# Patient Record
Sex: FEMALE | Race: WHITE | Employment: FULL TIME | URBAN - NONMETROPOLITAN AREA
[De-identification: names, ages, dates, MRNs, and addresses within clinical notes are randomized per-mention and may not be internally consistent; named-entity substitution may affect disease eponyms.]

---

## 2017-10-10 ENCOUNTER — HOSPITAL ENCOUNTER (OUTPATIENT)
Dept: GENERAL RADIOLOGY | Age: 57
Discharge: HOME | End: 2017-10-10
Payer: COMMERCIAL

## 2017-10-10 ENCOUNTER — HOSPITAL ENCOUNTER (OUTPATIENT)
Age: 57
Discharge: HOME | End: 2017-10-10
Payer: COMMERCIAL

## 2017-10-10 DIAGNOSIS — R05.9 COUGH: ICD-10-CM

## 2017-10-10 PROCEDURE — 71020 XR CHEST STANDARD TWO VW: CPT

## 2017-10-11 LAB
ABSOLUTE BASO #: 0.1 K/UL (ref 0–0.1)
ABSOLUTE EOS #: 0.1 K/UL (ref 0.1–0.4)
ABSOLUTE LYMPH #: 1.2 K/UL (ref 0.8–5.2)
ABSOLUTE MONO #: 0.2 K/UL (ref 0.1–0.9)
ABSOLUTE NEUT #: 6 K/UL (ref 1.3–9.1)
ALBUMIN SERPL-MCNC: 4.2 G/DL (ref 3.2–5.3)
ALK PHOSPHATASE: 78 IU/L (ref 35–121)
ALT SERPL-CCNC: 8 IU/L (ref 5–59)
ANION GAP SERPL CALCULATED.3IONS-SCNC: 10 MMOL/L
AST SERPL-CCNC: 12 IU/L (ref 10–42)
BASOPHILS RELATIVE PERCENT: 0.7 %
BILIRUB SERPL-MCNC: 0.6 MG/DL (ref 0.2–1.3)
BUN BLDV-MCNC: 11 MG/DL (ref 10–20)
CALCIUM SERPL-MCNC: 9.7 MG/DL (ref 8.7–10.8)
CHLORIDE BLD-SCNC: 108 MMOL/L (ref 95–111)
CO2: 29 MMOL/L (ref 21–32)
CREAT SERPL-MCNC: 0.8 MG/DL (ref 0.5–1.3)
EGFR AFRICAN AMERICAN: 90
EGFR IF NONAFRICAN AMERICAN: 74
EOSINOPHILS RELATIVE PERCENT: 1.4 %
GLUCOSE: 79 MG/DL (ref 70–100)
HCT VFR BLD CALC: 43.1 % (ref 36–48)
HEMOGLOBIN: 14.8 G/DL (ref 12–16)
LYMPHOCYTE %: 15.8 %
MCH RBC QN AUTO: 32 PG (ref 27–34)
MCHC RBC AUTO-ENTMCNC: 34.3 G/DL (ref 31–36)
MCV RBC AUTO: 93.1 FL (ref 80–100)
MONOCYTES # BLD: 3 %
NEUTROPHILS RELATIVE PERCENT: 78.8 %
PDW BLD-RTO: 12.1 % (ref 10.8–14.8)
PLATELETS: 107 K/UL (ref 150–450)
POTASSIUM SERPL-SCNC: 4.6 MMOL/L (ref 3.5–5.4)
RBC: 4.63 M/UL (ref 4–5.5)
SEDIMENTATION RATE, ERYTHROCYTE: 4 MM/HR (ref 0–30)
SODIUM BLD-SCNC: 142 MMOL/L (ref 134–147)
TOTAL PROTEIN: 6.3 G/DL (ref 5.8–8)
TSH SERPL DL<=0.05 MIU/L-ACNC: 4.75 UIU/ML (ref 0.4–4.4)
VITAMIN B-12: 723 PG/ML (ref 211–911)
WBC: 7.7 K/UL (ref 3.7–10.8)

## 2019-07-24 RX ORDER — BUPIVACAINE HYDROCHLORIDE 2.5 MG/ML
5 INJECTION, SOLUTION EPIDURAL; INFILTRATION; INTRACAUDAL ONCE
Status: CANCELLED | OUTPATIENT
Start: 2019-07-24 | End: 2019-07-24

## 2019-07-24 RX ORDER — METHYLPREDNISOLONE ACETATE 80 MG/ML
80 INJECTION, SUSPENSION INTRA-ARTICULAR; INTRALESIONAL; INTRAMUSCULAR; SOFT TISSUE ONCE
Status: CANCELLED | OUTPATIENT
Start: 2019-07-24 | End: 2019-07-24

## 2019-07-25 ENCOUNTER — HOSPITAL ENCOUNTER (OUTPATIENT)
Dept: INTERVENTIONAL RADIOLOGY/VASCULAR | Age: 59
Discharge: HOME | End: 2019-07-25
Payer: COMMERCIAL

## 2019-07-25 VITALS
SYSTOLIC BLOOD PRESSURE: 133 MMHG | TEMPERATURE: 98 F | OXYGEN SATURATION: 95 % | HEART RATE: 62 BPM | DIASTOLIC BLOOD PRESSURE: 87 MMHG | RESPIRATION RATE: 16 BRPM

## 2019-07-25 PROCEDURE — 6360000002 HC RX W HCPCS

## 2019-07-25 PROCEDURE — 2709999900 HC NON-CHARGEABLE SUPPLY

## 2019-07-25 PROCEDURE — 6360000004 HC RX CONTRAST MEDICATION: Performed by: RADIOLOGY

## 2019-07-25 PROCEDURE — 2500000003 HC RX 250 WO HCPCS

## 2019-07-25 PROCEDURE — 62323 NJX INTERLAMINAR LMBR/SAC: CPT | Performed by: RADIOLOGY

## 2019-07-25 RX ORDER — METHYLPREDNISOLONE ACETATE 80 MG/ML
80 INJECTION, SUSPENSION INTRA-ARTICULAR; INTRALESIONAL; INTRAMUSCULAR; SOFT TISSUE ONCE
Status: COMPLETED | OUTPATIENT
Start: 2019-07-25 | End: 2019-07-25

## 2019-07-25 RX ORDER — BUPIVACAINE HYDROCHLORIDE 2.5 MG/ML
5 INJECTION, SOLUTION EPIDURAL; INFILTRATION; INTRACAUDAL ONCE
Status: COMPLETED | OUTPATIENT
Start: 2019-07-25 | End: 2019-07-25

## 2019-07-25 RX ORDER — CYCLOBENZAPRINE HCL 5 MG
5 TABLET ORAL 2 TIMES DAILY PRN
COMMUNITY
End: 2021-03-26 | Stop reason: ALTCHOICE

## 2019-07-25 RX ADMIN — METHYLPREDNISOLONE ACETATE 80 MG: 80 INJECTION, SUSPENSION INTRA-ARTICULAR; INTRALESIONAL; INTRAMUSCULAR; SOFT TISSUE at 14:28

## 2019-07-25 RX ADMIN — IOHEXOL 1 ML: 180 INJECTION INTRAVENOUS at 14:28

## 2019-07-25 RX ADMIN — BUPIVACAINE HYDROCHLORIDE 2 ML: 2.5 INJECTION, SOLUTION EPIDURAL; INFILTRATION; INTRACAUDAL at 14:28

## 2019-07-25 ASSESSMENT — PAIN SCALES - GENERAL
PAINLEVEL_OUTOF10: 5
PAINLEVEL_OUTOF10: 0

## 2019-07-25 ASSESSMENT — PAIN DESCRIPTION - ORIENTATION: ORIENTATION: MID

## 2019-07-25 ASSESSMENT — PAIN DESCRIPTION - LOCATION: LOCATION: BACK

## 2019-07-25 ASSESSMENT — PAIN DESCRIPTION - PAIN TYPE: TYPE: CHRONIC PAIN

## 2019-07-25 NOTE — PROGRESS NOTES
1414 Patient received in IR for Lumbar Epidural  1416 This procedure has been fully reviewed with the patient and written informed consent has been obtained. Pt states no pain. When moving pain goes down to right leg. 1427 [facility]  [facility]36 Procedure completed; patient tolerated well. Band aid to lower back; no bleeding noted. 1432 Patient on cart; comfort ensured. 1435 Patient taken to OPN via cart.

## 2021-02-26 ENCOUNTER — HOSPITAL ENCOUNTER (OUTPATIENT)
Dept: MAMMOGRAPHY | Age: 61
Discharge: HOME | End: 2021-02-26
Payer: COMMERCIAL

## 2021-02-26 ENCOUNTER — HOSPITAL ENCOUNTER (OUTPATIENT)
Dept: GENERAL RADIOLOGY | Age: 61
Discharge: HOME | End: 2021-02-26
Payer: COMMERCIAL

## 2021-02-26 DIAGNOSIS — R05.9 COUGH: ICD-10-CM

## 2021-02-26 DIAGNOSIS — Z12.39 ENCOUNTER FOR OTHER SCREENING FOR MALIGNANT NEOPLASM OF BREAST: ICD-10-CM

## 2021-02-26 PROCEDURE — 77063 BREAST TOMOSYNTHESIS BI: CPT

## 2021-02-26 PROCEDURE — 71046 X-RAY EXAM CHEST 2 VIEWS: CPT

## 2021-03-11 ENCOUNTER — HOSPITAL ENCOUNTER (OUTPATIENT)
Dept: CT IMAGING | Age: 61
Discharge: HOME | End: 2021-03-11
Payer: COMMERCIAL

## 2021-03-11 DIAGNOSIS — R91.1 PULMONARY NODULE: ICD-10-CM

## 2021-03-11 PROCEDURE — 71250 CT THORAX DX C-: CPT

## 2021-03-16 ENCOUNTER — OFFICE VISIT (OUTPATIENT)
Dept: PULMONOLOGY | Age: 61
End: 2021-03-16
Payer: COMMERCIAL

## 2021-03-16 VITALS
HEIGHT: 66 IN | BODY MASS INDEX: 19.64 KG/M2 | DIASTOLIC BLOOD PRESSURE: 82 MMHG | WEIGHT: 122.2 LBS | SYSTOLIC BLOOD PRESSURE: 132 MMHG | OXYGEN SATURATION: 98 % | TEMPERATURE: 98.2 F | HEART RATE: 65 BPM

## 2021-03-16 DIAGNOSIS — R63.4 WEIGHT LOSS: ICD-10-CM

## 2021-03-16 DIAGNOSIS — J98.4 CAVITATING MASS IN LEFT UPPER LUNG LOBE: ICD-10-CM

## 2021-03-16 DIAGNOSIS — R93.89 ABNORMAL CT OF THE CHEST: ICD-10-CM

## 2021-03-16 DIAGNOSIS — Z72.0 TOBACCO ABUSE: Primary | ICD-10-CM

## 2021-03-16 PROCEDURE — 99204 OFFICE O/P NEW MOD 45 MIN: CPT | Performed by: INTERNAL MEDICINE

## 2021-03-16 SDOH — HEALTH STABILITY: MENTAL HEALTH: HOW MANY STANDARD DRINKS CONTAINING ALCOHOL DO YOU HAVE ON A TYPICAL DAY?: NOT ASKED

## 2021-03-16 NOTE — PROGRESS NOTES
[facility] Pul[facility], Sleep and Critical Care [facility]   Pulmonary medicine clinic initial consult note. Patient: Nidhi Morataya  : 1960      [place]:    Nidhi Morataya is a 62 y.o. old female came for further evaluation regarding her left lung upper lobe lung mass with referral from Dr. Stef Juarez MD    She was initially evaluated by Dr. Anusha Galicia MD due to generalized weakness and significant weight loss despite having a normal appetite. She underwent chest x-ray PA and lateral views on 2021. The chest x-ray was reported as abnormal with a 2 cm nodule in the left perihilar region compared to her old x-ray performed in 2017. Patient subsequently underwent CT scan of chest without contrast as recommended by the radiologist on 2021. She was found to have a left upper lobe spiculated mass on the CT scan. She came for further evaluation      She underwent chest CT scan on: 2021  The above chest  CT was performed at: [facility]    She was ever seen by a pulmonologist in the past:no  She was ever diagnosed with lung nodule or Lung cancer in the past: no  She was ever diagnosed with Mediastinal Lymphadenopathy in the past:no  She ever diagnosed with any extrapulmonary cancers I.e Breast,Colon etc: no    She was ever diagnosed with following pulmonary diseases:  Bronchial asthma: NO  COPD:Yes. She is currently not using any inhalers. She never underwent pulmonary function test in the past  Pulmonary fibrosis:NO  Sarcoidosis:NO  Pulmonary embolism: NO  Pulmonary hypertension:NO  Pleural effusion/s in the past:NO    She is having cough: Yes  Duration of cough: for 1 year.    More than 8weeks: Yes  History of post nasal drip: No  History suggestive of GERD I.e bad tast in the mouth in the morning or heart burn: No  History suggestive of aspiration/silent aspiration: No  Her cough is associated with sputum production: Yes   The sputum color: clear  Hemoptysis:No  Diurnal variation: None. Associated with fever: No  Recent travel history:No.    Rrecent sick contacts: No.   History of Atopy: No.  History of exposure to occupational dust or chemicals:No.     She ever diagnosed with pulmonary tuberculosis in the past:NO  She ever recently exposed to any patients with tuberculosis:NO  She ever recently travelled to endemic places of tuberculosis:NO  Her ever tested for PPD in the past: NO    She ever diagnosed with  connective tissue diseases including Systemic lupus Erythematosus or Rheumatoid arthritis: no    Any of her first-degree family relative ever diagnosed with Lung cancer: no  She ever exposed to asbestos, radon, or uranium in the past: no    She ever had a Colonoscopy performed: yes -she had a last colonoscopy 5 years back. She was found to have a 2 benign polyps. 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 History of tobacco smoking:Yes  Amount of tobacco smokin.0 PPD. Years of tobacco smokin. Quit smoking: No.              Current smoker: Yes. Amount of current tobacco smokin.25 PPD    History of recreational or IV drug use in the past: She smokes marijuana occasionally.   History of alcohol use in the past:NO     History of exposure to coal mines/coal dust: NO  History of exposure to foundry dust/welding: NO  History of exposure to quarry/silica/sandblasting: NO  History of exposure to asbestos/working with breaks/ships: NO  History of exposure to farm dust: NO  History of recent travel to long distances: NO  History of exposure to birds, pigeons, or chickens in the past:NO  Pet animals at home:Yes  Dogs: 2  Cats: 2    History of pulmonary embolism in the past: No            History of DVT in the past:No                       Review of Systems:   General/Constitutional: She lost approximately 25 pounds of weight in the last 1 year with normal appetite No fever or chills. HENT: Negative. Eyes: Negative. Upper respiratory tract: No nasal stuffiness or post nasal drip. She gives a history of rhinorrhea  Lower respiratory tract/ lungs: Occasional cough with occasional sputum production. No hemoptysis. Cardiovascular: No palpitations or chest pain. Gastrointestinal: No nausea or vomiting. Neurological: No focal neurologiacal weakness. Extremities: No edema. Musculoskeletal: No complaints. Genitourinary: No complaints. Hematological: Negative. Psychiatric/Behavioral: Negative. Skin: No itching. Current Medications:          Past Medical History:   Diagnosis Date    Arthritis     COPD (chronic obstructive pulmonary disease) ([facility] [address]        Past Surgical History:   Procedure Laterality Date     SECTION      CHOLECYSTECTOMY      COLONOSCOPY  2016    HYSTERECTOMY      UPPER GASTROINTESTINAL ENDOSCOPY  2016       Allergies   Allergen Reactions    Tylenol With Codeine #3 [Acetaminophen-Codeine]        Current Outpatient Medications   Medication Sig Dispense Refill    VITAMIN D PO Take by mouth      cyclobenzaprine (FLEXERIL) 5 MG tablet Take 5 mg by mouth 2 times daily as needed for Muscle spasms       No current facility-administered medications for this visit. No family history on file. Physical Exam     VITALS:  /82 (Site: Left Upper Arm, Position: Sitting, Cuff Size: Medium Adult)   Pulse 65   Temp 98.2 °F (36.8 °C)   Ht 5' 6\" (1.676 m)   Wt 122 lb 3.2 oz (55.4 kg)   SpO2 98% Comment: on RA  BMI 19.72 kg/m²   Nursing note and vitals reviewed.    Constitutional: Patient appears moderately built and moderately nourished. No distress. Patient is oriented to person, place, and time. HENT:   Head: Normocephalic and atraumatic. Right Ear: External ear normal.   Left Ear: External ear normal.   Mouth/Throat: Oropharynx is clear and moist.  No oral thrush. Eyes: Conjunctivae are normal. Pupils are equal, round, and reactive to light. No scleral icterus. Neck: Neck supple. No JVD present. No tracheal deviation present. Cardiovascular: Normal rate, regular rhythm, normal heart sounds. No murmur heard. Pulmonary/Chest: Effort normal and breath sounds normal. No stridor. No respiratory distress. No wheezes. No rales. Patient exhibits no tenderness. Abdominal: Soft. Patient exhibits no distension. No tenderness. Musculoskeletal: Normal range of motion. Extremities: Patient exhibits no edema and no tenderness. Lymphadenopathy:  No cervical adenopathy. Neurological: Patient is alert and oriented to person, place, and time. Skin: Skin is warm and dry. Patient is not diaphoretic. Psychiatric: Patient  has a normal mood and affect. Patient behavior is normal.       Diagnostic Data:    Radiological Data:  CT scan of chest without contrast.  Fri Mar 12, 2021  8:18 AM   PROCEDURE: CT CHEST WO CONTRAST   CLINICAL INFORMATION: Pulmonary nodule   COMPARISON: Chest x-ray dated 2/26/2021   1. There is a cavitary mass demonstrated within the left upper lobe with peripheral mild spiculation measuring 3.9 x 2.1 cm on axial image 34. This corresponds with previous chest x-ray dated 2/26/2021. This is concerning for malignancy until proven otherwise. Further evaluation could be obtained with PET/CT and/or biopsy. An infectious process is also not excluded. 2. There are additional smaller groundglass nodular densities within the right lung as detailed above. 3. Small pericardial effusion is noted. This is nonspecific.        Pulmonary function tests:  None in epic      Assessment:  - 3.9 x 2.1 cm spiculated cavitary mass present within the left upper lobe-differential include high suspicion for neoplastic process VS infectious etiology.  -Chronic history of tobacco smoking for 45 years with 1 pack of cigarettes per day. She is currently smoking 6 cigarettes/day. To evaluate for COPD. -Significant weight loss- ? etiology      Recommendations/Plan:  -Schedule patient for CT guided biopsy of left upper lobe lung mass with interventional radiology service at [address] [place] [address] as soon as possible. Patient was informed about complications of procedure including but not limited to development of pneumothorax with requirement of chest tube placement. Patient verbalizes understanding.  -Christian Parikh was educated and informed about CT guided biopsy procedure,method, complicantions of procedure including but not limited to development of pneumothorax with requirement of chest tube placement. She agreed for the procedure and verbalizes understanding.  -Will schedule patient for full pulmonary function tests as out patient before clinic visit. - Patient educated to quit smoking as soon as possible. Patient verbalizes understanding of adverse consequences of tobacco smoking.  -Will schedule patient for follow up with my clinic in 1week after biopsy to go over the results and further managment.  -Christian Parikh was advised to make early appointment with my clinic if she develops any constitutional symptoms including loss of weight, poor appetite or hemoptysis. She verbalizes under standing.  - Patient and her  were educated about my impression and plan. They verbalizes understanding.    -I personally reviewed all the above labs, pulmonary, pathological, microbiological and radiological investigations.

## 2021-03-19 NOTE — PROGRESS NOTES
[facility] for Pulmonary, Sleep and Critical Care Medicine   Pulmonary medicine follow up note. Patient: Kay Goddard                 : 1960                           [place]:     Kay Goddard is a 62 y.o. old female came for follow up regarding her left lung upper lobe lung mass. She underwent CT-guided biopsy of the left upper lobe lung mass by interventional radiology service on 2021. She was diagnosed with Adenocarcinoma. She came for follow-up today after having a MRI scan of the brain with and without contrast, full pulmonary function tests and PET scan- requested the last visit    She was referred to Dr. Kurtis Hansen MD from medical oncology service at her last visit. She still waiting to see Dr. Kurtis Hansen MD.  Her appointment was scheduled on 2021 with the Dr. Kurtis Hansen MD.     She was initially referred from Dr. Gloria Buckley.     On today's questioning:  She denies cough or expectoration. She denies hemoptysis. She denies fever or chills. She denies recent hospitalizations or emergency room visits. She is currently not using any inhalers     She denies recent loss of weight or appetite changes. She denies recent decline in functional status. She is currently not using any home oxygen at home. She ever diagnosed with  connective tissue diseases including Systemic lupus Erythematosus or Rheumatoid arthritis: no     Any of her first-degree family relative ever diagnosed with Lung cancer: no     She ever exposed to asbestos, radon, or uranium in the past: no     She ever had a Colonoscopy performed: yes -she had a last colonoscopy 5 years back. She was found to have a 2 benign polyps.   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 History of tobacco smoking:Yes  Amount of tobacco smokin.0 PPD. Years of tobacco smokin. Quit smoking: No.              Current smoker: Yes. Amount of current tobacco smoking: She still smoking 1cigarette/day     History of recreational or IV drug use in the past: She smokes marijuana occasionally. History of alcohol use in the past:NO     History of exposure to coal mines/coal dust: NO  History of exposure to foundry dust/welding: NO  History of exposure to quarry/silica/sandblasting: NO  History of exposure to asbestos/working with breaks/ships: NO  History of exposure to farm dust: NO  History of recent travel to long distances: NO  History of exposure to birds, pigeons, or chickens in the past:NO  Pet animals at home:Yes  Dogs: 2  Cats: 2     History of pulmonary embolism in the past: No            History of DVT in the past:No                         Review of Systems:   General/Constitutional: she gained 4lbs of weight from the last visit. See HPI regarding appetite,fever and chills. HENT: Negative. Eyes: Negative. Upper respiratory tract: No nasal stuffiness or post nasal drip. Lower respiratory tract/ lungs: See HPI. Cardiovascular: No palpitations or chest pain. Gastrointestinal: No nausea or vomiting. Neurological: No focal neurologiacal weakness. Extremities: No edema. Musculoskeletal: No complaints. Genitourinary: No complaints. Hematological: Negative. Psychiatric/Behavioral: Negative. Skin: No itching. Her current functional status is unlimited on level ground. She can climb unlimited flights of stairs.        Past Medical History:   Diagnosis Date    Arthritis     COPD (chronic obstructive pulmonary disease) [facility])        Past Surgical History:   Procedure Laterality Date     SECTION      CHOLECYSTECTOMY      COLONOSCOPY  2016    CT NEEDLE BIOPSY LUNG PERCUTANEOUS  3/26/2021    CT NEEDLE BIOPSY LUNG PERCUTANEOUS 3/26/2021 [facility] CT SCAN    HYSTERECTOMY      UPPER GASTROINTESTINAL ENDOSCOPY  2016       Social History     Tobacco Use    Smoking status: Current Every Day Smoker     Packs/day: 1.00     Years: 45.00     Pack years: 45.00     Types: Cigarettes    Smokeless tobacco: Never Used   Substance Use Topics    Alcohol use: Never     Frequency: Never     Binge frequency: Never    Drug use: Yes     Types: Marijuana       Allergies   Allergen Reactions    Tylenol With Codeine #3 [Acetaminophen-Codeine]        Current Outpatient Medications   Medication Sig Dispense Refill    VITAMIN D PO Take by mouth       No current facility-administered medications for this visit. No family history on file. Vitals: /72 (Site: Left Upper Arm, Position: Sitting, Cuff Size: Medium Adult)   Pulse 66   Temp 97.5 °F (36.4 °C)   Ht 5' 6\" (1.676 m)   Wt 126 lb 12.8 oz (57.5 kg)   SpO2 98% Comment: on ra  BMI 20.47 kg/m²         Nursing note and vitals reviewed. Constitutional: Patient appears moderately built and moderately nourished. No distress. Patient is oriented to person, place, and time. HENT:   Head: Normocephalic and atraumatic. Right Ear: External ear normal.   Left Ear: External ear normal.   Mouth/Throat: Oropharynx is clear and moist.  No oral thrush. Eyes: Conjunctivae are normal. Pupils are equal, round, and reactive to light. No scleral icterus. Neck: Neck supple. No JVD present. No tracheal deviation present. Cardiovascular: Normal rate, regular rhythm, normal heart sounds. No murmur heard. Pulmonary/Chest: Effort normal and breath sounds normal. No stridor. No respiratory distress. No wheezes. No rales. Patient exhibits no tenderness.    Abdominal: Soft. Patient exhibits no distension. No tenderness. Musculoskeletal: Normal range of motion. Extremities: Patient exhibits no edema and no tenderness. Lymphadenopathy:  No cervical adenopathy. Neurological: Patient is alert and oriented to person, place, and time. Skin: Skin is warm and dry. Patient is not diaphoretic. Psychiatric: Patient  has a normal mood and affect. Patient behavior is normal.      Diagnostic Data:     Radiological Data:  CT scan of chest without contrast.  Fri Mar 12, 2021  8:18 AM   PROCEDURE: CT CHEST WO CONTRAST   CLINICAL INFORMATION: Pulmonary nodule   COMPARISON: Chest x-ray dated 2/26/2021   1. There is a cavitary mass demonstrated within the left upper lobe with peripheral mild spiculation measuring 3.9 x 2.1 cm on axial image 34. This corresponds with previous chest x-ray dated 2/26/2021. This is concerning for malignancy until proven otherwise. Further evaluation could be obtained with PET/CT and/or biopsy. An infectious process is also not excluded. 2. There are additional smaller groundglass nodular densities within the right lung as detailed above. 3. Small pericardial effusion is noted. This is nonspecific. CT-guided biopsy by interventional radiology service on 26 March 2021:     Pathology:  Copies To:   Miguel A Gill     Clinical Information: CAVITATING MASS SHANNON, SMOKER     FINAL DIAGNOSIS:   Lung, left, needle core biopsies:       Adenocarcinoma. Chest x-ray PA view:  PROCEDURE: XR CHEST PA INSPIRATION 1 VW   1. No pneumothorax following left lung lesion biopsy. There is redemonstration of a nodular density overlying the left midlung. PET scan:  4/14/2021 10:04 AM   PROCEDURE: PET CT SKULL BASE TO MID THIGH   1. FDG avid left lung mass corresponding to known malignancy. 2. Small, mildly FDG avid right lung nodules, possibly infectious or inflammatory but satellite malignancy cannot be excluded.       MRI scan of the brain with and without contrast:   PROCEDURE: MRI BRAIN W WO CONTRAST   1. No evidence of intracranial metastases or other acute intracranial abnormality. 2. Moderate severity chronic microvascular angiopathy. 3. Mild to moderate mucosal inflammation in the paranasal sinuses with air-fluid levels as evidence for an acute component     Pulmonary function tests: Performed on 6 April 2021            Assessment:  -3.9 x 2.1 cm spiculated cavitary mass present within the left upper lobe S/p CT guided biopsy by IR service on 3/26/2021. The biopsy is consistent with Adenocarcinoma. -Moderately severe COPD-patient currently remain asymptomatic with no limitation in functional status. She did not want to go for any maintenance inhalers.  -Chronic history of tobacco smoking for 45 years with 1 pack of cigarettes per day. She is currently smoking 1 cigarette/day. Recommendations/Plan:  -Patient educated to quit smoking as soon as possible. Patient verbalizes understanding of adverse consequences of tobacco smoking.  -Start patient on Albuterol HFA 90mcg/Spray MDI, 2puffs  Q6Hprn. She  was informed about adverse effects of Albuterol HFA. She verbalizes understanding.  -Patient advised to continue current inhalers and keep good compliance. Patient verbalizes understanding.  -Patient educated to update his pneumococcal vaccine with family physician and take influenza vaccine in coming season with out fail. Patient verbalizes understanding. Patient told me that she do not take flu shots or pneumonia vaccines.  -Schedule general surgery consult [facility] Dr. Silvestre Lees MD at [facility] as soon as possible for further evaluation and management of 3.9 x 2.1 cm spiculated cavitary mass present within the left upper lobe S/p CT guided biopsy by IR service on 3/26/2021.  The biopsy is consistent with Adenocarcinoma.  -She was advised to keep her scheduled appointment for Medical Oncology consultation with Monroe Díaz MD- Medical Oncologist on 4/19/2021 for further evaluation,staging and management of lung adenocarcinoma noted on recent CT-guided biopsy.  -Schedule patient for follow up with my clinic in 1 to 2months for clinical re evaluation. Patient advised to make early appointment if needed. -Virginia Lo was advised to make early appointment with my clinic if she develops any constitutional symptoms including loss of weight, poor appetite or hemoptysis. She verbalizes under standing.  - Patient and her  were educated about my impression and plan. They verbalizes understanding.     -I personally reviewed and updated the Past medical hx, Past surgical hx,Social hx, Family hx, Medications, Allergies in the discrete data section of the patient chart. I also reviewed pertaining labs and Pulmonary medicine,Sleep medicine related, Pathological, Microbiological and Radiological investigations. Addendum done on 4/19/21 at 1:40 PM EDT:  I spoke with Dr. Rula Elizondo MD medical oncologist on the case. Dr. Rula Elizondo MD recommended staging EBUS for further evaluation of patient's adenocarcinoma of lung. I placed an interventional pulmonary consultation with Dr. Gagandeep Stoddard DO for EBUS staging. I personally spoke with him over phone. He is kind enough to do EBUS procedure next 1 to 2 days. I tried to reach patient to discuss regarding my referral to Dr. Gagandeep Stoddard for staging EBUS by calling her on mobile and home phone number. She is not available to speak with me.   I am able to leave a message on her home answering system to call back to discuss about referral.

## 2021-03-23 RX ORDER — MIDAZOLAM HYDROCHLORIDE 2 MG/2ML
1 INJECTION, SOLUTION INTRAMUSCULAR; INTRAVENOUS ONCE
Status: CANCELLED | OUTPATIENT
Start: 2021-03-23 | End: 2021-03-23

## 2021-03-23 RX ORDER — FENTANYL CITRATE 50 UG/ML
50 INJECTION, SOLUTION INTRAMUSCULAR; INTRAVENOUS ONCE
Status: CANCELLED | OUTPATIENT
Start: 2021-03-23 | End: 2021-03-23

## 2021-03-23 RX ORDER — SODIUM CHLORIDE 450 MG/100ML
INJECTION, SOLUTION INTRAVENOUS CONTINUOUS
Status: CANCELLED | OUTPATIENT
Start: 2021-03-23

## 2021-03-25 RX ORDER — SODIUM CHLORIDE 450 MG/100ML
INJECTION, SOLUTION INTRAVENOUS CONTINUOUS
Status: CANCELLED | OUTPATIENT
Start: 2021-03-25

## 2021-03-25 RX ORDER — FENTANYL CITRATE 50 UG/ML
50 INJECTION, SOLUTION INTRAMUSCULAR; INTRAVENOUS ONCE
Status: CANCELLED | OUTPATIENT
Start: 2021-03-25 | End: 2021-03-25

## 2021-03-25 RX ORDER — MIDAZOLAM HYDROCHLORIDE 2 MG/2ML
1 INJECTION, SOLUTION INTRAMUSCULAR; INTRAVENOUS ONCE
Status: CANCELLED | OUTPATIENT
Start: 2021-03-25 | End: 2021-03-25

## 2021-03-26 ENCOUNTER — HOSPITAL ENCOUNTER (OUTPATIENT)
Dept: GENERAL RADIOLOGY | Age: 61
Discharge: HOME | End: 2021-03-26
Payer: COMMERCIAL

## 2021-03-26 ENCOUNTER — HOSPITAL ENCOUNTER (OUTPATIENT)
Dept: CT IMAGING | Age: 61
Discharge: HOME | End: 2021-03-26
Payer: COMMERCIAL

## 2021-03-26 VITALS
RESPIRATION RATE: 18 BRPM | OXYGEN SATURATION: 96 % | DIASTOLIC BLOOD PRESSURE: 72 MMHG | HEART RATE: 62 BPM | SYSTOLIC BLOOD PRESSURE: 112 MMHG | BODY MASS INDEX: 19.29 KG/M2 | WEIGHT: 120 LBS | HEIGHT: 66 IN | TEMPERATURE: 97.5 F

## 2021-03-26 LAB
CREAT SERPL-MCNC: 0.8 MG/DL (ref 0.4–1.2)
ERYTHROCYTE [DISTWIDTH] IN BLOOD BY AUTOMATED COUNT: 13.2 % (ref 11.5–14.5)
ERYTHROCYTE [DISTWIDTH] IN BLOOD BY AUTOMATED COUNT: 47.5 FL (ref 35–45)
GFR SERPL CREATININE-BSD FRML MDRD: 73 ML/MIN/1.73M2
HCT VFR BLD CALC: 41.7 % (ref 37–47)
HEMOGLOBIN: 13.4 GM/DL (ref 12–16)
MCH RBC QN AUTO: 31.8 PG (ref 26–33)
MCHC RBC AUTO-ENTMCNC: 32.1 GM/DL (ref 32.2–35.5)
MCV RBC AUTO: 99 FL (ref 81–99)
PLATELET # BLD: 129 THOU/MM3 (ref 130–400)
PMV BLD AUTO: 13 FL (ref 9.4–12.4)
RBC # BLD: 4.21 MILL/MM3 (ref 4.2–5.4)
WBC # BLD: 6.7 THOU/MM3 (ref 4.8–10.8)

## 2021-03-26 PROCEDURE — 6370000000 HC RX 637 (ALT 250 FOR IP)

## 2021-03-26 PROCEDURE — 87075 CULTR BACTERIA EXCEPT BLOOD: CPT

## 2021-03-26 PROCEDURE — 6360000002 HC RX W HCPCS

## 2021-03-26 PROCEDURE — 88333 PATH CONSLTJ SURG CYTO XM 1: CPT

## 2021-03-26 PROCEDURE — 71045 X-RAY EXAM CHEST 1 VIEW: CPT

## 2021-03-26 PROCEDURE — 32408 CORE NDL BX LNG/MED PERQ: CPT

## 2021-03-26 PROCEDURE — 87070 CULTURE OTHR SPECIMN AEROBIC: CPT

## 2021-03-26 PROCEDURE — 87102 FUNGUS ISOLATION CULTURE: CPT

## 2021-03-26 PROCEDURE — 77012 CT SCAN FOR NEEDLE BIOPSY: CPT

## 2021-03-26 PROCEDURE — 2709999900 HC NON-CHARGEABLE SUPPLY

## 2021-03-26 PROCEDURE — 87205 SMEAR GRAM STAIN: CPT

## 2021-03-26 PROCEDURE — 2580000003 HC RX 258: Performed by: RADIOLOGY

## 2021-03-26 PROCEDURE — 88305 TISSUE EXAM BY PATHOLOGIST: CPT

## 2021-03-26 PROCEDURE — 36415 COLL VENOUS BLD VENIPUNCTURE: CPT

## 2021-03-26 PROCEDURE — 82565 ASSAY OF CREATININE: CPT

## 2021-03-26 PROCEDURE — 85027 COMPLETE CBC AUTOMATED: CPT

## 2021-03-26 PROCEDURE — 87116 MYCOBACTERIA CULTURE: CPT

## 2021-03-26 RX ORDER — SODIUM CHLORIDE 450 MG/100ML
INJECTION, SOLUTION INTRAVENOUS CONTINUOUS
Status: DISCONTINUED | OUTPATIENT
Start: 2021-03-26 | End: 2021-03-27 | Stop reason: HOSPADM

## 2021-03-26 RX ORDER — BACITRACIN, NEOMYCIN, POLYMYXIN B 400; 3.5; 5 [USP'U]/G; MG/G; [USP'U]/G
OINTMENT TOPICAL ONCE
Status: COMPLETED | OUTPATIENT
Start: 2021-03-26 | End: 2021-03-26

## 2021-03-26 RX ORDER — FENTANYL CITRATE 50 UG/ML
50 INJECTION, SOLUTION INTRAMUSCULAR; INTRAVENOUS ONCE
Status: COMPLETED | OUTPATIENT
Start: 2021-03-26 | End: 2021-03-26

## 2021-03-26 RX ORDER — MIDAZOLAM HYDROCHLORIDE 2 MG/2ML
1 INJECTION, SOLUTION INTRAMUSCULAR; INTRAVENOUS ONCE
Status: COMPLETED | OUTPATIENT
Start: 2021-03-26 | End: 2021-03-26

## 2021-03-26 RX ADMIN — FENTANYL CITRATE 25 MCG: 50 INJECTION, SOLUTION INTRAMUSCULAR; INTRAVENOUS at 09:11

## 2021-03-26 RX ADMIN — BACITRACIN, NEOMYCIN, POLYMYXIN B 1 G: 400; 3.5; 5 OINTMENT TOPICAL at 09:33

## 2021-03-26 RX ADMIN — SODIUM CHLORIDE: 4.5 INJECTION, SOLUTION INTRAVENOUS at 07:52

## 2021-03-26 RX ADMIN — MIDAZOLAM HYDROCHLORIDE 0.5 MG: 2 INJECTION, SOLUTION INTRAMUSCULAR; INTRAVENOUS at 09:22

## 2021-03-26 ASSESSMENT — PAIN SCALES - GENERAL: PAINLEVEL_OUTOF10: 0

## 2021-03-26 ASSESSMENT — PAIN - FUNCTIONAL ASSESSMENT: PAIN_FUNCTIONAL_ASSESSMENT: 0-10

## 2021-03-26 NOTE — PROGRESS NOTES
__M__ Safety:       (Environmental)   [place] to environment   Ensure ID band is correct and in place/ allergy band as needed   Assess for fall risk   Initiate fall precautions as applicable (fall band, side rails, etc.)   Call light within reach   Bed in low position/ wheels locked    _M___ Pain:        Assess pain level and characteristics   Administer analgesics as ordered   Assess effectiveness of pain management and report to MD as needed    __M__ Knowledge Deficit:   Assess baseline knowledge   Provide teaching at level of understanding   Provide teaching via preferred learning method   Evaluate teaching effectiveness    ___M_ Hemodynamic/Respiratory Status:       (Pre and Post Procedure Monitoring)   Assess/Monitor vital signs and LOC   Assess Baseline SpO2 prior to any sedation   Obtain weight/height   Assess vital signs/ LOC until patient meets discharge criteria   Monitor procedure site and notify MD of any issues

## 2021-03-26 NOTE — PROGRESS NOTES
[facility]  Sedation/Analgesia History & Physical    Pt Name: Natasha Davis  MRN: [id number]  YOB: 1960  Provider Performing Procedure: Colette Palm MD  Primary Care Physician: Martin Loving MD    PRE-PROCEDURE   DNR-CCA/DNR-CC []Yes [x]No  Brief History/Pre-Procedure Diagnosis: SHANNON lung mass          MEDICAL HISTORY  []CAD/Valve  []Liver Disease  []Lung Disease []Diabetes  []Hypertension []Renal Disease  []Additional information:       has a past medical history of Arthritis and COPD (chronic obstructive pulmonary disease) ([facility] [address] SURGICAL HISTORY   has a past surgical history that includes Hysterectomy; Cholecystectomy;  section; Colonoscopy (2016); and Upper gastrointestinal endoscopy (2016). Additional information:       ALLERGIES   Allergies as of 2021 - Review Complete 2021   Allergen Reaction Noted    Tylenol with codeine #3 [acetaminophen-codeine]  2014     Additional information:       MEDICATIONS   Coumadin Use Last 5 Days [x]No []Yes  Antiplatelet drug therapy use last 5 days  [x]No []Yes  Other anticoagulant use last 5 days  [x]No []Yes    Current Outpatient Medications:     VITAMIN D PO, Take by mouth, Disp: , Rfl:     Current Facility-Administered Medications:     0.45 % sodium chloride infusion, , Intravenous, Continuous, Colette Palm MD, Last Rate: 20 mL/hr at 21 0752, New Bag at 21 0752  Prior to Admission medications    Medication Sig Start Date End Date Taking?  Authorizing Provider   VITAMIN D PO Take by mouth   Yes Historical Provider, MD     Additional information:       VITAL SIGNS   Vitals:    21 0935   BP: 121/74   Pulse: 54   Resp: 12   Temp:    SpO2: 97%       PHYSICAL:   Heart:  [x]Regular rate and rhythm  []Other:    Lungs:  []Clear    []Other:    Abdomen: [x]Soft    []Other:    Mental Status: [x]Alert & Oriented  []Other:      PLANNED PROCEDURE   [x]Biospy []Arteriogram []Drainage   []Mediport Insertion  []Fistulogram []IV access       []Vertebroplasty / Augmentation  []IVC filter []Dialysis catheter []Biliary drainage  []Other: []CAPD Catheter []Nephrostomy Tube / Stent  SEDATION  Planned agent:[x]Midazolam []Meperidine [x]Sublimaze []Dilaudid []Morphine     []Diazepam  []Other:     ASA Classification:  []1 [x]2 []3 []4 []5  Class 1: A normal healthy patient  Class 2: Pt with mild to moderate systemic disease  Class 3: Severe systemic disease or disturbance  Class 4: Severe systemic disorders that are already life threatening. Class 5: Moribund pt with little chances of survival, for more than 24 hours. Mallampati I Airway Classification:   []1 [x]2 []3 []4    [x]Pre-procedure diagnostic studies complete and results available. Comment:    [x]Previous sedation/anesthesia experiences assessed. Comment:    [x]The patient is an appropriate candidate to undergo the planned procedure sedation and anesthesia. (Refer to nursing sedation/analgesia documentation record)  [x]Formulation and discussion of sedation/procedure plan, risks, and expectations with patient and/or responsible adult completed. [x]Patient examined immediately prior to the procedure.  (Refer to nursing sedation/analgesia documentation record)    Dharmesh Amezquita MD  Electronically signed 3/26/2021 at 9:41 AM

## 2021-03-26 NOTE — PROGRESS NOTES
0844 Pt arrived to CT holding. Skin natural for race, warm, dry. Respirations even and unlabored. Denies c/o pain. 1892 Dr. Dorota Burleson in to evaluate pt and explain procedure. Consent obtained. 1798 Pt positioned on CT table. Monitor applied. 9810 Pt prepped for procedure with chloraprep  0913 Procedure started with Dr. Dorota Burleson  9446 Pathology present in lab.   323 Procedure complete. Core samples given to pathologist. bandaid to left chest puncture site. Blood patch to biopsy tract per Dr. Dorota Burleson using pt's own blood. 0963 Pt transported to [facility] via cart. Report called to Deedee Padron RN. Skin natural for race, warm, dry. Respirations even and unlabored. Denies c/o pain at this time.

## 2021-03-26 NOTE — PROGRESS NOTES
Formulation and discussion of sedation / procedure plans, risks, benefits, side effects and alternatives with patient and/or responsible adult completed.     Electronically signed by Nguyen Aguilera MD on 3/26/2021 at 9:41 AM

## 2021-03-31 LAB
AEROBIC CULTURE: NORMAL
ANAEROBIC CULTURE: NORMAL
GRAM STAIN RESULT: NORMAL

## 2021-04-02 ENCOUNTER — OFFICE VISIT (OUTPATIENT)
Dept: PULMONOLOGY | Age: 61
End: 2021-04-02
Payer: COMMERCIAL

## 2021-04-02 VITALS
SYSTOLIC BLOOD PRESSURE: 128 MMHG | TEMPERATURE: 98.4 F | BODY MASS INDEX: 19.67 KG/M2 | OXYGEN SATURATION: 97 % | WEIGHT: 122.4 LBS | HEIGHT: 66 IN | DIASTOLIC BLOOD PRESSURE: 80 MMHG | HEART RATE: 70 BPM

## 2021-04-02 DIAGNOSIS — R63.4 WEIGHT LOSS: ICD-10-CM

## 2021-04-02 DIAGNOSIS — C34.92 ADENOCARCINOMA OF LEFT LUNG (HCC): Primary | ICD-10-CM

## 2021-04-02 DIAGNOSIS — Z72.0 TOBACCO ABUSE: ICD-10-CM

## 2021-04-02 PROCEDURE — 99214 OFFICE O/P EST MOD 30 MIN: CPT | Performed by: INTERNAL MEDICINE

## 2021-04-02 NOTE — PATIENT INSTRUCTIONS
Recommendations/Plan:  -Schedule patient for PET scan of the lungs and whole body with lung nodule protocol before clinic visit.  -Schedule patient for MRI scan of brain with and with out contrast for lung cancer staging.  -She was advised to keep her scheduled appointment on 6 April 2021 for a planned full pulmonary function tests as out patient before clinic visit. - Patient educated to quit smoking as soon as possible. Patient verbalizes understanding of adverse consequences of tobacco smoking.  -Schedule patient for Medical Oncology consult with Apple Christianson MD- Medical Oncologist. as soon as possible for further evaluation,staging and management of lung adenocarcinoma noted on recent CT-guided biopsy.  -Will schedule patient for follow up with my clinic in 1 week to go over the above test results and further managment.  -Jade Bland was advised to make early appointment with my clinic if she develops any constitutional symptoms including loss of weight, poor appetite or hemoptysis. She verbalizes under standing.  - Patient and her  were educated about my impression and plan. They verbalizes understanding.

## 2021-04-02 NOTE — PROGRESS NOTES
Neck Circumference -   13  Mallampati - 1    Lung Nodule Screening     [x] Qualifies    [] Does not qualify   [] Declined    [] Completed

## 2021-04-02 NOTE — PROGRESS NOTES
[facility]   Pulmonary medicine follow up note. Patient: Donna Coley  : 1960      "[employer]:    Donna Coley is a 62 y.o. old female came for follow up regarding her left lung upper lobe lung mass after having CT-guided biopsy by interventional radiology service on 2021. She was initially referred from Dr. Lynette Juarez MD.    On today's questioning:  She denies cough or expectoration. She denies hemoptysis. She denies fever or chills. She denies recent hospitalizations or emergency room visits. She is currently not using any inhalers    She denies any recent loss of weight  She denies recent decline in functional status. She was initially evaluated by Dr. Ezequiel Martinez MD due to generalized weakness and significant weight loss despite having a normal appetite. She underwent chest x-ray PA and lateral views on 2021. The chest x-ray was reported as abnormal with a 2 cm nodule in the left perihilar region compared to her old x-ray performed in 2017. Patient subsequently underwent CT scan of chest without contrast as recommended by the radiologist on 2021. She was found to have a left upper lobe spiculated mass on the CT scan. She ever diagnosed with  connective tissue diseases including Systemic lupus Erythematosus or Rheumatoid arthritis: no    Any of her first-degree family relative ever diagnosed with Lung cancer: no    She ever exposed to asbestos, radon, or uranium in the past: no    She ever had a Colonoscopy performed: yes -she had a last colonoscopy 5 years back. She was found to have a 2 benign polyps.   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 History of tobacco smoking:Yes  Amount of tobacco smokin.0 PPD. Years of tobacco smokin. Quit smoking: No.              Current smoker: Yes. Amount of current tobacco smoking: She still smoking 6 cigarettes/day    History of recreational or IV drug use in the past: She smokes marijuana occasionally. History of alcohol use in the past:NO     History of exposure to coal mines/coal dust: NO  History of exposure to foundry dust/welding: NO  History of exposure to quarry/silica/sandblasting: NO  History of exposure to asbestos/working with breaks/ships: NO  History of exposure to farm dust: NO  History of recent travel to long distances: NO  History of exposure to birds, pigeons, or chickens in the past:NO  Pet animals at home:Yes  Dogs: 2  Cats: 2    History of pulmonary embolism in the past: No            History of DVT in the past:No                       Review of Systems:   General/Constitutional: Please see HPI section regarding weight, appetite, fever and chills. HENT: Negative. Eyes: Negative. Upper respiratory tract: No nasal stuffiness or post nasal drip. Lower respiratory tract/ lungs: Please see HPI section. Cardiovascular: No palpitations or chest pain. Gastrointestinal: No nausea or vomiting. Neurological: No focal neurologiacal weakness. Extremities: No edema. Musculoskeletal: No complaints. Genitourinary: No complaints. Hematological: Negative. Psychiatric/Behavioral: Negative. Skin: No itching.         Current Medications:        Past Medical History:   Diagnosis Date    Arthritis     COPD (chronic obstructive pulmonary disease) ([facility] [address]        Past Surgical History:   Procedure Laterality Date     SECTION      CHOLECYSTECTOMY      COLONOSCOPY  2016    CT NEEDLE BIOPSY LUNG PERCUTANEOUS  3/26/2021    CT NEEDLE BIOPSY LUNG PERCUTANEOUS 3/26/2021 [facility] CT SCAN    HYSTERECTOMY      UPPER GASTROINTESTINAL ENDOSCOPY  07/11/2016       Allergies   Allergen Reactions    Tylenol With Codeine #3 [Acetaminophen-Codeine]        Current Outpatient Medications   Medication Sig Dispense Refill    VITAMIN D PO Take by mouth       No current facility-administered medications for this visit. No family history on file. Physical Exam     VITALS:  /80 (Site: Left Upper Arm, Position: Sitting, Cuff Size: Medium Adult)   Pulse 70   Temp 98.4 °F (36.9 °C)   Ht 5' 6\" (1.676 m)   Wt 122 lb 6.4 oz (55.5 kg)   SpO2 97% Comment: on room air at rest  BMI 19.76 kg/m²   Nursing note and vitals reviewed. Constitutional: Patient appears moderately built and moderately nourished. No distress. Patient is oriented to person, place, and time. HENT:   Head: Normocephalic and atraumatic. Right Ear: External ear normal.   Left Ear: External ear normal.   Mouth/Throat: Oropharynx is clear and moist.  No oral thrush. Eyes: Conjunctivae are normal. Pupils are equal, round, and reactive to light. No scleral icterus. Neck: Neck supple. No JVD present. No tracheal deviation present. Cardiovascular: Normal rate, regular rhythm, normal heart sounds. No murmur heard. Pulmonary/Chest: Effort normal and breath sounds normal. No stridor. No respiratory distress. No wheezes. No rales. Patient exhibits no tenderness. Abdominal: Soft. Patient exhibits no distension. No tenderness. Musculoskeletal: Normal range of motion. Extremities: Patient exhibits no edema and no tenderness. Lymphadenopathy:  No cervical adenopathy. Neurological: Patient is alert and oriented to person, place, and time. Skin: Skin is warm and dry. Patient is not diaphoretic. Psychiatric: Patient  has a normal mood and affect.  Patient behavior is normal.       Diagnostic Data:    Radiological Data:  CT scan of chest without contrast.  Fri Mar 12,

## 2021-04-06 ENCOUNTER — HOSPITAL ENCOUNTER (OUTPATIENT)
Dept: PULMONOLOGY | Age: 61
Discharge: HOME | End: 2021-04-06
Payer: COMMERCIAL

## 2021-04-06 DIAGNOSIS — J98.4 CAVITATING MASS IN LEFT UPPER LUNG LOBE: ICD-10-CM

## 2021-04-06 DIAGNOSIS — Z72.0 TOBACCO ABUSE: ICD-10-CM

## 2021-04-06 DIAGNOSIS — R93.89 ABNORMAL CT OF THE CHEST: ICD-10-CM

## 2021-04-06 DIAGNOSIS — R63.4 WEIGHT LOSS: ICD-10-CM

## 2021-04-06 PROCEDURE — 94729 DIFFUSING CAPACITY: CPT

## 2021-04-06 PROCEDURE — 94060 EVALUATION OF WHEEZING: CPT

## 2021-04-06 PROCEDURE — 94726 PLETHYSMOGRAPHY LUNG VOLUMES: CPT

## 2021-04-06 NOTE — PROGRESS NOTES
Prescreening performed prior to testing. The following symptoms may indicate COVID-19 infection:        One of the following criteria:   Temperature taken, patient temperature was 97.4 F. Fever greater 100.0 F -no  Cough -  no  New onset shortness of breath -no  New onset difficulty breathing -no        And/or   Two or more of the following criteria:  Muscle aches -no  Headache -no  Sore throat -no  New onset loss of smell/taste -no  New onset diarrhea -no    Patient's screening was negative. PFT will be performed.

## 2021-04-09 ENCOUNTER — HOSPITAL ENCOUNTER (OUTPATIENT)
Dept: MRI IMAGING | Age: 61
Discharge: HOME | End: 2021-04-09
Payer: COMMERCIAL

## 2021-04-09 DIAGNOSIS — Z72.0 TOBACCO ABUSE: ICD-10-CM

## 2021-04-09 DIAGNOSIS — R63.4 WEIGHT LOSS: ICD-10-CM

## 2021-04-09 DIAGNOSIS — C34.92 ADENOCARCINOMA OF LEFT LUNG (HCC): ICD-10-CM

## 2021-04-09 PROCEDURE — A9579 GAD-BASE MR CONTRAST NOS,1ML: HCPCS | Performed by: INTERNAL MEDICINE

## 2021-04-09 PROCEDURE — 6360000004 HC RX CONTRAST MEDICATION: Performed by: INTERNAL MEDICINE

## 2021-04-09 PROCEDURE — 70553 MRI BRAIN STEM W/O & W/DYE: CPT

## 2021-04-09 RX ADMIN — GADOTERIDOL 10 ML: 279.3 INJECTION, SOLUTION INTRAVENOUS at 10:51

## 2021-04-14 ENCOUNTER — HOSPITAL ENCOUNTER (OUTPATIENT)
Dept: PET IMAGING | Age: 61
Discharge: HOME | End: 2021-04-14
Payer: COMMERCIAL

## 2021-04-14 DIAGNOSIS — R63.4 WEIGHT LOSS: ICD-10-CM

## 2021-04-14 DIAGNOSIS — C34.92 ADENOCARCINOMA OF LEFT LUNG (HCC): ICD-10-CM

## 2021-04-14 DIAGNOSIS — Z72.0 TOBACCO ABUSE: ICD-10-CM

## 2021-04-14 PROCEDURE — 3430000000 HC RX DIAGNOSTIC RADIOPHARMACEUTICAL: Performed by: INTERNAL MEDICINE

## 2021-04-14 PROCEDURE — A9552 F18 FDG: HCPCS | Performed by: INTERNAL MEDICINE

## 2021-04-14 PROCEDURE — 78815 PET IMAGE W/CT SKULL-THIGH: CPT

## 2021-04-14 RX ORDER — FLUDEOXYGLUCOSE F 18 200 MCI/ML
13.8 INJECTION, SOLUTION INTRAVENOUS
Status: COMPLETED | OUTPATIENT
Start: 2021-04-14 | End: 2021-04-14

## 2021-04-14 RX ADMIN — FLUDEOXYGLUCOSE F 18 13.8 MILLICURIE: 200 INJECTION, SOLUTION INTRAVENOUS at 07:37

## 2021-04-16 ENCOUNTER — OFFICE VISIT (OUTPATIENT)
Dept: PULMONOLOGY | Age: 61
End: 2021-04-16
Payer: COMMERCIAL

## 2021-04-16 VITALS
HEART RATE: 66 BPM | TEMPERATURE: 97.5 F | OXYGEN SATURATION: 98 % | WEIGHT: 126.8 LBS | HEIGHT: 66 IN | SYSTOLIC BLOOD PRESSURE: 124 MMHG | BODY MASS INDEX: 20.38 KG/M2 | DIASTOLIC BLOOD PRESSURE: 72 MMHG

## 2021-04-16 DIAGNOSIS — J98.4 CAVITATING MASS IN LEFT UPPER LUNG LOBE: ICD-10-CM

## 2021-04-16 DIAGNOSIS — J44.9 MODERATE COPD (CHRONIC OBSTRUCTIVE PULMONARY DISEASE) (HCC): ICD-10-CM

## 2021-04-16 DIAGNOSIS — Z72.0 TOBACCO ABUSE: ICD-10-CM

## 2021-04-16 DIAGNOSIS — C34.92 ADENOCARCINOMA OF LEFT LUNG (HCC): Primary | ICD-10-CM

## 2021-04-16 PROCEDURE — 99214 OFFICE O/P EST MOD 30 MIN: CPT | Performed by: INTERNAL MEDICINE

## 2021-04-16 RX ORDER — ALBUTEROL SULFATE 90 UG/1
2 AEROSOL, METERED RESPIRATORY (INHALATION) 4 TIMES DAILY PRN
Qty: 1 INHALER | Refills: 11 | Status: SHIPPED | OUTPATIENT
Start: 2021-04-16 | End: 2021-12-08

## 2021-04-16 NOTE — PATIENT INSTRUCTIONS
Patient Education        Stopping Smoking: Care Instructions  Your Care Instructions     Cigarette smokers crave the nicotine in cigarettes. Giving it up is much harder than simply changing a habit. Your body has to stop craving the nicotine. It is hard to quit, but you can do it. There are many tools that people use to quit smoking. You may find that combining tools works best for you. There are several steps to quitting. First you get ready to quit. Then you get support to help you. After that, you learn new skills and behaviors to become a nonsmoker. For many people, a necessary step is getting and using medicine. Your doctor will help you set up the plan that best meets your needs. You may want to attend a smoking cessation program to help you quit smoking. When you choose a program, look for one that has proven success. Ask your doctor for ideas. You will greatly increase your chances of success if you take medicine as well as get counseling or join a cessation program.  Some of the changes you feel when you first quit tobacco are uncomfortable. Your body will miss the nicotine at first, and you may feel short-tempered and grumpy. You may have trouble sleeping or concentrating. Medicine can help you deal with these symptoms. You may struggle with changing your smoking habits and rituals. The last step is the tricky one: Be prepared for the smoking urge to continue for a time. This is a lot to deal with, but keep at it. You will feel better. Follow-up care is a key part of your treatment and safety. Be sure to make and go to all appointments, and call your doctor if you are having problems. It's also a good idea to know your test results and keep a list of the medicines you take. How can you care for yourself at home? · Ask your family, friends, and coworkers for support. You have a better chance of quitting if you have help and support.   · Join a support group, such as Nicotine Anonymous, for people who are trying to quit smoking. · Consider signing up for a smoking cessation program, such as the American Lung Association's Freedom from Smoking program.  · Get text messaging support. Go to the website at www.smokefree. gov to sign up for the [facility] program.  · Set a quit date. Pick your date carefully so that it is not right in the middle of a big deadline or stressful time. Once you quit, do not even take a puff. Get rid of all ashtrays and lighters after your last cigarette. Clean your house and your clothes so that they do not smell of smoke. · Learn how to be a nonsmoker. Think about ways you can avoid those things that make you reach for a cigarette. ? Avoid situations that put you at greatest risk for smoking. For some people, it is hard to have a drink with friends without smoking. For others, they might skip a coffee break with coworkers who smoke. ? Change your daily routine. Take a different route to work or eat a meal in a different place. · Cut down on stress. Calm yourself or release tension by doing an activity you enjoy, such as reading a book, taking a hot bath, or gardening. · Talk to your doctor or pharmacist about nicotine replacement therapy, which replaces the nicotine in your body. You still get nicotine but you do not use tobacco. Nicotine replacement products help you slowly reduce the amount of nicotine you need. These products come in several forms, many of them available over-the-counter:  ? Nicotine patches  ? Nicotine gum and lozenges  ? Nicotine inhaler  · Ask your doctor about bupropion (Wellbutrin) or varenicline (Chantix), which are prescription medicines. They do not contain nicotine. They help you by reducing withdrawal symptoms, such as stress and anxiety. · Some people find hypnosis, acupuncture, and massage helpful for ending the smoking habit. · Eat a healthy diet and get regular exercise.  Having healthy habits will help your body move past its craving for nicotine. · Be prepared to keep trying. Most people are not successful the first few times they try to quit. Do not get mad at yourself if you smoke again. Make a list of things you learned and think about when you want to try again, such as next week, next month, or next year. Where can you learn more? Go to https://[facility].Lipperhey. org and sign in to your [employer] account. Enter Y900 in the [employer] box to learn more about \"Stopping Smoking: Care Instructions. \"     If you do not have an account, please click on the \"Sign Up Now\" link. Current as of: March 12, 2020               Content Version: 12.8  © 3587-8257 [employer], [employer]. Care instructions adapted under license by [facility] ([facility]). If you have questions about a medical condition or this instruction, always ask your healthcare professional. [place] [address] any warranty or liability for your use of this information. Recommendations/Plan:  -Patient educated to quit smoking as soon as possible. Patient verbalizes understanding of adverse consequences of tobacco smoking.  -Start patient on Albuterol HFA 90mcg/Spray MDI, 2puffs  Q6Hprn. She  was informed about adverse effects of Albuterol HFA. She verbalizes understanding.  -Patient advised to continue current inhalers and keep good compliance. Patient verbalizes understanding.  -Patient educated to update his pneumococcal vaccine with family physician and take influenza vaccine in coming season with out fail. Patient verbalizes understanding. Patient told me that she do not take flu shots or pneumonia vaccines.  -Schedule general surgery consult [facility] Dr. Vicente Alba MD at [facility] as soon as possible for further evaluation and management of 3.9 x 2.1 cm spiculated cavitary mass present within the left upper lobe S/p CT guided biopsy by IR service on 3/26/2021.  The biopsy is consistent with Adenocarcinoma.  -She was advised to keep her scheduled appointment for Medical Oncology consultation with Gavin Short MD- Medical Oncologist on 4/19/2021 for further evaluation,staging and management of lung adenocarcinoma noted on recent CT-guided biopsy.  -Schedule patient for follow up with my clinic in 1 to 2months for clinical re evaluation. Patient advised to make early appointment if needed. -14 Tiff Berrios was advised to make early appointment with my clinic if she develops any constitutional symptoms including loss of weight, poor appetite or hemoptysis. She verbalizes under standing.  - Patient and her  were educated about my impression and plan. They verbalizes understanding.

## 2021-04-19 ENCOUNTER — OFFICE VISIT (OUTPATIENT)
Dept: ONCOLOGY | Age: 61
End: 2021-04-19
Payer: COMMERCIAL

## 2021-04-19 ENCOUNTER — CLINICAL DOCUMENTATION (OUTPATIENT)
Dept: CASE MANAGEMENT | Age: 61
End: 2021-04-19

## 2021-04-19 ENCOUNTER — HOSPITAL ENCOUNTER (OUTPATIENT)
Dept: INFUSION THERAPY | Age: 61
Discharge: HOME | End: 2021-04-19
Payer: COMMERCIAL

## 2021-04-19 VITALS
BODY MASS INDEX: 20.41 KG/M2 | WEIGHT: 127 LBS | HEART RATE: 59 BPM | HEIGHT: 66 IN | SYSTOLIC BLOOD PRESSURE: 151 MMHG | RESPIRATION RATE: 18 BRPM | DIASTOLIC BLOOD PRESSURE: 87 MMHG | TEMPERATURE: 98.9 F | OXYGEN SATURATION: 98 %

## 2021-04-19 DIAGNOSIS — C34.92 PRIMARY ADENOCARCINOMA OF LEFT LUNG (HCC): ICD-10-CM

## 2021-04-19 DIAGNOSIS — C34.92 PRIMARY ADENOCARCINOMA OF LEFT LUNG (HCC): Primary | ICD-10-CM

## 2021-04-19 DIAGNOSIS — C34.92 ADENOCARCINOMA OF LEFT LUNG (HCC): Primary | ICD-10-CM

## 2021-04-19 LAB
ABSOLUTE IMMATURE GRANULOCYTE: 0.01 THOU/MM3 (ref 0–0.07)
ALBUMIN SERPL-MCNC: 4.7 G/DL (ref 3.5–5.1)
ALP BLD-CCNC: 87 U/L (ref 38–126)
ALT SERPL-CCNC: 11 U/L (ref 11–66)
AST SERPL-CCNC: 14 U/L (ref 5–40)
BASINOPHIL, AUTOMATED: 1 % (ref 0–3)
BASOPHILS ABSOLUTE: 0.1 THOU/MM3 (ref 0–0.1)
BILIRUB SERPL-MCNC: 0.3 MG/DL (ref 0.3–1.2)
BILIRUBIN DIRECT: < 0.2 MG/DL (ref 0–0.3)
BUN, WHOLE BLOOD: 12 MG/DL (ref 8–26)
CHLORIDE, WHOLE BLOOD: 106 MEQ/L (ref 98–109)
CREATININE, WHOLE BLOOD: 1 MG/DL (ref 0.5–1.2)
EOSINOPHILS ABSOLUTE: 0.1 THOU/MM3 (ref 0–0.4)
EOSINOPHILS RELATIVE PERCENT: 2 % (ref 0–4)
GFR, ESTIMATED: 60 ML/MIN/1.73M2
GLUCOSE, WHOLE BLOOD: 91 MG/DL (ref 70–108)
HCT VFR BLD CALC: 42.3 % (ref 37–47)
HEMOGLOBIN: 13.9 GM/DL (ref 12–16)
IMMATURE GRANULOCYTES: 0 %
IONIZED CALCIUM, WHOLE BLOOD: 1.23 MMOL/L (ref 1.12–1.32)
LYMPHOCYTES # BLD: 19 % (ref 15–47)
LYMPHOCYTES ABSOLUTE: 0.9 THOU/MM3 (ref 1–4.8)
MCH RBC QN AUTO: 32.1 PG (ref 26–33)
MCHC RBC AUTO-ENTMCNC: 32.9 GM/DL (ref 32.2–35.5)
MCV RBC AUTO: 98 FL (ref 81–99)
MONOCYTES ABSOLUTE: 0.3 THOU/MM3 (ref 0.4–1.3)
MONOCYTES: 6 % (ref 0–12)
PDW BLD-RTO: 13.1 % (ref 11.5–14.5)
PLATELET # BLD: 112 THOU/MM3 (ref 130–400)
PMV BLD AUTO: 12.4 FL (ref 9.4–12.4)
POTASSIUM, WHOLE BLOOD: 5.2 MEQ/L (ref 3.5–4.9)
RBC # BLD: 4.33 MILL/MM3 (ref 4.2–5.4)
SEG NEUTROPHILS: 73 % (ref 43–75)
SEGMENTED NEUTROPHILS ABSOLUTE COUNT: 3.6 THOU/MM3 (ref 1.8–7.7)
SODIUM, WHOLE BLOOD: 141 MEQ/L (ref 138–146)
TOTAL CO2, WHOLE BLOOD: 29 MEQ/L (ref 23–33)
TOTAL PROTEIN: 6.9 G/DL (ref 6.1–8)
WBC # BLD: 4.9 THOU/MM3 (ref 4.8–10.8)

## 2021-04-19 PROCEDURE — 80076 HEPATIC FUNCTION PANEL: CPT

## 2021-04-19 PROCEDURE — 99205 OFFICE O/P NEW HI 60 MIN: CPT | Performed by: INTERNAL MEDICINE

## 2021-04-19 PROCEDURE — 85025 COMPLETE CBC W/AUTO DIFF WBC: CPT

## 2021-04-19 PROCEDURE — 99211 OFF/OP EST MAY X REQ PHY/QHP: CPT

## 2021-04-19 PROCEDURE — 80047 BASIC METABLC PNL IONIZED CA: CPT

## 2021-04-19 PROCEDURE — 36415 COLL VENOUS BLD VENIPUNCTURE: CPT

## 2021-04-19 NOTE — PROGRESS NOTES
[address] CANCER [facility] [address] 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 Chief Complaint   Patient presents with    New Patient     Adenocarcinoma of left lung          HPI: Latisha Blum is a 69-year-old female with a 45-pack-year smoking habit (ongoing) with a chest CT without contrast on 3/12/2021 revealing a 3.9 x 2.1 cm cavitary mass in the left upper lung corresponding to the abnormality seen on the chest x-ray of 2/26/2021. Radiologically very suspicious. Patient underwent a needle biopsy on 3/26 revealing adenocarcinoma. PET scan shows no distant mets or adenopathy. PFT's done and reviewed below. She is waiting  for a consult with Dr Henny Grullon of Surgery. Reports 30 # weight loss ove 9 months; unintentional. Normal weight @ 145. Appetite has remained good. Chronic cough unchanged, no blood. No chest pain or pain elsewhere. Denied increased sob. Average 1ppd x 45 years; now about 5 cigs/day . Motivated to stop but failed Nicotine replacement products. Chantix not attempted thus far. ROS:  Review of Systems 14 point negative except as above. Limited to unintentional weight loss. Chronic unchanged nonproductive cough and shortness of breath with activity also unchanged.     PMH:   Past Medical History:   Diagnosis Date    Arthritis     COPD (chronic obstructive pulmonary disease) ([facility] [address]    Aneurysm    Social HX:   Social History     Socioeconomic History    Marital status:      Spouse name: Not on file    Number of children: Not on file    Years of education: Not on file    Highest education level: Not on file   Occupational History    Not on file   Social Needs    Financial resource strain: Not on file    Food insecurity     Worry: Not on file     Inability: Not on file    Transportation needs     Medical: Not on file     Non-medical: Not on file   Tobacco Use    Smoking status: Current Every Day Smoker     Packs/day: 1.00     Years: 45.00     Pack years: 45.00     Types: Cigarettes    Smokeless tobacco: Never Used   Substance and Sexual Activity    Alcohol use: Never     Frequency: Never     Binge frequency: Never    Drug use: Yes     Types: Marijuana    Sexual activity: Not on file   Lifestyle    Physical activity     Days per week: Not on file     Minutes per session: Not on file    Stress: Not on file   Relationships    Social connections     Talks on phone: Not on file     Gets together: Not on file     Attends Zoroastrianism service: Not on file     Active member of club or organization: Not on file     Attends meetings of clubs or organizations: Not on file     Relationship status: Not on file    Intimate partner violence     Fear of current or ex partner: Not on file     Emotionally abused: Not on file     Physically abused: Not on file     Forced sexual activity: Not on file   Other Topics Concern    Not on file   Social History Narrative    Not on file      45 pack year smoking history, currently down to 5 cigarettes/day and attempting to stop. 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 Health Screenings:  Mammogram:  No results found for this or any previous visit. Results for orders placed during the hospital encounter of 02/26/21   BRITTANIE BOUCHRA DIGITAL SCREEN BILATERAL    Narrative IMPRESSION: Mammogram BI-RADS: Category 1: Negative    There is no mammographic evidence of malignancy. A 1 year   screening mammogram is recommended.  The patient has been entered into our database and they will receive a notification when they   are due for their next exam.      The patient was notified of the results. #[id number] - BRITTANIE BOUCHRA DIGITAL SCREEN BILATERAL  BILATERAL DIGITAL SCREENING MAMMOGRAM 3D/2D WITH CAD: 2021  CLINICAL: Tomosynthesis. Screening. Comparison is made to exam dated:  2011 mammogram - [facility]. The tissue of both breasts is heterogeneously dense. This may   lower the sensitivity of mammography. Current study was also evaluated with a Computer Aided Detection   (CAD) system. No significant masses, calcifications, or other findings are seen   in either breast.    There has been no significant interval change. Dr. Laura Calderon            nn/penrad:2021 13:05:44      Imaging Technologist: Juliet SUTTON(R)(M), [facility]  letter sent: Normal-All Doctor Names    [id number]        Pap / Pelvic:na  C-Scope: Approximately 5 years ago with 2 benign polyps      Gyn HX:   GPA:  DARREL/BSO:31 years ago  LMP: No LMP recorded. Patient has had a hysterectomy. Health Maintenance Due   Topic Date Due    Pneumococcal 0-64 years Vaccine (1 of 1 - PPSV23) Never done    HIV screen  Never done    COVID-19 Vaccine (1) Never done    DTaP/Tdap/Td vaccine (1 - Tdap) Never done    Cervical cancer screen  Never done    Lipid screen  Never done    Shingles Vaccine (1 of 2) Never done    Colon cancer screen colonoscopy  Never done        Interests:   none    Fam HX:   History reviewed. No pertinent family history. Hospitalizations:   None recent    Allergies: Allergies   Allergen Reactions    Tylenol With Codeine #3 [Acetaminophen-Codeine]         Adult Illness: There is no problem list on file for this patient.        Surgery:  Past Surgical History:   Procedure Laterality Date     SECTION      CHOLECYSTECTOMY      COLONOSCOPY  2016    CT NEEDLE BIOPSY LUNG PERCUTANEOUS  3/26/2021    CT NEEDLE BIOPSY LUNG PERCUTANEOUS 3/26/2021 [facility] CT SCAN    HYSTERECTOMY      UPPER GASTROINTESTINAL ENDOSCOPY  2016        Medications:  Current Outpatient Medications   Medication Sig Dispense Refill    albuterol sulfate HFA (VENTOLIN HFA) 108 (90 Base) MCG/ACT inhaler Inhale 2 puffs into the lungs 4 times daily as needed for Wheezing 1 Inhaler 11    VITAMIN D PO Take by mouth       No current facility-administered medications for this visit. EXAM:    BP (!) 151/87 (Site: Left Upper Arm, Position: Sitting, Cuff Size: Medium Adult)   Pulse 59   Temp 98.9 °F (37.2 °C) (Infrared)   Resp 18   Ht 5' 6\" (1.676 m)   Wt 127 lb (57.6 kg)   SpO2 98%   BMI 20.50 kg/m²      ECO  General: Non-ill appearing. Appears comfortable and in no distress. HEENT: NC/AT,nonicteric, perrla,eom intact, no mucosal lesions  Neck: normal thyroid, no masses. pulses nl, no bruits,   Nodes: No adenopathy  Lungs/chest: clear, no rales,rhonchi or wheezing, lung bases clear  CV: rrr, no rubs ,gallops or murmurs  Breasts: Not examined  Abd/Rectal: soft, non-tender,bowel sounds normal , no HSM,no masses  Back: normal curvature, No midline tenderness. flanks nontender  : Not Examined  Extremities: no cyanosis,clubbing or edema. Skin: unremarkable  Neuro: A and O x 4, CN exam nonfocal, Motor- no deficits, Sensory- no deficits, gait-nl, speech- fluent, no ataxia.   Devices: none      DATA:    LAB: CBC, CMP pending today  CBC with Differential:      Lab Results   Component Value Date    WBC 6.7 2021    RBC 4.21 2021    RBC 4.63 10/10/2017    HGB 13.4 2021    HCT 41.7 2021     2021    MCV 99.0 2021    MCH 31.8 2021    MCHC 32.1 2021    RDW 12.1 10/10/2017    NRBC 0 2014    SEGSPCT 92.9 2014    LYMPHOPCT 15.8 10/10/2017    MONOPCT 3.0 10/10/2017    EOSPCT 1.4 10/10/2017    BASOPCT 0.7 10/10/2017    MONOSABS 0.2 10/10/2017 MONOSABS 0.3 01/19/2014    LYMPHSABS 1.2 10/10/2017    LYMPHSABS 0.6 01/19/2014    EOSABS 0.1 10/10/2017    EOSABS 0.0 01/19/2014    BASOSABS 0.1 10/10/2017    BASOSABS 0.0 01/19/2014       CMP:    Lab Results   Component Value Date     10/10/2017    K 4.6 10/10/2017     10/10/2017    CO2 29 10/10/2017    BUN 11 10/10/2017    CREATININE 0.8 03/26/2021    LABGLOM 73 03/26/2021    GLUCOSE 79 10/10/2017    PROT 6.3 10/10/2017    LABALBU 4.2 10/10/2017    CALCIUM 9.7 10/10/2017    BILITOT 0.6 10/10/2017    ALKPHOS 78 10/10/2017    ALKPHOS 67 06/20/2016    AST 12 10/10/2017    ALT 8 10/10/2017       BMP:    Lab Results   Component Value Date     10/10/2017    K 4.6 10/10/2017     10/10/2017    CO2 29 10/10/2017    BUN 11 10/10/2017    LABALBU 4.2 10/10/2017    CREATININE 0.8 03/26/2021    CALCIUM 9.7 10/10/2017    LABGLOM 73 03/26/2021    GLUCOSE 79 10/10/2017       Magnesium:  No results found for: MG  PT/INR:  No results found for: PROTIME, INR  TSH:    Lab Results   Component Value Date    TSH 4.750 10/10/2017           IMAGING:  PROCEDURE: XR CHEST (2 VW)       2/26/2021       CLINICAL INFORMATION: Cough       COMPARISON: 10/10/2017       TECHNIQUE: PA and lateral views of the chest were obtained.               Impression   1. Normal heart size. No acute infiltrates or effusions are seen. 2. Interval development of a 2 cm nodule left perihilar region since prior study which should be considered malignant until proven otherwise. CT thorax recommended for further evaluation.                 Radiological Data:  CT scan of chest without contrast.  Fri Mar 12, 2021  8:18 AM   PROCEDURE: CT CHEST WO CONTRAST   CLINICAL INFORMATION: Pulmonary nodule   COMPARISON: Chest x-ray dated 2/26/2021   1. There is a cavitary mass demonstrated within the left upper lobe with peripheral mild spiculation measuring 3.9 x 2.1 cm on axial image 34. This corresponds with previous chest x-ray dated 2/26/2021.  This is concerning for malignancy until proven otherwise. Further evaluation could be obtained with PET/CT and/or biopsy. An infectious process is also not excluded.   2. There are additional smaller groundglass nodular densities within the right lung as detailed above.   3. Small pericardial effusion is noted. This is nonspecific.          Ct Needle Biopsy Lung Percutaneous  Result Date: 3/26/2021  1. Status post successful CT guided lung biopsy. 2. Followup chest radiographs will be obtained. **This report has been created using voice recognition software. It may contain minor errors which are inherent in voice recognition technology. ** Final report electronically signed by Dr. Robbie Otto on 3/26/2021 4:02 PM    PROCEDURE: PET CT SKULL BASE TO MID THIGH       CLINICAL INFORMATION: 43-year-old woman with recently diagnosed adenocarcinoma of the left lung; initial treatment strategy.       Radiopharmaceutical: 13.8 mCi F-18 FDG, intravenously.       TECHNIQUE: PET/CT imaging was performed from the skull base to the midthigh levels using routine PET acquisition.       Injection site: Left antecubital fossa.       Time of FDG injection: 7:37 AM.       Serum glucose: 86 mg/dL at 7:35 AM.       Time of imagin:37 AM.       COMPARISON: CT chest 3/11/2021       FINDINGS:        Neck: No FDG avid cervical lymphadenopathy. Physiologic activity is noted bilaterally in the lower neck.       Chest: Cardiac size is normal. Atherosclerotic calcifications are present in the thoracic aorta and coronary arteries. There is stable aneurysmal dilation of the ascending thoracic aorta which measures 4.3 cm in maximum diameter (image 103). There is no    pleural or pericardial effusion. Emphysematous changes are again noted in the bilateral lungs. In the regular mass with areas of cavitation in the lingula is stable in size, again measuring approximately 3.7 cm and is associated with a maximum SUV of 6.7    (image 107).  Adjacent opacities are likely postobstructive atelectasis. A 0.4 cm nodule at the right lung apex has a maximum SUV of 1.9 (image 70). A 0.4 cm nodule in the right upper lobe has a maximum SUV of 0.9 (image 80). An indistinct 0.5 cm nodular    density at the right mid lung is not associated with activity above background. There is no FDG avid mediastinal, hilar or axillary lymphadenopathy. Degenerative changes are present in the thoracic spine without evidence of hypermetabolic osseous    metastatic disease.       Abdomen/pelvis: Physiologic activity is present in the liver, spleen, urinary collecting system and gastrointestinal tract. The gallbladder is surgically absent. Atherosclerotic calcifications are seen in the abdominal aorta without evidence of aneurysm.    The urinary bladder is unremarkable. There are phleboliths in the pelvis. There is no FDG avid mesenteric, retroperitoneal, pelvic or inguinal lymphadenopathy. Degenerative changes are present in the lumbar spine and pelvis without evidence of    hypermetabolic osseous metastatic disease.           Impression   1. FDG avid left lung mass corresponding to known malignancy. 2. Small, mildly FDG avid right lung nodules, possibly infectious or inflammatory but satellite malignancy cannot be excluded.       Final report electronically signed by Dr. Alice Ash on 4/14/2021 10:02 AM       Mri Brain W Wo Contrast  Result Date: 4/9/2021   1. No evidence of intracranial metastases or other acute intracranial abnormality. 2. Moderate severity chronic microvascular angiopathy. 3. Mild to moderate mucosal inflammation in the paranasal sinuses with air-fluid levels as evidence for an acute component. **This report has been created using voice recognition software. It may contain minor errors which are inherent in voice recognition technology. ** Final report electronically signed by Dr. Abdi Nunn MD on 4/9/2021 1:50 PM           PROCEDURES:  -Core biopsy cavitary left upper lobe mass 3/26/2021    -Full pulmonary function study with bronchodilators completed 4/6/2021  FEV1 1.68 (61% predicted 25% change with bronchodilator. FVC 2.62, 75% predicted 15% change with bronchodilator  FEV1 over FVC ratio 64  Interpretation-airway obstruction. FVC is reduced relative to the SVC indicating air trapping. While the TLC is within normal limits, the FRC, RV and RV/TLC ratio are increased. Follow administration of bronchodilators there is a good response. Reduced diffusing capacity indicates had mild loss of functional alveolar capillary surface. PATHOLOGY:   3/26/2021  Clinical Information: CAVITATING MASS SHANNON, SMOKER     FINAL DIAGNOSIS:   Lung, left, needle core biopsies:       Adenocarcinoma. Specimen:   BIOPSY OF LUNG, LEFT      Microscopic Examination:   The specimen consists of several scant fragments of lung tissue with   infiltrating malignant glands consistent with adenocarcinoma. Josiephine Payer is   also anthracotic pigment. GENETICS:  Not applicable    MOLECULAR:  Not applicable i.e. patient does not have metastatic disease at this time    ASSESSMENT/PLAN:    1: Diagnosis: 56-year-old female with a heavy smoking history with 3.9 cm cavitary adenocarcinoma of the left upper lung. PET scan shows no hypermetabolic adenopathy or distant mets. Brain MRI shows no mets. Current clinical stage T2a N0/stage Ib. Recommend pathologic mediastinal lymph node evaluation i.e. bronchoscopy EBUS. 2) Prognosis / Disease Status: Potentially curable. 3) Work-up:    Labs: CBC, CMP   Imaging: All completed   Procedures: Recommend pathologic evaluation of the mediastinum i.e. bronchoscopy EBUS   Consults: Dr Deisy Rod of General surgery consulted by pulmonary   Other: PFTs reviewed and summarized above with good bronchodilator effect. Defer to surgery and  pulmonary but appears to be medically operable. 4) Symptom Management: None needed at this time.       5) Supportive care

## 2021-04-20 ENCOUNTER — TELEPHONE (OUTPATIENT)
Dept: SURGERY | Age: 61
End: 2021-04-20

## 2021-04-20 NOTE — PROGRESS NOTES
Name: Carolyn Carbone  : 1960  MRN: [id number]    Oncology Navigation- Initial Note:    Intake-  Contact Type: Medical Oncology    Diagnosis: Thoracic- malignant    Home Disposition: Lives with other who is able to assist    Patient needs and barriers to care: Knowledge deficit and Symptom Management     Referral Source: Outpatient    Receptive to Advanced Care Planning/ Palliative Care:  Deferred    Interventions-   General Interventions: Navigation Welcome Packet given and program explained     Referrals: Financial Navigator       Continuum of Care: Diagnosis/Active Treatment    Notes:   Met with Torin Otto and her , Lizbeth Drew, during consultation with Dr. Mihai Archer for early stage Lung Cancer. Smoker-down to 5 cigarettes/day-has tried patches in past-wanting to do it \"cold turkey\"     See Dr. Mihai Archer notes for details. Still working on Staging tests. Surgery vs Chemo Radiation. -Appointment with Dr. Davian Camacho for [address]. -Appointment with Dr. Luis Antonio Sanchez to discuss role of surgery. If node negative, surgery. If node positive, chemo/radiation. Will follow up with Dr. Mihai Archer 5/3 to determine plan of care. Torin Otto lives with her  in [place]. She works at [employer] and has [employer]. Very self sufficient and active. Torin Otto was given my contact information and she knows she can call with any questions or concerns.          Electronically signed by Som Griffin RN on 2021 at 3:15 PM

## 2021-04-20 NOTE — PROGRESS NOTES
Jeff Tejada MD [facility]  General Surgery  New Patient Evaluation in Office  Pt Name: Eder Calloway  Date of Birth 1960   Today's Date: 4/21/2021  Medical Record Number: [id number]  Referring Provider: Sandro Jacobson  Primary Care Provider: Valeria Ernst MD  Chief Complaint   Patient presents with   [facility] Surgical Consult     New patient-referred by Dr Devika Hardy lung adenocarcinoma-saw Dr Hermann Laureano 4/19/2021     ASSESSMENT      Problem List Items Addressed This Visit     Other emphysema ([address]    Primary cancer of left upper lobe of lung ([address]    Relevant Orders    AR THORACOSCOPY W/LOBECTOMY SINGLE LOBE (Completed)    COVID-19    Type and crossmatch    AR NERVOUS SYSTEM SURGERY UNLISTED (Completed)    Smoking greater than 40 pack years        Past Medical History:   Diagnosis Date    Arthritis     COPD (chronic obstructive pulmonary disease) ([facility]     Dr Carol Conn [facility])     Dr Curt Nevarez      1. Schedule Noemi for   1. EBUS for mediastinal lymphadenopathy evaluation by Dr. Charlie Montes De Oca  2. .  Cardiac clearance as CT scan shows small pericardial effusion  3. When medically cleared and the EBUS does not show N2 disease, she be scheduled for robotic assisted LEFT upper lobectomy and mediastinal dissection possible open and cryotherapy of intercostal nerves Dr Gloria Garza  4. Type and cross 2 units of blood  5. Preop COVID-19 testing  Potential diagnostic and therapeutic modalities were discussed with the patient including surgical and nonsurgical options. The risks, complications and benefits of the options were discussed. Complications including, but not limited to, bleeding, wound infection,lung staple leak, blood clots, , other organ injury, conversion to open, and need for further surgery were reviewed. The patient was given an opportunity to ask questions. Once answered, the patient is agreeable to proceed with surgery.   The advantages and disadvantages of using this robotic technology were discussed with the patient including but not limited to technical issues, limitations with exposure and potential conversion to an open procedure. The patient was given an opportunity to ask questions. Once answered, the patient is agreeable to proceed with a planned robotic assisted approach. (ROBOT)  2. Status: inpatient  3. Planned anesthesia: general  4. She will undergo pre-operative clearance per anesthesia guidelines with risk factors listed under the past medical history diagnosis & problem list.  5. Perioperative discontinuation of ASA, Plavix, warfarin, Brillinta, Effient, Pradaxa, Eliquis and Xarelto. Continuation of 81 mg Aspirin is acceptable. 6. Perioperative medical clearance is required   7. Orders Placed This Encounter:  Orders Placed This Encounter   Procedures    COVID-19     Standing Status:   Future     Standing Expiration Date:   4/21/2022     Scheduling Instructions:      1) Due to current limited availability of the COVID-19 test, tests will be prioritized based on responses to questions above. Testing may be delayed due to volume. 2) Print and instruct patient to adhere to CDC home isolation program. (Link Above)              3) Set up or refer patient for a monitoring program.              4) Have patient sign up for and leverage [employer] (if not previously done). Order Specific Question:   Is this test for diagnosis or screening? Answer:   Screening     Order Specific Question:   Symptomatic for COVID-19 as defined by CDC? Answer:   No     Order Specific Question:   Date of Symptom Onset     Answer:   N/A     Order Specific Question:   Hospitalized for COVID-19? Answer:   No     Order Specific Question:   Admitted to ICU for COVID-19? Answer:   No     Order Specific Question:   Employed in healthcare setting? Answer:   No     Order Specific Question:   Resident in a congregate (group) care setting?      Answer:   No     Order Specific Question:   Pregnant? Answer:   No     Order Specific Question:   Previously tested for COVID-19? Answer: Yes    Type and crossmatch     2 u pRBC for surgery    LA THORACOSCOPY W/LOBECTOMY SINGLE LOBE    LA NERVOUS SYSTEM SURGERY UNLISTED        SUBJECTIVE      Mikaela Kenyon is a 62 y.o. female seen in the office today regarding lung cancer involving LEFT upper lobe. She was initially referred to Dr. Junito Gill pulmonary because work-up by her PCP for generalized weakness and significant weight loss despite a normal appetite a chest x-ray was done on February 26 of 2021. It revealed a 2 cm nodule in the left perihilar region which was new compared to an old x-ray that was performed in 2017. CT scan of the chest subsequently returned a left upper lobe spiculated mass on the CT scan. She does have a chronic cough but no hemoptysis. She does have a smoking history 1 pack/day for 45 years she is still smoking. She states she has had an unintentional 25 pound weight loss in the past 1 year with normal appetite. She has seen the medical oncologist prior. The patient does need an endobronchial ultrasound for mediastinal lymph node evaluation preoperatively. This is scheduled for this Friday the 23rd by Dr. Sarita Burnett. She will need cardiac clearance as a pericardial effusion was seen on her CT scan preop.   PFT FEV1= 2.10 post bronchodilator 77% predicted(2.72)          DLCO = 18.02- 67% predicted(27.67)  Calculated PPO FEV1= 1.55 = 57% normal  Calculated PPO DLCO = 13.28=48% normal          Narrative   PROCEDURE: PET CT SKULL BASE TO MID THIGH       CLINICAL INFORMATION: 64-year-old woman with recently diagnosed adenocarcinoma of the left lung; initial treatment strategy.       Radiopharmaceutical: 13.8 mCi F-18 FDG, intravenously.       TECHNIQUE: PET/CT imaging was performed from the skull base to the midthigh levels using routine PET acquisition.       Injection site: Left antecubital fossa.     Time of FDG injection: 7:37 AM.       Serum glucose: 86 mg/dL at 7:35 AM.       Time of imagin:37 AM.       COMPARISON: CT chest 3/11/2021       FINDINGS:        Neck: No FDG avid cervical lymphadenopathy. Physiologic activity is noted bilaterally in the lower neck.       Chest: Cardiac size is normal. Atherosclerotic calcifications are present in the thoracic aorta and coronary arteries. There is stable aneurysmal dilation of the ascending thoracic aorta which measures 4.3 cm in maximum diameter (image 103). There is no    pleural or pericardial effusion. Emphysematous changes are again noted in the bilateral lungs. In the regular mass with areas of cavitation in the lingula is stable in size, again measuring approximately 3.7 cm and is associated with a maximum SUV of 6.7    (image 107). Adjacent opacities are likely postobstructive atelectasis. A 0.4 cm nodule at the right lung apex has a maximum SUV of 1.9 (image 70). A 0.4 cm nodule in the right upper lobe has a maximum SUV of 0.9 (image 80). An indistinct 0.5 cm nodular    density at the right mid lung is not associated with activity above background. There is no FDG avid mediastinal, hilar or axillary lymphadenopathy. Degenerative changes are present in the thoracic spine without evidence of hypermetabolic osseous    metastatic disease.       Abdomen/pelvis: Physiologic activity is present in the liver, spleen, urinary collecting system and gastrointestinal tract. The gallbladder is surgically absent. Atherosclerotic calcifications are seen in the abdominal aorta without evidence of aneurysm.    The urinary bladder is unremarkable. There are phleboliths in the pelvis. There is no FDG avid mesenteric, retroperitoneal, pelvic or inguinal lymphadenopathy. Degenerative changes are present in the lumbar spine and pelvis without evidence of    hypermetabolic osseous metastatic disease.           Impression   1.  FDG avid left lung mass corresponding to known Food insecurity     Worry: Not on file     Inability: Not on file    Transportation needs     Medical: Not on file     Non-medical: Not on file   Tobacco Use    Smoking status: Current Every Day Smoker     Packs/day: 1.00     Years: 45.00     Pack years: 45.00     Types: Cigarettes    Smokeless tobacco: Never Used   Substance and Sexual Activity    Alcohol use: Never     Frequency: Never     Binge frequency: Never    Drug use: Yes     Types: Marijuana    Sexual activity: Not on file   Lifestyle    Physical activity     Days per week: Not on file     Minutes per session: Not on file    Stress: Not on file   Relationships    Social connections     Talks on phone: Not on file     Gets together: Not on file     Attends Congregational service: Not on file     Active member of club or organization: Not on file     Attends meetings of clubs or organizations: Not on file     Relationship status: Not on file    Intimate partner violence     Fear of current or ex partner: Not on file     Emotionally abused: Not on file     Physically abused: Not on file     Forced sexual activity: Not on file   Other Topics Concern    Not on file   Social History Narrative    Not on file       [address] Maintenance   Topic Date Due    Pneumococcal 0-64 years Vaccine (1 of 1 - PPSV23) Never done    HIV screen  Never done    COVID-19 Vaccine (1) Never done    DTaP/Tdap/Td vaccine (1 - Tdap) Never done    Cervical cancer screen  Never done    Lipid screen  Never done    Shingles Vaccine (1 of 2) Never done    Colon cancer screen colonoscopy  Never done    Flu vaccine (Season Ended) 09/01/2021    Low dose CT lung screening  04/14/2022    Breast cancer screen  02/26/2023    Hepatitis C screen  Completed    Hepatitis A vaccine  Aged Out    Hepatitis B vaccine  Aged Out    Hib vaccine  Aged Out    Meningococcal (ACWY) vaccine  Aged Out       Review of Systems  Constitutional: Negative for activity change, appetite change, chills, diaphoresis, fatigue, fever and unexpected weight change. HENT: Negative for congestion, dental problem, drooling, ear discharge, ear pain, facial swelling, hearing loss, mouth sores, nosebleeds, postnasal drip, rhinorrhea, sinus pressure, sneezing, sore throat, tinnitus, trouble swallowing and voice change. Eyes: Negative for photophobia, pain, discharge, redness, itching and visual disturbance. Respiratory: Negative for apnea, cough, choking, chest tightness, shortness of breath, wheezing and stridor. Cardiovascular: Negative for chest pain, palpitations and leg swelling. Gastrointestinal: Negative for abdominal distention, abdominal pain, anal bleeding, blood in stool, constipation, diarrhea, nausea, rectal pain and vomiting. Endocrine: Negative for cold intolerance, heat intolerance, polydipsia, polyphagia and polyuria. Genitourinary: Negative for decreased urine volume, difficulty urinating, dysuria, enuresis, flank pain, frequency, genital sores, hematuria and urgency. Musculoskeletal: Negative for arthralgias, back pain, gait problem, joint swelling, myalgias, neck pain and neck stiffness. Skin: Negative for color change, pallor, rash and wound. Allergic/Immunologic: Negative for environmental allergies, food allergies and immunocompromised state. Neurological: Negative for dizziness, tremors, seizures, syncope, facial asymmetry, speech difficulty, weakness, light-headedness, numbness and headaches. Hematological: Negative for adenopathy. Does not bruise/bleed easily. Psychiatric/Behavioral: Negative for agitation, behavioral problems, confusion, decreased concentration, dysphoric mood, hallucinations, self-injury, sleep disturbance and suicidal ideas. The patient is not nervous/anxious and is not hyperactive      OBJECTIVE    VITALS:  height is 5' 6\" (1.676 m) and weight is 126 lb 8 oz (57.4 kg). Her temporal temperature is 97.2 °F (36.2 °C).  Her blood pressure is 120/62 and her pulse is 79. Her respiration is 18 and oxygen saturation is 96%. CONSTITUTIONAL: Alert and oriented times 3, no acute distress and cooperative to examination with proper mood and affect. SKIN: Skin color, texture, turgor normal. No rashes or lesions. LYMPH: no cervical nodes, no supraclavicular nodes, no inguinal nodes  HEENT: Head is normocephalic, atraumatic. EOMI, PERRLA. NECK: Supple, symmetrical, trachea midline, no adenopathy, thyroid symmetric, not enlarged and no tenderness, skin normal.  CHEST/LUNGS: chest symmetric with normal A/P diameter, normal respiratory rate and rhythm, lungs clear to auscultation without wheezes, rales or rhonchi. No accessory muscle use. Scars None   CARDIOVASCULAR: Heart sounds are normal.  Regular rate and rhythm without murmur, gallop or rub. Normal S1 and S2. . Carotid and femoral pulses 2+/4 and equal bilaterally. ABDOMEN: Normal shape. Hysterectomy and Laparoscopic scar(s) present. Normal bowel sounds. No bruits. [facility] \"soft, nontender, nondistended, no masses or organomegaly. no evidence of hernia. Percussion: Normal without hepatosplenomegally. Tenderness: absent. RECTAL: deferred, not clinically indicated  NEUROLOGIC: There are no focalizing motor or sensory deficits. CN II-XII are grossly intact. [facility] EXTREMITIES: no cyanosis, no clubbing and no edema. Musculoskeletal exam: no joint tenderness, deformity or swelling. Electronically signed by Natalia Gann MD on 4/21/2021 at 9:14 AM   The patients records and films were reviewed independently. Time was given for questions . All questions were answered to the patients satisfaction. A total time of 60 minutes  was spent with at least 51% related to counseling and coordination of care.

## 2021-04-20 NOTE — TELEPHONE ENCOUNTER
Patient is scheduled for appt with Dr Viviane Medina 4/21/2021 office visit-Needs EBUS prior to being resected for lung cancer. Patient having EBUS at [facility] Friday 4/23/2021 with Dr Andres Pinedo.

## 2021-04-21 ENCOUNTER — TELEPHONE (OUTPATIENT)
Dept: SURGERY | Age: 61
End: 2021-04-21

## 2021-04-21 ENCOUNTER — TELEPHONE (OUTPATIENT)
Dept: CARDIOLOGY CLINIC | Age: 61
End: 2021-04-21

## 2021-04-21 ENCOUNTER — OFFICE VISIT (OUTPATIENT)
Dept: SURGERY | Age: 61
End: 2021-04-21
Payer: COMMERCIAL

## 2021-04-21 VITALS
TEMPERATURE: 97.2 F | BODY MASS INDEX: 20.33 KG/M2 | WEIGHT: 126.5 LBS | SYSTOLIC BLOOD PRESSURE: 120 MMHG | HEART RATE: 79 BPM | OXYGEN SATURATION: 96 % | HEIGHT: 66 IN | DIASTOLIC BLOOD PRESSURE: 62 MMHG | RESPIRATION RATE: 18 BRPM

## 2021-04-21 DIAGNOSIS — F17.210 SMOKING GREATER THAN 40 PACK YEARS: ICD-10-CM

## 2021-04-21 DIAGNOSIS — J43.8 OTHER EMPHYSEMA (HCC): ICD-10-CM

## 2021-04-21 DIAGNOSIS — C34.12 PRIMARY CANCER OF LEFT UPPER LOBE OF LUNG (HCC): ICD-10-CM

## 2021-04-21 PROCEDURE — 99205 OFFICE O/P NEW HI 60 MIN: CPT | Performed by: SURGERY

## 2021-04-21 ASSESSMENT — ENCOUNTER SYMPTOMS
BLOOD IN STOOL: 0
CONSTIPATION: 0
EYE ITCHING: 0
RECTAL PAIN: 0
SINUS PRESSURE: 0
SORE THROAT: 0
TROUBLE SWALLOWING: 0
EYE REDNESS: 0
ANAL BLEEDING: 0
COUGH: 0
EYE PAIN: 0
DIARRHEA: 0
COLOR CHANGE: 0
ABDOMINAL PAIN: 0
PHOTOPHOBIA: 0
RHINORRHEA: 0
VOICE CHANGE: 0
VOMITING: 0
CHOKING: 0
NAUSEA: 0
SHORTNESS OF BREATH: 0
BACK PAIN: 0
APNEA: 0
STRIDOR: 0
CHEST TIGHTNESS: 0
WHEEZING: 0
ABDOMINAL DISTENTION: 0
FACIAL SWELLING: 0
EYE DISCHARGE: 0

## 2021-04-21 NOTE — TELEPHONE ENCOUNTER
Alex Mckeon from Dr Beto Kilgore office calling in to schedule this patient with any provider asap as a new patient for cardiac clearance. She has lung ca, and has pericardial effusion and needs clearance asap. Please call Alex Mckeon back at ext [phone] to advise when patient can be seen.

## 2021-04-21 NOTE — PROGRESS NOTES
Subjective:      Patient ID: Mary Carrillo is a 62 y.o. female. Chief Complaint   Patient presents with   [facility] Surgical Consult     New patient-referred by Dr Ruben Pitt lung adenocarcinoma-saw Dr Love Shaggy 4/19/2021       HPI    Review of Systems   Constitutional: Negative for activity change, appetite change, chills, diaphoresis, fatigue, fever and unexpected weight change. HENT: Negative for congestion, dental problem, drooling, ear discharge, ear pain, facial swelling, hearing loss, mouth sores, nosebleeds, postnasal drip, rhinorrhea, sinus pressure, sneezing, sore throat, tinnitus, trouble swallowing and voice change. Eyes: Negative for photophobia, pain, discharge, redness, itching and visual disturbance. Respiratory: Negative for apnea, cough, choking, chest tightness, shortness of breath, wheezing and stridor. Cardiovascular: Negative for chest pain, palpitations and leg swelling. Gastrointestinal: Negative for abdominal distention, abdominal pain, anal bleeding, blood in stool, constipation, diarrhea, nausea, rectal pain and vomiting. Endocrine: Negative for cold intolerance, heat intolerance, polydipsia, polyphagia and polyuria. Genitourinary: Negative for decreased urine volume, difficulty urinating, dysuria, enuresis, flank pain, frequency, genital sores, hematuria and urgency. Musculoskeletal: Negative for arthralgias, back pain, gait problem, joint swelling, myalgias, neck pain and neck stiffness. Skin: Negative for color change, pallor, rash and wound. Allergic/Immunologic: Negative for environmental allergies, food allergies and immunocompromised state. Neurological: Negative for dizziness, tremors, seizures, syncope, facial asymmetry, speech difficulty, weakness, light-headedness, numbness and headaches. Hematological: Negative for adenopathy. Does not bruise/bleed easily.    Psychiatric/Behavioral: Negative for agitation, behavioral problems, confusion, decreased concentration, dysphoric mood, hallucinations, self-injury, sleep disturbance and suicidal ideas. The patient is not nervous/anxious and is not hyperactive.       /62 (Site: Right Upper Arm, Position: Sitting, Cuff Size: Medium Adult)   Pulse 79   Temp 97.2 °F (36.2 °C) (Temporal)   Resp 18   Ht 5' 6\" (1.676 m)   Wt 126 lb 8 oz (57.4 kg)   SpO2 96%   BMI 20.42 kg/m²     Objective:   Physical Exam    Assessment:            Plan:              Rachid Oneill LPN

## 2021-04-21 NOTE — LETTER
[address] [facility]  Srini Urbina MD [facility]  Phone- [phone]  Fax [phone]    Pt Name: Gabriela Jenkins Record Number: [id number]  Date of Birth 1960   Today's Date: 4/21/2021    Merari Villa was evaluated in the office today. My assessment and plans are listed below. Assessment:     Rosa Porras was seen today for surgical consult. Diagnoses and all orders for this visit:    Primary cancer of left upper lobe of lung ([address]  -     MT THORACOSCOPY W/LOBECTOMY SINGLE LOBE  -     COVID-19; Future  -     Type and crossmatch  -     MT NERVOUS SYSTEM SURGERY UNLISTED    Other emphysema ([address]    Smoking greater than 40 pack years         Plan:  1. Schedule Noemi for   1. EBUS for mediastinal lymphadenopathy evaluation by Dr. Will Gillespie  2. .  Cardiac clearance as CT scan shows small pericardial effusion  3. When medically cleared and the EBUS does not show N2 disease, she be scheduled for robotic assisted LEFT upper lobectomy and mediastinal dissection possible open and cryotherapy of intercostal nerves Dr Cinda Garcia  4. Type and cross 2 units of blood  5. Preop COVID-19 testing  Potential diagnostic and therapeutic modalities were discussed with the patient including surgical and nonsurgical options. The risks, complications and benefits of the options were discussed. Complications including, but not limited to, bleeding, wound infection,lung staple leak, blood clots, , other organ injury, conversion to open, and need for further surgery were reviewed. The patient was given an opportunity to ask questions. Once answered, the patient is agreeable to proceed with surgery. The advantages and disadvantages of using this robotic technology were discussed with the patient including but not limited to technical issues, limitations with exposure and potential conversion to an open procedure. The patient was given an opportunity to ask questions.  Once answered, the patient is UNLISTED                If I can provide any additional assistance or you have any concerns, please feel free to contact me. Thank you for allowing to participate in the care of your patients. Sincerely,      Virgen Isaac MD [facility]  [id number] [facility], [address]  Office: [phone]  Fax: [phone]

## 2021-04-22 ENCOUNTER — TELEPHONE (OUTPATIENT)
Dept: SURGERY | Age: 61
End: 2021-04-22

## 2021-04-22 ENCOUNTER — OFFICE VISIT (OUTPATIENT)
Dept: CARDIOLOGY CLINIC | Age: 61
End: 2021-04-22
Payer: COMMERCIAL

## 2021-04-22 VITALS
HEIGHT: 66 IN | DIASTOLIC BLOOD PRESSURE: 92 MMHG | HEART RATE: 65 BPM | SYSTOLIC BLOOD PRESSURE: 132 MMHG | BODY MASS INDEX: 20.41 KG/M2 | WEIGHT: 127 LBS

## 2021-04-22 DIAGNOSIS — R06.09 DOE (DYSPNEA ON EXERTION): ICD-10-CM

## 2021-04-22 DIAGNOSIS — Z01.818 PRE-OP EVALUATION: Primary | ICD-10-CM

## 2021-04-22 DIAGNOSIS — R00.2 INTERMITTENT PALPITATIONS: ICD-10-CM

## 2021-04-22 DIAGNOSIS — R94.31 ABNORMAL EKG: ICD-10-CM

## 2021-04-22 DIAGNOSIS — Z72.0 TOBACCO ABUSE: ICD-10-CM

## 2021-04-22 PROCEDURE — 93000 ELECTROCARDIOGRAM COMPLETE: CPT | Performed by: INTERNAL MEDICINE

## 2021-04-22 PROCEDURE — 99204 OFFICE O/P NEW MOD 45 MIN: CPT | Performed by: INTERNAL MEDICINE

## 2021-04-22 NOTE — PROGRESS NOTES
Chief Complaint   Patient presents with    New Patient    Cardiac Clearance     New patient for pre op eval for lung Ca surgery    Pre-op for left upper lobe lung removal with Dr. Henny Grullon on 5-3-2021. dxed with lung ca 2021    EKG done today.     Denied chest pain, dizziness or edema    Occasional palpitations    KERR  Hx of mod  To severe COPD under F/u with pulm    Lost wt and cough and CT scan and noted lung nodule and Bx done    Smokes    FHX  Brother had and father had heart problem      Patient Active Problem List   Diagnosis    Primary cancer of left upper lobe of lung ([address]    Other emphysema ([address]    Smoking greater than 40 pack years    Pre-op evaluation-for lung ca    Tobacco abuse    KERR (dyspnea on exertion)    Intermittent palpitations    Borderline Abnormal EKG with rsr'       Past Surgical History:   Procedure Laterality Date     SECTION      x4    CHOLECYSTECTOMY      COLONOSCOPY  2016    CT NEEDLE BIOPSY LUNG PERCUTANEOUS  2021    CT NEEDLE BIOPSY LUNG PERCUTANEOUS 3/26/2021 [facility] CT SCAN    HYSTERECTOMY      UPPER GASTROINTESTINAL ENDOSCOPY  2016       Allergies   Allergen Reactions    Tylenol With Codeine #3 [Acetaminophen-Codeine] Nausea And Vomiting        Family History   Problem Relation Age of Onset    Other Mother         aneursym    No Known Problems Father     Depression Sister     Heart Disease Brother     Diabetes Brother     Other Brother         Lung disease    Lung Cancer Maternal Grandfather     Other Brother         Crohns    Macular Degen Brother     Depression Sister     Other Sister         Blood clots    Arthritis Sister     Brain Cancer Paternal Uncle         Social History     Socioeconomic History    Marital status:      Spouse name: Not on file    Number of children: Not on file    Years of education: Not on file    Highest education level: Not on file   Occupational History    Not on file   Social negative  Neurological ROS: no TIA or stroke symptoms  Dermatological ROS: negative      Blood pressure (!) 132/92, pulse 65, height 5' 6\" (1.676 m), weight 127 lb (57.6 kg). Physical Examination:    General appearance - alert, well appearing, and in no distress  HEENT- Pink conjunctiva  , Non-icteri sclera,PERRLA  Mental status - alert, oriented to person, place, and time  Neck - supple, no significant adenopathy, no JVD, or carotid bruits  Chest - clear to auscultation, no wheezes, rales or rhonchi, symmetric air entry  Heart - normal rate, regular rhythm, normal S1, S2, no murmurs, rubs, clicks or gallops  Abdomen - soft, nontender, nondistended, no masses or organomegaly  BERNARD- no CVA or flank tenderness, no suprapubic tenderness  Neurological - alert, oriented, normal speech, no focal findings or movement disorder noted  Musculoskeletal/limbs - no joint tenderness, deformity or swelling   - peripheral pulses normal, no pedal edema, no clubbing or cyanosis  Skin - normal coloration and turgor, no rashes, no suspicious skin lesions noted  Psych- appropriate mood and affect    Lab  No results for input(s): CKTOTAL, CKMB, CKMBINDEX, TROPONINI in the last 72 hours.   CBC:   Lab Results   Component Value Date    WBC 4.9 04/19/2021    RBC 4.33 04/19/2021    RBC 4.63 10/10/2017    HGB 13.9 04/19/2021    HCT 42.3 04/19/2021    MCV 98 04/19/2021    MCH 32.1 04/19/2021    MCHC 32.9 04/19/2021    RDW 13.1 04/19/2021     04/19/2021    MPV 12.4 04/19/2021     BMP:    Lab Results   Component Value Date     04/19/2021     10/10/2017    K 5.2 04/19/2021    K 4.6 10/10/2017     10/10/2017    CO2 29 10/10/2017    BUN 11 10/10/2017    LABALBU 4.7 04/19/2021    CREATININE 1.0 04/19/2021    CREATININE 0.8 03/26/2021    CALCIUM 9.7 10/10/2017    LABGLOM 73 03/26/2021    GLUCOSE 79 10/10/2017     Hepatic Function Panel:    Lab Results   Component Value Date    ALKPHOS 87 04/19/2021    ALT 11 04/19/2021 AST 14 04/19/2021    PROT 6.9 04/19/2021    BILITOT 0.3 04/19/2021    BILIDIR <0.2 04/19/2021    LABALBU 4.7 04/19/2021     Magnesium:  No results found for: MG  Warfarin PT/INR:  No components found for: PTPATWAR, PTINRWAR  HgBA1c:  No results found for: LABA1C  FLP:  No results found for: TRIG, HDL, LDLCALC, LDLDIRECT, LABVLDL  TSH:    Lab Results   Component Value Date    TSH 4.750 10/10/2017       ekg 4/22/21  NSR,RSR' no acute abn    Assessment   Diagnosis Orders   1. Pre-op evaluation-for lung ca  Stress test, lexiscan    ECHO Complete 2D W Doppler W Color   2. Tobacco abuse  Stress test, lexiscan    ECHO Complete 2D W Doppler W Color   3. KERR (dyspnea on exertion)  Stress test, lexiscan    ECHO Complete 2D W Doppler W Color   4. Intermittent palpitations  Stress test, lexiscan   5. Borderline Abnormal EKG with rsr'  Stress test, lexiscan       -3.9 x 2.1 cm spiculated cavitary mass present within the left upper lobe S/p CT guided biopsy by IR service on 3/26/2021. The biopsy is consistent with Adenocarcinoma.         Plan     meds and labs reviewed    Pre op eval  Minimal abn ekg  KERR  COPD  Tobacco abuse  Need echo  catracho nuc stress  If the test is okay may proceed with mod risk    Dseb the kar the plan of care    RTC in 2 months          [facility]

## 2021-04-26 LAB
FUNGUS IDENTIFIED: NORMAL
FUNGUS SMEAR: NORMAL

## 2021-04-27 ENCOUNTER — HOSPITAL ENCOUNTER (OUTPATIENT)
Dept: NON INVASIVE DIAGNOSTICS | Age: 61
Discharge: HOME | End: 2021-04-27
Payer: COMMERCIAL

## 2021-04-27 ENCOUNTER — TELEPHONE (OUTPATIENT)
Dept: SURGERY | Age: 61
End: 2021-04-27

## 2021-04-27 DIAGNOSIS — Z72.0 TOBACCO ABUSE: ICD-10-CM

## 2021-04-27 DIAGNOSIS — Z01.818 PRE-OP EVALUATION: ICD-10-CM

## 2021-04-27 DIAGNOSIS — R06.09 DOE (DYSPNEA ON EXERTION): ICD-10-CM

## 2021-04-27 LAB
LV EF: 60 %
LVEF MODALITY: NORMAL

## 2021-04-27 PROCEDURE — 93306 TTE W/DOPPLER COMPLETE: CPT

## 2021-04-29 ENCOUNTER — HOSPITAL ENCOUNTER (OUTPATIENT)
Dept: NON INVASIVE DIAGNOSTICS | Age: 61
Discharge: HOME | End: 2021-04-29
Payer: COMMERCIAL

## 2021-04-29 DIAGNOSIS — R00.2 INTERMITTENT PALPITATIONS: ICD-10-CM

## 2021-04-29 DIAGNOSIS — Z01.818 PRE-OP EVALUATION: ICD-10-CM

## 2021-04-29 DIAGNOSIS — R94.31 ABNORMAL EKG: ICD-10-CM

## 2021-04-29 DIAGNOSIS — Z72.0 TOBACCO ABUSE: ICD-10-CM

## 2021-04-29 DIAGNOSIS — R06.09 DOE (DYSPNEA ON EXERTION): ICD-10-CM

## 2021-04-29 PROCEDURE — A9500 TC99M SESTAMIBI: HCPCS | Performed by: INTERNAL MEDICINE

## 2021-04-29 PROCEDURE — 3430000000 HC RX DIAGNOSTIC RADIOPHARMACEUTICAL: Performed by: INTERNAL MEDICINE

## 2021-04-29 PROCEDURE — 93017 CV STRESS TEST TRACING ONLY: CPT | Performed by: INTERNAL MEDICINE

## 2021-04-29 PROCEDURE — 78452 HT MUSCLE IMAGE SPECT MULT: CPT

## 2021-04-29 PROCEDURE — 6360000002 HC RX W HCPCS

## 2021-04-29 RX ADMIN — Medication 9.1 MILLICURIE: at 07:25

## 2021-04-29 RX ADMIN — Medication 33.6 MILLICURIE: at 08:35

## 2021-04-30 ENCOUNTER — TELEPHONE (OUTPATIENT)
Dept: SPIRITUAL SERVICES | Facility: CLINIC | Age: 61
End: 2021-04-30

## 2021-04-30 ENCOUNTER — TELEPHONE (OUTPATIENT)
Dept: SURGERY | Age: 61
End: 2021-04-30

## 2021-04-30 NOTE — TELEPHONE ENCOUNTER
made contacted Tavares Sinha  via telephone call concerning the progress of her cancer treatments. And to offer spiritual support. Her treatments hasn't started and her surgery postponed. Her  shared that the cancer has spread to lymph nodes. Very disappointing to the family.

## 2021-05-01 ENCOUNTER — HOSPITAL ENCOUNTER (OUTPATIENT)
Age: 61
Discharge: HOME | DRG: 164 | End: 2021-05-01
Payer: COMMERCIAL

## 2021-05-01 LAB
ABO: NORMAL
ANTIBODY SCREEN: NORMAL
INFLUENZA A: NOT DETECTED
INFLUENZA B: NOT DETECTED
RH FACTOR: NORMAL
SARS-COV-2 RNA, RT PCR: NOT DETECTED

## 2021-05-01 PROCEDURE — 86901 BLOOD TYPING SEROLOGIC RH(D): CPT

## 2021-05-01 PROCEDURE — 86850 RBC ANTIBODY SCREEN: CPT

## 2021-05-01 PROCEDURE — 86900 BLOOD TYPING SEROLOGIC ABO: CPT

## 2021-05-01 PROCEDURE — 87636 SARSCOV2 & INF A&B AMP PRB: CPT

## 2021-05-01 PROCEDURE — 86923 COMPATIBILITY TEST ELECTRIC: CPT

## 2021-05-03 ENCOUNTER — OFFICE VISIT (OUTPATIENT)
Dept: ONCOLOGY | Age: 61
End: 2021-05-03
Payer: COMMERCIAL

## 2021-05-03 ENCOUNTER — HOSPITAL ENCOUNTER (OUTPATIENT)
Dept: INFUSION THERAPY | Age: 61
Discharge: HOME | End: 2021-05-03
Payer: COMMERCIAL

## 2021-05-03 ENCOUNTER — ANESTHESIA EVENT (OUTPATIENT)
Dept: OPERATING ROOM | Age: 61
DRG: 164 | End: 2021-05-03
Payer: COMMERCIAL

## 2021-05-03 ENCOUNTER — CLINICAL DOCUMENTATION (OUTPATIENT)
Dept: CASE MANAGEMENT | Age: 61
End: 2021-05-03

## 2021-05-03 VITALS
HEIGHT: 66 IN | WEIGHT: 125.8 LBS | HEART RATE: 68 BPM | TEMPERATURE: 97.7 F | BODY MASS INDEX: 20.22 KG/M2 | SYSTOLIC BLOOD PRESSURE: 115 MMHG | RESPIRATION RATE: 18 BRPM | OXYGEN SATURATION: 98 % | DIASTOLIC BLOOD PRESSURE: 80 MMHG

## 2021-05-03 DIAGNOSIS — C34.92 PRIMARY ADENOCARCINOMA OF LEFT LUNG (HCC): Primary | ICD-10-CM

## 2021-05-03 PROCEDURE — 99211 OFF/OP EST MAY X REQ PHY/QHP: CPT

## 2021-05-03 PROCEDURE — 99215 OFFICE O/P EST HI 40 MIN: CPT | Performed by: INTERNAL MEDICINE

## 2021-05-03 NOTE — H&P
Colleen Navarro MD [facility]  General Surgery  H and P for Surgery   Pt Name: Jarrod Gregory  Date of Birth 1960   Today's Date: 5/3/2021  Medical Record Number: [id number]  Referring Provider: No ref. provider found  Primary Care Provider: Anne-Marie Varela MD  No chief complaint on file. ASSESSMENT      Problem List Items Addressed This Visit     None        Past Medical History:   Diagnosis Date    Arthritis     [facility] obstructive [facility] disease) ([facility]     [facility]     Dr Manjula Tse      1. Schedule Noemi for   1. EBUS for mediastinal lymphadenopathy evaluation by Dr. Eugenia Ruvalcaba  2. .  Cardiac clearance as CT scan shows small pericardial effusion  3. When medically cleared and the EBUS does not show N2 disease, she be scheduled for robotic assisted LEFT upper lobectomy and mediastinal dissection possible open and cryotherapy of intercostal nerves Dr Neville Olea  4. Type and cross 2 units of blood  5. Preop COVID-19 testing  Potential diagnostic and therapeutic modalities were discussed with the patient including surgical and nonsurgical options. The risks, complications and benefits of the options were discussed. Complications including, but not limited to, bleeding, wound infection,lung staple leak, blood clots, , other organ injury, conversion to open, and need for further surgery were reviewed. The patient was given an opportunity to ask questions. Once answered, the patient is agreeable to proceed with surgery. The advantages and disadvantages of using this robotic technology were discussed with the patient including but not limited to technical issues, limitations with exposure and potential conversion to an open procedure. The patient was given an opportunity to ask questions. Once answered, the patient is agreeable to proceed with a planned robotic assisted approach. (ROBOT)  2. Status: inpatient  3. Planned anesthesia: general  4.  She will undergo pre-operative clearance per anesthesia guidelines with risk factors listed under the past medical history diagnosis & problem list.  5. Perioperative discontinuation of ASA, Plavix, warfarin, Brillinta, Effient, Pradaxa, Eliquis and Xarelto. Continuation of 81 mg Aspirin is acceptable. 6. Perioperative medical clearance is required   7. Orders Placed This Encounter:  No orders of the defined types were placed in this encounter. Inna Menchaca is a 62 y.o. female seen in the office today regarding lung cancer involving LEFT upper lobe. She was initially referred to Dr. Clement Smith pulmonary because work-up by her PCP for generalized weakness and significant weight loss despite a normal appetite a chest x-ray was done on February 26 of 2021. It revealed a 2 cm nodule in the left perihilar region which was new compared to an old x-ray that was performed in 2017. CT scan of the chest subsequently returned a left upper lobe spiculated mass on the CT scan. She does have a chronic cough but no hemoptysis. She does have a smoking history 1 pack/day for 45 years she is still smoking. She states she has had an unintentional 25 pound weight loss in the past 1 year with normal appetite. She has seen the medical oncologist prior. The patient does need an endobronchial ultrasound for mediastinal lymph node evaluation preoperatively. This is scheduled for this Friday the 23rd by Dr. Gris Fitzgerald. She will need cardiac clearance as a pericardial effusion was seen on her CT scan preop.   PFT FEV1= 2.10 post bronchodilator 77% predicted(2.72)          DLCO = 18.02- 67% predicted(27.67)  Calculated PPO FEV1= 1.55 = 57% normal  Calculated PPO DLCO = 13.28=48% normal          Narrative   PROCEDURE: PET CT SKULL BASE TO MID THIGH       CLINICAL INFORMATION: 81-year-old woman with recently diagnosed adenocarcinoma of the left lung; initial treatment strategy.       Radiopharmaceutical: 13.8 mCi F-18 FDG, changes are present in the lumbar spine and pelvis without evidence of    hypermetabolic osseous metastatic disease.           Impression   1. FDG avid left lung mass corresponding to known malignancy. 2. Small, mildly FDG avid right lung nodules, possibly infectious or inflammatory but satellite malignancy cannot be excluded.             Past Medical History  Past Medical History:   Diagnosis Date    Arthritis     COPD (chronic obstructive pulmonary disease) ([facility]     Dr Rob Weaver [facility])     Dr Mihai Archer       Past Surgical History  Past Surgical History:   Procedure Laterality Date     SECTION      x4    CHOLECYSTECTOMY      COLONOSCOPY  2016    CT NEEDLE BIOPSY LUNG PERCUTANEOUS  2021    CT NEEDLE BIOPSY LUNG PERCUTANEOUS 3/26/2021 [facility] CT SCAN    HYSTERECTOMY      UPPER GASTROINTESTINAL ENDOSCOPY  2016       Medications  No current facility-administered medications on file prior to encounter.       Current Outpatient Medications on File Prior to Encounter   Medication Sig Dispense Refill    albuterol sulfate HFA (VENTOLIN HFA) 108 (90 Base) MCG/ACT inhaler Inhale 2 puffs into the lungs 4 times daily as needed for Wheezing 1 Inhaler 11    VITAMIN D PO Take by mouth       Allergies  Allergies   Allergen Reactions    Tylenol With Codeine #3 [Acetaminophen-Codeine] Nausea And Vomiting    Acetaminophen Other (See Comments)    Codeine Other (See Comments)       Family History  Family History   Problem Relation Age of Onset    Other Mother         aneursym    No Known Problems Father     Depression Sister     Heart Disease Brother     Diabetes Brother     Other Brother         Lung disease    Lung Cancer Maternal Grandfather     Other Brother         Crohns    Macular Degen Brother     Depression Sister     Other Sister         Blood clots    Arthritis Sister     Brain Cancer Paternal Uncle        Social History  Social History     Socioeconomic History  Marital status:      Spouse name: Not on file    Number of children: Not on file    Years of education: Not on file    Highest education level: Not on file   Occupational History    Not on file   Social Needs    Financial resource strain: Not on file    Food insecurity     Worry: Not on file     Inability: Not on file    Transportation needs     Medical: Not on file     Non-medical: Not on file   Tobacco Use    Smoking status: Current Every Day Smoker     Packs/day: 1.00     Years: 45.00     Pack years: 45.00     Types: Cigarettes    Smokeless tobacco: Never Used   Substance and Sexual Activity    Alcohol use: Never     Frequency: Never     Binge frequency: Never    Drug use: Yes     Types: Marijuana    Sexual activity: Not on file   Lifestyle    Physical activity     Days per week: Not on file     Minutes per session: Not on file    Stress: Not on file   Relationships    Social connections     Talks on phone: Not on file     Gets together: Not on file     Attends Hoahaoism service: Not on file     Active member of club or organization: Not on file     Attends meetings of clubs or organizations: Not on file     Relationship status: Not on file    Intimate partner violence     Fear of current or ex partner: Not on file     Emotionally abused: Not on file     Physically abused: Not on file     Forced sexual activity: Not on file   Other Topics Concern    Not on file   Social History Narrative    Not on file       [address] Maintenance   Topic Date Due    Pneumococcal 0-64 years Vaccine (1 of 1 - PPSV23) Never done    HIV screen  Never done    COVID-19 Vaccine (1) Never done    DTaP/Tdap/Td vaccine (1 - Tdap) Never done    Cervical cancer screen  Never done    Lipid screen  Never done    Shingles Vaccine (1 of 2) Never done    Colon cancer screen colonoscopy  Never done    Flu vaccine (Season Ended) 09/01/2021    Low dose CT lung screening  04/14/2022    Breast cancer screen  02/26/2023    Hepatitis C screen  Completed    Hepatitis A vaccine  Aged Out    Hepatitis B vaccine  Aged Out    Hib vaccine  Aged Out    Meningococcal (ACWY) vaccine  Aged Out       Review of Systems  Constitutional: Negative for activity change, appetite change, chills, diaphoresis, fatigue, fever and unexpected weight change. HENT: Negative for congestion, dental problem, drooling, ear discharge, ear pain, facial swelling, hearing loss, mouth sores, nosebleeds, postnasal drip, rhinorrhea, sinus pressure, sneezing, sore throat, tinnitus, trouble swallowing and voice change. Eyes: Negative for photophobia, pain, discharge, redness, itching and visual disturbance. Respiratory: Negative for apnea, cough, choking, chest tightness, shortness of breath, wheezing and stridor. Cardiovascular: Negative for chest pain, palpitations and leg swelling. Gastrointestinal: Negative for abdominal distention, abdominal pain, anal bleeding, blood in stool, constipation, diarrhea, nausea, rectal pain and vomiting. Endocrine: Negative for cold intolerance, heat intolerance, polydipsia, polyphagia and polyuria. Genitourinary: Negative for decreased urine volume, difficulty urinating, dysuria, enuresis, flank pain, frequency, genital sores, hematuria and urgency. Musculoskeletal: Negative for arthralgias, back pain, gait problem, joint swelling, myalgias, neck pain and neck stiffness. Skin: Negative for color change, pallor, rash and wound. Allergic/Immunologic: Negative for environmental allergies, food allergies and immunocompromised state. Neurological: Negative for dizziness, tremors, seizures, syncope, facial asymmetry, speech difficulty, weakness, light-headedness, numbness and headaches. Hematological: Negative for adenopathy. Does not bruise/bleed easily.    Psychiatric/Behavioral: Negative for agitation, behavioral problems, confusion, decreased concentration, dysphoric mood, hallucinations, self-injury, sleep disturbance and suicidal ideas. The patient is not nervous/anxious and is not hyperactive      OBJECTIVE    VITALS:  vitals were not taken for this visit. CONSTITUTIONAL: Alert and oriented times 3, no acute distress and cooperative to examination with proper mood and affect. SKIN: Skin color, texture, turgor normal. No rashes or lesions. LYMPH: no cervical nodes, no supraclavicular nodes, no inguinal nodes  HEENT: Head is normocephalic, atraumatic. EOMI, PERRLA. NECK: Supple, symmetrical, trachea midline, no adenopathy, thyroid symmetric, not enlarged and no tenderness, skin normal.  CHEST/LUNGS: chest symmetric with normal A/P diameter, normal respiratory rate and rhythm, lungs clear to auscultation without wheezes, rales or rhonchi. No accessory muscle use. Scars None   CARDIOVASCULAR: Heart sounds are normal.  Regular rate and rhythm without murmur, gallop or rub. Normal S1 and S2. . Carotid and femoral pulses 2+/4 and equal bilaterally. ABDOMEN: Normal shape. Hysterectomy and Laparoscopic scar(s) present. Normal bowel sounds. No bruits. Clement Fly \"soft, nontender, nondistended, no masses or organomegaly. no evidence of hernia. Percussion: Normal without hepatosplenomegally. Tenderness: absent. RECTAL: deferred, not clinically indicated  NEUROLOGIC: There are no focalizing motor or sensory deficits. CN II-XII are grossly intact. Clement Fly EXTREMITIES: no cyanosis, no clubbing and no edema. Musculoskeletal exam: no joint tenderness, deformity or swelling. Electronically signed by Peña Funez MD on 5/3/2021 at 5:06 PM   The patients records and films were reviewed independently. Time was given for questions . All questions were answered to the patients satisfaction. A total time of 60 minutes  was spent with at least 51% related to counseling and coordination of care.

## 2021-05-03 NOTE — PROGRESS NOTES
[address] CANCER [address] [place] [address]  Dept: [phone]  Loc: [phone]   Hematology/Oncology Consult (Clinic)        4/19/21     Monia Alicia   1960     No ref. provider found   Ana Muñoz MD     DIAGNOSIS:  -  Left upper lobe cavitary adenocarcinoma of the lung. 3.9 x 2.1 cm. PET scan negative. Bronchoscopy EBUS-isolated station 11 lymph node with a moderate to poorly differentiated adenocarcinoma. 4L node with a minute focus of atypical cells on path review felt to be benign. Clinical stage: T2a N1M0 / IIB. TREATMENT:  -Left upper lobectomy and mediastinal node dissection scheduled by Dr. Greyson Phillip 5/4/2021  -Adjuvant chemotherapy planned    PARAMETERS:  -Chest CT without contrast 3/12/2021  -PET/CT 4/14/2021  -Brain MRI with and without contrast 4/9/2021    SUBJECTIVE: Patient is down to about 5 cigarettes/day. She is scheduled for her left upper lobectomy tomorrow. No new complaints. She is aware that after surgery we will likely propose adjuvant chemotherapy. She will follow-up in 1 month. Chief Complaint   Patient presents with    Follow-up     Primary adenocarcinoma of left lung           HPI: Kt Baez is a 27-year-old female with a 45-pack-year smoking habit (ongoing) with a chest CT without contrast on 3/12/2021 revealing a 3.9 x 2.1 cm cavitary mass in the left upper lung corresponding to the abnormality seen on the chest x-ray of 2/26/2021. Radiologically very suspicious. Patient underwent a needle biopsy on 3/26 revealing adenocarcinoma. PET scan shows no distant mets or adenopathy. PFT's done and reviewed below. She is waiting  for a consult with Dr Greyson Phillip of Surgery. Reports 30 # weight loss ove 9 months; unintentional. Normal weight @ 145. Appetite has remained good. Chronic cough unchanged, no blood. No chest pain or pain elsewhere. Denied increased sob.   Average 1ppd x 45 years; now about 5 cigs/day . Motivated to stop but failed Nicotine replacement products. Chantix not attempted thus far. ROS:  Review of Systems 14 point negative except as above. Limited to unintentional weight loss. Chronic unchanged nonproductive cough and shortness of breath with activity also unchanged.     PMH:   Past Medical History:   Diagnosis Date    Arthritis     COPD (chronic obstructive pulmonary disease) ([facility])     Dr Rob Weaver [facility])     Dr Mihai Archer   Aneurysm    Social HX:   Social History     Socioeconomic History    Marital status:      Spouse name: Not on file    Number of children: Not on file    Years of education: Not on file    Highest education level: Not on file   Occupational History    Not on file   Social Needs    Financial resource strain: Not on file    Food insecurity     Worry: Not on file     Inability: Not on file    Transportation needs     Medical: Not on file     Non-medical: Not on file   Tobacco Use    Smoking status: Current Every Day Smoker     Packs/day: 1.00     Years: 45.00     Pack years: 45.00     Types: Cigarettes    Smokeless tobacco: Never Used   Substance and Sexual Activity    Alcohol use: Never     Frequency: Never     Binge frequency: Never    Drug use: Yes     Types: Marijuana    Sexual activity: Not on file   Lifestyle    Physical activity     Days per week: Not on file     Minutes per session: Not on file    Stress: Not on file   Relationships    Social connections     Talks on phone: Not on file     Gets together: Not on file     Attends Mosque service: Not on file     Active member of club or organization: Not on file     Attends meetings of clubs or organizations: Not on file     Relationship status: Not on file    Intimate partner violence     Fear of current or ex partner: Not on file     Emotionally abused: Not on file     Physically abused: Not on file     Forced sexual activity: Not on file   Other Topics Concern  Not on file   Social History Narrative    Not on file      45 pack year smoking history, currently down to 5 cigarettes/day and attempting to stop. 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 Health Screenings:  Mammogram:  No results found for this or any previous visit. Results for orders placed during the hospital encounter of 21   [facility] BOUCHRA DIGITAL SCREEN BILATERAL    Narrative IMPRESSION: Mammogram BI-RADS: Category 1: Negative    There is no mammographic evidence of malignancy. A 1 year   screening mammogram is recommended. The patient has been entered   into our database and they will receive a notification when they   are due for their next exam.      The patient was notified of the results. #[id number] - [facility] BOUCHRA DIGITAL SCREEN BILATERAL  BILATERAL DIGITAL SCREENING MAMMOGRAM 3D/2D WITH CAD: 2021  CLINICAL: Tomosynthesis. Screening. Comparison is made to exam dated:  2011 mammogram - [facility]. The tissue of both breasts is heterogeneously dense. This may   lower the sensitivity of mammography. Current study was also evaluated with a Computer Aided Detection   (CAD) system. No significant masses, calcifications, or other findings are seen   in either breast.    There has been no significant interval change. Dr. Powell Ganser nn/nacho:2021 13:05:44      Imaging Technologist: Emmett Dave RT(R)(M), [facility]  letter sent: Normal-All Doctor Names    [id number]        Pap / Pelvic:na  C-Scope: Approximately 5 years ago with 2 benign polyps      Gyn HX:   GPA:  DARREL/BSO:31 years ago  LMP: No LMP recorded (lmp unknown). Patient has had a hysterectomy.      Health Maintenance Due   Topic Date Due    Pneumococcal 0-64 years Vaccine (1 of 1 - PPSV23) Never done    HIV screen  Never done    COVID-19 Vaccine (1) Never done    DTaP/Tdap/Td vaccine (1 - Tdap) Never done    Cervical cancer screen  Never done    Lipid screen  Never done    Shingles Vaccine (1 of 2) Never done    Colon cancer screen colonoscopy  Never done        Interests:   none    Fam HX:   Family History   Problem Relation Age of Onset    Other Mother         aneursym    No Known Problems Father     Depression Sister     Heart Disease Brother     Diabetes Brother     Other Brother         Lung disease    Lung Cancer Maternal Grandfather     Other Brother         Crohns    Macular Degen Brother     Depression Sister     Other Sister         Blood clots    Arthritis Sister     Brain Cancer Paternal Uncle         Hospitalizations:   None recent    Allergies:   Allergies   Allergen Reactions    Tylenol With Codeine #3 [Acetaminophen-Codeine] Nausea And Vomiting    Acetaminophen Other (See Comments)    Codeine Other (See Comments)        Adult Illness:  Patient Active Problem List   Diagnosis    Primary cancer of left upper lobe of lung ([facility] [address]    Other emphysema (HCC)    Smoking greater than 40 pack years    Pre-op evaluation-for lung ca    Tobacco abuse    KERR (dyspnea on exertion)    Intermittent palpitations    Borderline Abnormal EKG with rsr'        Surgery:  Past Surgical History:   Procedure Laterality Date     SECTION      x4    CHOLECYSTECTOMY      COLONOSCOPY  2016    CT NEEDLE BIOPSY LUNG PERCUTANEOUS  2021    CT NEEDLE BIOPSY LUNG PERCUTANEOUS 3/26/2021 [facility] CT SCAN    HYSTERECTOMY      UPPER GASTROINTESTINAL ENDOSCOPY  2016        Medications:  Current Outpatient Medications   Medication Sig Dispense Refill    albuterol sulfate HFA (VENTOLIN HFA) 108 (90 Base) MCG/ACT inhaler Inhale 2 puffs into the lungs 4 times daily as needed for Wheezing 1 Inhaler 11    VITAMIN D PO Take by mouth       No current facility-administered medications for this visit. EXAM:    /80 (Site: Left Upper Arm, Position: Sitting, Cuff Size: Medium Adult)   Pulse 68   Temp 97.7 °F (36.5 °C) (Infrared)   Resp 18   Ht 5' 6\" (1.676 m)   Wt 125 lb 12.8 oz (57.1 kg)   LMP  (LMP Unknown)   SpO2 98%   BMI 20.30 kg/m²      ECO  General: Non-ill appearing. Appears comfortable and in no distress. HEENT: NC/AT,nonicteric, perrla,eom intact, no mucosal lesions  Neck: normal thyroid, no masses. pulses nl, no bruits,   Nodes: No adenopathy  Lungs/chest: clear, no rales,rhonchi or wheezing, lung bases clear  CV: rrr, no rubs ,gallops or murmurs  Breasts: Not examined  Abd/Rectal: soft, non-tender,bowel sounds normal , no HSM,no masses  Back: normal curvature, No midline tenderness. flanks nontender  : Not Examined  Extremities: no cyanosis,clubbing or edema. Skin: unremarkable  Neuro: A and O x 4, CN exam nonfocal, Motor- no deficits, Sensory- no deficits, gait-nl, speech- fluent, no ataxia.   Devices: none      DATA:    LAB: CBC, CMP pending today  CBC with Differential:      Lab Results   Component Value Date    WBC 4.9 2021    RBC 4.33 2021    RBC 4.63 10/10/2017    HGB 13.9 2021    HCT 42.3 2021     2021    MCV 98 2021    MCH 32.1 2021    MCHC 32.9 2021    RDW 13.1 2021    NRBC 0 2014    SEGSPCT 92.9 2014    LYMPHOPCT 15.8 10/10/2017    MONOPCT 3.0 10/10/2017    EOSPCT 1.4 10/10/2017    BASOPCT 0.7 10/10/2017    MONOSABS 0.3 2021    LYMPHSABS 0.9 2021    EOSABS 0.1 2021    BASOSABS 0.1 2021       CMP:    Lab Results   Component Value Date     2021     10/10/2017    K 5.2 2021    K 4.6 10/10/2017     10/10/2017    CO2 29 10/10/2017    BUN 11 10/10/2017    CREATININE 1.0 2021    CREATININE 0.8 2021    LABGLOM 73 2021    GLUCOSE 79 10/10/2017    PROT 6.9 04/19/2021    LABALBU 4.7 04/19/2021    CALCIUM 9.7 10/10/2017    BILITOT 0.3 04/19/2021    ALKPHOS 87 04/19/2021    AST 14 04/19/2021    ALT 11 04/19/2021       BMP:    Lab Results   Component Value Date     04/19/2021     10/10/2017    K 5.2 04/19/2021    K 4.6 10/10/2017     10/10/2017    CO2 29 10/10/2017    BUN 11 10/10/2017    LABALBU 4.7 04/19/2021    CREATININE 1.0 04/19/2021    CREATININE 0.8 03/26/2021    CALCIUM 9.7 10/10/2017    LABGLOM 73 03/26/2021    GLUCOSE 79 10/10/2017       Magnesium:  No results found for: MG  PT/INR:  No results found for: PROTIME, INR  TSH:    Lab Results   Component Value Date    TSH 4.750 10/10/2017           IMAGING:  PROCEDURE: XR CHEST (2 VW)       2/26/2021       CLINICAL INFORMATION: Cough       COMPARISON: 10/10/2017       TECHNIQUE: PA and lateral views of the chest were obtained.               Impression   1. Normal heart size. No acute infiltrates or effusions are seen. 2. Interval development of a 2 cm nodule left perihilar region since prior study which should be considered malignant until proven otherwise. CT thorax recommended for further evaluation.                 Radiological Data:  CT scan of chest without contrast.  Fri Mar 12, 2021  8:18 AM   PROCEDURE: CT CHEST WO CONTRAST   CLINICAL INFORMATION: Pulmonary nodule   COMPARISON: Chest x-ray dated 2/26/2021   1. There is a cavitary mass demonstrated within the left upper lobe with peripheral mild spiculation measuring 3.9 x 2.1 cm on axial image 34. This corresponds with previous chest x-ray dated 2/26/2021. This is concerning for malignancy until proven otherwise. Further evaluation could be obtained with PET/CT and/or biopsy. An infectious process is also not excluded.   2. There are additional smaller groundglass nodular densities within the right lung as detailed above.   3. Small pericardial effusion is noted.  This is nonspecific.          Ct Needle Biopsy Lung Percutaneous  Result Date: 3/26/2021  1. Status post successful CT guided lung biopsy. 2. Followup chest radiographs will be obtained. **This report has been created using voice recognition software. It may contain minor errors which are inherent in voice recognition technology. ** Final report electronically signed by Dr. Moisés Donohue on 3/26/2021 4:02 PM    PROCEDURE: PET CT SKULL BASE TO MID THIGH       CLINICAL INFORMATION: 27-year-old woman with recently diagnosed adenocarcinoma of the left lung; initial treatment strategy.       Radiopharmaceutical: 13.8 mCi F-18 FDG, intravenously.       TECHNIQUE: PET/CT imaging was performed from the skull base to the midthigh levels using routine PET acquisition.       Injection site: Left antecubital fossa.       Time of FDG injection: 7:37 AM.       Serum glucose: 86 mg/dL at 7:35 AM.       Time of imagin:37 AM.       COMPARISON: CT chest 3/11/2021       FINDINGS:        Neck: No FDG avid cervical lymphadenopathy. Physiologic activity is noted bilaterally in the lower neck.       Chest: Cardiac size is normal. Atherosclerotic calcifications are present in the thoracic aorta and coronary arteries. There is stable aneurysmal dilation of the ascending thoracic aorta which measures 4.3 cm in maximum diameter (image 103). There is no    pleural or pericardial effusion. Emphysematous changes are again noted in the bilateral lungs. In the regular mass with areas of cavitation in the lingula is stable in size, again measuring approximately 3.7 cm and is associated with a maximum SUV of 6.7    (image 107). Adjacent opacities are likely postobstructive atelectasis. A 0.4 cm nodule at the right lung apex has a maximum SUV of 1.9 (image 70). A 0.4 cm nodule in the right upper lobe has a maximum SUV of 0.9 (image 80). An indistinct 0.5 cm nodular    density at the right mid lung is not associated with activity above background. There is no FDG avid mediastinal, hilar or axillary lymphadenopathy. Degenerative changes are present in the thoracic spine without evidence of hypermetabolic osseous    metastatic disease.       Abdomen/pelvis: Physiologic activity is present in the liver, spleen, urinary collecting system and gastrointestinal tract. The gallbladder is surgically absent. Atherosclerotic calcifications are seen in the abdominal aorta without evidence of aneurysm.    The urinary bladder is unremarkable. There are phleboliths in the pelvis. There is no FDG avid mesenteric, retroperitoneal, pelvic or inguinal lymphadenopathy. Degenerative changes are present in the lumbar spine and pelvis without evidence of    hypermetabolic osseous metastatic disease.           Impression   1. FDG avid left lung mass corresponding to known malignancy. 2. Small, mildly FDG avid right lung nodules, possibly infectious or inflammatory but satellite malignancy cannot be excluded.       Final report electronically signed by Dr. Joni Henson on 4/14/2021 10:02 AM       Mri Brain W Wo Contrast  Result Date: 4/9/2021   1. No evidence of intracranial metastases or other acute intracranial abnormality. 2. Moderate severity chronic microvascular angiopathy. 3. Mild to moderate mucosal inflammation in the paranasal sinuses with air-fluid levels as evidence for an acute component. **This report has been created using voice recognition software. It may contain minor errors which are inherent in voice recognition technology. ** Final report electronically signed by Dr. Authur Lombard, MD on 4/9/2021 1:50 PM           PROCEDURES:  -Core biopsy cavitary left upper lobe mass 3/26/2021    -Full pulmonary function study with bronchodilators completed 4/6/2021  FEV1 1.68 (61% predicted 25% change with bronchodilator. FVC 2.62, 75% predicted 15% change with bronchodilator  FEV1 over FVC ratio 64  Interpretation-airway obstruction. FVC is reduced relative to the SVC indicating air trapping.   While the TLC is within normal limits, the FRC, RV and RV/TLC ratio are increased. Follow administration of bronchodilators there is a good response. Reduced diffusing capacity indicates had mild loss of functional alveolar capillary surface. PATHOLOGY:   3/26/2021  Clinical Information: CAVITATING MASS SHANNON, SMOKER     FINAL DIAGNOSIS:   Lung, left, needle core biopsies:       Adenocarcinoma. Specimen:   BIOPSY OF LUNG, LEFT      Microscopic Examination:   The specimen consists of several scant fragments of lung tissue with   infiltrating malignant glands consistent with adenocarcinoma. Kreg Bull is   also anthracotic pigment. Bronchoscopy EBUS 4/27/2021  [facility]ulmonary at [facility]  4R node-negative,   station 7 node negative,   station 4L-minute group of atypical cells. Station 11 poorly differentiated moderately differentiated adenocarcinoma  Note: Dr. Elsa Bosch had the pathology reviewed elsewhere and it was felt that the station 4L lymph node unequivocally did NOT show malignancy. GENETICS:  Not applicable    MOLECULAR:  Not applicable i.e. patient does not have metastatic disease at this time    ASSESSMENT/PLAN:    1: Diagnosis: 50-year-old female with a heavy smoking history with 3.9 cm cavitary adenocarcinoma of the left upper lung. PET scan shows no hypermetabolic adenopathy or distant mets. Brain MRI shows no mets. [facility] bronchoscopy EBUS as reported above shows T2 a N1 M0/IIB disease. The L4 Station node on reviewed and felt to be benign. 2) Prognosis / Disease Status: Potentially curable. 3) Work-up:    Labs: None today   Imaging: All completed   Procedures: Scheduled tomorrow May 4 for left upper lobectomy and mediastinal node dissection   Consults: None   Other: PFTs reviewed and summarized above with good bronchodilator effect. Defer to surgery and  pulmonary but appears to be medically operable. 4) Symptom Management: None needed at this time. 5) Supportive care provided.        Level of care is appropriate. Teaching done today. It was as the importance of stopping smoking. She is also informed that she will likely need adjuvant chemotherapy. 6) Treatment goal:      Treatment plan:      Left upper lobectomy and mediastinal node dissection. To be followed by adjuvant chemotherapy. 7) Medications reviewed. Prescriptions today: None            No orders of the defined types were placed in this encounter. OARRS:  Controlled Substance Monitoring:    Acute and Chronic Pain Monitoring:   No flowsheet data found. 8) Research Options:      Not applicable      9) Other:   Case reviewed with Dr. Roberto Bernheim of surgery today. 10) Follow Up: Follow-up with me in 4 weeks. I spent 40 minutes face-to-face with the patient and family. Greater than 50% of time spent on counseling and coordinating the patient's care. Face-to-face counseling included prognosis, review of risks and benefits of any treatment along with compliance and risk reduction.     Everardo Overton MD

## 2021-05-03 NOTE — PROGRESS NOTES
Patient contacted regarding COVID-19 screen. Test was done   Following questions asked: In the last month, have you been in contact with someone who was confirmed or suspected to have Coronavirus/COVID-19:  Patient stated NO      Pt was informed can be a visitor allowed. Please bring masks. Do you or family members have any of the following symptoms:  Cough-no   Muscle pain-no   Shortness of breath-no   Fever-no   Weakness-no  Severe headache-no   Sore throat-no   Respiratory symptoms-no    Have you traveled internationally in the last month?  No     Have you been to the emergency room recently-no

## 2021-05-03 NOTE — PROGRESS NOTES
Name: Judi Perez  : 1960  MRN: [id number]    Oncology Navigation Follow-Up Note    Contact Type:  Medical Oncology    Notes:   Briefly met with Mary Crews and , Med [facility]ino, after follow up with Dr. Marylen Ates.  -  Left upper lobe cavitary adenocarcinoma of the lung. 3.9 x 2.1 cm. PET scan negative. Bronchoscopy EBUS-isolated station 11 lymph node with a moderate to poorly differentiated adenocarcinoma. 4L node with a minute focus of atypical cells on path review felt to be benign. Clinical stage: T2a N1M0 / IIB.    TREATMENT:  -Left upper lobectomy and mediastinal node dissection scheduled by Dr. Marcel Shahid 2021  -Adjuvant chemotherapy planned    Appointment with Dr. Marylen Ates post surgery 6/3. Will follow and they know to call with any questions or concerns.     Electronically signed by Anatoly Macedo RN on 5/3/2021 at 4:32 PM

## 2021-05-04 ENCOUNTER — HOSPITAL ENCOUNTER (INPATIENT)
Age: 61
LOS: 7 days | Discharge: OTHER INSTITUTION | DRG: 164 | End: 2021-05-11
Attending: SURGERY | Admitting: SURGERY
Payer: COMMERCIAL

## 2021-05-04 ENCOUNTER — ANESTHESIA (OUTPATIENT)
Dept: OPERATING ROOM | Age: 61
DRG: 164 | End: 2021-05-04
Payer: COMMERCIAL

## 2021-05-04 ENCOUNTER — APPOINTMENT (OUTPATIENT)
Dept: GENERAL RADIOLOGY | Age: 61
DRG: 164 | End: 2021-05-04
Attending: SURGERY
Payer: COMMERCIAL

## 2021-05-04 VITALS — OXYGEN SATURATION: 100 % | TEMPERATURE: 95.4 F | DIASTOLIC BLOOD PRESSURE: 61 MMHG | SYSTOLIC BLOOD PRESSURE: 111 MMHG

## 2021-05-04 PROBLEM — Z90.2 S/P LOBECTOMY OF LUNG: Status: ACTIVE | Noted: 2021-05-04

## 2021-05-04 PROCEDURE — 7100000001 HC PACU RECOVERY - ADDTL 15 MIN: Performed by: SURGERY

## 2021-05-04 PROCEDURE — 99253 IP/OBS CNSLTJ NEW/EST LOW 45: CPT | Performed by: INTERNAL MEDICINE

## 2021-05-04 PROCEDURE — 36415 COLL VENOUS BLD VENIPUNCTURE: CPT

## 2021-05-04 PROCEDURE — 3700000000 HC ANESTHESIA ATTENDED CARE: Performed by: SURGERY

## 2021-05-04 PROCEDURE — 6370000000 HC RX 637 (ALT 250 FOR IP): Performed by: SURGERY

## 2021-05-04 PROCEDURE — 07B70ZZ EXCISION OF THORAX LYMPHATIC, OPEN APPROACH: ICD-10-PCS | Performed by: SURGERY

## 2021-05-04 PROCEDURE — 0BTG0ZZ RESECTION OF LEFT UPPER LUNG LOBE, OPEN APPROACH: ICD-10-PCS | Performed by: SURGERY

## 2021-05-04 PROCEDURE — 6360000002 HC RX W HCPCS: Performed by: SURGERY

## 2021-05-04 PROCEDURE — 2060000000 HC ICU INTERMEDIATE R&B

## 2021-05-04 PROCEDURE — 6360000002 HC RX W HCPCS: Performed by: NURSE ANESTHETIST, CERTIFIED REGISTERED

## 2021-05-04 PROCEDURE — 2580000003 HC RX 258: Performed by: SURGERY

## 2021-05-04 PROCEDURE — 2500000003 HC RX 250 WO HCPCS: Performed by: SURGERY

## 2021-05-04 PROCEDURE — S2900 ROBOTIC SURGICAL SYSTEM: HCPCS | Performed by: SURGERY

## 2021-05-04 PROCEDURE — 6360000002 HC RX W HCPCS

## 2021-05-04 PROCEDURE — 3E0T3BZ INTRODUCTION OF ANESTHETIC AGENT INTO PERIPHERAL NERVES AND PLEXI, PERCUTANEOUS APPROACH: ICD-10-PCS | Performed by: SURGERY

## 2021-05-04 PROCEDURE — C1713 ANCHOR/SCREW BN/BN,TIS/BN: HCPCS | Performed by: SURGERY

## 2021-05-04 PROCEDURE — 3600000009 HC SURGERY ROBOT BASE: Performed by: SURGERY

## 2021-05-04 PROCEDURE — C2618 PROBE/NEEDLE, CRYO: HCPCS | Performed by: SURGERY

## 2021-05-04 PROCEDURE — 88307 TISSUE EXAM BY PATHOLOGIST: CPT

## 2021-05-04 PROCEDURE — 2720000010 HC SURG SUPPLY STERILE: Performed by: SURGERY

## 2021-05-04 PROCEDURE — 88381 MICRODISSECTION MANUAL: CPT

## 2021-05-04 PROCEDURE — 81235 EGFR GENE COM VARIANTS: CPT

## 2021-05-04 PROCEDURE — 64999 UNLISTED PX NERVOUS SYSTEM: CPT | Performed by: SURGERY

## 2021-05-04 PROCEDURE — 3600000019 HC SURGERY ROBOT ADDTL 15MIN: Performed by: SURGERY

## 2021-05-04 PROCEDURE — 2709999900 HC NON-CHARGEABLE SUPPLY: Performed by: SURGERY

## 2021-05-04 PROCEDURE — 8E0W0CZ ROBOTIC ASSISTED PROCEDURE OF TRUNK REGION, OPEN APPROACH: ICD-10-PCS | Performed by: SURGERY

## 2021-05-04 PROCEDURE — 88309 TISSUE EXAM BY PATHOLOGIST: CPT

## 2021-05-04 PROCEDURE — 7100000000 HC PACU RECOVERY - FIRST 15 MIN: Performed by: SURGERY

## 2021-05-04 PROCEDURE — 32663 THORACOSCOPY W/LOBECTOMY: CPT | Performed by: SURGERY

## 2021-05-04 PROCEDURE — 6370000000 HC RX 637 (ALT 250 FOR IP): Performed by: ANESTHESIOLOGY

## 2021-05-04 PROCEDURE — 3700000001 HC ADD 15 MINUTES (ANESTHESIA): Performed by: SURGERY

## 2021-05-04 PROCEDURE — 88305 TISSUE EXAM BY PATHOLOGIST: CPT

## 2021-05-04 PROCEDURE — 2500000003 HC RX 250 WO HCPCS

## 2021-05-04 PROCEDURE — 6360000002 HC RX W HCPCS: Performed by: ANESTHESIOLOGY

## 2021-05-04 PROCEDURE — 32674 THORACOSCOPY LYMPH NODE EXC: CPT | Performed by: SURGERY

## 2021-05-04 PROCEDURE — 71045 X-RAY EXAM CHEST 1 VIEW: CPT

## 2021-05-04 PROCEDURE — C1729 CATH, DRAINAGE: HCPCS | Performed by: SURGERY

## 2021-05-04 RX ORDER — ONDANSETRON 2 MG/ML
4 INJECTION INTRAMUSCULAR; INTRAVENOUS EVERY 6 HOURS PRN
Status: DISCONTINUED | OUTPATIENT
Start: 2021-05-04 | End: 2021-05-11 | Stop reason: HOSPADM

## 2021-05-04 RX ORDER — DEXAMETHASONE SODIUM PHOSPHATE 4 MG/ML
INJECTION, SOLUTION INTRA-ARTICULAR; INTRALESIONAL; INTRAMUSCULAR; INTRAVENOUS; SOFT TISSUE PRN
Status: DISCONTINUED | OUTPATIENT
Start: 2021-05-04 | End: 2021-05-04 | Stop reason: SDUPTHER

## 2021-05-04 RX ORDER — SODIUM CHLORIDE 9 MG/ML
INJECTION, SOLUTION INTRAVENOUS CONTINUOUS
Status: DISCONTINUED | OUTPATIENT
Start: 2021-05-04 | End: 2021-05-04

## 2021-05-04 RX ORDER — ACETAMINOPHEN 325 MG/1
650 TABLET ORAL EVERY 4 HOURS PRN
Status: DISCONTINUED | OUTPATIENT
Start: 2021-05-04 | End: 2021-05-11 | Stop reason: HOSPADM

## 2021-05-04 RX ORDER — ROCURONIUM BROMIDE 10 MG/ML
INJECTION, SOLUTION INTRAVENOUS PRN
Status: DISCONTINUED | OUTPATIENT
Start: 2021-05-04 | End: 2021-05-04 | Stop reason: SDUPTHER

## 2021-05-04 RX ORDER — ONDANSETRON 2 MG/ML
INJECTION INTRAMUSCULAR; INTRAVENOUS PRN
Status: DISCONTINUED | OUTPATIENT
Start: 2021-05-04 | End: 2021-05-04 | Stop reason: SDUPTHER

## 2021-05-04 RX ORDER — PROPOFOL 10 MG/ML
INJECTION, EMULSION INTRAVENOUS PRN
Status: DISCONTINUED | OUTPATIENT
Start: 2021-05-04 | End: 2021-05-04 | Stop reason: SDUPTHER

## 2021-05-04 RX ORDER — SODIUM CHLORIDE 9 MG/ML
25 INJECTION, SOLUTION INTRAVENOUS PRN
Status: DISCONTINUED | OUTPATIENT
Start: 2021-05-04 | End: 2021-05-04 | Stop reason: HOSPADM

## 2021-05-04 RX ORDER — SODIUM CHLORIDE 0.9 % (FLUSH) 0.9 %
5-40 SYRINGE (ML) INJECTION PRN
Status: DISCONTINUED | OUTPATIENT
Start: 2021-05-04 | End: 2021-05-04 | Stop reason: HOSPADM

## 2021-05-04 RX ORDER — MEPERIDINE HYDROCHLORIDE 25 MG/ML
INJECTION INTRAMUSCULAR; INTRAVENOUS; SUBCUTANEOUS
Status: COMPLETED
Start: 2021-05-04 | End: 2021-05-04

## 2021-05-04 RX ORDER — SODIUM CHLORIDE 0.9 % (FLUSH) 0.9 %
5-40 SYRINGE (ML) INJECTION EVERY 12 HOURS SCHEDULED
Status: DISCONTINUED | OUTPATIENT
Start: 2021-05-04 | End: 2021-05-11 | Stop reason: HOSPADM

## 2021-05-04 RX ORDER — BUPIVACAINE HYDROCHLORIDE 5 MG/ML
INJECTION, SOLUTION EPIDURAL; INTRACAUDAL PRN
Status: DISCONTINUED | OUTPATIENT
Start: 2021-05-04 | End: 2021-05-04 | Stop reason: HOSPADM

## 2021-05-04 RX ORDER — PHENYLEPHRINE HYDROCHLORIDE 10 MG/ML
INJECTION INTRAVENOUS PRN
Status: DISCONTINUED | OUTPATIENT
Start: 2021-05-04 | End: 2021-05-04 | Stop reason: SDUPTHER

## 2021-05-04 RX ORDER — SODIUM CHLORIDE 0.9 % (FLUSH) 0.9 %
5-40 SYRINGE (ML) INJECTION EVERY 12 HOURS SCHEDULED
Status: DISCONTINUED | OUTPATIENT
Start: 2021-05-04 | End: 2021-05-04 | Stop reason: HOSPADM

## 2021-05-04 RX ORDER — KETOROLAC TROMETHAMINE 30 MG/ML
30 INJECTION, SOLUTION INTRAMUSCULAR; INTRAVENOUS EVERY 6 HOURS
Status: COMPLETED | OUTPATIENT
Start: 2021-05-04 | End: 2021-05-06

## 2021-05-04 RX ORDER — FENTANYL CITRATE 50 UG/ML
INJECTION, SOLUTION INTRAMUSCULAR; INTRAVENOUS PRN
Status: DISCONTINUED | OUTPATIENT
Start: 2021-05-04 | End: 2021-05-04 | Stop reason: SDUPTHER

## 2021-05-04 RX ORDER — MEPERIDINE HYDROCHLORIDE 25 MG/ML
12.5 INJECTION INTRAMUSCULAR; INTRAVENOUS; SUBCUTANEOUS
Status: COMPLETED | OUTPATIENT
Start: 2021-05-04 | End: 2021-05-04

## 2021-05-04 RX ORDER — DIAPER,BRIEF,INFANT-TODD,DISP
EACH MISCELLANEOUS DAILY
Status: DISCONTINUED | OUTPATIENT
Start: 2021-05-04 | End: 2021-05-11 | Stop reason: HOSPADM

## 2021-05-04 RX ORDER — NICOTINE 21 MG/24HR
1 PATCH, TRANSDERMAL 24 HOURS TRANSDERMAL DAILY
Status: DISCONTINUED | OUTPATIENT
Start: 2021-05-04 | End: 2021-05-11 | Stop reason: HOSPADM

## 2021-05-04 RX ORDER — IPRATROPIUM BROMIDE AND ALBUTEROL SULFATE 2.5; .5 MG/3ML; MG/3ML
1 SOLUTION RESPIRATORY (INHALATION)
Status: COMPLETED | OUTPATIENT
Start: 2021-05-04 | End: 2021-05-04

## 2021-05-04 RX ORDER — IPRATROPIUM BROMIDE AND ALBUTEROL SULFATE 2.5; .5 MG/3ML; MG/3ML
1 SOLUTION RESPIRATORY (INHALATION) EVERY 4 HOURS PRN
Status: DISCONTINUED | OUTPATIENT
Start: 2021-05-04 | End: 2021-05-06 | Stop reason: HOSPADM

## 2021-05-04 RX ORDER — PROMETHAZINE HYDROCHLORIDE 25 MG/1
12.5 TABLET ORAL EVERY 6 HOURS PRN
Status: DISCONTINUED | OUTPATIENT
Start: 2021-05-04 | End: 2021-05-11 | Stop reason: HOSPADM

## 2021-05-04 RX ORDER — FENTANYL CITRATE 50 UG/ML
50 INJECTION, SOLUTION INTRAMUSCULAR; INTRAVENOUS EVERY 5 MIN PRN
Status: COMPLETED | OUTPATIENT
Start: 2021-05-04 | End: 2021-05-04

## 2021-05-04 RX ORDER — LIDOCAINE HCL/PF 100 MG/5ML
SYRINGE (ML) INJECTION PRN
Status: DISCONTINUED | OUTPATIENT
Start: 2021-05-04 | End: 2021-05-04 | Stop reason: SDUPTHER

## 2021-05-04 RX ORDER — MIDAZOLAM HYDROCHLORIDE 1 MG/ML
INJECTION INTRAMUSCULAR; INTRAVENOUS PRN
Status: DISCONTINUED | OUTPATIENT
Start: 2021-05-04 | End: 2021-05-04 | Stop reason: SDUPTHER

## 2021-05-04 RX ORDER — SODIUM CHLORIDE, SODIUM LACTATE, POTASSIUM CHLORIDE, CALCIUM CHLORIDE 600; 310; 30; 20 MG/100ML; MG/100ML; MG/100ML; MG/100ML
INJECTION, SOLUTION INTRAVENOUS CONTINUOUS
Status: DISCONTINUED | OUTPATIENT
Start: 2021-05-04 | End: 2021-05-05

## 2021-05-04 RX ORDER — FENTANYL CITRATE 50 UG/ML
INJECTION, SOLUTION INTRAMUSCULAR; INTRAVENOUS
Status: COMPLETED
Start: 2021-05-04 | End: 2021-05-04

## 2021-05-04 RX ORDER — SODIUM CHLORIDE 9 MG/ML
25 INJECTION, SOLUTION INTRAVENOUS PRN
Status: DISCONTINUED | OUTPATIENT
Start: 2021-05-04 | End: 2021-05-11 | Stop reason: HOSPADM

## 2021-05-04 RX ORDER — IBUPROFEN 400 MG/1
800 TABLET ORAL ONCE
Status: COMPLETED | OUTPATIENT
Start: 2021-05-04 | End: 2021-05-04

## 2021-05-04 RX ORDER — HYDRALAZINE HYDROCHLORIDE 20 MG/ML
INJECTION INTRAMUSCULAR; INTRAVENOUS PRN
Status: DISCONTINUED | OUTPATIENT
Start: 2021-05-04 | End: 2021-05-04 | Stop reason: SDUPTHER

## 2021-05-04 RX ORDER — SODIUM CHLORIDE 0.9 % (FLUSH) 0.9 %
5-40 SYRINGE (ML) INJECTION PRN
Status: DISCONTINUED | OUTPATIENT
Start: 2021-05-04 | End: 2021-05-11 | Stop reason: HOSPADM

## 2021-05-04 RX ORDER — HYDROMORPHONE HCL 110MG/55ML
PATIENT CONTROLLED ANALGESIA SYRINGE INTRAVENOUS PRN
Status: DISCONTINUED | OUTPATIENT
Start: 2021-05-04 | End: 2021-05-04 | Stop reason: SDUPTHER

## 2021-05-04 RX ORDER — ACETAMINOPHEN 500 MG
1000 TABLET ORAL ONCE
Status: COMPLETED | OUTPATIENT
Start: 2021-05-04 | End: 2021-05-04

## 2021-05-04 RX ORDER — NALOXONE HYDROCHLORIDE 0.4 MG/ML
INJECTION, SOLUTION INTRAMUSCULAR; INTRAVENOUS; SUBCUTANEOUS PRN
Status: DISCONTINUED | OUTPATIENT
Start: 2021-05-04 | End: 2021-05-04 | Stop reason: SDUPTHER

## 2021-05-04 RX ADMIN — FENTANYL CITRATE 50 MCG: 50 INJECTION, SOLUTION INTRAMUSCULAR; INTRAVENOUS at 15:23

## 2021-05-04 RX ADMIN — ACETAMINOPHEN 1000 MG: 500 TABLET ORAL at 07:57

## 2021-05-04 RX ADMIN — MEPERIDINE HYDROCHLORIDE 12.5 MG: 25 INJECTION INTRAMUSCULAR; INTRAVENOUS; SUBCUTANEOUS at 14:11

## 2021-05-04 RX ADMIN — SODIUM CHLORIDE: 9 INJECTION, SOLUTION INTRAVENOUS at 12:34

## 2021-05-04 RX ADMIN — SUGAMMADEX 200 MG: 100 INJECTION, SOLUTION INTRAVENOUS at 13:34

## 2021-05-04 RX ADMIN — FENTANYL CITRATE 50 MCG: 50 INJECTION, SOLUTION INTRAMUSCULAR; INTRAVENOUS at 11:21

## 2021-05-04 RX ADMIN — IBUPROFEN 800 MG: 400 TABLET, FILM COATED ORAL at 07:56

## 2021-05-04 RX ADMIN — FENTANYL CITRATE 50 MCG: 50 INJECTION, SOLUTION INTRAMUSCULAR; INTRAVENOUS at 13:08

## 2021-05-04 RX ADMIN — PROPOFOL 50 MG: 10 INJECTION, EMULSION INTRAVENOUS at 10:04

## 2021-05-04 RX ADMIN — FENTANYL CITRATE 50 MCG: 50 INJECTION, SOLUTION INTRAMUSCULAR; INTRAVENOUS at 09:29

## 2021-05-04 RX ADMIN — IPRATROPIUM BROMIDE AND ALBUTEROL SULFATE 1 AMPULE: .5; 3 SOLUTION RESPIRATORY (INHALATION) at 16:03

## 2021-05-04 RX ADMIN — HYDROMORPHONE HYDROCHLORIDE 0.5 MG: 1 INJECTION, SOLUTION INTRAMUSCULAR; INTRAVENOUS; SUBCUTANEOUS at 17:14

## 2021-05-04 RX ADMIN — NALOXONE HYDROCHLORIDE 0.04 MG: 0.4 INJECTION, SOLUTION INTRAMUSCULAR; INTRAVENOUS; SUBCUTANEOUS at 13:40

## 2021-05-04 RX ADMIN — SODIUM CHLORIDE, PRESERVATIVE FREE 10 ML: 5 INJECTION INTRAVENOUS at 19:55

## 2021-05-04 RX ADMIN — PHENYLEPHRINE HYDROCHLORIDE 100 MCG: 10 INJECTION INTRAVENOUS at 10:53

## 2021-05-04 RX ADMIN — MIDAZOLAM 2 MG: 1 INJECTION INTRAMUSCULAR; INTRAVENOUS at 09:23

## 2021-05-04 RX ADMIN — ONDANSETRON 4 MG: 2 INJECTION INTRAMUSCULAR; INTRAVENOUS at 20:02

## 2021-05-04 RX ADMIN — CEFAZOLIN 2000 MG: 10 INJECTION, POWDER, FOR SOLUTION INTRAVENOUS at 09:45

## 2021-05-04 RX ADMIN — FENTANYL CITRATE 50 MCG: 50 INJECTION, SOLUTION INTRAMUSCULAR; INTRAVENOUS at 13:03

## 2021-05-04 RX ADMIN — SODIUM CHLORIDE, POTASSIUM CHLORIDE, SODIUM LACTATE AND CALCIUM CHLORIDE: 600; 310; 30; 20 INJECTION, SOLUTION INTRAVENOUS at 17:27

## 2021-05-04 RX ADMIN — Medication 100 MG: at 09:29

## 2021-05-04 RX ADMIN — HYDRALAZINE HYDROCHLORIDE 10 MG: 20 INJECTION INTRAMUSCULAR; INTRAVENOUS at 13:46

## 2021-05-04 RX ADMIN — DEXAMETHASONE SODIUM PHOSPHATE 8 MG: 4 INJECTION, SOLUTION INTRAMUSCULAR; INTRAVENOUS at 10:57

## 2021-05-04 RX ADMIN — BISACODYL 5 MG: 5 TABLET, COATED ORAL at 19:55

## 2021-05-04 RX ADMIN — ROCURONIUM BROMIDE 50 MG: 10 INJECTION INTRAVENOUS at 09:29

## 2021-05-04 RX ADMIN — SODIUM CHLORIDE: 9 INJECTION, SOLUTION INTRAVENOUS at 08:03

## 2021-05-04 RX ADMIN — ONDANSETRON HYDROCHLORIDE 4 MG: 4 INJECTION, SOLUTION INTRAMUSCULAR; INTRAVENOUS at 13:27

## 2021-05-04 RX ADMIN — PHENYLEPHRINE HYDROCHLORIDE 100 MCG: 10 INJECTION INTRAVENOUS at 10:50

## 2021-05-04 RX ADMIN — SODIUM CHLORIDE: 9 INJECTION, SOLUTION INTRAVENOUS at 09:42

## 2021-05-04 RX ADMIN — HYDROMORPHONE HYDROCHLORIDE 0.5 MG: 2 INJECTION INTRAMUSCULAR; INTRAVENOUS; SUBCUTANEOUS at 13:09

## 2021-05-04 RX ADMIN — FENTANYL CITRATE 50 MCG: 50 INJECTION, SOLUTION INTRAMUSCULAR; INTRAVENOUS at 14:26

## 2021-05-04 RX ADMIN — PHENYLEPHRINE HYDROCHLORIDE 100 MCG: 10 INJECTION INTRAVENOUS at 10:51

## 2021-05-04 RX ADMIN — KETOROLAC TROMETHAMINE 30 MG: 30 INJECTION, SOLUTION INTRAMUSCULAR; INTRAVENOUS at 20:02

## 2021-05-04 RX ADMIN — FENTANYL CITRATE 50 MCG: 50 INJECTION, SOLUTION INTRAMUSCULAR; INTRAVENOUS at 14:19

## 2021-05-04 RX ADMIN — PROPOFOL 40 MG: 10 INJECTION, EMULSION INTRAVENOUS at 10:00

## 2021-05-04 RX ADMIN — SODIUM CHLORIDE: 9 INJECTION, SOLUTION INTRAVENOUS at 11:32

## 2021-05-04 RX ADMIN — HYDROMORPHONE HYDROCHLORIDE 0.25 MG: 1 INJECTION, SOLUTION INTRAMUSCULAR; INTRAVENOUS; SUBCUTANEOUS at 23:47

## 2021-05-04 RX ADMIN — ROCURONIUM BROMIDE 40 MG: 10 INJECTION INTRAVENOUS at 11:00

## 2021-05-04 RX ADMIN — PROPOFOL 160 MG: 10 INJECTION, EMULSION INTRAVENOUS at 09:29

## 2021-05-04 RX ADMIN — CEFAZOLIN 2000 MG: 10 INJECTION, POWDER, FOR SOLUTION INTRAVENOUS at 20:02

## 2021-05-04 RX ADMIN — FENTANYL CITRATE 50 MCG: 50 INJECTION, SOLUTION INTRAMUSCULAR; INTRAVENOUS at 09:58

## 2021-05-04 RX ADMIN — FENTANYL CITRATE 50 MCG: 50 INJECTION, SOLUTION INTRAMUSCULAR; INTRAVENOUS at 14:44

## 2021-05-04 RX ADMIN — FENTANYL CITRATE 50 MCG: 50 INJECTION, SOLUTION INTRAMUSCULAR; INTRAVENOUS at 11:12

## 2021-05-04 ASSESSMENT — PULMONARY FUNCTION TESTS
PIF_VALUE: 25
PIF_VALUE: 33
PIF_VALUE: 35
PIF_VALUE: 20
PIF_VALUE: 43
PIF_VALUE: 37
PIF_VALUE: 38
PIF_VALUE: 4
PIF_VALUE: 20
PIF_VALUE: 41
PIF_VALUE: 36
PIF_VALUE: 41
PIF_VALUE: 15
PIF_VALUE: 22
PIF_VALUE: 41
PIF_VALUE: 33
PIF_VALUE: 33
PIF_VALUE: 1
PIF_VALUE: 2
PIF_VALUE: 34
PIF_VALUE: 1
PIF_VALUE: 1
PIF_VALUE: 34
PIF_VALUE: 15
PIF_VALUE: 34
PIF_VALUE: 34
PIF_VALUE: 6
PIF_VALUE: 1
PIF_VALUE: 34
PIF_VALUE: 31
PIF_VALUE: 1
PIF_VALUE: 21
PIF_VALUE: 43
PIF_VALUE: 18
PIF_VALUE: 25
PIF_VALUE: 34
PIF_VALUE: 34
PIF_VALUE: 35
PIF_VALUE: 34
PIF_VALUE: 35
PIF_VALUE: 34
PIF_VALUE: 42
PIF_VALUE: 35
PIF_VALUE: 34
PIF_VALUE: 32
PIF_VALUE: 44
PIF_VALUE: 38
PIF_VALUE: 35
PIF_VALUE: 33
PIF_VALUE: 34
PIF_VALUE: 26
PIF_VALUE: 1
PIF_VALUE: 39
PIF_VALUE: 30
PIF_VALUE: 24
PIF_VALUE: 34
PIF_VALUE: 33
PIF_VALUE: 34
PIF_VALUE: 21
PIF_VALUE: 19
PIF_VALUE: 24
PIF_VALUE: 34
PIF_VALUE: 37
PIF_VALUE: 1
PIF_VALUE: 34
PIF_VALUE: 19
PIF_VALUE: 41
PIF_VALUE: 20
PIF_VALUE: 35
PIF_VALUE: 36
PIF_VALUE: 33
PIF_VALUE: 34
PIF_VALUE: 25
PIF_VALUE: 36
PIF_VALUE: 0
PIF_VALUE: 35
PIF_VALUE: 34
PIF_VALUE: 34
PIF_VALUE: 42
PIF_VALUE: 33
PIF_VALUE: 21
PIF_VALUE: 19
PIF_VALUE: 13
PIF_VALUE: 20
PIF_VALUE: 34
PIF_VALUE: 33
PIF_VALUE: 34
PIF_VALUE: 47
PIF_VALUE: 34
PIF_VALUE: 33
PIF_VALUE: 34
PIF_VALUE: 31
PIF_VALUE: 38
PIF_VALUE: 33
PIF_VALUE: 34
PIF_VALUE: 33
PIF_VALUE: 34
PIF_VALUE: 33
PIF_VALUE: 7
PIF_VALUE: 27
PIF_VALUE: 34
PIF_VALUE: 34
PIF_VALUE: 31
PIF_VALUE: 24
PIF_VALUE: 33
PIF_VALUE: 1
PIF_VALUE: 20
PIF_VALUE: 1
PIF_VALUE: 33
PIF_VALUE: 31
PIF_VALUE: 1
PIF_VALUE: 34
PIF_VALUE: 35
PIF_VALUE: 34
PIF_VALUE: 36
PIF_VALUE: 29
PIF_VALUE: 34
PIF_VALUE: 36
PIF_VALUE: 44
PIF_VALUE: 33
PIF_VALUE: 15
PIF_VALUE: 44
PIF_VALUE: 32
PIF_VALUE: 2
PIF_VALUE: 34
PIF_VALUE: 1
PIF_VALUE: 34
PIF_VALUE: 19
PIF_VALUE: 47
PIF_VALUE: 19
PIF_VALUE: 34
PIF_VALUE: 1
PIF_VALUE: 36
PIF_VALUE: 20
PIF_VALUE: 34
PIF_VALUE: 37
PIF_VALUE: 33
PIF_VALUE: 24
PIF_VALUE: 36
PIF_VALUE: 22
PIF_VALUE: 20
PIF_VALUE: 1
PIF_VALUE: 35
PIF_VALUE: 4

## 2021-05-04 ASSESSMENT — PAIN DESCRIPTION - PAIN TYPE
TYPE: SURGICAL PAIN

## 2021-05-04 ASSESSMENT — PAIN DESCRIPTION - FREQUENCY: FREQUENCY: CONTINUOUS

## 2021-05-04 ASSESSMENT — ENCOUNTER SYMPTOMS
SHORTNESS OF BREATH: 1
DYSPNEA ACTIVITY LEVEL: NO INTERVAL CHANGE

## 2021-05-04 ASSESSMENT — PAIN DESCRIPTION - ORIENTATION
ORIENTATION: LEFT
ORIENTATION: LEFT
ORIENTATION: LOWER
ORIENTATION: LEFT

## 2021-05-04 ASSESSMENT — PAIN SCALES - GENERAL
PAINLEVEL_OUTOF10: 7
PAINLEVEL_OUTOF10: 5
PAINLEVEL_OUTOF10: 7
PAINLEVEL_OUTOF10: 0

## 2021-05-04 ASSESSMENT — PAIN DESCRIPTION - PROGRESSION
CLINICAL_PROGRESSION: GRADUALLY IMPROVING
CLINICAL_PROGRESSION: NOT CHANGED

## 2021-05-04 ASSESSMENT — PAIN DESCRIPTION - LOCATION
LOCATION: BACK;RIB CAGE
LOCATION: BACK;RIB CAGE

## 2021-05-04 ASSESSMENT — PAIN DESCRIPTION - ONSET
ONSET: ON-GOING
ONSET: ON-GOING

## 2021-05-04 ASSESSMENT — PAIN - FUNCTIONAL ASSESSMENT: PAIN_FUNCTIONAL_ASSESSMENT: PREVENTS OR INTERFERES SOME ACTIVE ACTIVITIES AND ADLS

## 2021-05-04 ASSESSMENT — PAIN DESCRIPTION - DESCRIPTORS: DESCRIPTORS: PRESSURE

## 2021-05-04 NOTE — PROGRESS NOTES
Oriented to sds   4      Refuses flu and pneumonia vaccine. Family updated to stay in room. Informed family to take belonging with them if they leave. Keep nothing of value in room unattended. pt was asked and agreed to first name and last initial being put on white boards. Allergy and fall risks applied. SCD Applied to patient. Warming blanket applied to patient. Pt denies any abuse or thoughts of suicide.

## 2021-05-04 NOTE — ANESTHESIA POSTPROCEDURE EVALUATION
Department of Anesthesiology  Postprocedure Note    Patient: Bhavesh Patterson  MRN: [id number]  YOB: 1960  Date of evaluation: 5/4/2021  Time:  5:38 PM     Procedure Summary     Date: 05/04/21 Room / Location: [address] [place] [address] ANGIE Ocampo    Anesthesia Start: 0923 Anesthesia Stop: 7875    Procedure: ROBOTIC ASSISTED LEFT UPPER LOBECTOMY AND MEDIASTINAL DISSECTION, POSSIBLE OPEN AND CRYOTHERAPY OF INTERCOSTAL NERVES 5, 6, 7 & 8 (Left Chest) Diagnosis: (LEFT LUNG CANCER)    Surgeons: Silvestre Lees MD Responsible Provider: Veronika Auguste MD    Anesthesia Type: general ASA Status: 3          Anesthesia Type: general    Jamel Phase I: Jamel Score: 10    Jamel Phase II:      Last vitals: Reviewed and per EMR flowsheets.        Anesthesia Post Evaluation    Patient location during evaluation: PACU  Patient participation: complete - patient participated  Level of consciousness: awake and alert  Airway patency: patent  Nausea & Vomiting: no nausea  Complications: no  Cardiovascular status: blood pressure returned to baseline and hemodynamically stable  Respiratory status: acceptable and spontaneous ventilation  Hydration status: euvolemic

## 2021-05-04 NOTE — OP NOTE
[facility]  Operative Report    PATIENT NAME: Carolyn Carbone  MEDICAL RECORD NO. [id number]  SURGEON: Siria Olivas MD FACS  Primary Care Physician: Arn Skiff, MD  Date: 5/4/2021, 1:24 PM     PROCEDURE PERFORMED:    1.  Robotic assisted left upper lobectomy and mediastinal dissection with 5 level 3 nodes, 1 level 6 node, 2 level 7 nodes, 9 level 10 nodes, 211 L nodes, and one special 11 L suspected to be the preoperatively biopsied positive node     2. Cryotherapy of intercostal nerves V, VI, VII and VIII for postoperative pain control  PREOPERATIVE DIAGNOSIS:   Active Hospital Problems    Diagnosis Date Noted    S/P left upper lobectomy of lung and mediastinal dissection [Z90.2] 05/04/2021    Primary cancer of left upper lobe of lung ([facility] [address] [C34.12] 04/21/2021    Smoking greater than 40 pack years [F17.210] 04/21/2021      POSTOPERATIVE DIAGNOSIS: Same, path pending  SURGEON:  Siria Olivas MD FACS  ANESTHESIA:  General endotracheal - Double lumen tube and local  ANESTHESIA:  30 OF 0.5% Marcaine for intercostal nerve block  ESTIMATED BLOOD LOSS:  100  ml  SPECIMEN: Left upper lobe and lymph nodes as described above  COMPLICATIONS:  None; patient tolerated the procedure well. DRAINS: 15 [place] pigtail catheter for chest tube  DISPOSITION: PACU - hemodynamically stable. CONDITION: stable      Narrative:    Patient brought to the operating room dual-lumen tube intubation was performed. Arterial line was started. Porras catheter was placed. Additional IVs were placed. The patient was then placed in the left lateral thoracotomy position with the table flexed head slightly up. Anatomic landmarks were drawn out including the margins of the scapula of the tips of the spine the costal margin in the posterior axillary line.   At this point in the seventh interspace 5 mm Optiview was used to access the pleural space and then robotic ports were placed in the interspace #8 with a a 12 an 8 mm posteriorly and a 12 mm anteriorly. The assistant port was placed low in front of the 11th rib because this tumor was 3.7 cm and I was going to bring it through the diaphragm. So a 15 mm port was placed under direct vision coming right at the junction of the intercostal muscle and the diaphragm. At this point we then brought up the cryotherapy device and darting 1 rib level above our uppermost port the probe was then placed at the junction of the muscular portion with the chest wall at least 4 cm from the spine and frozen to -60 5/422. We did the fifth 6/7 and eighth ribs with 2 minutes each and then the probe was removed. We then proceeded to docked the robot and placed [place] bipolar fenestrated and tip up grasper. At this point the inferior pulmonary ligament was released all the way up to the inferior pulmonary vein we then rolled the lung over posteriorly and a posterior dissection we freed up the pleura off the aorta and identified level 7 nodes 2 of these were taken. We then worked our way up where multiple level 10 nodes were found throughout the case a total of 9 removed. We worked our way around super to the level 5 and 6 lymph nodes were identified. At this point we then rolled the lung medially and freed up the inferior pulmonary vein worked in the space between and then cleared portion of the superior pulmonary vein then worked around the top seeing the pulmonary artery here. At this point we then worked in the fissure which was nearly completely developed only some inferiorly medially was identified we identified the pulmonary artery in the fissure staying on its surface we dissected it out and then followed it medially where it basically went down the fissure which was very minimal and by taking the pulmonary artery off the fissure was completely developed medially where it was attached and there was none posteriorly.   The lingular branch was identified there was a hard multilobulated firm lymph node here and I suspect that this level 11 node was the one that they had biopsy preop and was positive. 2 other 11 L lymph nodes were removed to clear the pulmonary artery branches. The other 1 which was suspicious was sent as 11 L special.  This helped us to encircle the lingular branch was then divided with a vascular stapler with gray cartridge wound with the robotic stapler. We then rolled the lung posteriorly and identified the posterior descending branch which actually branched early were able to dissect this out and divide this with a robotic gray stapling cartridge. We then rolled the lung with the apex down there was one large apical branch and we worked on its surface and then rolled the lung over medially and unable to completely encircle it. Once we did this as able to get an angle from the posterior ports to staple this off with a robotic gray stapler cartridge. At this point we then turned our attention medially where the superior vein was dissected away from the bronchus and we are able to get a 30 vascular gray cartridge across this and divide this. This just left the bronchus and so we lifted up the lung brought in a robotic 30 cartridge stapler placed across the bronchus closed it down it closed and 90% and recommended staying on compression for 15 seconds and then seeing it and then closing it again and then we got full goal 100% compression we pressed firing the stapler and about 3 seconds into it a odd metallic sound was made the stapler stop firing and instructions from the device said that it malfunctioned and that we should press the blue paddle to release it. We released it and basically left a bunch of staples that were not fired and the bronchus and there was also several holes in the bronchus here.   This point we got a new stapler new green cartridge guide in position angled stapler low bit lower closed it down were still able to inflate the lower lobe of the lung so we fired the stapler without incident. We then placed saline and the bronchial around the bronchial stump and had them inflate the lower lobe under pressure and no leak air leak was identified. After this, there was a sudden inflation of the lower lobe and it was being ventilated, all robot instruments were still in the chest and in place. When the lung was deflated by anesthesia, we assessed the chest, there was a pleural injury to lower lobe surface, no bleeding. At this point the lung was then completely free and we suctioned out all the fluid no significant bleeding was noted and a retrieval bag was placed with a 15 mm port the lobe was placed within it and then close down and then we removed the instruments and undocked the robot and I came to the table side. At this point I then was able to expand the site and from the 11th rib goes to the diaphragm and pull the specimen out. Then placing the scope back in from below we used the appropriate sealant progel on the bronchial stump and on the vessel staple lines. Once this was completed were able to then place a 14 Upper sorbian pigtail under direct vision up to the apex the pigtail was locked in place and then secured with an OpSite and then a 2-0 silk securing it to the skin so it was not pulled out we then watched the lung be inflated under direct vision.   At this point all the ports removed the scope was removed and then the 212 mm port sites were closed with 2-0 Vicryl deep the large extraction site the diaphragm was closed separately with 0 Vicryl then the intercostal muscles reapproximated with 0 Vicryl intercostal block with 30 cc half percent Marcaine was performed at multiple levels and skin incisions were closed with 4-0 Vicryl subcuticular stitch skin affix glue was applied and the patient brought back to recovery room stable condition  May 4, 2021    Payer   Address    RE: Letter of medical necessity for ADDING cryoanalgesia, post-operative pain management, CPT 76363        Patient: Elaine Reinoso ID#:    Diagnosis ICD-10 C34.12   Primary Service:    Distinct Add-On Service  CPT 06666: Per CHRISTINE, for post-operative cryoanalgesia    Date of Service: 5/3/2021     Dear Aby Culver: The purpose of this correspondence is to provide evidence that substantiates payment for adding cryoanalgesia as a separate and distinct service. The primary surgical procedure was a VATS-Right Lower Lobectomy     As you consider this request to separately reimburse me for the added time, risk and skill required to control this patients post-operative pain, please consider the complexity of the primary procedure. Although this procedure is considered \"minimally invasive\", its post-operative pain control can be quite challenging, requiring multi-modal pain management including anesthetics during  hospitalization followed by p.o. narcotics post-discharge. In view of the opioid epidemic, I have changed my surgical pain management protocol by ADDING cryoanalegesia to my VATS protocols. This cryo component should be considered a separate and distinct component that should receive  additional reimbursement. Please consider the enclosed operative dictation. Adding cryoablation involved creating multiple levels of bilateral intercostal nerve blocks, typically adding more than 45 additional minutes to the base procedure. Per the American Society of Anesthesiologists (ASA), a provider may bill for a postoperative pain procedure as a service separate from the anesthetic if the pain procedure is employed primarily for postoperative analgesia. Per the [address] [place] [address]), CPT 78117, Unlisted procedure, nervous system should be reported when cryotherapy/cryoablation/cryoanalgesia/ cryroneuromodulation is performed.  When reporting an unlisted code to describe a procedure or service, it is necessary to submit supporting documentation (eg, a procedure report) along with the claim to provide an adequate description of the nature, extent, and need for the procedure, and the time, effort, and equipment necessary to provide the [address]. Adding cryoablation was clearly in this patients best interest. The results I am getting exceed what is reported in the peer-reviewed literature. In response to the [facility] and [facility] 2017 declaration that the opioid epidemic was a public health emergency, every healthcare provider has reassessed how to control post-operative pain. Until the AMA issues an appropriate add-on Level 1 CPT for Intraoperative intercostal cryoablation, I would like to be paid comparable to CPT 66211 (Thoracoscopy, with thoracic sympathectomy) or CPT 89419 (Sympathectomy, thoracolumbar). Per CMS 2021 Physician Fee Schedule, the time, risk and intensity is similar to CPT [id number] or CPT 0378 [id number], therefore add on payment for CPT 19062 on my claim should be at least $1,000. CPT Pre-Eval Time  Pre-Position Time  Pre-Service Wait Time  Median Intra-Service Time  Immediate Post Service Time    32560 70 2 3 748 09   15735 30 0 25 103 24           CPT  Phys Work RVUs Non-Facility [address] [facility]  PE  RVUs   Mal-  Practice  RVUs     Total Non-Facility RVU     Total Facility RVUs   Global   34315 14.28 NA 7.23 3.48 NA 24.99 090   43122 14.71 NA 11.94 5.78 NA 32.43 090       Sincerely,       Physicians Name Peña Funez MD FACS    Publications Available Upon Request   Use of Cryoanalgesia for pain management   Eduardo Hunter, et al. \"Pain is significantly reduced by cryoablation therapy in patients with lateral minithoracotomy. \" Pari Saas Surg 70.3 (2000): 7400-1555. Naseem ADAMS et al. Postoperative pain control modalities for pectus excavatum repair: a prospective observational study of cryoablation compared to results of a randomized trial of epidural versus patient-controlled analgesia.  Journal of pediatric surgery, 2019, October 26, epub. Yahir CORBIN et al. Intraoperative intercostals nerve cryoablation during the Isela procedure reduces length of stay and opiod requirement: a randomized clinical trial. Journal of pediatric surgery, 2019 Nov; 54 (11): 2592-8314. Quita Westfall et al. \"Intraoperative cryoanalgesia for managing pain after the Isela procedure. \" J Ped Surg 52.6 (2017): 407-378  Gabriel ELLIS et al. [facility] nerve cryoablation versus thoracic epidural catheters for postoperative analgesia following pectusexcavatum repair: Preliminary outcomes in twenty-six cryoablation patients. \" J Ped Surg 51.12 (2016): 6719-9533. Chase Bueno et al. Rupa Cable of transthoracic cryoanalgesia during the Isela procedure. \" Arnetha Grout Card Surg 151.3 (8572): 115-804  Magalys Rodriguez et al. Ira Jakes in Patients Undergoing Isela Repair of Pectus Excavatum: Technique Modification and Early Results. \" J Lap Adv Surg Tech (2018), in press. Pascale M et al. Use of cryoanalgesia for pain management for the modified ravitch procedure in children. Journal of pediatric surgery, 2019, October 25, epub. Royal Landa, et al. Emperatriz Wells role of intercostal cryoanalgesia in post-thoracotomy analgesia. \" Int Card Thor Surg 16.6 (2013): [id number]. Terri SARAH et al. Intraoperative intercostals nerve cryoanalgesia improves pain control after descending and thoracoabdominal aortic aneurysm repairs. Annal of Thoracic Surgery, 2020, Jan; 109 (1): 249-254.

## 2021-05-04 NOTE — ANESTHESIA PRE PROCEDURE
Department of Anesthesiology  Preprocedure Note       Name:  Jarrod Gregory   Age:  62 y.o.  :  1960                                          MRN:  [id number]         Date:  2021      Surgeon: Jose A Foley):  Colleen Navarro MD    Procedure: Procedure(s):  ROBOTIC ASSISTED LEFT UPPER LOBECTOMY AND MEDIASTINAL DISSECTION, POSSIBLE OPEN AND CRYOTHERAPY OF INTERCOSTAL NERVES    Medications prior to admission:   Prior to Admission medications    Medication Sig Start Date End Date Taking? Authorizing Provider   albuterol sulfate HFA (VENTOLIN HFA) 108 (90 Base) MCG/ACT inhaler Inhale 2 puffs into the lungs 4 times daily as needed for Wheezing 21  Yes Judith Patel MD   VITAMIN D PO Take by mouth Unsure on dose   Yes Historical Provider, MD       Current medications:    Current Facility-Administered Medications   Medication Dose Route Frequency Provider Last Rate Last Admin    0.9 % sodium chloride infusion   Intravenous Continuous Colleen Navarro  mL/hr at 21 0803 New Bag at 21 0803    sodium chloride flush 0.9 % injection 5-40 mL  5-40 mL Intravenous 2 times per day Colleen Navarro MD        sodium chloride flush 0.9 % injection 5-40 mL  5-40 mL Intravenous PRN Colleen Navarro MD        0.9 % sodium chloride infusion  25 mL Intravenous PRN Colleen Navarro MD        ceFAZolin (ANCEF) 2000 mg in dextrose 5 % 50 mL IVPB  2,000 mg Intravenous On Call to MD Siomara           Allergies:     Allergies   Allergen Reactions    Tylenol With Codeine #3 [Acetaminophen-Codeine] Nausea And Vomiting    Codeine Nausea And Vomiting       Problem List:    Patient Active Problem List   Diagnosis Code    Primary cancer of left upper lobe of lung ([address] C34.12    Other emphysema ([address] J43.8    Smoking greater than 40 pack years F17.210    Pre-op evaluation-for lung ca Z01.818    Tobacco abuse Z72.0    KERR (dyspnea on exertion) R06.00    Intermittent palpitations R00.2    Borderline Abnormal EKG with rsr' R94.31    S/P lobectomy of lung Z90.2       Past Medical History:        Diagnosis Date    Arthritis     [facility]     Dr Analisa [facility]     Dr Tania Dumont       Past Surgical History:        Procedure Laterality Date     SECTION      x4    CHOLECYSTECTOMY      COLONOSCOPY  2016    CT NEEDLE BIOPSY LUNG PERCUTANEOUS  2021    CT NEEDLE BIOPSY LUNG PERCUTANEOUS 3/26/2021 [facility] CT SCAN    HYSTERECTOMY      UPPER GASTROINTESTINAL ENDOSCOPY  2016       Social History:    Social History     Tobacco Use    Smoking status: Current Every Day Smoker     Packs/day: 1.00     Years: 45.00     Pack years: 45.00     Types: Cigarettes    Smokeless tobacco: Never Used   Substance Use Topics    Alcohol use: Never     Frequency: Never     Binge frequency: Never                                Ready to quit: Not Answered  Counseling given: Not Answered      Vital Signs (Current):   Vitals:    21 0731   BP: 138/87   Pulse: 59   Resp: 16   Temp: 97.2 °F (36.2 °C)   TempSrc: Temporal   SpO2: 99%   Weight: 125 lb 6.4 oz (56.9 kg)   Height: 5' 6\" (1.676 m)                                              BP Readings from Last 3 Encounters:   21 138/87   21 115/80   21 (!) 132/92       NPO Status: Time of last liquid consumption:                         Time of last solid consumption:                         Date of last liquid consumption: 21                        Date of last solid food consumption: 21    BMI:   Wt Readings from Last 3 Encounters:   21 125 lb 6.4 oz (56.9 kg)   21 125 lb 12.8 oz (57.1 kg)   21 127 lb (57.6 kg)     Body mass index is 20.24 kg/m².     CBC:   Lab Results   Component Value Date    WBC 4.9 2021    RBC 4.33 2021    RBC 4.63 10/10/2017    HGB 13.9 2021    HCT 42.3 2021    MCV 98 Anesthetic plan and risks discussed with patient.       Plan discussed with CRNA and surgical team.                  Jacoby Rodríguez MD   5/4/2021

## 2021-05-04 NOTE — H&P
[address] [address]  History and Physical Update    Pt Name: Suzy Pacheco  MRN: [id number]  YOB: 1960  Date of evaluation: 5/4/2021    [x] I have examined the patient and reviewed the H&P/Consult and there are no changes to the patient or plans.     [] I have examined the patient and reviewed the H&P/Consult and have noted the following changes:        Bethany Boyd  Electronically signed 5/4/2021 at 8:40 AM

## 2021-05-04 NOTE — CONSULTS
[facility]      Patient - Marguerite Souza   MRN -  [id number]   [facility] # - [de-identified]   - 1960      Date of Admission -  2021  7:13 AM  Date of evaluation -  2021  Room - -006-A   Hospital Day - 0  Consulting - Lisset Morales MD Primary Care Physician - Doretha Duran MD     Problem List      Active Hospital Problems    Diagnosis Date Noted    S/P left upper lobectomy of lung and mediastinal dissection [Z90.2] 2021    Primary cancer of left upper lobe of lung ([address] [C34.12] 2021    Smoking greater than 40 pack years [F17.210] 2021     Reason for Consult    For post operative pulmonary management  HPI   History Obtained From: Patient and electronic medical record. Marguerite Souza is a 62 y.o. female She was admitted under Dr. Nilson eWst MD service. Pulmonary medicine was consulted for further evaluation of post operative pulmonary management. She underwent:  Type of surgery: Robotic assisted left upper lobectomy and mediastinal dissection along with cryotherapy of the intercostal nerves for postoperative pain control. Date of surgery: 4 May 2021  Surgeon: Dr. Nilson West MD      At the time of my initial evaluation, she is on room air    Input/Out put fluid balance from the time of admission: 1.3 L positive    Prior to current hospitalization: She was diagnosed with moderately COPD however she is not using any inhalers    She is currently not using any oxygen supplementation at rest, exercise or during sleep/at night time. 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 [address] Jamaal Leiva       History of tobacco smoking:Yes  Amount of tobacco smokin.0 PPD.   Years of tobacco smokin.                                    Quit smoking: No.              Current smoker: Yes.       Amount of current tobacco smoking: She quit smoking     History of recreational or IV drug use in the past: She smokes marijuana occasionally.   History of alcohol use in the past:NO     History of exposure to coal mines/coal dust: NO  History of exposure to foundry dust/welding: NO  History of exposure to quarry/silica/sandblasting: NO  History of exposure to asbestos/working with breaks/ships: NO  History of exposure to farm dust: NO  History of recent travel to long distances: NO  History of exposure to birds, pigeons, or chickens in the past:NO  Pet animals at home:Yes  Dogs: 2  Cats: 2     History of pulmonary embolism in the past: No            History of DVT in the past:No           PMHx   Past Medical History      Diagnosis Date    Arthritis     COPD (chronic obstructive pulmonary disease) ([facility]     Dr [facility])     Dr Orly Mai      Past Surgical History        Procedure Laterality Date     SECTION      x4    CHOLECYSTECTOMY      COLONOSCOPY  2016    CT NEEDLE BIOPSY LUNG PERCUTANEOUS  2021    CT NEEDLE BIOPSY LUNG PERCUTANEOUS 3/26/2021 [facility] CT SCAN    HYSTERECTOMY      UPPER GASTROINTESTINAL ENDOSCOPY  2016     Meds    Current Medications    sodium chloride flush  5-40 mL Intravenous 2 times per day    bacitracin zinc   Topical Daily    [START ON 2021] enoxaparin  40 mg Subcutaneous Q24H    ceFAZolin (ANCEF) IVPB  2,000 mg Intravenous Q8H    ketorolac  30 mg Intravenous Q6H    bisacodyl  5 mg Oral Daily    nicotine  1 patch Transdermal Daily     sodium chloride flush, sodium chloride, acetaminophen, promethazine **OR** ondansetron, HYDROmorphone **OR** HYDROmorphone  IV Drips/Infusions   lactated ringers 125 mL/hr at 21 1727    sodium chloride       Home Medications  Medications Prior to Admission: albuterol sulfate HFA (VENTOLIN HFA) 108 (90 Base) MCG/ACT inhaler, Inhale 2 puffs into the lungs 4 times daily as needed for Wheezing  VITAMIN D PO, Take by mouth Unsure on dose  Diet    DIET GENERAL;  Allergies    Tylenol with codeine #3 [acetaminophen-codeine] and Codeine  Social History     Social History     Socioeconomic History    Marital status:      Spouse name: Not on file    Number of children: Not on file    Years of education: Not on file    Highest education level: Not on file   Occupational History    Not on file   Social Needs    Financial resource strain: Not on file    Food insecurity     Worry: Not on file     Inability: Not on file    Transportation needs     Medical: Not on file     Non-medical: Not on file   Tobacco Use    Smoking status: Current Every Day Smoker     Packs/day: 1.00     Years: 45.00     Pack years: 45.00     Types: Cigarettes    Smokeless tobacco: Never Used   Substance and Sexual Activity    Alcohol use: Never     Frequency: Never     Binge frequency: Never    Drug use: Yes     Types: Marijuana    Sexual activity: Not on file   Lifestyle    Physical activity     Days per week: Not on file     Minutes per session: Not on file    Stress: Not on file   Relationships    Social connections     Talks on phone: Not on file     Gets together: Not on file     Attends Yarsanism service: Not on file     Active member of club or organization: Not on file     Attends meetings of clubs or organizations: Not on file     Relationship status: Not on file    Intimate partner violence     Fear of current or ex partner: Not on file     Emotionally abused: Not on file     Physically abused: Not on file     Forced sexual activity: Not on file   Other Topics Concern    Not on file   Social History Narrative    Not on file     Family History          Problem Relation Age of Onset    Other Mother         aneursym    No Known Problems Father     Depression Sister     Heart Disease Brother     Diabetes Brother     Other Brother         Lung disease    Lung Cancer Maternal Grandfather     Other Brother         Crohns    Macular Degen Brother     Depression Sister     Other Sister         Blood clots    Arthritis Sister     Brain Cancer Paternal Uncle      Sleep History    Never diagnosed with sleep apnea in the past.    Riview of systems   General/Constitutional: No recent loss of weight or appetite changes. No fever or chills. HENT: Negative. Eyes: Negative. Upper respiratory tract: No nasal stuffiness or post nasal drip. Lower respiratory tract/ lungs: No cough or sputum production. No hemoptysis. Cardiovascular: No palpitations or chest pain. Gastrointestinal: No nausea or vomiting. Neurological: No focal neurologiacal weakness. Extremities: No edema. Musculoskeletal: No complaints. Genitourinary: No complaints. Hematological: Negative. Psychiatric/Behavioral: Negative. Skin: No itching. Vitals     height is 5' 6\" (1.676 m) and weight is 125 lb 6.4 oz (56.9 kg). Her axillary temperature is 97.1 °F (36.2 °C). Her blood pressure is 124/72 and her pulse is 71. Her respiration is 24 and oxygen saturation is 97%. Body mass index is 20.24 kg/m². I/O        Intake/Output Summary (Last 24 hours) at 5/4/2021 1743  Last data filed at 5/4/2021 1615  Gross per 24 hour   Intake 2000 ml   Output 700 ml   Net 1300 ml     I/O last 3 completed shifts: In: 2000 [I.V.:2000]  Out: 300 [Urine:200; Blood:100]   Patient Vitals for the past 96 hrs (Last 3 readings):   Weight   05/04/21 0731 125 lb 6.4 oz (56.9 kg)       Exam   General Appearance: Patient appears moderately built and moderately nourished in no acute distress on room air  HEENT: Normal, Head is normocephalic, atraumatic. Oropharynx is clear and moist.  No oral thrush. PERRLA  Neck - Supple, No JVD present. No tracheal deviation present. Lungs - Bilateral air entry present. Bilateral aeration good with diminished breath sounds at lung bases left > Right. Occasional expiratory wheezes. Subcutaneous emphysema palpable.   Left-sided small bore chest tube in place with function. Ejection fraction was   estimated at 60 %. There were no regional left ventricular wall motion   abnormalities and wall thickness was within normal limits. Aortic aneurysm noted in the ascending aorta . Aortic aneurysm measures 4.1 cm . Signature      ----------------------------------------------------------------   Electronically signed by Saurabh Morfin MD (Interpreting   physician) on 04/27/2021 at 07:10 PM   ----------------------------------------------------------------      Radiology    CXR  Tue May 4, 2021  2:51 PM   PROCEDURE: XR CHEST PORTABLE   . Normal heart size. Right lung is unremarkable. 2. Postsurgical changes left side of chest. Extensive subcutaneous emphysema left side of chest and neck. A small caliber pigtail catheter is present in the pleural space left side. 3. Diffuse lucency in the left hemithorax, representing a very large pneumothorax post lung resection. Residual lung markings are seen in the left lower lung field. CT Scans  (See actual reports for details)  CT scan of chest without contrast.  Fri Mar 12, 2021  8:18 AM   PROCEDURE: CT CHEST WO CONTRAST   CLINICAL INFORMATION: Pulmonary nodule   COMPARISON: Chest x-ray dated 2/26/2021   1. There is a cavitary mass demonstrated within the left upper lobe with peripheral mild spiculation measuring 3.9 x 2.1 cm on axial image 34. This corresponds with previous chest x-ray dated 2/26/2021. This is concerning for malignancy until proven otherwise. Further evaluation could be obtained with PET/CT and/or biopsy. An infectious process is also not excluded.   2. There are additional smaller groundglass nodular densities within the right lung as detailed above.   3. Small pericardial effusion is noted. This is nonspecific.        CT-guided biopsy by interventional radiology service on 26 March 2021:     Pathology:  Copies To: Milton Paez     Clinical Information: CAVITATING MASS SHANNON, SMOKER     FINAL DIAGNOSIS:   Lung, left, needle core biopsies:       Adenocarcinoma. PET scan:  4/14/2021 10:04 AM   PROCEDURE: PET CT SKULL BASE TO MID THIGH   1. FDG avid left lung mass corresponding to known malignancy. 2. Small, mildly FDG avid right lung nodules, possibly infectious or inflammatory but satellite malignancy cannot be excluded. MRI scan of the brain with and without contrast:   PROCEDURE: MRI BRAIN W WO CONTRAST   1. No evidence of intracranial metastases or other acute intracranial abnormality. 2. Moderate severity chronic microvascular angiopathy. 3. Mild to moderate mucosal inflammation in the paranasal sinuses with air-fluid levels as evidence for an acute component     Pathology from [facility] performed on 28 April 2021:      Assessment   -.9 x 2.1 cm spiculated cavitary mass present within the left upper lobe S/p CT guided biopsy by IR service on 3/26/2021. The biopsy is consistent with Adenocarcinoma. She underwent robotic assisted left upper lobectomy and mediastinal dissection along with cryotherapy of the intercostal nerves for postoperative pain control.  -Subcutaneous emphysema with left-sided small bore chest tube placement  -Moderately severe COPD. -Chronic history of tobacco smoking for 45 years with 1 pack of cigarettes per day. She is planning to quit smoking    Plan   -Wean Fio2 to keep Spo2 > 90%. -We will start patient on duo nebs Q4h as needed  -Chest tube management per Dr. Yumiko Godinez MD service  -Continue incentive spirometry every 4 hourly as tolerated  -Avoid all sedative meds if she is drowsy. \"Thank you for asking us to see this patient\"     Case discussed with registered nurse Ms. Greenberg taking care of patient. 14 Tiff Berrios educated about my impression and plan. She verbalizes understanding. Questions and concerns addressed.      Electronically signed by   Warden Braydon MD on 5/4/2021 at 5:43 PM

## 2021-05-04 NOTE — ANESTHESIA PROCEDURE NOTES
Arterial Line:    An arterial line was placed using ultrasound guidance, in the OR for the following indication(s): continuous blood pressure monitoring. A 20 gauge (size), (length), Arrow (type) catheter was placed, into the left radial artery, secured by tape and Tegaderm. Events:  patient tolerated procedure well with no complications and EBL < 5mL.   5/4/2021 9:37 AM5/4/2021 9:44 AM  Anesthesiologist: Cyril Herring MD  Performed: Anesthesiologist   Preanesthetic Checklist  Completed: patient identified, IV checked, site marked, risks and benefits discussed, surgical consent, monitors and equipment checked, pre-op evaluation, timeout performed, anesthesia consent given, oxygen available and patient being monitored

## 2021-05-05 ENCOUNTER — APPOINTMENT (OUTPATIENT)
Dept: GENERAL RADIOLOGY | Age: 61
DRG: 164 | End: 2021-05-05
Attending: SURGERY
Payer: COMMERCIAL

## 2021-05-05 LAB
BASOPHILS # BLD: 0.3 %
BASOPHILS ABSOLUTE: 0 THOU/MM3 (ref 0–0.1)
EOSINOPHIL # BLD: 0.1 %
EOSINOPHILS ABSOLUTE: 0 THOU/MM3 (ref 0–0.4)
ERYTHROCYTE [DISTWIDTH] IN BLOOD BY AUTOMATED COUNT: 13.2 % (ref 11.5–14.5)
ERYTHROCYTE [DISTWIDTH] IN BLOOD BY AUTOMATED COUNT: 48.2 FL (ref 35–45)
HCT VFR BLD CALC: 37.2 % (ref 37–47)
HEMOGLOBIN: 11.8 GM/DL (ref 12–16)
IMMATURE GRANS (ABS): 0.03 THOU/MM3 (ref 0–0.07)
IMMATURE GRANULOCYTES: 0.3 %
LYMPHOCYTES # BLD: 10.3 %
LYMPHOCYTES ABSOLUTE: 1.1 THOU/MM3 (ref 1–4.8)
MCH RBC QN AUTO: 31.4 PG (ref 26–33)
MCHC RBC AUTO-ENTMCNC: 31.7 GM/DL (ref 32.2–35.5)
MCV RBC AUTO: 98.9 FL (ref 81–99)
MONOCYTES # BLD: 4 %
MONOCYTES ABSOLUTE: 0.4 THOU/MM3 (ref 0.4–1.3)
NUCLEATED RED BLOOD CELLS: 0 /100 WBC
PLATELET # BLD: 121 THOU/MM3 (ref 130–400)
PMV BLD AUTO: 12.8 FL (ref 9.4–12.4)
RBC # BLD: 3.76 MILL/MM3 (ref 4.2–5.4)
SEG NEUTROPHILS: 85 %
SEGMENTED NEUTROPHILS ABSOLUTE COUNT: 9.3 THOU/MM3 (ref 1.8–7.7)
WBC # BLD: 10.9 THOU/MM3 (ref 4.8–10.8)

## 2021-05-05 PROCEDURE — 36415 COLL VENOUS BLD VENIPUNCTURE: CPT

## 2021-05-05 PROCEDURE — 71045 X-RAY EXAM CHEST 1 VIEW: CPT

## 2021-05-05 PROCEDURE — 0W9B30Z DRAINAGE OF LEFT PLEURAL CAVITY WITH DRAINAGE DEVICE, PERCUTANEOUS APPROACH: ICD-10-PCS | Performed by: SURGERY

## 2021-05-05 PROCEDURE — APPSS30 APP SPLIT SHARED TIME 16-30 MINUTES: Performed by: NURSE PRACTITIONER

## 2021-05-05 PROCEDURE — 6370000000 HC RX 637 (ALT 250 FOR IP): Performed by: SURGERY

## 2021-05-05 PROCEDURE — 2060000000 HC ICU INTERMEDIATE R&B

## 2021-05-05 PROCEDURE — 94760 N-INVAS EAR/PLS OXIMETRY 1: CPT

## 2021-05-05 PROCEDURE — 6360000002 HC RX W HCPCS: Performed by: SURGERY

## 2021-05-05 PROCEDURE — 99232 SBSQ HOSP IP/OBS MODERATE 35: CPT | Performed by: INTERNAL MEDICINE

## 2021-05-05 PROCEDURE — 94640 AIRWAY INHALATION TREATMENT: CPT

## 2021-05-05 PROCEDURE — 85025 COMPLETE CBC W/AUTO DIFF WBC: CPT

## 2021-05-05 PROCEDURE — 99024 POSTOP FOLLOW-UP VISIT: CPT | Performed by: SURGERY

## 2021-05-05 PROCEDURE — 2580000003 HC RX 258: Performed by: SURGERY

## 2021-05-05 PROCEDURE — 6370000000 HC RX 637 (ALT 250 FOR IP): Performed by: NURSE PRACTITIONER

## 2021-05-05 RX ORDER — IPRATROPIUM BROMIDE AND ALBUTEROL SULFATE 2.5; .5 MG/3ML; MG/3ML
1 SOLUTION RESPIRATORY (INHALATION)
Status: DISCONTINUED | OUTPATIENT
Start: 2021-05-05 | End: 2021-05-11 | Stop reason: HOSPADM

## 2021-05-05 RX ORDER — OXYCODONE HYDROCHLORIDE 5 MG/1
5 TABLET ORAL EVERY 4 HOURS PRN
Status: DISCONTINUED | OUTPATIENT
Start: 2021-05-05 | End: 2021-05-11 | Stop reason: HOSPADM

## 2021-05-05 RX ADMIN — OXYCODONE HYDROCHLORIDE 5 MG: 5 TABLET ORAL at 22:44

## 2021-05-05 RX ADMIN — IPRATROPIUM BROMIDE AND ALBUTEROL SULFATE 1 AMPULE: .5; 3 SOLUTION RESPIRATORY (INHALATION) at 12:06

## 2021-05-05 RX ADMIN — IPRATROPIUM BROMIDE AND ALBUTEROL SULFATE 1 AMPULE: .5; 3 SOLUTION RESPIRATORY (INHALATION) at 21:16

## 2021-05-05 RX ADMIN — SODIUM CHLORIDE, POTASSIUM CHLORIDE, SODIUM LACTATE AND CALCIUM CHLORIDE: 600; 310; 30; 20 INJECTION, SOLUTION INTRAVENOUS at 01:17

## 2021-05-05 RX ADMIN — SODIUM CHLORIDE, PRESERVATIVE FREE 10 ML: 5 INJECTION INTRAVENOUS at 20:21

## 2021-05-05 RX ADMIN — SODIUM CHLORIDE, PRESERVATIVE FREE 10 ML: 5 INJECTION INTRAVENOUS at 08:08

## 2021-05-05 RX ADMIN — BACITRACIN ZINC: 500 OINTMENT TOPICAL at 13:19

## 2021-05-05 RX ADMIN — OXYCODONE HYDROCHLORIDE 5 MG: 5 TABLET ORAL at 08:08

## 2021-05-05 RX ADMIN — BISACODYL 5 MG: 5 TABLET, COATED ORAL at 08:08

## 2021-05-05 RX ADMIN — KETOROLAC TROMETHAMINE 30 MG: 30 INJECTION, SOLUTION INTRAMUSCULAR; INTRAVENOUS at 08:14

## 2021-05-05 RX ADMIN — HYDROMORPHONE HYDROCHLORIDE 0.25 MG: 1 INJECTION, SOLUTION INTRAMUSCULAR; INTRAVENOUS; SUBCUTANEOUS at 04:33

## 2021-05-05 RX ADMIN — OXYCODONE HYDROCHLORIDE 5 MG: 5 TABLET ORAL at 18:20

## 2021-05-05 RX ADMIN — CEFAZOLIN 2000 MG: 10 INJECTION, POWDER, FOR SOLUTION INTRAVENOUS at 04:13

## 2021-05-05 RX ADMIN — OXYCODONE HYDROCHLORIDE 5 MG: 5 TABLET ORAL at 13:19

## 2021-05-05 RX ADMIN — KETOROLAC TROMETHAMINE 30 MG: 30 INJECTION, SOLUTION INTRAMUSCULAR; INTRAVENOUS at 20:20

## 2021-05-05 RX ADMIN — ENOXAPARIN SODIUM 40 MG: 40 INJECTION SUBCUTANEOUS at 08:13

## 2021-05-05 RX ADMIN — KETOROLAC TROMETHAMINE 30 MG: 30 INJECTION, SOLUTION INTRAMUSCULAR; INTRAVENOUS at 01:58

## 2021-05-05 RX ADMIN — IPRATROPIUM BROMIDE AND ALBUTEROL SULFATE 1 AMPULE: .5; 3 SOLUTION RESPIRATORY (INHALATION) at 15:56

## 2021-05-05 RX ADMIN — KETOROLAC TROMETHAMINE 30 MG: 30 INJECTION, SOLUTION INTRAMUSCULAR; INTRAVENOUS at 14:03

## 2021-05-05 ASSESSMENT — PAIN DESCRIPTION - ONSET
ONSET: ON-GOING
ONSET: ON-GOING

## 2021-05-05 ASSESSMENT — PAIN - FUNCTIONAL ASSESSMENT: PAIN_FUNCTIONAL_ASSESSMENT: PREVENTS OR INTERFERES SOME ACTIVE ACTIVITIES AND ADLS

## 2021-05-05 ASSESSMENT — PAIN DESCRIPTION - PROGRESSION
CLINICAL_PROGRESSION: GRADUALLY IMPROVING
CLINICAL_PROGRESSION: GRADUALLY IMPROVING

## 2021-05-05 ASSESSMENT — PAIN SCALES - GENERAL
PAINLEVEL_OUTOF10: 3
PAINLEVEL_OUTOF10: 5
PAINLEVEL_OUTOF10: 3
PAINLEVEL_OUTOF10: 5

## 2021-05-05 ASSESSMENT — PAIN DESCRIPTION - PAIN TYPE
TYPE: ACUTE PAIN;SURGICAL PAIN

## 2021-05-05 ASSESSMENT — PAIN DESCRIPTION - FREQUENCY
FREQUENCY: CONTINUOUS
FREQUENCY: CONTINUOUS

## 2021-05-05 ASSESSMENT — PAIN DESCRIPTION - ORIENTATION
ORIENTATION: LEFT
ORIENTATION: LEFT

## 2021-05-05 ASSESSMENT — PAIN DESCRIPTION - DESCRIPTORS
DESCRIPTORS: ACHING;CONSTANT
DESCRIPTORS: ACHING;CONSTANT

## 2021-05-05 ASSESSMENT — PAIN DESCRIPTION - LOCATION
LOCATION: BACK;RIB CAGE
LOCATION: RIB CAGE;BACK
LOCATION: RIB CAGE;BACK

## 2021-05-05 NOTE — PLAN OF CARE
Problem: Impaired respiratory status  Goal: Clear lung sounds  Description: Clear lung sounds  Outcome: Ongoing  Note: Treatments to improve lung aeration.

## 2021-05-05 NOTE — PROGRESS NOTES
Renard Bianchi M.D. [facility]  Daily Progress Note    Pt Name: Jeromy Shelton  Medical Record Number: [id number]  Date of Birth 1960   Today's Date: 5/5/2021    ASSESSMENT:     1. POD # 1  2. HD # 1  Active Hospital Problems    Diagnosis Date Noted    S/P left upper lobectomy of lung and mediastinal dissection [Z90.2] 05/04/2021    Primary cancer of left upper lobe of lung ([address] [C34.12] 04/21/2021    Smoking greater than 40 pack years [F17.210] 04/21/2021   3. Chief Complaint:  No chief complaint on file. PLAN:     1. CXR improved, has air leak with cough, lung has not filled pleural space, may need bigger CT to help , will monitor for now  2. Will turn up suction to see if helps  3. Re Check CXR later this am  4. On room air, sats good  5. Labs OK      SUBJECTIVE:     Joanie Carlson is doing well. Pain is moderately controlled. She has no nausea and no vomiting.       OBJECTIVE:     Patient Vitals for the past 24 hrs:   BP Temp Temp src Pulse Resp SpO2 Height Weight   05/05/21 0300 132/71 98.9 °F (37.2 °C) Oral 66 18 97 % -- --   05/05/21 0200 -- -- -- -- -- -- -- 131 lb 12.8 oz (59.8 kg)   05/04/21 2339 (!) 145/71 98.5 °F (36.9 °C) Oral 63 22 99 % -- --   05/04/21 1946 117/73 97.6 °F (36.4 °C) Oral 61 18 99 % -- --   05/04/21 1634 124/72 97.1 °F (36.2 °C) Axillary 71 -- 97 % -- --   05/04/21 1617 127/68 -- -- 74 24 98 % -- --   05/04/21 1612 110/70 -- -- 73 23 100 % -- --   05/04/21 1607 112/68 -- -- 70 18 100 % -- --   05/04/21 1602 108/70 -- -- 69 18 98 % -- --   05/04/21 1556 (!) 103/59 -- -- 73 12 98 % -- --   05/04/21 1551 116/67 -- -- 69 8 97 % -- --   05/04/21 1547 102/61 -- -- 74 29 97 % -- --   05/04/21 1543 107/62 -- -- 75 26 96 % -- --   05/04/21 1536 121/62 -- -- 75 23 93 % -- --   05/04/21 1530 115/67 -- -- 73 15 97 % -- --   05/04/21 1527 109/65 -- -- 77 16 98 % -- --   05/04/21 1522 116/69 -- -- 72 11 95 % -- --   05/04/21 1517 121/67 -- -- 66 25 99 % -- --   05/04/21 1512 114/72 -- -- 68 (!) 31 97 % -- --   05/04/21 1507 110/72 -- -- 71 15 95 % -- --   05/04/21 1502 106/70 -- -- 69 12 95 % -- --   05/04/21 1457 116/66 -- -- 70 23 98 % -- --   05/04/21 1452 116/71 -- -- 72 21 97 % -- --   05/04/21 1447 122/64 -- -- 67 23 99 % -- --   05/04/21 1443 113/64 -- -- 74 17 99 % -- --   05/04/21 1437 (!) 108/58 -- -- 68 8 98 % -- --   05/04/21 1432 123/69 -- -- 72 10 95 % -- --   05/04/21 1427 121/64 -- -- 70 13 99 % -- --   05/04/21 1421 130/67 -- -- 71 12 98 % -- --   05/04/21 1415 136/71 -- -- 71 14 98 % -- --   05/04/21 1412 115/66 -- -- 72 22 98 % -- --   05/04/21 1408 139/61 -- -- 123 (!) 36 98 % -- --   05/04/21 1403 (!) 149/65 -- -- 74 16 100 % -- --   05/04/21 1400 (!) 143/65 98 °F (36.7 °C) Temporal 77 13 98 % -- --   05/04/21 1353 (!) 151/90 -- -- 50 13 100 % -- --   05/04/21 0731 138/87 97.2 °F (36.2 °C) Temporal 59 16 99 % 5' 6\" (1.676 m) 125 lb 6.4 oz (56.9 kg)         Intake/Output Summary (Last 24 hours) at 5/5/2021 6349  Last data filed at 5/5/2021 0600  Gross per 24 hour   Intake 4954.96 ml   Output 2200 ml   Net 2754.96 ml       In: 2632 [P.O.:600; I.V.:4355]  Out: 2200 [Urine:2100]    I/O last 3 completed shifts: In: 2429 [P.O.:400; I.V.:2944]  Out: 1050 [Urine:950; Blood:100]     Date 05/05/21 0000 - 05/05/21 2359   Shift 7123-6804 7214-0498 7983-5855 24 Hour Total   INTAKE   P.O.(mL/kg/hr) 200   200   I. V.(mL/kg) 1411(23.6)   1411(23.6)   Shift Total(mL/kg) 9940(82.6)   0086(26.0)   OUTPUT   Urine(mL/kg/hr) 750   750   Emesis/NG output(mL/kg) 0(0)   0(0)   Other(mL/kg) 0(0)   0(0)   Stool(mL/kg) 0(0)   0(0)   Blood(mL/kg) 0(0)   0(0)   Chest Tube 0   0   Shift Total(mL/kg) 750(12.5)   750(12.5)   Weight (kg) 59.8 59.8 59.8 59.8       Wt Readings from Last 3 Encounters:   05/05/21 131 lb 12.8 oz (59.8 kg)   05/03/21 125 lb 12.8 oz (57.1 kg)   04/22/21 127 lb (57.6 kg)        Body mass index is 21.27 kg/m².      Diet: DIET GENERAL;        MEDS:     Scheduled Meds:   sodium chloride flush  5-40 mL Intravenous 2 times per day    bacitracin zinc   Topical Daily    enoxaparin  40 mg Subcutaneous Q24H    ketorolac  30 mg Intravenous Q6H    bisacodyl  5 mg Oral Daily    nicotine  1 patch Transdermal Daily     Continuous Infusions:   lactated ringers 125 mL/hr at 05/05/21 0117    sodium chloride       PRN Meds:sodium chloride flush, 5-40 mL, PRN  sodium chloride, 25 mL, PRN  acetaminophen, 650 mg, Q4H PRN  promethazine, 12.5 mg, Q6H PRN    Or  ondansetron, 4 mg, Q6H PRN  HYDROmorphone, 0.25 mg, Q3H PRN    Or  HYDROmorphone, 0.5 mg, Q3H PRN  ipratropium-albuterol, 1 ampule, Q4H PRN          PHYSICAL EXAM:     CONSTITUTIONAL: Alert and oriented times 3, no acute distress and cooperative to examination. HEENT: sub q emphysema  NECK: sub q emphysema  LUNGS: decreased on left, pigtail on left,         LABS:     CBC:   Recent Labs     05/05/21  0449   WBC 10.9*   RBC 3.76*   HGB 11.8*   HCT 37.2   MCV 98.9   MCH 31.4   MCHC 31.7*   *   MPV 12.8*      Last 3 CMP:   No results for input(s): NA, K, CL, CO2, BUN, CREATININE, GLUCOSE, CALCIUM, PROT, LABALBU, BILITOT, ALKPHOS, AST, ALT in the last 72 hours. Troponin: No results for input(s): TROPONINI in the last 72 hours. Calcium:   Lab Results   Component Value Date    CALCIUM 9.7 10/10/2017    CALCIUM 9.5 06/20/2016    CALCIUM 9.9 01/19/2014      Ionized Calcium: No results found for: IONCA   Lipids: No results for input(s): CHOL, HDL in the last 72 hours. Invalid input(s): LDLCALCU  INR: No results for input(s): INR in the last 72 hours.   Lactic Acid: No results found for: LACTA               DVT prophylaxis: [x] Lovenox                                 [] SCDs                                 [] SQ Heparin                                 [] Encourage ambulation, low risk for DVT, no chemical or mechanical prophylaxis necessary              [] Already on Anticoagulation      RADIOLOGY:      Xr Chest Portable    Result Date: 5/5/2021  Impression: Significant improvement left pneumothorax with approximately 20 percent residual pneumothorax. This document has been electronically signed by: Olman Blankenship MD on 05/05/2021 04:46 AM    Xr Chest Portable    Result Date: 5/4/2021  1. Normal heart size. Right lung is unremarkable. 2. Postsurgical changes left side of chest. Extensive subcutaneous emphysema left side of chest and neck. A small caliber pigtail catheter is present in the pleural space left side. 3. Diffuse lucency in the left hemithorax, representing a very large pneumothorax post lung resection. Residual lung markings are seen in the left lower lung field. **This report has been created using voice recognition software. It may contain minor errors which are inherent in voice recognition technology. ** Final report electronically signed by Dr. Brian Cervantes on 5/4/2021 2:51 PM        Rodrigo Person M.D.  FACS  Electronically signed 5/5/2021 at 6:52 AM

## 2021-05-05 NOTE — PROGRESS NOTES
0650-Jamila Ayon at bedside to see the patient. Dr. Cristina Patten increased the suction on patient's chest tube to negative 30.

## 2021-05-05 NOTE — CARE COORDINATION
5/5/21, 10:20 AM EDT  DISCHARGE PLANNING EVALUATION:    Jim Garza       Admitted: 5/4/2021/ [place] 33 day: 1   Location: [facility]06/006-A Reason for admit: S/P lobectomy of lung [Z90.2]   PMH:  has a past medical history of Arthritis, COPD (chronic obstructive pulmonary disease) ([facility] and Lung cancer ([facility] Procedure:   5/5 CXR  Improved Left Pneumothorax with 20% residual pneumothorax  5/4 ROBOTIC ASSISTED LEFT UPPER LOBECTOMY AND MEDIASTINAL DISSECTION, POSSIBLE OPEN AND CRYOTHERAPY OF INTERCOSTAL NERVES 5, 6, 7 & 8 (Left Chest)  Barriers to Discharge: From \[facility] [place] [facility]\"". POD 1 procedure above for SHANNON Adenocarcinoma: SGY/Pulmonary following. May need larger left chest (presently to -30 cwp) tube per notes  PCP: Raghu Madrid MD  Readmission Risk Score: 7%    Patient Goals/Plan/Treatment Preferences: met w client; denied needs as plans home w spouse; she still works at [employer] Addressed:  No issues identified.

## 2021-05-05 NOTE — PROGRESS NOTES
smoking: She quit smoking     History of recreational or IV drug use in the past: She smokes marijuana occasionally. History of alcohol use in the past:NO     History of exposure to coal mines/coal dust: NO  History of exposure to foundry dust/welding: NO  History of exposure to quarry/silica/sandblasting: NO  History of exposure to asbestos/working with breaks/ships: NO  History of exposure to farm dust: NO  History of recent travel to long distances: NO  History of exposure to birds, pigeons, or chickens in the past:NO  Pet animals at home:Yes  Dogs: 2  Cats: 2     History of pulmonary embolism in the past: No            History of DVT in the past:No     Past 24 Hours   - POD #1  - Left lung not expanding as primary would like; may need larger bore CT  - Stable on room air- 96%  - No CT output noted last 24 hours   - No pulmonary events overnight     -All systems reviewed.      PMHx   Past Medical History      Diagnosis Date    Arthritis     COPD (chronic obstructive pulmonary disease) ([facility]     Dr Kimmie Montoya [facility])     Dr Ángel Sal      Past Surgical History        Procedure Laterality Date     SECTION      x4    CHOLECYSTECTOMY      COLONOSCOPY  2016    CT NEEDLE BIOPSY LUNG PERCUTANEOUS  2021    CT NEEDLE BIOPSY LUNG PERCUTANEOUS 3/26/2021 [facility] CT SCAN    HYSTERECTOMY      UPPER GASTROINTESTINAL ENDOSCOPY  2016     Meds    Current Medications    sodium chloride flush  5-40 mL Intravenous 2 times per day    bacitracin zinc   Topical Daily    enoxaparin  40 mg Subcutaneous Q24H    ketorolac  30 mg Intravenous Q6H    bisacodyl  5 mg Oral Daily    nicotine  1 patch Transdermal Daily     oxyCODONE, sodium chloride flush, sodium chloride, acetaminophen, promethazine **OR** ondansetron, HYDROmorphone **OR** HYDROmorphone, ipratropium-albuterol  IV Drips/Infusions   sodium chloride       Home Medications  Medications Prior to Admission: albuterol sulfate HFA (VENTOLIN HFA) 108 (90 Base) MCG/ACT inhaler, Inhale 2 puffs into the lungs 4 times daily as needed for Wheezing  VITAMIN D PO, Take by mouth Unsure on dose  Diet    DIET GENERAL;  Allergies    Tylenol with codeine #3 [acetaminophen-codeine] and Codeine  Social History     Social History     Socioeconomic History    Marital status:      Spouse name: Not on file    Number of children: Not on file    Years of education: Not on file    Highest education level: Not on file   Occupational History    Not on file   Social Needs    Financial resource strain: Not on file    Food insecurity     Worry: Not on file     Inability: Not on file    Transportation needs     Medical: Not on file     Non-medical: Not on file   Tobacco Use    Smoking status: Current Every Day Smoker     Packs/day: 1.00     Years: 45.00     Pack years: 45.00     Types: Cigarettes    Smokeless tobacco: Never Used   Substance and Sexual Activity    Alcohol use: Never     Frequency: Never     Binge frequency: Never    Drug use: Yes     Types: Marijuana    Sexual activity: Not on file   Lifestyle    Physical activity     Days per week: Not on file     Minutes per session: Not on file    Stress: Not on file   Relationships    Social connections     Talks on phone: Not on file     Gets together: Not on file     Attends Caodaism service: Not on file     Active member of club or organization: Not on file     Attends meetings of clubs or organizations: Not on file     Relationship status: Not on file    Intimate partner violence     Fear of current or ex partner: Not on file     Emotionally abused: Not on file     Physically abused: Not on file     Forced sexual activity: Not on file   Other Topics Concern    Not on file   Social History Narrative    Not on file     Family History          Problem Relation Age of Onset    Other Mother         aneursym    No Known Problems Father     Depression Sister     Heart Disease Brother     Diabetes Brother     Other Brother         Lung disease    Lung Cancer Maternal Grandfather     Other Brother         Crohns    Macular Degen Brother     Depression Sister     Other Sister         Blood clots    Arthritis Sister     Brain Cancer Paternal Uncle      Sleep History    Never diagnosed with sleep apnea in the past.      Vitals     height is 5' 6\" (1.676 m) and weight is 131 lb 12.8 oz (59.8 kg). Her oral temperature is 98.8 °F (37.1 °C). Her blood pressure is 146/86 (abnormal) and her pulse is 72. Her respiration is 20 and oxygen saturation is 96%. Body mass index is 21.27 kg/m². I/O        Intake/Output Summary (Last 24 hours) at 5/5/2021 1053  Last data filed at 5/5/2021 0839  Gross per 24 hour   Intake 4964.96 ml   Output 2310 ml   Net 2654.96 ml     I/O last 3 completed shifts: In: 9085 [P.O.:600; I.V.:4355]  Out: 2200 [Urine:2100; Blood:100]   Patient Vitals for the past 96 hrs (Last 3 readings):   Weight   05/05/21 0200 131 lb 12.8 oz (59.8 kg)   05/04/21 0731 125 lb 6.4 oz (56.9 kg)       Exam   General Appearance: Patient appears moderately built and moderately nourished in no acute distress on room air  HEENT: Normal, Head is normocephalic, atraumatic. Oropharynx is clear and moist.  No oral thrush. PERRLA  Neck - Supple, No JVD present. No tracheal deviation present. Lungs - Bilateral air entry present. Bilateral aeration good with diminished breath sounds at lung bases left > Right. Subcutaneous emphysema palpable. Left-sided small bore chest tube in place with airleak in the atrium. Cardiovascular - Heart sounds are normal.  Regular rhythm normal rate without murmur, gallop or rub. Abdomen - Soft, nontender, nondistended, no masses or organomegaly  Neurologic - Awake, alert, oriented. There are no focal motor or sensory deficits  Extremities - No cyanosis, clubbing or edema. Musculoskeletal: Normal range of motion. Patient exhibits no tenderness.    Skin - No bruising or bleeding. Labs  - Old records and notes have been reviewed in [facility]   ABG  No results found for: PH, PO2, PCO2, HCO3, O2SAT  No results found for: IFIO2, MODE, SETTIDVOL, SETPEEP  CBC  Recent Labs     05/05/21  0449   WBC 10.9*   RBC 3.76*   HGB 11.8*   HCT 37.2   MCV 98.9   MCH 31.4   MCHC 31.7*   *   MPV 12.8*      BMP  No results for input(s): NA, K, CL, CO2, BUN, CREATININE, GLUCOSE, MG, PHOS, CALCIUM, IONCA, MG in the last 72 hours. LFT  No results for input(s): AST, ALT, ALB, BILITOT, ALKPHOS, LIPASE in the last 72 hours. Invalid input(s): AMYLASE  TROP  No results found for: TROPONINT  BNP  No results for input(s): BNP in the last 72 hours. Lactic Acid  No results for input(s): LACTA in the last 72 hours. INR  No results for input(s): INR, PROTIME in the last 72 hours. PTT  No results for input(s): APTT in the last 72 hours. Glucose  No results for input(s): POCGLU in the last 72 hours. UA No results for input(s): SPECGRAV, PHUR, COLORU, CLARITYU, MUCUS, PROTEINU, BLOODU, RBCUA, WBCUA, BACTERIA, NITRU, GLUCOSEU, BILIRUBINUR, UROBILINOGEN, KETUA, LABCAST, LABCASTTY, AMORPHOS in the last 72 hours. Invalid input(s): CRYSTALS. PFTs   Pulmonary function tests: Performed on 6 April 2021                Sleep studies   None    Cultures    No cultures    Echocardiogram   4/27/2021   Narrative & Impression      Transthoracic Echocardiography Report (TTE)   Conclusions      Summary   Normal left ventricle size and systolic function. Ejection fraction was   estimated at 60 %. There were no regional left ventricular wall motion   abnormalities and wall thickness was within normal limits. Aortic aneurysm noted in the ascending aorta . Aortic aneurysm measures 4.1 cm .       Signature      ----------------------------------------------------------------   Electronically signed by Saurabh Morfin MD (Interpreting   physician) on 04/27/2021 at 07:10 PM ----------------------------------------------------------------      Radiology    CXR  05/05/2021   1V chest   Impression: Significant improvement left pneumothorax with approximately 20 percent residual pneumothorax. Tue May 4, 2021  2:51 PM   PROCEDURE: XR CHEST PORTABLE   . Normal heart size. Right lung is unremarkable. 2. Postsurgical changes left side of chest. Extensive subcutaneous emphysema left side of chest and neck. A small caliber pigtail catheter is present in the pleural space left side. 3. Diffuse lucency in the left hemithorax, representing a very large pneumothorax post lung resection. Residual lung markings are seen in the left lower lung field. CT Scans  (See actual reports for details)  CT scan of chest without contrast.  Fri Mar 12, 2021  8:18 AM   PROCEDURE: CT CHEST WO CONTRAST   CLINICAL INFORMATION: Pulmonary nodule   COMPARISON: Chest x-ray dated 2/26/2021   1. There is a cavitary mass demonstrated within the left upper lobe with peripheral mild spiculation measuring 3.9 x 2.1 cm on axial image 34. This corresponds with previous chest x-ray dated 2/26/2021. This is concerning for malignancy until proven otherwise. Further evaluation could be obtained with PET/CT and/or biopsy. An infectious process is also not excluded.     2. There are additional smaller groundglass nodular densities within the right lung as detailed above.     3. Small pericardial effusion is noted. This is nonspecific. CT-guided biopsy by interventional radiology service on 26 March 2021:     Pathology:  Copies To: Dewey Scott     Clinical Information: CAVITATING MASS SHANNON, SMOKER     FINAL DIAGNOSIS:   Lung, left, needle core biopsies:       Adenocarcinoma. PET scan:  4/14/2021 10:04 AM   PROCEDURE: PET CT SKULL BASE TO MID THIGH   1. FDG avid left lung mass corresponding to known malignancy.  2. Small, mildly FDG avid right lung nodules, possibly infectious or inflammatory but satellite malignancy cannot be excluded. MRI scan of the brain with and without contrast:   PROCEDURE: MRI BRAIN W WO CONTRAST   1. No evidence of intracranial metastases or other acute intracranial abnormality. 2. Moderate severity chronic microvascular angiopathy. 3. Mild to moderate mucosal inflammation in the paranasal sinuses with air-fluid levels as evidence for an acute component     Pathology from [facility] performed on 28 April 2021:      Assessment   -.9 x 2.1 cm spiculated cavitary mass present within the left upper lobe S/p CT guided biopsy by IR service on 3/26/2021. The biopsy is consistent with Adenocarcinoma. She underwent robotic assisted left upper lobectomy and mediastinal dissection along with cryotherapy of the intercostal nerves for postoperative pain control.  -Subcutaneous emphysema with left-sided small bore chest tube placement  -Moderately severe COPD. -Chronic history of tobacco smoking for 45 years with 1 pack of cigarettes per day. She is planning to quit smoking  -Full Code     Plan   -Wean Fio2 to keep Spo2 > 90%. -Duonebs every 4 hours w/a   -Chest tube management per Dr. Annie Lyons MD service  -IS discussed and encouraged  -Avoid all sedative meds if she is drowsy. -NRT in place   -VTE prophylaxis: Lovenox     Case discussed with patient and Stephen Jenkins RN taking care of patient. 14 Tiff Berrios educated about my impression and plan. She verbalizes understanding. Questions and concerns addressed. Meds and orders reviewed     Electronically signed by   ERYN Vera CNP on 5/5/2021 at 10:53 AM     Addendum by Dr. Delvin Rodriguez MD:  I have seen and examined the patient independently. Face to face evaluation and examination was performed. The above evaluation and note has been reviewed. Labs and radiographs were reviewed. I Have discussed with Ms. NICOLE Vera CNP about this patient in detail. The above assessment and plan has been reviewed.  Please see my

## 2021-05-06 ENCOUNTER — APPOINTMENT (OUTPATIENT)
Dept: GENERAL RADIOLOGY | Age: 61
DRG: 164 | End: 2021-05-06
Attending: SURGERY
Payer: COMMERCIAL

## 2021-05-06 ENCOUNTER — ANESTHESIA EVENT (OUTPATIENT)
Dept: ENDOSCOPY | Age: 61
DRG: 164 | End: 2021-05-06
Payer: COMMERCIAL

## 2021-05-06 ENCOUNTER — ANESTHESIA (OUTPATIENT)
Dept: ENDOSCOPY | Age: 61
DRG: 164 | End: 2021-05-06
Payer: COMMERCIAL

## 2021-05-06 VITALS — DIASTOLIC BLOOD PRESSURE: 80 MMHG | SYSTOLIC BLOOD PRESSURE: 120 MMHG | OXYGEN SATURATION: 100 %

## 2021-05-06 PROCEDURE — 2580000003 HC RX 258: Performed by: ANESTHESIOLOGY

## 2021-05-06 PROCEDURE — 2580000003 HC RX 258: Performed by: SURGERY

## 2021-05-06 PROCEDURE — 3700000000 HC ANESTHESIA ATTENDED CARE: Performed by: INTERNAL MEDICINE

## 2021-05-06 PROCEDURE — 94640 AIRWAY INHALATION TREATMENT: CPT

## 2021-05-06 PROCEDURE — 6360000002 HC RX W HCPCS: Performed by: SURGERY

## 2021-05-06 PROCEDURE — 3609027000 HC BRONCHOSCOPY: Performed by: INTERNAL MEDICINE

## 2021-05-06 PROCEDURE — 94760 N-INVAS EAR/PLS OXIMETRY 1: CPT

## 2021-05-06 PROCEDURE — 7100000001 HC PACU RECOVERY - ADDTL 15 MIN: Performed by: INTERNAL MEDICINE

## 2021-05-06 PROCEDURE — 7100000000 HC PACU RECOVERY - FIRST 15 MIN: Performed by: INTERNAL MEDICINE

## 2021-05-06 PROCEDURE — 6370000000 HC RX 637 (ALT 250 FOR IP): Performed by: SURGERY

## 2021-05-06 PROCEDURE — 31622 DX BRONCHOSCOPE/WASH: CPT | Performed by: INTERNAL MEDICINE

## 2021-05-06 PROCEDURE — 2500000003 HC RX 250 WO HCPCS: Performed by: ANESTHESIOLOGY

## 2021-05-06 PROCEDURE — 99024 POSTOP FOLLOW-UP VISIT: CPT | Performed by: SURGERY

## 2021-05-06 PROCEDURE — 6360000002 HC RX W HCPCS: Performed by: ANESTHESIOLOGY

## 2021-05-06 PROCEDURE — 99232 SBSQ HOSP IP/OBS MODERATE 35: CPT | Performed by: INTERNAL MEDICINE

## 2021-05-06 PROCEDURE — 3700000001 HC ADD 15 MINUTES (ANESTHESIA): Performed by: INTERNAL MEDICINE

## 2021-05-06 PROCEDURE — 2060000000 HC ICU INTERMEDIATE R&B

## 2021-05-06 PROCEDURE — 71045 X-RAY EXAM CHEST 1 VIEW: CPT

## 2021-05-06 PROCEDURE — 0BJ08ZZ INSPECTION OF TRACHEOBRONCHIAL TREE, VIA NATURAL OR ARTIFICIAL OPENING ENDOSCOPIC: ICD-10-PCS | Performed by: INTERNAL MEDICINE

## 2021-05-06 PROCEDURE — 6370000000 HC RX 637 (ALT 250 FOR IP): Performed by: NURSE PRACTITIONER

## 2021-05-06 PROCEDURE — APPSS30 APP SPLIT SHARED TIME 16-30 MINUTES: Performed by: NURSE PRACTITIONER

## 2021-05-06 RX ORDER — LIDOCAINE HYDROCHLORIDE 20 MG/ML
INJECTION, SOLUTION INFILTRATION; PERINEURAL PRN
Status: DISCONTINUED | OUTPATIENT
Start: 2021-05-06 | End: 2021-05-06 | Stop reason: SDUPTHER

## 2021-05-06 RX ORDER — DIPHENHYDRAMINE HYDROCHLORIDE 50 MG/ML
12.5 INJECTION INTRAMUSCULAR; INTRAVENOUS
Status: DISCONTINUED | OUTPATIENT
Start: 2021-05-06 | End: 2021-05-06 | Stop reason: HOSPADM

## 2021-05-06 RX ORDER — MEPERIDINE HYDROCHLORIDE 25 MG/ML
12.5 INJECTION INTRAMUSCULAR; INTRAVENOUS; SUBCUTANEOUS EVERY 5 MIN PRN
Status: DISCONTINUED | OUTPATIENT
Start: 2021-05-06 | End: 2021-05-06 | Stop reason: HOSPADM

## 2021-05-06 RX ORDER — METOCLOPRAMIDE HYDROCHLORIDE 5 MG/ML
10 INJECTION INTRAMUSCULAR; INTRAVENOUS
Status: DISCONTINUED | OUTPATIENT
Start: 2021-05-06 | End: 2021-05-06 | Stop reason: HOSPADM

## 2021-05-06 RX ORDER — LABETALOL 20 MG/4 ML (5 MG/ML) INTRAVENOUS SYRINGE
5 EVERY 10 MIN PRN
Status: DISCONTINUED | OUTPATIENT
Start: 2021-05-06 | End: 2021-05-06 | Stop reason: HOSPADM

## 2021-05-06 RX ORDER — ONDANSETRON 2 MG/ML
INJECTION INTRAMUSCULAR; INTRAVENOUS PRN
Status: DISCONTINUED | OUTPATIENT
Start: 2021-05-06 | End: 2021-05-06 | Stop reason: SDUPTHER

## 2021-05-06 RX ORDER — PROMETHAZINE HYDROCHLORIDE 25 MG/ML
12.5 INJECTION, SOLUTION INTRAMUSCULAR; INTRAVENOUS
Status: DISCONTINUED | OUTPATIENT
Start: 2021-05-06 | End: 2021-05-06 | Stop reason: HOSPADM

## 2021-05-06 RX ORDER — PROPOFOL 10 MG/ML
INJECTION, EMULSION INTRAVENOUS PRN
Status: DISCONTINUED | OUTPATIENT
Start: 2021-05-06 | End: 2021-05-06 | Stop reason: SDUPTHER

## 2021-05-06 RX ORDER — FENTANYL CITRATE 50 UG/ML
50 INJECTION, SOLUTION INTRAMUSCULAR; INTRAVENOUS EVERY 5 MIN PRN
Status: DISCONTINUED | OUTPATIENT
Start: 2021-05-06 | End: 2021-05-06 | Stop reason: HOSPADM

## 2021-05-06 RX ORDER — FENTANYL CITRATE 50 UG/ML
25 INJECTION, SOLUTION INTRAMUSCULAR; INTRAVENOUS EVERY 5 MIN PRN
Status: DISCONTINUED | OUTPATIENT
Start: 2021-05-06 | End: 2021-05-06 | Stop reason: HOSPADM

## 2021-05-06 RX ORDER — SUCCINYLCHOLINE CHLORIDE 20 MG/ML
INJECTION INTRAMUSCULAR; INTRAVENOUS PRN
Status: DISCONTINUED | OUTPATIENT
Start: 2021-05-06 | End: 2021-05-06 | Stop reason: SDUPTHER

## 2021-05-06 RX ORDER — SODIUM CHLORIDE 9 MG/ML
INJECTION, SOLUTION INTRAVENOUS CONTINUOUS PRN
Status: DISCONTINUED | OUTPATIENT
Start: 2021-05-06 | End: 2021-05-06 | Stop reason: SDUPTHER

## 2021-05-06 RX ADMIN — KETOROLAC TROMETHAMINE 30 MG: 30 INJECTION, SOLUTION INTRAMUSCULAR; INTRAVENOUS at 02:47

## 2021-05-06 RX ADMIN — IPRATROPIUM BROMIDE AND ALBUTEROL SULFATE 1 AMPULE: .5; 3 SOLUTION RESPIRATORY (INHALATION) at 20:05

## 2021-05-06 RX ADMIN — OXYCODONE HYDROCHLORIDE 5 MG: 5 TABLET ORAL at 10:15

## 2021-05-06 RX ADMIN — KETOROLAC TROMETHAMINE 30 MG: 30 INJECTION, SOLUTION INTRAMUSCULAR; INTRAVENOUS at 13:36

## 2021-05-06 RX ADMIN — SUCCINYLCHOLINE CHLORIDE 80 MG: 20 INJECTION, SOLUTION INTRAMUSCULAR; INTRAVENOUS at 12:16

## 2021-05-06 RX ADMIN — PROPOFOL 160 MG: 10 INJECTION, EMULSION INTRAVENOUS at 12:16

## 2021-05-06 RX ADMIN — OXYCODONE HYDROCHLORIDE 5 MG: 5 TABLET ORAL at 20:29

## 2021-05-06 RX ADMIN — OXYCODONE HYDROCHLORIDE 5 MG: 5 TABLET ORAL at 15:49

## 2021-05-06 RX ADMIN — IPRATROPIUM BROMIDE AND ALBUTEROL SULFATE 1 AMPULE: .5; 3 SOLUTION RESPIRATORY (INHALATION) at 07:37

## 2021-05-06 RX ADMIN — BACITRACIN ZINC: 500 OINTMENT TOPICAL at 13:35

## 2021-05-06 RX ADMIN — OXYCODONE HYDROCHLORIDE 5 MG: 5 TABLET ORAL at 04:05

## 2021-05-06 RX ADMIN — KETOROLAC TROMETHAMINE 30 MG: 30 INJECTION, SOLUTION INTRAMUSCULAR; INTRAVENOUS at 07:56

## 2021-05-06 RX ADMIN — ACETAMINOPHEN 650 MG: 325 TABLET ORAL at 23:57

## 2021-05-06 RX ADMIN — BISACODYL 5 MG: 5 TABLET, COATED ORAL at 07:56

## 2021-05-06 RX ADMIN — SODIUM CHLORIDE, PRESERVATIVE FREE 10 ML: 5 INJECTION INTRAVENOUS at 07:56

## 2021-05-06 RX ADMIN — SODIUM CHLORIDE, PRESERVATIVE FREE 10 ML: 5 INJECTION INTRAVENOUS at 20:27

## 2021-05-06 RX ADMIN — LIDOCAINE HYDROCHLORIDE 80 MG: 20 INJECTION, SOLUTION INFILTRATION; PERINEURAL at 12:16

## 2021-05-06 RX ADMIN — ONDANSETRON HYDROCHLORIDE 4 MG: 4 INJECTION, SOLUTION INTRAMUSCULAR; INTRAVENOUS at 12:22

## 2021-05-06 RX ADMIN — SODIUM CHLORIDE: 9 INJECTION, SOLUTION INTRAVENOUS at 12:06

## 2021-05-06 ASSESSMENT — PAIN SCALES - GENERAL
PAINLEVEL_OUTOF10: 0
PAINLEVEL_OUTOF10: 4
PAINLEVEL_OUTOF10: 5
PAINLEVEL_OUTOF10: 7
PAINLEVEL_OUTOF10: 3
PAINLEVEL_OUTOF10: 0
PAINLEVEL_OUTOF10: 6
PAINLEVEL_OUTOF10: 8
PAINLEVEL_OUTOF10: 8
PAINLEVEL_OUTOF10: 3

## 2021-05-06 ASSESSMENT — PAIN DESCRIPTION - DIRECTION
RADIATING_TOWARDS: N?A
RADIATING_TOWARDS: N?A

## 2021-05-06 ASSESSMENT — PULMONARY FUNCTION TESTS
PIF_VALUE: 1
PIF_VALUE: 24
PIF_VALUE: 26
PIF_VALUE: 4
PIF_VALUE: 7
PIF_VALUE: 1
PIF_VALUE: 16
PIF_VALUE: 33
PIF_VALUE: 16

## 2021-05-06 ASSESSMENT — ENCOUNTER SYMPTOMS
SHORTNESS OF BREATH: 1
DYSPNEA ACTIVITY LEVEL: NO INTERVAL CHANGE

## 2021-05-06 ASSESSMENT — PAIN DESCRIPTION - PROGRESSION
CLINICAL_PROGRESSION: GRADUALLY IMPROVING
CLINICAL_PROGRESSION: GRADUALLY IMPROVING

## 2021-05-06 ASSESSMENT — PAIN DESCRIPTION - LOCATION: LOCATION: BACK;RIB CAGE

## 2021-05-06 ASSESSMENT — PAIN DESCRIPTION - DESCRIPTORS
DESCRIPTORS: ACHING
DESCRIPTORS: ACHING;CONSTANT

## 2021-05-06 ASSESSMENT — PAIN DESCRIPTION - ORIENTATION
ORIENTATION: LEFT
ORIENTATION: LEFT

## 2021-05-06 ASSESSMENT — PAIN - FUNCTIONAL ASSESSMENT
PAIN_FUNCTIONAL_ASSESSMENT: PREVENTS OR INTERFERES SOME ACTIVE ACTIVITIES AND ADLS
PAIN_FUNCTIONAL_ASSESSMENT: 0-10

## 2021-05-06 ASSESSMENT — PAIN DESCRIPTION - FREQUENCY: FREQUENCY: CONTINUOUS

## 2021-05-06 ASSESSMENT — PAIN DESCRIPTION - ONSET
ONSET: ON-GOING
ONSET: ON-GOING

## 2021-05-06 ASSESSMENT — PAIN DESCRIPTION - PAIN TYPE
TYPE: ACUTE PAIN;SURGICAL PAIN
TYPE: ACUTE PAIN;SURGICAL PAIN

## 2021-05-06 NOTE — PROGRESS NOTES
[facility] [place] [facility]      Patient - Alfonzo Martínez   MRN -  [id number]   [facility] # - [de-identified]   - 1960      Date of Admission -  2021  7:13 AM  Date of evaluation -  2021  Room - --A   Hospital Day - 2  Consulting - Bernarda Echevarria MD Primary Care Physician - Tobias Kay MD     Problem List      Active Hospital Problems    Diagnosis Date Noted    S/P left upper lobectomy of lung and mediastinal dissection [Z90.2] 2021    Primary cancer of left upper lobe of lung ([address] [C34.12] 2021    Smoking greater than 40 pack years [F17.210] 2021     Reason for Consult    For post operative pulmonary management  HPI   History Obtained From: Patient and electronic medical record. Alfonzo Martínez is a 62 y.o. female She was admitted under Dr. Pan Lopez MD service. Pulmonary medicine was consulted for further evaluation of post operative pulmonary management. She underwent:  Type of surgery: Robotic assisted left upper lobectomy and mediastinal dissection along with cryotherapy of the intercostal nerves for postoperative pain control. Date of surgery: 4 May 2021  Surgeon: Dr. Pan Lopez MD      At the time of my initial evaluation, she is on room air    Input/Out put fluid balance from the time of admission: 1.3 L positive    Prior to current hospitalization: She was diagnosed with moderately COPD however she is not using any inhalers    She is currently not using any oxygen supplementation at rest, exercise or during sleep/at night time. 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 [address] Jamaal Leiva       History of tobacco smoking:Yes  Amount of tobacco smokin.0 PPD.   Years of tobacco smokin.                                    Quit smoking: No.              Current smoker: Yes.       Amount of current tobacco smoking: She quit smoking     History of recreational or IV drug use in the past: She smokes marijuana occasionally. History of alcohol use in the past:NO     History of exposure to coal mines/coal dust: NO  History of exposure to foundry dust/welding: NO  History of exposure to quarry/silica/sandblasting: NO  History of exposure to asbestos/working with breaks/ships: NO  History of exposure to farm dust: NO  History of recent travel to long distances: NO  History of exposure to birds, pigeons, or chickens in the past:NO  Pet animals at home:Yes  Dogs: 2  Cats: 2     History of pulmonary embolism in the past: No            History of DVT in the past:No     Past 24 Hours   - Larger CT placed yesterday per Dr. Faith Kowalski   - Stable on room air- 94%  - Bronch today at 1200- per Dr. Leary [place]  - No pulmonary events overnight     -All systems reviewed.      PMHx   Past Medical History      Diagnosis Date    Arthritis     COPD (chronic obstructive [facility] disease) ([facility]     Dr Onel Hackett [facility])     Dr VARGAS      Past Surgical History        Procedure Laterality Date     SECTION      x4    CHOLECYSTECTOMY      COLONOSCOPY  2016    CT NEEDLE BIOPSY LUNG PERCUTANEOUS  2021    CT NEEDLE BIOPSY LUNG PERCUTANEOUS 3/26/2021 [facility] CT SCAN    HYSTERECTOMY      UPPER GASTROINTESTINAL ENDOSCOPY  2016     Meds    Current Medications    ipratropium-albuterol  1 ampule Inhalation Q4H WA    sodium chloride flush  5-40 mL Intravenous 2 times per day    bacitracin zinc   Topical Daily    [Held by provider] enoxaparin  40 mg Subcutaneous Q24H    ketorolac  30 mg Intravenous Q6H    bisacodyl  5 mg Oral Daily    nicotine  1 patch Transdermal Daily     oxyCODONE, sodium chloride flush, sodium chloride, acetaminophen, promethazine **OR** ondansetron, HYDROmorphone **OR** HYDROmorphone, ipratropium-albuterol  IV Drips/Infusions   sodium chloride       Home Medications  Medications Prior to Admission: albuterol sulfate HFA (VENTOLIN HFA) 108 (90 Base) MCG/ACT inhaler, Inhale 2 puffs into the lungs 4 times daily as needed for Wheezing  VITAMIN D PO, Take by mouth Unsure on dose  Diet    Diet NPO Effective Now Exceptions are:  Other (See Comment)  Allergies    Tylenol with codeine #3 [acetaminophen-codeine] and Codeine  Social History     Social History     Socioeconomic History    Marital status:      Spouse name: Not on file    Number of children: Not on file    Years of education: Not on file    Highest education level: Not on file   Occupational History    Not on file   Social Needs    Financial resource strain: Not on file    Food insecurity     Worry: Not on file     Inability: Not on file    Transportation needs     Medical: Not on file     Non-medical: Not on file   Tobacco Use    Smoking status: Current Every Day Smoker     Packs/day: 1.00     Years: 45.00     Pack years: 45.00     Types: Cigarettes    Smokeless tobacco: Never Used   Substance and Sexual Activity    Alcohol use: Never     Frequency: Never     Binge frequency: Never    Drug use: Yes     Types: Marijuana    Sexual activity: Not on file   Lifestyle    Physical activity     Days per week: Not on file     Minutes per session: Not on file    Stress: Not on file   Relationships    Social connections     Talks on phone: Not on file     Gets together: Not on file     Attends [employer] service: Not on file     Active member of club or organization: Not on file     Attends meetings of clubs or organizations: Not on file     Relationship status: Not on file    Intimate partner violence     Fear of current or ex partner: Not on file     Emotionally abused: Not on file     Physically abused: Not on file     Forced sexual activity: Not on file   Other Topics Concern    Not on file   Social History Narrative    Not on file     Family History          Problem Relation Age of Onset    Other Mother         aneursym    No Known Problems Father     Depression Sister     Heart Disease Brother     Diabetes Brother     Other Brother         Lung disease    Lung Cancer Maternal Grandfather     Other Brother         Crohns    Macular Degen Brother     Depression Sister     Other Sister         Blood clots    Arthritis Sister     Brain Cancer Paternal Uncle      Sleep History    Never diagnosed with sleep apnea in the past.      Vitals     height is 5' 6\" (1.676 m) and weight is 131 lb 12.8 oz (59.8 kg). Her oral temperature is 98.1 °F (36.7 °C). Her blood pressure is 152/92 (abnormal) and her pulse is 84. Her respiration is 16 and oxygen saturation is 94%. Body mass index is 21.27 kg/m². I/O        Intake/Output Summary (Last 24 hours) at 5/6/2021 0906  Last data filed at 5/6/2021 0756  Gross per 24 hour   Intake 1134.6 ml   Output 4770 ml   Net -3635.4 ml     I/O last 3 completed shifts: In: 1314.6 [P.O.:1100; I.V.:214.6]  Out: 5020 [Urine:4400; Chest Tube:620]   Patient Vitals for the past 96 hrs (Last 3 readings):   Weight   05/05/21 0200 131 lb 12.8 oz (59.8 kg)   05/04/21 0731 125 lb 6.4 oz (56.9 kg)       Exam   General Appearance: Patient appears moderately built and moderately nourished in no acute distress on room air  HEENT: Normal, Head is normocephalic, atraumatic. Oropharynx is clear and moist.  No oral thrush. PERRLA  Neck - Supple, No JVD present. No tracheal deviation present. Lungs - Bilateral air entry present. Bilateral aeration good with diminished breath sounds at lung bases left > Right. Subcutaneous emphysema palpable. Left-sided small bore chest tube in place with airleak in the atrium. Cardiovascular - Heart sounds are normal.  Regular rhythm normal rate without murmur, gallop or rub. Abdomen - Soft, nontender, nondistended, no masses or organomegaly  Neurologic - Awake, alert, oriented.  There are no focal motor or sensory deficits  Extremities - No cyanosis, clubbing or edema.  Musculoskeletal: Normal range of motion. Patient exhibits no tenderness. Skin - No bruising or bleeding. Labs  - Old records and notes have been reviewed in [facility]   ABG  No results found for: PH, PO2, PCO2, HCO3, O2SAT  No results found for: IFIO2, MODE, SETTIDVOL, SETPEEP  CBC  Recent Labs     05/05/21  0449   WBC 10.9*   RBC 3.76*   HGB 11.8*   HCT 37.2   MCV 98.9   MCH 31.4   MCHC 31.7*   *   MPV 12.8*      BMP  No results for input(s): NA, K, CL, CO2, BUN, CREATININE, GLUCOSE, MG, PHOS, CALCIUM, IONCA, MG in the last 72 hours. LFT  No results for input(s): AST, ALT, ALB, BILITOT, ALKPHOS, LIPASE in the last 72 hours. Invalid input(s): AMYLASE  TROP  No results found for: TROPONINT  BNP  No results for input(s): BNP in the last 72 hours. Lactic Acid  No results for input(s): LACTA in the last 72 hours. INR  No results for input(s): INR, PROTIME in the last 72 hours. PTT  No results for input(s): APTT in the last 72 hours. Glucose  No results for input(s): POCGLU in the last 72 hours. UA No results for input(s): SPECGRAV, PHUR, COLORU, CLARITYU, MUCUS, PROTEINU, BLOODU, RBCUA, WBCUA, BACTERIA, NITRU, GLUCOSEU, BILIRUBINUR, UROBILINOGEN, KETUA, LABCAST, LABCASTTY, AMORPHOS in the last 72 hours. Invalid input(s): CRYSTALS. PFTs   Pulmonary function tests: Performed on 6 April 2021                Sleep studies   None    Cultures    No cultures    4/30/21  Cytology   FINAL DIAGNOSIS:   Slides received from [facility], [id number], [address] 4/23/21:   [facility] 4R, lymph node biopsy:    Negative for malignancy. [address] [facility] 7, lymph node biopsy:    Lymphoid tissue and granuloma.    Negative for malignancy. C - Station 4L, lymph node biopsy:    Lymphoid tissue and possible granulomatous inflammation.    Negative for malignancy. D - Station 11L, lymph node biopsy:    Consistent with lung adenocarcinoma.   See microscopic.    Echocardiogram   4/27/2021   Narrative & PET/CT and/or biopsy. An infectious process is also not excluded.     2. There are additional smaller groundglass nodular densities within the right lung as detailed above.     3. Small pericardial effusion is noted. This is nonspecific. CT-guided biopsy by interventional radiology service on 26 March 2021:     Pathology:  Copies To: Brandie Merlos     Clinical Information: CAVITATING MASS SHANNON, SMOKER     FINAL DIAGNOSIS:   Lung, left, needle core biopsies:       Adenocarcinoma. PET scan:  4/14/2021 10:04 AM   PROCEDURE: PET CT SKULL BASE TO MID THIGH   1. FDG avid left lung mass corresponding to known malignancy. 2. Small, mildly FDG avid right lung nodules, possibly infectious or inflammatory but satellite malignancy cannot be excluded. MRI scan of the brain with and without contrast:   PROCEDURE: MRI BRAIN W WO CONTRAST   1. No evidence of intracranial metastases or other acute intracranial abnormality. 2. Moderate severity chronic microvascular angiopathy. 3. Mild to moderate mucosal inflammation in the paranasal sinuses with air-fluid levels as evidence for an acute component     Pathology from [facility] performed on 28 April 2021:      Assessment   -.9 x 2.1 cm spiculated cavitary mass present within the left upper lobe S/p CT guided biopsy by IR service on 3/26/2021. The biopsy is consistent with Adenocarcinoma. She underwent robotic assisted left upper lobectomy and mediastinal dissection along with cryotherapy of the intercostal nerves for postoperative pain control.  -Subcutaneous emphysema with left-sided small bore chest tube placement  -Moderately severe COPD. -Chronic history of tobacco smoking for 45 years with 1 pack of cigarettes per day.   She is planning to quit smoking  -Full Code     Plan   -Bronchoscopy today 5/6 at 1630- to check integrity of bronchial stump- procedure being done per Dr. Alta Kussmaul due to wanting procedure done early  -NPO till procedure  -Regina stevenson family were educated about my impression and plan. Ruben Abreu her family were informed about the risks and complications associated with bronchoscopy with biopsy including but not limited to pneumothorax with requirement of chest tube placement. Ruben Abreu her family  verbalizes understanding.   -Will follow cultures/cytology from procedure   -Wean Fio2 to keep Spo2 > 90%. -Duonebs every 4 hours w/a   -Cytology (+) Adenocarcinoma Station 11L lymph node biopsy   -Chest tube management per Dr. Alfonzo Porras MD service  -IS discussed and encouraged  -Avoid all sedative meds if she is drowsy. -NRT in place   -VTE prophylaxis: Lovenox (on HOLD for procedure)     Case discussed with patient and Pee Garcia RN taking care of patient. 14 Tiff Berrios educated about my impression and plan. She verbalizes understanding. Questions and concerns addressed. Meds and orders reviewed     Electronically signed by   ERYN Greer CNP on 5/6/2021 at 9:06 AM     Addendum by Dr. Trish Romero MD:  I have seen and examined the patient independently. Face to face evaluation and examination was performed. The above evaluation and note has been reviewed. Labs and radiographs were reviewed. I Have discussed with Ms. NICOLE Greer CNP about this patient in detail. The above assessment and plan has been reviewed. Please see my modifications mentioned below. My modifications:  She is in no acute distress  Right side chest tube is still leaking air  I spoke with Dr. Regi Bowman MD on phone and arranged Bronchoscopy for airway examination as requested by Dr. Alfonzo Porras MD to check for the patency of postoperative stump.     Arden Brothers MD 5/6/2021 7:58 PM

## 2021-05-06 NOTE — PLAN OF CARE
Problem: Falls - Risk of:  Goal: Will remain free from falls  Description: Will remain free from falls  Outcome: Met This Shift  Note: Fall precautions in place, no falls during shift, at this time. Patient ambulates with 1 assist.  PT/OT not needed at this time. Problem: Pain:  Goal: Pain level will decrease  Description: Pain level will decrease  Outcome: Ongoing  Note: Patient having acute/surgical pain at CT site and in back near OP sites. Denies chronic pain. See flowsheet and MAR for details. Will continue to monitor. Pain Assessment: 0-10  Pain Level: 5  Patient's Stated Pain Goal: 3   Is pain goal met at this time? No - tolerating pain     Non-Pharmaceutical Pain Intervention(s): Rest - denied wanting Ice at this time. Goal: Control of acute pain  Description: Control of acute pain  Outcome: Ongoing  Note: Pt has acute/surgical pain at this time. Problem: Pain:  Goal: Control of chronic pain  Description: Control of chronic pain  Outcome: Completed     Problem: Falls - Risk of:  Goal: Absence of physical injury  Description: Absence of physical injury  Outcome: Completed     Care plan reviewed with patient. Patient verbalize understanding of the plan of care and contribute to goal setting.

## 2021-05-06 NOTE — PROGRESS NOTES
Anton Carmona is here for bronchoscopy , scope # [id number] used. Photos taken. Patient tolerated procedure well. CHest tube intact and to low continuous suction. Per Yumiko Cook said ok to resume diet if okay with Dr. Greyson Phillip. Report to [facility] RN PACU

## 2021-05-06 NOTE — PROGRESS NOTES
Isabela Garcia M.D. [facility]  Daily Progress Note    Pt Name: Marguerite Souza  Medical Record Number: [id number]  Date of Birth 1960   Today's Date: 5/6/2021    ASSESSMENT:     1. POD # 2  2. HD # 2  Active Hospital Problems    Diagnosis Date Noted    S/P left upper lobectomy of lung and mediastinal dissection [Z90.2] 05/04/2021    Primary cancer of left upper lobe of lung ([address] [C34.12] 04/21/2021    Smoking greater than 40 pack years [F17.210] 04/21/2021       Chief Complaint:  No chief complaint on file. PLAN:     1. CT replaced yesterday, lung nearly up, but has fairly sizeable air leak, will ask pulm to bronch to check integrity of bronchial stump  2. On RA sats good  3. SUBJECTIVE:     Alireza Moore is doing well. Pain is better controlled. She has no nausea and no vomiting. OBJECTIVE:     Patient Vitals for the past 24 hrs:   BP Temp Temp src Pulse Resp SpO2   05/06/21 0345 (!) 140/88 98.2 °F (36.8 °C) Oral 74 16 98 %   05/05/21 2315 137/74 98.2 °F (36.8 °C) Oral 70 18 96 %   05/05/21 2015 138/83 98.1 °F (36.7 °C) Oral 68 18 98 %   05/05/21 1523 119/73 97.6 °F (36.4 °C) Oral 72 19 98 %   05/05/21 1207 -- -- -- -- -- 97 %   05/05/21 1120 (!) 153/82 98 °F (36.7 °C) Oral 67 18 99 %   05/05/21 0806 (!) 146/86 98.8 °F (37.1 °C) Oral 72 20 96 %         Intake/Output Summary (Last 24 hours) at 5/6/2021 0641  Last data filed at 5/6/2021 0345  Gross per 24 hour   Intake 1314.6 ml   Output 5020 ml   Net -3705.4 ml       In: 1314.6 [P.O.:1100; I.V.:214.6]  Out: 5020 [Urine:4400]    I/O last 3 completed shifts: In: 2425.6 [P.O.:800; I.V.:1625.6]  Out: 4210 [Urine:3750; Chest Tube:460]     Date 05/06/21 0000 - 05/06/21 2359   Shift 1819-9025 6367-0130 9675-0126 24 Hour Total   INTAKE   P.O.(mL/kg/hr) 500   500   I. V.(mL/kg) 0(0)   0(0)   Shift Total(mL/kg) 500(8.4)   500(8.4)   OUTPUT   Urine(mL/kg/hr) 1800   1800   Emesis/NG output(mL/kg) 0(0)   0(0) Other(mL/kg) 0(0)   0(0)   Stool(mL/kg) 0(0)   0(0)   Blood(mL/kg) 0(0)   0(0)   Chest Tube 160   160   Shift Total(mL/kg) 9843(12.3)   0867(06.6)   Weight (kg) 59.8 59.8 59.8 59.8       Wt Readings from Last 3 Encounters:   05/05/21 131 lb 12.8 oz (59.8 kg)   05/03/21 125 lb 12.8 oz (57.1 kg)   04/22/21 127 lb (57.6 kg)        Body mass index is 21.27 kg/m². Diet: Diet NPO Effective Now Exceptions are: Other (See Comment)        MEDS:     Scheduled Meds:   ipratropium-albuterol  1 ampule Inhalation Q4H WA    sodium chloride flush  5-40 mL Intravenous 2 times per day    bacitracin zinc   Topical Daily    enoxaparin  40 mg Subcutaneous Q24H    ketorolac  30 mg Intravenous Q6H    bisacodyl  5 mg Oral Daily    nicotine  1 patch Transdermal Daily     Continuous Infusions:   sodium chloride       PRN Meds:oxyCODONE, 5 mg, Q4H PRN  sodium chloride flush, 5-40 mL, PRN  sodium chloride, 25 mL, PRN  acetaminophen, 650 mg, Q4H PRN  promethazine, 12.5 mg, Q6H PRN    Or  ondansetron, 4 mg, Q6H PRN  HYDROmorphone, 0.25 mg, Q3H PRN    Or  HYDROmorphone, 0.5 mg, Q3H PRN  ipratropium-albuterol, 1 ampule, Q4H PRN          PHYSICAL EXAM:     CONSTITUTIONAL: Alert and oriented times 3, no acute distress and cooperative to examination. HEENT: sub q emphysema  NECK: sub q emphysema  LUNGS: decreased on left CT in place, fairly sizeable air leak         LABS:     CBC:   Recent Labs     05/05/21  0449   WBC 10.9*   RBC 3.76*   HGB 11.8*   HCT 37.2   MCV 98.9   MCH 31.4   MCHC 31.7*   *   MPV 12.8*      Last 3 CMP:   No results for input(s): NA, K, CL, CO2, BUN, CREATININE, GLUCOSE, CALCIUM, PROT, LABALBU, BILITOT, ALKPHOS, AST, ALT in the last 72 hours. Troponin: No results for input(s): TROPONINI in the last 72 hours.   Calcium:   Lab Results   Component Value Date    CALCIUM 9.7 10/10/2017    CALCIUM 9.5 06/20/2016    CALCIUM 9.9 01/19/2014      Ionized Calcium: No results found for: IONCA   Lipids: No results for input(s): CHOL, HDL in the last 72 hours. Invalid input(s): LDLCALCU  INR: No results for input(s): INR in the last 72 hours. Lactic Acid: No results found for: LACTA               DVT prophylaxis: [x] Lovenox                                 [] SCDs                                 [] SQ Heparin                                 [] Encourage ambulation, low risk for DVT, no chemical or mechanical prophylaxis necessary              [] Already on Anticoagulation      RADIOLOGY:      Xr Chest Portable    Result Date: 5/5/2021  Impression: Significant improvement left pneumothorax with approximately 20 percent residual pneumothorax. This document has been electronically signed by: Bettie Guerra MD on 05/05/2021 04:46 AM    Xr Chest Portable    Result Date: 5/4/2021  1. Normal heart size. Right lung is unremarkable. 2. Postsurgical changes left side of chest. Extensive subcutaneous emphysema left side of chest and neck. A small caliber pigtail catheter is present in the pleural space left side. 3. Diffuse lucency in the left hemithorax, representing a very large pneumothorax post lung resection. Residual lung markings are seen in the left lower lung field. **This report has been created using voice recognition software. It may contain minor errors which are inherent in voice recognition technology. ** Final report electronically signed by Dr. Michael Ochoa on 5/4/2021 2:51 PM        Raymond Goldsmith M.D.  FACS  Electronically signed 5/6/2021 at 6:41 AM

## 2021-05-06 NOTE — ANESTHESIA PRE PROCEDURE
Department of Anesthesiology  Preprocedure Note       Name:  Jamel Gaytan   Age:  62 y.o.  :  1960                                          MRN:  [id number]         Date:  2021      Surgeon: Alla Faust):  Nena Sheikh MD    Procedure: Procedure(s):  BRONCHOSCOPY    Medications prior to admission:   Prior to Admission medications    Medication Sig Start Date End Date Taking? Authorizing Provider   albuterol sulfate HFA (VENTOLIN HFA) 108 (90 Base) MCG/ACT inhaler Inhale 2 puffs into the lungs 4 times daily as needed for Wheezing 21   Akosua Srivastava MD   VITAMIN D PO Take by mouth Unsure on dose    Historical Provider, MD       Current medications:    No current facility-administered medications for this visit. No current outpatient medications on file.      Facility-Administered Medications Ordered in Other Visits   Medication Dose Route Frequency Provider Last Rate Last Admin    oxyCODONE (ROXICODONE) immediate release tablet 5 mg  5 mg Oral Q4H PRN Kayden Arita MD   5 mg at 21 1015    ipratropium-albuterol (DUONEB) nebulizer solution 1 ampule  1 ampule Inhalation Q4H WA ERYN Isabel - CNP   1 ampule at 21 5961    sodium chloride flush 0.9 % injection 5-40 mL  5-40 mL Intravenous 2 times per day Kayden Arita MD   10 mL at 21 0756    sodium chloride flush 0.9 % injection 5-40 mL  5-40 mL Intravenous PRN Kayden Arita MD        0.9 % sodium chloride infusion  25 mL Intravenous PRN Kayden Arita MD        bacitracin zinc ointment   Topical Daily Kayden Arita MD   Given at 21 1319    [Held by provider] enoxaparin (LOVENOX) injection 40 mg  40 mg Subcutaneous Q24H Kayden Arita MD   Stopped at 21 1000    acetaminophen (TYLENOL) tablet 650 mg  650 mg Oral Q4H PRN Kayden Arita MD        promethazine (PHENERGAN) tablet 12.5 mg  12.5 mg Oral Q6H PRN Kayden Arita MD        Or    [facility]ron [facility] injection 4 mg  4 mg Intravenous Q6H PRN Jeff Tejada MD   4 mg at 21    HYDROmorphone (DILAUDID) injection 0.25 mg  0.25 mg Intravenous Q3H PRN Jeff Tejada MD   0.25 mg at 21 0433    Or    HYDROmorphone (DILAUDID) injection 0.5 mg  0.5 mg Intravenous Q3H PRN Jeff Tejada MD   0.5 mg at 21 1714    ketorolac (TORADOL) injection 30 mg  30 mg Intravenous Q6H Jeff Tejada MD   30 mg at 21 0756    bisacodyl (DULCOLAX) EC tablet 5 mg  5 mg Oral Daily Jeff Tejada MD   5 mg at 21 0756    nicotine (NICODERM CQ) 21 MG/24HR 1 patch  1 patch Transdermal Daily Jeff Tejada MD   1 patch at 21 0800    ipratropium-albuterol (DUONEB) nebulizer solution 1 ampule  1 ampule Inhalation Q4H PRN Salinas Rowan MD           Allergies:     Allergies   Allergen Reactions    Tylenol With Codeine #3 [Acetaminophen-Codeine] Nausea And Vomiting    Codeine Nausea And Vomiting       Problem List:    Patient Active Problem List   Diagnosis Code    Primary cancer of left upper lobe of lung (HCC) C34.12    Other emphysema ([facility] J43.8    Smoking greater than 40 pack years F17.210    Pre-op evaluation-for lung ca Z01.818    Tobacco abuse Z72.0    KERR (dyspnea on exertion) R06.00    Intermittent palpitations R00.2    Borderline Abnormal EKG with rsr' R94.31    S/P left upper lobectomy of lung and mediastinal dissection Z90.2       Past Medical History:        Diagnosis Date    Arthritis     COPD (chronic obstructive pulmonary disease) ([facility] [address]     Dr Carol Conn [facility]     Dr Hermann Laureano       Past Surgical History:        Procedure Laterality Date     SECTION      x4    CHOLECYSTECTOMY      COLONOSCOPY  2016    CT NEEDLE BIOPSY LUNG PERCUTANEOUS  2021    CT NEEDLE BIOPSY LUNG PERCUTANEOUS 3/26/2021 [facility] CT SCAN    HYSTERECTOMY      UPPER GASTROINTESTINAL ENDOSCOPY  2016       Social History:    Social History     Tobacco Use    Smoking status: Current Every Day Smoker     Packs/day: 1.00     Years: 45.00     Pack years: 45.00     Types: Cigarettes    Smokeless tobacco: Never Used   Substance Use Topics    Alcohol use: Never     Frequency: Never     Binge frequency: Never                                Ready to quit: Not Answered  Counseling given: Not Answered      Vital Signs (Current): There were no vitals filed for this visit. BP Readings from Last 3 Encounters:   05/06/21 (!) 155/86   05/04/21 111/61   05/03/21 115/80       NPO Status:                                                                                 BMI:   Wt Readings from Last 3 Encounters:   05/05/21 131 lb 12.8 oz (59.8 kg)   05/03/21 125 lb 12.8 oz (57.1 kg)   04/22/21 127 lb (57.6 kg)     There is no height or weight on file to calculate BMI.    CBC:   Lab Results   Component Value Date    WBC 10.9 05/05/2021    RBC 3.76 05/05/2021    RBC 4.63 10/10/2017    HGB 11.8 05/05/2021    HCT 37.2 05/05/2021    MCV 98.9 05/05/2021    RDW 13.1 04/19/2021     05/05/2021       CMP:   Lab Results   Component Value Date     04/19/2021     10/10/2017    K 5.2 04/19/2021    K 4.6 10/10/2017     10/10/2017    CO2 29 10/10/2017    BUN 11 10/10/2017    CREATININE 1.0 04/19/2021    CREATININE 0.8 03/26/2021    LABGLOM 73 03/26/2021    GLUCOSE 79 10/10/2017    PROT 6.9 04/19/2021    CALCIUM 9.7 10/10/2017    BILITOT 0.3 04/19/2021    ALKPHOS 87 04/19/2021    AST 14 04/19/2021    ALT 11 04/19/2021       POC Tests: No results for input(s): POCGLU, POCNA, POCK, POCCL, POCBUN, POCHEMO, POCHCT in the last 72 hours.     Coags:   Lab Results   Component Value Date    APTT 34.0 01/19/2014       HCG (If Applicable): No results found for: PREGTESTUR, PREGSERUM, HCG, HCGQUANT     ABGs: No results found for: PHART, PO2ART, FZA0XSJ, HGW2VPW, BEART, T0ANKVAB     Type & Screen (If Applicable):  Lab Results   Component Value Date    LABRH POS 05/01/2021       Drug/Infectious Status (If Applicable):  Lab Results   Component Value Date    HEPCAB Negative 07/11/2016       COVID-19 Screening (If Applicable):   Lab Results   Component Value Date    COVID19 NOT DETECTED 05/01/2021           Anesthesia Evaluation  Patient summary reviewed no history of anesthetic complications:   Airway: Mallampati: II  TM distance: >3 FB   Neck ROM: full  Mouth opening: > = 3 FB Dental:    (+) edentulous      Pulmonary:   (+) COPD:  shortness of breath:  decreased breath sounds,                             Cardiovascular:    (+) KERR: no interval change,                   Neuro/Psych:               GI/Hepatic/Renal:             Endo/Other:    (+) malignancy/cancer. Abdominal:           Vascular:                                          Anesthesia Plan      general     ASA 3       Induction: intravenous. MIPS: Postoperative opioids intended and Prophylactic antiemetics administered. Anesthetic plan and risks discussed with patient.       Plan discussed with surgical team.                  Polo Tate MD   5/6/2021

## 2021-05-06 NOTE — PROCEDURES
Bronchoscopy Procedure Note    Date of Operation: 5/6/2021    Pre-op Diagnosis: Large leak by chest tube after SHANNON lobectomy    Post-op Diagnosis: No stump dehiscence noted     Surgeon: Dai Tripathi    Anesthesia: General endotracheal anesthesia    Operation: Flexible fiberoptic bronchoscopy    Estimated Blood Loss: None    Complications: None inmediate    Indications and History:  The patient is a 62 y.o. female with a large air leak noted after SHANNON lobectomy,  Bronchoscopy exploration indicated to r/o stump dehiscence The risks, benefits, complications, treatment options and expected outcomes were discussed with the patient. The possibilities of reaction to medication, pulmonary aspiration, perforation of a viscus, bleeding, failure to diagnose a condition and creating a complication requiring transfusion or operation were discussed with the patient who freely signed the consent. Description of Procedure: The patient was taken to endoscopy suite, identified as [facility] and the procedure verified as Flexible Fiberoptic Bronchoscopy. A Time Out was held and the above information confirmed. After sedation and intubation by anesthesia staff the scope was then passed into the trachea. Careful inspection of the tracheal lumen was accomplished. The scope was sequentially passed into the left main and then left upper and lower bronchi and segmental bronchi. The scope was then withdrawn and advanced into the right main bronchus and then into the RUL, RML, and RLL bronchi and segmental bronchi. Endobronchial findings: There is a stump present where to SHANNON lobar bronchus was, the edges are closed the lumen is no longer existent no dehiscence noted, the stump was irrigated with saline solution and no bubbling was noted. Inspection of the LLL was accomplished all airways open mucus plugs with old blood aspirated out. Pictures taken.         Trachea: Normal mucosa  Gemini: Normal

## 2021-05-06 NOTE — CARE COORDINATION
5/6/21, 11:45 AM EDT    DISCHARGE ON GOING EVALUATION    KIMBERLY CARRASCO [facility] day: 2  Location: -06/006-A Reason for admit: S/P lobectomy of lung [Z90.2]   Procedure:   05/05  left upper lobectomy of lung and mediastinal dissection   05/06  planning bronchoscopy today  Barriers to Discharge: NPO, consent for procedure, med nebs, hold Lovenox, chest tube to suction, drained 160 ml, weaned to room air, air leak with crepitus,   PCP: Jasmyne Sampson MD  Readmission Risk Score: 8%  Patient Goals/Plan/Treatment Preferences: planning home with spouse; will follow.

## 2021-05-07 ENCOUNTER — TELEPHONE (OUTPATIENT)
Dept: SPIRITUAL SERVICES | Facility: CLINIC | Age: 61
End: 2021-05-07

## 2021-05-07 ENCOUNTER — APPOINTMENT (OUTPATIENT)
Dept: GENERAL RADIOLOGY | Age: 61
DRG: 164 | End: 2021-05-07
Attending: SURGERY
Payer: COMMERCIAL

## 2021-05-07 PROCEDURE — 94640 AIRWAY INHALATION TREATMENT: CPT

## 2021-05-07 PROCEDURE — 6360000002 HC RX W HCPCS: Performed by: INTERNAL MEDICINE

## 2021-05-07 PROCEDURE — 2060000000 HC ICU INTERMEDIATE R&B

## 2021-05-07 PROCEDURE — 6370000000 HC RX 637 (ALT 250 FOR IP): Performed by: SURGERY

## 2021-05-07 PROCEDURE — 2580000003 HC RX 258: Performed by: SURGERY

## 2021-05-07 PROCEDURE — 99024 POSTOP FOLLOW-UP VISIT: CPT | Performed by: SURGERY

## 2021-05-07 PROCEDURE — 71045 X-RAY EXAM CHEST 1 VIEW: CPT

## 2021-05-07 PROCEDURE — 6370000000 HC RX 637 (ALT 250 FOR IP): Performed by: NURSE PRACTITIONER

## 2021-05-07 PROCEDURE — 99232 SBSQ HOSP IP/OBS MODERATE 35: CPT | Performed by: INTERNAL MEDICINE

## 2021-05-07 RX ADMIN — IPRATROPIUM BROMIDE AND ALBUTEROL SULFATE 1 AMPULE: .5; 3 SOLUTION RESPIRATORY (INHALATION) at 07:40

## 2021-05-07 RX ADMIN — IPRATROPIUM BROMIDE AND ALBUTEROL SULFATE 1 AMPULE: .5; 3 SOLUTION RESPIRATORY (INHALATION) at 21:01

## 2021-05-07 RX ADMIN — SODIUM CHLORIDE, PRESERVATIVE FREE 10 ML: 5 INJECTION INTRAVENOUS at 09:21

## 2021-05-07 RX ADMIN — OXYCODONE HYDROCHLORIDE 5 MG: 5 TABLET ORAL at 19:20

## 2021-05-07 RX ADMIN — IPRATROPIUM BROMIDE AND ALBUTEROL SULFATE 1 AMPULE: .5; 3 SOLUTION RESPIRATORY (INHALATION) at 15:27

## 2021-05-07 RX ADMIN — ACETAMINOPHEN 650 MG: 325 TABLET ORAL at 11:40

## 2021-05-07 RX ADMIN — OXYCODONE HYDROCHLORIDE 5 MG: 5 TABLET ORAL at 14:05

## 2021-05-07 RX ADMIN — SODIUM CHLORIDE, PRESERVATIVE FREE 10 ML: 5 INJECTION INTRAVENOUS at 20:33

## 2021-05-07 RX ADMIN — ENOXAPARIN SODIUM 40 MG: 40 INJECTION SUBCUTANEOUS at 16:20

## 2021-05-07 RX ADMIN — IPRATROPIUM BROMIDE AND ALBUTEROL SULFATE 1 AMPULE: .5; 3 SOLUTION RESPIRATORY (INHALATION) at 11:40

## 2021-05-07 RX ADMIN — BACITRACIN ZINC: 500 OINTMENT TOPICAL at 11:39

## 2021-05-07 RX ADMIN — BISACODYL 5 MG: 5 TABLET, COATED ORAL at 09:24

## 2021-05-07 RX ADMIN — OXYCODONE HYDROCHLORIDE 5 MG: 5 TABLET ORAL at 04:28

## 2021-05-07 RX ADMIN — OXYCODONE HYDROCHLORIDE 5 MG: 5 TABLET ORAL at 09:19

## 2021-05-07 ASSESSMENT — PAIN SCALES - GENERAL
PAINLEVEL_OUTOF10: 7
PAINLEVEL_OUTOF10: 5
PAINLEVEL_OUTOF10: 6
PAINLEVEL_OUTOF10: 9
PAINLEVEL_OUTOF10: 5
PAINLEVEL_OUTOF10: 1
PAINLEVEL_OUTOF10: 3
PAINLEVEL_OUTOF10: 6

## 2021-05-07 ASSESSMENT — PAIN DESCRIPTION - FREQUENCY: FREQUENCY: CONTINUOUS

## 2021-05-07 ASSESSMENT — PAIN DESCRIPTION - ORIENTATION: ORIENTATION: LEFT

## 2021-05-07 ASSESSMENT — PAIN DESCRIPTION - PAIN TYPE
TYPE: ACUTE PAIN;SURGICAL PAIN
TYPE: ACUTE PAIN;SURGICAL PAIN

## 2021-05-07 ASSESSMENT — PAIN - FUNCTIONAL ASSESSMENT: PAIN_FUNCTIONAL_ASSESSMENT: ACTIVITIES ARE NOT PREVENTED

## 2021-05-07 ASSESSMENT — PAIN DESCRIPTION - ONSET: ONSET: ON-GOING

## 2021-05-07 NOTE — PROGRESS NOTES
[facility]      Patient - Mitchell Voss   MRN -  [id number]   [facility] # - [de-identified]   - 1960      Date of Admission -  2021  7:13 AM  Date of evaluation -  2021  Room - [address] Vicente Alba MD Primary Care Physician - Iggy Alvarez MD     Problem List      Active Hospital Problems    Diagnosis Date Noted    S/P left upper lobectomy of lung and mediastinal dissection [Z90.2] 2021    Primary cancer of left upper lobe of lung ([facility] [address] [C34.12] 2021    Smoking greater than 40 pack years [F17.210] 2021     Reason for Consult    For post operative pulmonary management  HPI   History Obtained From: Patient and electronic medical record. Mitchell Voss is a 62 y.o. female She was admitted under Dr. Kamryn Wilkinson MD service. Pulmonary medicine was consulted for further evaluation of post operative pulmonary management. She underwent:  Type of surgery: Robotic assisted left upper lobectomy and mediastinal dissection along with cryotherapy of the intercostal nerves for postoperative pain control. Date of surgery: 4 May 2021  Surgeon: Dr. Kamryn Wilkinson MD      At the time of my initial evaluation, she is on room air    Input/Out put fluid balance from the time of admission: 1.3 L positive    Prior to current hospitalization: She was diagnosed with moderately COPD however she is not using any inhalers    She is currently not using any oxygen supplementation at rest, exercise or during sleep/at night time. 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 [address] Jama[address]iva       History of tobacco smoking:Yes  Amount of tobacco smokin.0 PPD.   Years of tobacco smokin.                                    Quit smoking: No.              Current smoker: Yes.       Amount of current tobacco smoking: She quit smoking     History of recreational or IV drug use in the past: She smokes marijuana occasionally. History of alcohol use in the past:NO     History of exposure to coal mines/coal dust: NO  History of exposure to foundry dust/welding: NO  History of exposure to quarry/silica/sandblasting: NO  History of exposure to asbestos/working with breaks/ships: NO  History of exposure to farm dust: NO  History of recent travel to long distances: NO  History of exposure to birds, pigeons, or chickens in the past:NO  Pet animals at home:Yes  Dogs: 2  Cats: 2     History of pulmonary embolism in the past: No            History of DVT in the past:No     Past 24 Hours   -R sided chest tube still with air leak  - Stable on room air- 98%  - Bronch yesterday unremarkable   - No pulmonary events overnight     -All systems reviewed.      PMHx   Past Medical History      Diagnosis Date    Arthritis     [facility]     Dr Carol Conn [facility])     Dr Mccrary Bulgarian      Past Surgical History        Procedure Laterality Date     SECTION      x4    CHOLECYSTECTOMY      COLONOSCOPY  2016    CT NEEDLE BIOPSY LUNG PERCUTANEOUS  2021    CT NEEDLE BIOPSY LUNG PERCUTANEOUS 3/26/2021 [facility] CT SCAN    HYSTERECTOMY      UPPER GASTROINTESTINAL ENDOSCOPY  2016     Meds    Current Medications    ipratropium-albuterol  1 ampule Inhalation Q4H WA    sodium chloride flush  5-40 mL Intravenous 2 times per day    bacitracin zinc   Topical Daily    [Held by provider] enoxaparin  40 mg Subcutaneous Q24H    bisacodyl  5 mg Oral Daily    nicotine  1 patch Transdermal Daily     oxyCODONE, sodium chloride flush, sodium chloride, acetaminophen, promethazine **OR** ondansetron, HYDROmorphone **OR** HYDROmorphone  IV Drips/Infusions   sodium chloride       Home Medications  Medications Prior to Admission: albuterol sulfate HFA (VENTOLIN HFA) 108 (90 Base) MCG/ACT inhaler, Inhale 2 puffs into the lungs 4 times daily as needed for Wheezing  VITAMIN D PO, Take by mouth Unsure on dose  Diet    DIET GENERAL;  Allergies    Tylenol with codeine #3 [acetaminophen-codeine] and Codeine  Social History     Social History     Socioeconomic History    Marital status:      Spouse name: Not on file    Number of children: Not on file    Years of education: Not on file    Highest education level: Not on file   Occupational History    Not on file   Social Needs    Financial resource strain: Not on file    Food insecurity     Worry: Not on file     Inability: Not on file    Transportation needs     Medical: Not on file     Non-medical: Not on file   Tobacco Use    Smoking status: Current Every Day Smoker     Packs/day: 1.00     Years: 45.00     Pack years: 45.00     Types: Cigarettes    Smokeless tobacco: Never Used   Substance and Sexual Activity    Alcohol use: Never     Frequency: Never     Binge frequency: Never    Drug use: Yes     Types: Marijuana    Sexual activity: Not on file   Lifestyle    Physical activity     Days per week: Not on file     Minutes per session: Not on file    Stress: Not on file   Relationships    Social connections     Talks on phone: Not on file     Gets together: Not on file     Attends Gnosticism service: Not on file     Active member of club or organization: Not on file     Attends meetings of clubs or organizations: Not on file     Relationship status: Not on file    Intimate partner violence     Fear of current or ex partner: Not on file     Emotionally abused: Not on file     Physically abused: Not on file     Forced sexual activity: Not on file   Other Topics Concern    Not on file   Social History Narrative    Not on file     Family History          Problem Relation Age of Onset    Other Mother         aneursym    No Known Problems Father     Depression Sister     Heart Disease Brother     Diabetes Brother     Other Brother Lung disease    Lung Cancer Maternal Grandfather     Other Brother         Crohns    Macular Degen Brother     Depression Sister     Other Sister         Blood clots    Arthritis Sister     Brain Cancer Paternal Uncle      Sleep History    Never diagnosed with sleep apnea in the past.      Vitals     height is 5' 6\" (1.676 m) and weight is 127 lb 6.4 oz (57.8 kg). Her oral temperature is 98 °F (36.7 °C). Her blood pressure is 138/90 (abnormal) and her pulse is 89. Her respiration is 16 and oxygen saturation is 98%. Body mass index is 20.56 kg/m². I/O        Intake/Output Summary (Last 24 hours) at 5/7/2021 1028  Last data filed at 5/7/2021 0420  Gross per 24 hour   Intake 1200 ml   Output 2670 ml   Net -1470 ml     I/O last 3 completed shifts: In: 1210 [P.O.:1000; I.V.:210]  Out: 2670 [Urine:2400; Chest Tube:270]   Patient Vitals for the past 96 hrs (Last 3 readings):   Weight   05/07/21 0420 127 lb 6.4 oz (57.8 kg)   05/05/21 0200 131 lb 12.8 oz (59.8 kg)   05/04/21 0731 125 lb 6.4 oz (56.9 kg)       Exam   General Appearance: Patient appears moderately built and moderately nourished in no acute distress on room air  HEENT: Normal, Head is normocephalic, atraumatic. Oropharynx is clear and moist.  No oral thrush. PERRLA  Neck - Supple, No JVD present. No tracheal deviation present. Lungs - Bilateral air entry present. Bilateral aeration good with diminished breath sounds at lung bases left > Right. Subcutaneous emphysema palpable. Left-sided small bore chest tube in place with airleak in the atrium. Cardiovascular - Heart sounds are normal.  Regular rhythm normal rate without murmur, gallop or rub. Abdomen - Soft, nontender, nondistended, no masses or organomegaly  Neurologic - Awake, alert, oriented. There are no focal motor or sensory deficits  Extremities - No cyanosis, clubbing or edema. Musculoskeletal: Normal range of motion. Patient exhibits no tenderness.    Skin - No bruising or bleeding. Labs  - Old records and notes have been reviewed in [facility]   ABG  No results found for: PH, PO2, PCO2, HCO3, O2SAT  No results found for: IFIO2, MODE, SETTIDVOL, SETPEEP  CBC  Recent Labs     05/05/21  0449   WBC 10.9*   RBC 3.76*   HGB 11.8*   HCT 37.2   MCV 98.9   MCH 31.4   MCHC 31.7*   *   MPV 12.8*      BMP  No results for input(s): NA, K, CL, CO2, BUN, CREATININE, GLUCOSE, MG, PHOS, CALCIUM, IONCA, MG in the last 72 hours. LFT  No results for input(s): AST, ALT, ALB, BILITOT, ALKPHOS, LIPASE in the last 72 hours. Invalid input(s): AMYLASE  TROP  No results found for: TROPONINT  BNP  No results for input(s): BNP in the last 72 hours. Lactic Acid  No results for input(s): LACTA in the last 72 hours. INR  No results for input(s): INR, PROTIME in the last 72 hours. PTT  No results for input(s): APTT in the last 72 hours. Glucose  No results for input(s): POCGLU in the last 72 hours. UA No results for input(s): SPECGRAV, PHUR, COLORU, CLARITYU, MUCUS, PROTEINU, BLOODU, RBCUA, WBCUA, BACTERIA, NITRU, GLUCOSEU, BILIRUBINUR, UROBILINOGEN, KETUA, LABCAST, LABCASTTY, AMORPHOS in the last 72 hours. Invalid input(s): CRYSTALS. PFTs   Pulmonary function tests: Performed on 6 April 2021                Sleep studies   None    Cultures    No cultures    4/30/21  Cytology   FINAL DIAGNOSIS:   Slides received from [facility], [id number], [address] 4/23/21:   [facility] 4R, lymph node biopsy:    Negative for malignancy. [address] [facility] 7, lymph node biopsy:    Lymphoid tissue and granuloma.    Negative for malignancy. C - Station 4L, lymph node biopsy:    Lymphoid tissue and possible granulomatous inflammation.    Negative for malignancy. D - Station 11L, lymph node biopsy:    Consistent with lung adenocarcinoma.   See microscopic.    Echocardiogram   4/27/2021   Narrative & Impression      Transthoracic Echocardiography Report (TTE)   Conclusions      Summary   Normal left ventricle size and systolic function. Ejection fraction was   estimated at 60 %. There were no regional left ventricular wall motion   abnormalities and wall thickness was within normal limits. Aortic aneurysm noted in the ascending aorta . Aortic aneurysm measures 4.1 cm . Signature      ----------------------------------------------------------------   Electronically signed by Ata العلي MD (Interpreting   physician) on 04/27/2021 at 07:10 PM   ----------------------------------------------------------------      Radiology    CXR  05/06/2021   1 view chest x-ray. Impression:   Minimal left apical pneumothorax along with pneumomediastinum. Patchy bilateral nasal or consolidation. 05/05/2021   1V chest   Impression: Significant improvement left pneumothorax with approximately 20 percent residual pneumothorax. Tue May 4, 2021  2:51 PM   PROCEDURE: XR CHEST PORTABLE   . Normal heart size. Right lung is unremarkable. 2. Postsurgical changes left side of chest. Extensive subcutaneous emphysema left side of chest and neck. A small caliber pigtail catheter is present in the pleural space left side. 3. Diffuse lucency in the left hemithorax, representing a very large pneumothorax post lung resection. Residual lung markings are seen in the left lower lung field. CT Scans  (See actual reports for details)  CT scan of chest without contrast.  Fri Mar 12, 2021  8:18 AM   PROCEDURE: CT CHEST WO CONTRAST   CLINICAL INFORMATION: Pulmonary nodule   COMPARISON: Chest x-ray dated 2/26/2021   1. There is a cavitary mass demonstrated within the left upper lobe with peripheral mild spiculation measuring 3.9 x 2.1 cm on axial image 34. This corresponds with previous chest x-ray dated 2/26/2021. This is concerning for malignancy until proven otherwise. Further evaluation could be obtained with PET/CT and/or biopsy. An infectious process is also not excluded.     2.  There are additional smaller groundglass nodular densities within the right lung as detailed above.     3. Small pericardial effusion is noted. This is nonspecific. CT-guided biopsy by interventional radiology service on 26 March 2021:     Pathology:  Copies To: Levy Yaneth     Clinical Information: CAVITATING MASS SHANNON, SMOKER     FINAL DIAGNOSIS:   Lung, left, needle core biopsies:       Adenocarcinoma. PET scan:  4/14/2021 10:04 AM   PROCEDURE: PET CT SKULL BASE TO MID THIGH   1. FDG avid left lung mass corresponding to known malignancy. 2. Small, mildly FDG avid right lung nodules, possibly infectious or inflammatory but satellite malignancy cannot be excluded. MRI scan of the brain with and without contrast:   PROCEDURE: MRI BRAIN W WO CONTRAST   1. No evidence of intracranial metastases or other acute intracranial abnormality. 2. Moderate severity chronic microvascular angiopathy. 3. Mild to moderate mucosal inflammation in the paranasal sinuses with air-fluid levels as evidence for an acute component     Pathology from [facility] performed on 28 April 2021:      Assessment   -.9 x 2.1 cm spiculated cavitary mass present within the left upper lobe S/p CT guided biopsy by IR service on 3/26/2021. The biopsy is consistent with Adenocarcinoma. She underwent robotic assisted left upper lobectomy and mediastinal dissection along with cryotherapy of the intercostal nerves for postoperative pain control.  -Subcutaneous emphysema with left-sided small bore chest tube placement  -Moderately severe COPD. -Chronic history of tobacco smoking for 45 years with 1 pack of cigarettes per day. She is planning to quit smoking  -Full Code     Plan   -On RA and comfortable  -Duonebs every 4 hours w/a   -Cytology (+) Adenocarcinoma Station 11L lymph node biopsy   -Chest tube management per Dr. Misael Wilkins MD service  -IS discussed and encouraged  -Avoid all sedative meds if she is drowsy.   -NRT in place   -VTE prophylaxis: Lovenox (on HOLD for procedure)     14 Tiff Berrios educated about my impression and plan. She verbalizes understanding. Questions and concerns addressed.    Meds and orders reviewed     Electronically signed by   Samreen Plata DO on 5/7/2021 at 10:28 AM

## 2021-05-07 NOTE — CARE COORDINATION
5/7/21, 8:53 AM EDT    DISCHARGE ON GOING EVALUATION    KIMBERLY CARRASCO [facility] day: 3  Location: 4-06/006-A Reason for admit: S/P lobectomy of lung [Z90.2]   Procedure:   05/05  left upper lobectomy of lung and mediastinal dissection   05/06   bronchoscopy under anesthesia  05/07  CXR: No visible residual pneumothorax. Similar small pneumomediastinum. Left   chest tubes remain in place. Extensive chest wall soft tissue emphysema. Barriers to Discharge: chest tube continues, on room air, med nebs, less crepitus, sats 97%, up in room, follow daily CXR. PCP: Leonela Garcia MD  Readmission Risk Score: 10%  Patient Goals/Plan/Treatment Preferences: plans home withs pouse.

## 2021-05-07 NOTE — PROGRESS NOTES
Dena José M.D. FACS  Daily Progress Note    Pt Name: Jaylyn Hayes  Medical Record Number: [id number]  Date of Birth 1960   Today's Date: 5/7/2021    ASSESSMENT:     1. POD # 3  2. HD # 3  Active Hospital Problems    Diagnosis Date Noted    S/P left upper lobectomy of lung and mediastinal dissection [Z90.2] 05/04/2021    Primary cancer of left upper lobe of lung ([address] [C34.12] 04/21/2021    Smoking greater than 40 pack years [F17.210] 04/21/2021       Chief Complaint:  No chief complaint on file. PLAN:     1. Less sub q emphysema  2. Bronch yesterday OK  3. Still with air leak  4. Will watch over weekend, lung is inflated fully on CXR  5. On RA sats good  6. SUBJECTIVE:     Elli Demarco is doing well. Pain is better controlled. She has no nausea and no vomiting.       OBJECTIVE:     Patient Vitals for the past 24 hrs:   BP Temp Temp src Pulse Resp SpO2 Weight   05/07/21 0420 (!) 149/85 98.6 °F (37 °C) Oral 70 16 97 % 127 lb 6.4 oz (57.8 kg)   05/06/21 2345 (!) 147/89 98.3 °F (36.8 °C) Oral 84 18 97 % --   05/06/21 2005 -- -- -- -- 18 97 % --   05/06/21 1945 (!) 148/79 98 °F (36.7 °C) Oral 79 20 99 % --   05/06/21 1521 136/74 98.2 °F (36.8 °C) Oral 91 22 97 % --   05/06/21 1320 (!) 142/81 -- -- 77 23 97 % --   05/06/21 1315 (!) 148/82 -- -- 69 15 100 % --   05/06/21 1310 -- -- -- 68 17 100 % --   05/06/21 1305 136/75 -- -- 74 8 96 % --   05/06/21 1300 (!) 141/82 -- -- 77 10 97 % --   05/06/21 1255 (!) 151/83 -- -- 73 13 97 % --   05/06/21 1250 139/82 -- -- 70 12 97 % --   05/06/21 1245 132/78 -- -- 73 16 98 % --   05/06/21 1240 (!) 101/56 -- -- 81 16 96 % --   05/06/21 1235 108/67 98.2 °F (36.8 °C) Temporal 79 14 97 % --   05/06/21 1212 139/78 -- -- 68 16 100 % --   05/06/21 1119 (!) 155/86 98.3 °F (36.8 °C) Oral 75 18 100 % --         Intake/Output Summary (Last 24 hours) at 5/7/2021 0800  Last data filed at 5/7/2021 0420  Gross per 24 hour Intake 1200 ml   Output 2670 ml   Net -1470 ml       In: 1000 [P.O.:1000]  Out: 0147 [Urine:2400]    I/O last 3 completed shifts: In: 1210 [P.O.:1000; I.V.:210]  Out: 2670 [Urine:2400; Chest Tube:270]     Date 05/07/21 0000 - 05/07/21 2359   Shift 5770-9517 5628-3145 4119-3202 24 Hour Total   INTAKE   P.O.(mL/kg/hr) 500(1.1)   500   I. V.(mL/kg) 0(0)   0(0)   Shift Total(mL/kg) 500(8.7)   500(8.7)   OUTPUT   Urine(mL/kg/hr) 1300(2.8)   1300   Emesis/NG output(mL/kg) 0(0)   0(0)   Other(mL/kg) 0(0)   0(0)   Stool(mL/kg) 0(0)   0(0)   Blood(mL/kg) 0(0)   0(0)   Chest Tube 70   70   Shift Total(mL/kg) 1370(23.7)   1370(23.7)   Weight (kg) 57.8 57.8 57.8 57.8       Wt Readings from Last 3 Encounters:   05/07/21 127 lb 6.4 oz (57.8 kg)   05/03/21 125 lb 12.8 oz (57.1 kg)   04/22/21 127 lb (57.6 kg)        Body mass index is 20.56 kg/m². Diet: DIET GENERAL;        MEDS:     Scheduled Meds:   ipratropium-albuterol  1 ampule Inhalation Q4H WA    sodium chloride flush  5-40 mL Intravenous 2 times per day    bacitracin zinc   Topical Daily    [Held by provider] enoxaparin  40 mg Subcutaneous Q24H    bisacodyl  5 mg Oral Daily    nicotine  1 patch Transdermal Daily     Continuous Infusions:   sodium chloride       PRN Meds:oxyCODONE, 5 mg, Q4H PRN  sodium chloride flush, 5-40 mL, PRN  sodium chloride, 25 mL, PRN  acetaminophen, 650 mg, Q4H PRN  promethazine, 12.5 mg, Q6H PRN    Or  ondansetron, 4 mg, Q6H PRN  HYDROmorphone, 0.25 mg, Q3H PRN    Or  HYDROmorphone, 0.5 mg, Q3H PRN          PHYSICAL EXAM:     CONSTITUTIONAL: Alert and oriented times 3, no acute distress and cooperative to examination.   HEENT: less sub q emphysema  NECK: less sub q emphysema  LUNGS: decreased on left CT in place, fairly sizeable air leak, unchanged         LABS:     CBC:   Recent Labs     05/05/21  0449   WBC 10.9*   RBC 3.76*   HGB 11.8*   HCT 37.2   MCV 98.9   MCH 31.4   MCHC 31.7*   *   MPV 12.8*      Last 3 CMP:   No results for input(s): NA, K, CL, CO2, BUN, CREATININE, GLUCOSE, CALCIUM, PROT, LABALBU, BILITOT, ALKPHOS, AST, ALT in the last 72 hours. Troponin: No results for input(s): TROPONINI in the last 72 hours. Calcium:   Lab Results   Component Value Date    CALCIUM 9.7 10/10/2017    CALCIUM 9.5 06/20/2016    CALCIUM 9.9 01/19/2014      Ionized Calcium: No results found for: IONCA   Lipids: No results for input(s): CHOL, HDL in the last 72 hours. Invalid input(s): LDLCALCU  INR: No results for input(s): INR in the last 72 hours. Lactic Acid: No results found for: LACTA               DVT prophylaxis: [x] Lovenox                                 [] SCDs                                 [] SQ Heparin                                 [] Encourage ambulation, low risk for DVT, no chemical or mechanical prophylaxis necessary              [] Already on Anticoagulation      RADIOLOGY:        Narrative   Chest X-ray, 1 View       COMPARISON:  CR,SR  - XR CHEST PORTABLE  - 05/06/2021 12:37 AM EDT       FINDINGS:   No consolidation. Mild bibasilar atelectasis. No pleural effusion. No visible pneumothorax. Similar small pneumomediastinum. No cardiomegaly. No acute fracture. Extensive chest wall soft tissue emphysema. Two unchanged left chest tubes.           Impression   No visible residual pneumothorax. Similar small pneumomediastinum. Left    chest tubes remain in place. Extensive chest wall soft tissue emphysema     Xr Chest Portable    Result Date: 5/5/2021  Impression: Significant improvement left pneumothorax with approximately 20 percent residual pneumothorax. This document has been electronically signed by: Vega Lawrence MD on 05/05/2021 04:46 AM    Xr Chest Portable    Result Date: 5/4/2021  1. Normal heart size. Right lung is unremarkable. 2. Postsurgical changes left side of chest. Extensive subcutaneous emphysema left side of chest and neck.  A small caliber pigtail catheter is present in the pleural space left side. 3. Diffuse lucency in the left hemithorax, representing a very large pneumothorax post lung resection. Residual lung markings are seen in the left lower lung field. **This report has been created using voice recognition software. It may contain minor errors which are inherent in voice recognition technology. ** Final report electronically signed by Dr. Edgar Ramsey on 5/4/2021 2:51 PM        Arsenio Vasques M.D.  FACS  Electronically signed 5/7/2021 at 8:00 AM

## 2021-05-07 NOTE — TELEPHONE ENCOUNTER
I attempted to contact Mckenna Noel but her family shared that she was a inpatient at [facility] on 4K6. I will visit Mckenna Noel and chart her as a inpatient.

## 2021-05-07 NOTE — FLOWSHEET NOTE
05/07/21 1719   Encounter Summary   Services provided to: Patient   Referral/Consult From: [facility] Family members   Continue Visiting Yes  (5/7)   Complexity of Encounter Moderate   Length of Encounter 30 minutes   Spiritual/Christianity   Type Spiritual support   Assessment Approachable;Coping   Intervention Active listening;Nurtured hope;Prayer   Outcome Comfort;Expressed gratitude   Assessment:  I was following the patient as an out patient but discovered that she was admitted due to a left upper lobectomy of lung and mediastinal. In my last conversation with the pt's  Milo Mccullough) he shared how the cancer has spread and metastasized to the lymph nodes and that the pt as well as family was very disappointed. Interventions:  I spoke with the pt and offered words of encouragement and comfort. I also prayed for her and her family. Outcomes:  She was very grateful that she was able to share her feelings and emotions. Plan:  1. I will share this information with the chaplains so that we can continue to provide spiritual support to the family.

## 2021-05-08 ENCOUNTER — APPOINTMENT (OUTPATIENT)
Dept: GENERAL RADIOLOGY | Age: 61
DRG: 164 | End: 2021-05-08
Attending: SURGERY
Payer: COMMERCIAL

## 2021-05-08 LAB
CREAT SERPL-MCNC: 0.8 MG/DL (ref 0.4–1.2)
GFR SERPL CREATININE-BSD FRML MDRD: 73 ML/MIN/1.73M2

## 2021-05-08 PROCEDURE — 2580000003 HC RX 258: Performed by: SURGERY

## 2021-05-08 PROCEDURE — 99024 POSTOP FOLLOW-UP VISIT: CPT | Performed by: SURGERY

## 2021-05-08 PROCEDURE — 82565 ASSAY OF CREATININE: CPT

## 2021-05-08 PROCEDURE — 99233 SBSQ HOSP IP/OBS HIGH 50: CPT | Performed by: INTERNAL MEDICINE

## 2021-05-08 PROCEDURE — 36415 COLL VENOUS BLD VENIPUNCTURE: CPT

## 2021-05-08 PROCEDURE — 6360000002 HC RX W HCPCS: Performed by: INTERNAL MEDICINE

## 2021-05-08 PROCEDURE — 6370000000 HC RX 637 (ALT 250 FOR IP): Performed by: NURSE PRACTITIONER

## 2021-05-08 PROCEDURE — 94640 AIRWAY INHALATION TREATMENT: CPT

## 2021-05-08 PROCEDURE — 2060000000 HC ICU INTERMEDIATE R&B

## 2021-05-08 PROCEDURE — 94761 N-INVAS EAR/PLS OXIMETRY MLT: CPT

## 2021-05-08 PROCEDURE — 71045 X-RAY EXAM CHEST 1 VIEW: CPT

## 2021-05-08 PROCEDURE — 6370000000 HC RX 637 (ALT 250 FOR IP): Performed by: SURGERY

## 2021-05-08 RX ADMIN — ACETAMINOPHEN 650 MG: 325 TABLET ORAL at 15:55

## 2021-05-08 RX ADMIN — ENOXAPARIN SODIUM 40 MG: 40 INJECTION SUBCUTANEOUS at 15:55

## 2021-05-08 RX ADMIN — IPRATROPIUM BROMIDE AND ALBUTEROL SULFATE 1 AMPULE: .5; 3 SOLUTION RESPIRATORY (INHALATION) at 07:51

## 2021-05-08 RX ADMIN — OXYCODONE HYDROCHLORIDE 5 MG: 5 TABLET ORAL at 04:36

## 2021-05-08 RX ADMIN — BACITRACIN ZINC: 500 OINTMENT TOPICAL at 08:27

## 2021-05-08 RX ADMIN — SODIUM CHLORIDE, PRESERVATIVE FREE 10 ML: 5 INJECTION INTRAVENOUS at 20:16

## 2021-05-08 RX ADMIN — IPRATROPIUM BROMIDE AND ALBUTEROL SULFATE 1 AMPULE: .5; 3 SOLUTION RESPIRATORY (INHALATION) at 13:00

## 2021-05-08 RX ADMIN — SODIUM CHLORIDE, PRESERVATIVE FREE 10 ML: 5 INJECTION INTRAVENOUS at 08:27

## 2021-05-08 RX ADMIN — IPRATROPIUM BROMIDE AND ALBUTEROL SULFATE 1 AMPULE: .5; 3 SOLUTION RESPIRATORY (INHALATION) at 21:43

## 2021-05-08 RX ADMIN — OXYCODONE HYDROCHLORIDE 5 MG: 5 TABLET ORAL at 00:13

## 2021-05-08 RX ADMIN — BISACODYL 5 MG: 5 TABLET, COATED ORAL at 08:26

## 2021-05-08 RX ADMIN — OXYCODONE HYDROCHLORIDE 5 MG: 5 TABLET ORAL at 08:28

## 2021-05-08 RX ADMIN — OXYCODONE HYDROCHLORIDE 5 MG: 5 TABLET ORAL at 22:37

## 2021-05-08 RX ADMIN — ACETAMINOPHEN 650 MG: 325 TABLET ORAL at 08:28

## 2021-05-08 RX ADMIN — OXYCODONE HYDROCHLORIDE 5 MG: 5 TABLET ORAL at 17:05

## 2021-05-08 RX ADMIN — OXYCODONE HYDROCHLORIDE 5 MG: 5 TABLET ORAL at 12:46

## 2021-05-08 RX ADMIN — IPRATROPIUM BROMIDE AND ALBUTEROL SULFATE 1 AMPULE: .5; 3 SOLUTION RESPIRATORY (INHALATION) at 15:40

## 2021-05-08 ASSESSMENT — PAIN DESCRIPTION - PAIN TYPE: TYPE: ACUTE PAIN;SURGICAL PAIN

## 2021-05-08 ASSESSMENT — PAIN SCALES - GENERAL
PAINLEVEL_OUTOF10: 3
PAINLEVEL_OUTOF10: 7
PAINLEVEL_OUTOF10: 8
PAINLEVEL_OUTOF10: 7
PAINLEVEL_OUTOF10: 4
PAINLEVEL_OUTOF10: 8

## 2021-05-08 ASSESSMENT — PAIN DESCRIPTION - DESCRIPTORS: DESCRIPTORS: CONSTANT;ACHING

## 2021-05-08 ASSESSMENT — PAIN DESCRIPTION - LOCATION: LOCATION: BACK;RIB CAGE

## 2021-05-08 ASSESSMENT — PAIN DESCRIPTION - FREQUENCY: FREQUENCY: CONTINUOUS

## 2021-05-08 ASSESSMENT — PAIN DESCRIPTION - ORIENTATION: ORIENTATION: LEFT

## 2021-05-08 ASSESSMENT — PAIN DESCRIPTION - ONSET: ONSET: ON-GOING

## 2021-05-08 NOTE — PROGRESS NOTES
Milla Mehta M.D. for Dr. Pastor Santiago  Daily Progress Note    Pt Name: Yunior Last Record Number: [id number]  Date of Birth 1960   Today's Date: 5/8/2021    ASSESSMENT:     1. POD # 3  2. 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 PLAN:     1. Less subcutaneous emphysema  2. Bronch yesterday Thursday  3. Still with air leak  4. Will watch over weekend, lung is inflated fully on CXR. Chest x-ray reviewed  5. On RA sats good  6. Lovenox VTE prophylaxis      SUBJECTIVE:     Rica Palma is doing well. Pain is better controlled. She has no nausea and no vomiting. Tolerating diet well. Minimal out chest tube remains with air leak. Chest x-ray no pneumothorax. To continue chest tube through the weekend. OBJECTIVE:     Patient Vitals for the past 24 hrs:   BP Temp Temp src Pulse Resp SpO2   05/08/21 0822 (!) 149/94 98.7 °F (37.1 °C) Oral 88 18 92 %   05/08/21 0436 (!) 161/91 97.8 °F (36.6 °C) Oral 90 18 98 %   05/07/21 2340 (!) 140/91 97.6 °F (36.4 °C) Oral 76 18 98 %   05/07/21 2101 -- -- -- -- 20 98 %   05/07/21 2015 (!) 140/90 98 °F (36.7 °C) Oral 80 18 98 %   05/07/21 1545 (!) 142/82 97.9 °F (36.6 °C) Oral 85 18 100 %   05/07/21 1130 (!) 141/80 98.2 °F (36.8 °C) Oral 84 18 99 %         Intake/Output Summary (Last 24 hours) at 5/8/2021 1042  Last data filed at 5/8/2021 0442  Gross per 24 hour   Intake 870 ml   Output 3020 ml   Net -2150 ml       In: 200 [P.O.:200]  Out: 4547 [Urine:2400]    I/O last 3 completed shifts: In: 6409 [P.O.:1340; I.V.:10]  Out: 0375 [Urine:2800;  Chest Tube:220]     Date 05/08/21 0000 - 05/08/21 2359   Shift 7831-3734 7779-4537 5829-8251 24 Hour Total   INTAKE   Shift Total(mL/kg)       OUTPUT   Urine(mL/kg/hr) 600(1.3)   600   Chest Tube 40   40   Shift Total(mL/kg) 640(11.1)   640(11.1)   Weight (kg) 57.8 57.8 57.8 57.8       Wt Readings from Last 3 Encounters:   05/07/21 127 lb 6.4 oz (57.8 kg)   05/03/21 125 lb 12.8 oz (57.1 kg)   04/22/21 127 lb (57.6 kg)        Body mass index is 20.56 kg/m². Diet: DIET GENERAL;        MEDS:     Scheduled Meds:   enoxaparin  40 mg Subcutaneous Q24H    ipratropium-albuterol  1 ampule Inhalation Q4H WA    sodium chloride flush  5-40 mL Intravenous 2 times per day    bacitracin zinc   Topical Daily    bisacodyl  5 mg Oral Daily    nicotine  1 patch Transdermal Daily     Continuous Infusions:   sodium chloride       PRN Meds:oxyCODONE, 5 mg, Q4H PRN  sodium chloride flush, 5-40 mL, PRN  sodium chloride, 25 mL, PRN  acetaminophen, 650 mg, Q4H PRN  promethazine, 12.5 mg, Q6H PRN    Or  ondansetron, 4 mg, Q6H PRN  HYDROmorphone, 0.25 mg, Q3H PRN    Or  HYDROmorphone, 0.5 mg, Q3H PRN          PHYSICAL EXAM:     CONSTITUTIONAL: Alert and oriented times 3, no acute distress and cooperative to examination. HEENT: less sub q emphysema  NECK: less sub q emphysema  LUNGS: decreased on left CT in place, fairly sizeable air leak, unchanged         LABS:     CBC:   No results for input(s): WBC, RBC, HGB, HCT, MCV, MCH, MCHC, RDW, PLT, MPV in the last 72 hours. Last 3 CMP:   Recent Labs     05/08/21  0501   CREATININE 0.8      Troponin: No results for input(s): TROPONINI in the last 72 hours. Calcium:   Lab Results   Component Value Date    CALCIUM 9.7 10/10/2017    CALCIUM 9.5 06/20/2016    CALCIUM 9.9 01/19/2014      Ionized Calcium: No results found for: IONCA   Lipids: No results for input(s): CHOL, HDL in the last 72 hours. Invalid input(s): LDLCALCU  INR: No results for input(s): INR in the last 72 hours.   Lactic Acid: No results found for: LACTA DVT prophylaxis: [x] Lovenox                                 [] SCDs                                 [] SQ Heparin                                 [] Encourage ambulation, low risk for DVT, no chemical or mechanical prophylaxis necessary              [] Already on Anticoagulation      RADIOLOGY:           Narrative   AP chest.       Comparison:  CR,SR  - XR CHEST PORTABLE  - 05/07/2021 01:26 AM EDT .       Findings:   Stable volume loss left lung. 2 left-sided chest tubes remain in place without pneumothorax. Small stable pneumomediastinum. Heart size is normal.   No right lung consolidation. Diffuse bilateral subcutaneous emphysema is unchanged. No acute fracture.           Impression   Impression:   Stable chest with left-sided chest tubes in place and no pneumothorax.       This document has been electronically signed by: Emerson Quintanilla. Fabricio Dyer MD    on 23/27/1269 06:46 AM           Narrative   Chest X-ray, 1 View       COMPARISON:  CR,SR  - XR CHEST PORTABLE  - 05/06/2021 12:37 AM EDT       FINDINGS:   No consolidation. Mild bibasilar atelectasis. No pleural effusion. No visible pneumothorax. Similar small pneumomediastinum. No cardiomegaly. No acute fracture. Extensive chest wall soft tissue emphysema. Two unchanged left chest tubes.           Impression   No visible residual pneumothorax. Similar small pneumomediastinum. Left    chest tubes remain in place. Extensive chest wall soft tissue emphysema     Xr Chest Portable    Result Date: 5/5/2021  Impression: Significant improvement left pneumothorax with approximately 20 percent residual pneumothorax. This document has been electronically signed by: Albert Lei MD on 05/05/2021 04:46 AM    Xr Chest Portable    Result Date: 5/4/2021  1. Normal heart size. Right lung is unremarkable. 2. Postsurgical changes left side of chest. Extensive subcutaneous emphysema left side of chest and neck.  A small caliber pigtail catheter is present in the pleural space left side. 3. Diffuse lucency in the left hemithorax, representing a very large pneumothorax post lung resection. Residual lung markings are seen in the left lower lung field. **This report has been created using voice recognition software. It may contain minor errors which are inherent in voice recognition technology. ** Final report electronically signed by Dr. Meme Vasques on 5/4/2021 2:51 Michael Willis M.D.  FACS  Electronically signed 5/8/2021 at 10:42 AM

## 2021-05-08 NOTE — PLAN OF CARE
Problem: Pain:  Goal: Pain level will decrease  Description: Pain level will decrease  Outcome: Ongoing  Note: PRN roxicodone administered from complaints of pain. PRN tylenol administered as well. Medicaiton effective for pain control. Problem: Falls - Risk of:  Goal: Will remain free from falls  Description: Will remain free from falls  Outcome: Ongoing  Note: No falls this shift. Patient able to use call light for assist when needed. Bed alarm in use when in bed. Chair alarm in use when up to chair. Problem: Respiratory  Goal: O2 Sat > 90%  Outcome: Ongoing  Note: Chest tube with noticeable air leak. Crepitus noted however not any worse. Patient denies any shortness of breath while at rest.   Does complain of mild chest discomfort. CXR stable this morning. Planning to keep chest tubes this weekend and re evaluate Monday. Problem: Skin Integrity/Risk  Goal: No skin breakdown during hospitalization  Outcome: Ongoing  Note: No new skin breakdown noted. Patient able to reposition with minimal assist.      Problem: Discharge Planning:  Goal: Discharged to appropriate level of care  Description: Discharged to appropriate level of care  Outcome: Ongoing  Note: Patient is from home. Planning to be discharged home when medically stable. Care Plan reviewed in detail with patient. Patient has verbalized understanding regarding current treatment plan and is compliant with current treatment.

## 2021-05-08 NOTE — PROGRESS NOTES
[facility]      Patient - Marcy Valdez   MRN -  [id number]   [facility] # - [de-identified]   - 1960      Date of Admission -  2021  7:13 AM  Date of evaluation -  2021  Room - --   Hospital Day - [facility] Srini Urbina MD Primary Care Physician - Rafael Eugene MD     Problem List      Active Hospital Problems    Diagnosis Date Noted    Moderate COPD (chronic obstructive pulmonary disease) ([facility] [J44.9]     S/P left upper lobectomy of lung and mediastinal dissection [Z90.2] 2021    Primary cancer of left upper lobe of lung ([facility] [C34.12] 2021    Smoking greater than 40 pack years [F17.210] 2021     Reason for Consult    S/P Left upper lobe Lobectomy   HPI   History Obtained From: Patient and electronic medical record. Marcy Valdez is a 62 y.o. female She was admitted under Dr. Jorge Matthew MD service. Pulmonary medicine was consulted for further evaluation of post operative pulmonary management. She underwent:  Type of surgery: Robotic assisted left upper lobectomy and mediastinal dissection along with cryotherapy of the intercostal nerves for postoperative pain control. Date of surgery: 4 May 2021  Surgeon: Dr. Jorge Matthew MD      At the time of my initial evaluation, she is on room air    Input/Out put fluid balance from the time of admission: 1.3 L positive    Prior to current hospitalization: She was diagnosed with moderately COPD however she is not using any inhalers    She is currently not using any oxygen supplementation at rest, exercise or during sleep/at night time. 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 [address] Jamaal Leiva       History of tobacco smoking:Yes  Amount of tobacco smokin.0 PPD.   Years of tobacco smokin.                                    Quit smoking: No.              Current smoker: Yes.       Amount of current tobacco smoking: She quit smoking     History of recreational or IV drug use in the past: She smokes marijuana occasionally. History of alcohol use in the past:NO     History of exposure to coal mines/coal dust: NO  History of exposure to foundry dust/welding: NO  History of exposure to quarry/silica/sandblasting: NO  History of exposure to asbestos/working with breaks/ships: NO  History of exposure to farm dust: NO  History of recent travel to long distances: NO  History of exposure to birds, pigeons, or chickens in the past:NO  Pet animals at home:Yes  Dogs: 2  Cats: 2     History of pulmonary embolism in the past: No            History of DVT in the past:No     Past 24 Hours     No acute events  Remained on RA  Right chest tube still with air leak   -All systems reviewed.      PMHx   Past Medical History      Diagnosis Date    Arthritis     [facility] disease) [facility]     Dr Kelley Curry [facility])     Dr Jalen Wynne      Past Surgical History        Procedure Laterality Date    BRONCHOSCOPY N/A 2021    BRONCHOSCOPY performed by Selena Florian MD at [facility]     SECTION      x4    CHOLECYSTECTOMY      COLONOSCOPY  2016    CT NEEDLE BIOPSY LUNG PERCUTANEOUS  2021    CT NEEDLE BIOPSY LUNG PERCUTANEOUS 3/26/2021 [facility] CT SCAN    HYSTERECTOMY      UPPER GASTROINTESTINAL ENDOSCOPY  2016     Meds    Current Medications    enoxaparin  40 mg Subcutaneous Q24H    ipratropium-albuterol  1 ampule Inhalation Q4H WA    sodium chloride flush  5-40 mL Intravenous 2 times per day    bacitracin zinc   Topical Daily    bisacodyl  5 mg Oral Daily    nicotine  1 patch Transdermal Daily     oxyCODONE, sodium chloride flush, sodium chloride, acetaminophen, promethazine **OR** ondansetron, HYDROmorphone **OR** HYDROmorphone  IV Drips/Infusions   sodium chloride       Home Medications  Medications Prior to Admission: albuterol sulfate HFA (VENTOLIN HFA) 108 (90 Base) MCG/ACT inhaler, Inhale 2 puffs into the lungs 4 times daily as needed for Wheezing  VITAMIN D PO, Take by mouth Unsure on dose  Diet    DIET GENERAL;  Allergies    Tylenol with codeine #3 [acetaminophen-codeine] and Codeine  Social History     Social History     Socioeconomic History    Marital status:      Spouse name: Not on file    Number of children: Not on file    Years of education: Not on file    Highest education level: Not on file   Occupational History    Not on file   Social Needs    Financial resource strain: Not on file    Food insecurity     Worry: Not on file     Inability: Not on file    Transportation needs     Medical: Not on file     Non-medical: Not on file   Tobacco Use    Smoking status: Current Every Day Smoker     Packs/day: 1.00     Years: 45.00     Pack years: 45.00     Types: Cigarettes    Smokeless tobacco: Never Used   Substance and Sexual Activity    Alcohol use: Never     Frequency: Never     Binge frequency: Never    Drug use: Yes     Types: Marijuana    Sexual activity: Not on file   Lifestyle    Physical activity     Days per week: Not on file     Minutes per session: Not on file    Stress: Not on file   Relationships    Social connections     Talks on phone: Not on file     Gets together: Not on file     Attends Jehovah's witness service: Not on file     Active member of club or organization: Not on file     Attends meetings of clubs or organizations: Not on file     Relationship status: Not on file    Intimate partner violence     Fear of current or ex partner: Not on file     Emotionally abused: Not on file     Physically abused: Not on file     Forced sexual activity: Not on file   Other Topics Concern    Not on file   Social History Narrative    Not on file     Family History          Problem Relation Age of Onset    Other Mother         aneursym    No Known Problems Father     Depression Sister     Heart Disease Brother     Diabetes Brother     Other Brother         Lung disease    Lung Cancer Maternal Grandfather     Other Brother         Crohns    Macular Degen Brother     Depression Sister     Other Sister         Blood clots    Arthritis Sister     Brain Cancer Paternal Uncle      Sleep History    Never diagnosed with sleep apnea in the past.      Vitals     height is 5' 6\" (1.676 m) and weight is 127 lb 6.4 oz (57.8 kg). Her oral temperature is 98.1 °F (36.7 °C). Her blood pressure is 130/84 and her pulse is 79. Her respiration is 18 and oxygen saturation is 98%. Body mass index is 20.56 kg/m². I/O        Intake/Output Summary (Last 24 hours) at 5/8/2021 1316  Last data filed at 5/8/2021 8354  Gross per 24 hour   Intake 200 ml   Output 3070 ml   Net -2870 ml     I/O last 3 completed shifts: In: 2611 [P.O.:1340; I.V.:10]  Out: 6672 [Urine:2800; Chest Tube:220]   Patient Vitals for the past 96 hrs (Last 3 readings):   Weight   05/07/21 0420 127 lb 6.4 oz (57.8 kg)   05/05/21 0200 131 lb 12.8 oz (59.8 kg)       Exam     HEENT: Normal, Head is normocephalic, atraumatic. Oropharynx is clear and moist.  No oral thrush. PERRLA  Neck - Supple, No JVD present. No tracheal deviation present. Lungs - Bilateral air entry present. Bilateral aeration good with diminished breath sounds at lung bases left > Right. Subcutaneous emphysema palpable. Left-sided chest tube   Cardiovascular - Heart sounds are normal.  Regular rhythm normal rate without murmur, gallop or rub. Abdomen - Soft, nontender, nondistended, no masses or organomegaly  Neurologic - Awake, alert, oriented. There are no focal motor or sensory deficits  Extremities - No cyanosis, clubbing or edema. Musculoskeletal: Normal range of motion. Patient exhibits no tenderness. Skin - No bruising or bleeding.     Labs  - Old records and notes have been reviewed in [facility]   AB  No results found for: PH, PO2, PCO2, HCO3, O2SAT  No results found for: IFIO2, MODE, SETTIDVOL, SETPEEP  CBC  No results for input(s): WBC, RBC, HGB, HCT, MCV, MCH, MCHC, RDW, PLT, MPV in the last 72 hours. BMP  Recent Labs     05/08/21  0501   CREATININE 0.8     LFT      PFTs   Pulmonary function tests: Performed on 6 April 2021                Sleep studies   None    Cultures    No cultures    4/30/21  Cytology   FINAL DIAGNOSIS:   Slides received from [facility], [id number], [address] [place] [address] Drive 4/23/21:   [facility] 4R, lymph node biopsy:    Negative for malignancy. [facility] 7, lymph node biopsy:    Lymphoid tissue and granuloma.    Negative for malignancy. C - Station 4L, lymph node biopsy:    Lymphoid tissue and possible granulomatous inflammation.    Negative for malignancy. D - Station 11L, lymph node biopsy:    Consistent with lung adenocarcinoma.   See microscopic. Echocardiogram   4/27/2021   Narrative & Impression      Transthoracic Echocardiography Report (TTE)   Conclusions      Summary   Normal left ventricle size and systolic function. Ejection fraction was   estimated at 60 %. There were no regional left ventricular wall motion   abnormalities and wall thickness was within normal limits. Aortic aneurysm noted in the ascending aorta . Aortic aneurysm measures 4.1 cm . Signature      ----------------------------------------------------------------   Electronically signed by Michael Menjivar MD (Interpreting   physician) on 04/27/2021 at 07:10 PM   ----------------------------------------------------------------      Radiology    CXR  05/06/2021   1 view chest x-ray. Impression:   Minimal left apical pneumothorax along with pneumomediastinum. Patchy bilateral nasal or consolidation. 05/05/2021   1V chest   Impression: Significant improvement left pneumothorax with approximately 20 percent residual pneumothorax. Tue May 4, 2021  2:51 PM   PROCEDURE: XR CHEST PORTABLE   . Normal heart size. in the paranasal sinuses with air-fluid levels as evidence for an acute component     Pathology from EBUS performed on 28 April 2021:      Assessment   -.9 x 2.1 cm spiculated cavitary mass present within the left upper lobe S/p CT guided biopsy by IR service on 3/26/2021. The biopsy is consistent with Adenocarcinoma. She underwent robotic assisted left upper lobectomy and mediastinal dissection along with cryotherapy of the intercostal nerves for postoperative pain control.  -Subcutaneous emphysema with left sided chest tube placement  -Moderate COPD. -Chronic history of tobacco smoking for 45 years with 1 pack of cigarettes per day.   She is planning to quit smoking  -Full Code     Plan   -Continue Chest tube to suction, daily CXR  -Duonebs every 4 hours w/a   -Cytology (+) Adenocarcinoma Station 11L lymph node biopsy   -VTE prophylaxis: Lovenox       Electronically signed by   Meghann Nixon MD on 5/8/2021 at 1:16 PM

## 2021-05-09 ENCOUNTER — APPOINTMENT (OUTPATIENT)
Dept: GENERAL RADIOLOGY | Age: 61
DRG: 164 | End: 2021-05-09
Attending: SURGERY
Payer: COMMERCIAL

## 2021-05-09 PROCEDURE — 2060000000 HC ICU INTERMEDIATE R&B

## 2021-05-09 PROCEDURE — 6360000002 HC RX W HCPCS: Performed by: INTERNAL MEDICINE

## 2021-05-09 PROCEDURE — 99232 SBSQ HOSP IP/OBS MODERATE 35: CPT | Performed by: INTERNAL MEDICINE

## 2021-05-09 PROCEDURE — 94640 AIRWAY INHALATION TREATMENT: CPT

## 2021-05-09 PROCEDURE — 6370000000 HC RX 637 (ALT 250 FOR IP): Performed by: SURGERY

## 2021-05-09 PROCEDURE — 71045 X-RAY EXAM CHEST 1 VIEW: CPT

## 2021-05-09 PROCEDURE — 6370000000 HC RX 637 (ALT 250 FOR IP): Performed by: NURSE PRACTITIONER

## 2021-05-09 PROCEDURE — 2580000003 HC RX 258: Performed by: SURGERY

## 2021-05-09 PROCEDURE — 94760 N-INVAS EAR/PLS OXIMETRY 1: CPT

## 2021-05-09 RX ORDER — POLYETHYLENE GLYCOL 3350 17 G/17G
17 POWDER, FOR SOLUTION ORAL DAILY
Status: DISCONTINUED | OUTPATIENT
Start: 2021-05-09 | End: 2021-05-11 | Stop reason: HOSPADM

## 2021-05-09 RX ADMIN — POLYETHYLENE GLYCOL 3350 17 G: 17 POWDER, FOR SOLUTION ORAL at 21:32

## 2021-05-09 RX ADMIN — SODIUM CHLORIDE, PRESERVATIVE FREE 10 ML: 5 INJECTION INTRAVENOUS at 08:17

## 2021-05-09 RX ADMIN — SODIUM CHLORIDE, PRESERVATIVE FREE 10 ML: 5 INJECTION INTRAVENOUS at 21:32

## 2021-05-09 RX ADMIN — BACITRACIN ZINC: 500 OINTMENT TOPICAL at 08:17

## 2021-05-09 RX ADMIN — OXYCODONE HYDROCHLORIDE 5 MG: 5 TABLET ORAL at 02:30

## 2021-05-09 RX ADMIN — OXYCODONE HYDROCHLORIDE 5 MG: 5 TABLET ORAL at 07:00

## 2021-05-09 RX ADMIN — IPRATROPIUM BROMIDE AND ALBUTEROL SULFATE 1 AMPULE: .5; 3 SOLUTION RESPIRATORY (INHALATION) at 09:14

## 2021-05-09 RX ADMIN — IPRATROPIUM BROMIDE AND ALBUTEROL SULFATE 1 AMPULE: .5; 3 SOLUTION RESPIRATORY (INHALATION) at 22:04

## 2021-05-09 RX ADMIN — OXYCODONE HYDROCHLORIDE 5 MG: 5 TABLET ORAL at 17:49

## 2021-05-09 RX ADMIN — IPRATROPIUM BROMIDE AND ALBUTEROL SULFATE 1 AMPULE: .5; 3 SOLUTION RESPIRATORY (INHALATION) at 12:53

## 2021-05-09 RX ADMIN — OXYCODONE HYDROCHLORIDE 5 MG: 5 TABLET ORAL at 21:32

## 2021-05-09 RX ADMIN — ACETAMINOPHEN 650 MG: 325 TABLET ORAL at 17:49

## 2021-05-09 RX ADMIN — OXYCODONE HYDROCHLORIDE 5 MG: 5 TABLET ORAL at 12:45

## 2021-05-09 RX ADMIN — BISACODYL 5 MG: 5 TABLET, COATED ORAL at 08:16

## 2021-05-09 RX ADMIN — ENOXAPARIN SODIUM 40 MG: 40 INJECTION SUBCUTANEOUS at 17:51

## 2021-05-09 RX ADMIN — ACETAMINOPHEN 650 MG: 325 TABLET ORAL at 08:17

## 2021-05-09 ASSESSMENT — PAIN SCALES - GENERAL
PAINLEVEL_OUTOF10: 7
PAINLEVEL_OUTOF10: 8
PAINLEVEL_OUTOF10: 6
PAINLEVEL_OUTOF10: 8
PAINLEVEL_OUTOF10: 8

## 2021-05-09 ASSESSMENT — PAIN - FUNCTIONAL ASSESSMENT: PAIN_FUNCTIONAL_ASSESSMENT: ACTIVITIES ARE NOT PREVENTED

## 2021-05-09 ASSESSMENT — PAIN DESCRIPTION - PROGRESSION: CLINICAL_PROGRESSION: NOT CHANGED

## 2021-05-09 ASSESSMENT — PAIN DESCRIPTION - ONSET: ONSET: ON-GOING

## 2021-05-09 NOTE — PROGRESS NOTES
[address] [address]    [address]  Daily Progress Note  Pt Name: Rubia Bailey Record Number: [id number]  Date of Birth 1960   Today's Date: 5/9/2021    POD#5    [facility]    SUBJECTIVE  Patient feels  C/o constipation    OBJECTIVE  CURRENT VITALS /77   Pulse 82   Temp 97.8 °F (36.6 °C) (Oral)   Resp 18   Ht 5' 6\" (1.676 m)   Wt 127 lb 6.4 oz (57.8 kg)   SpO2 97%   BMI 20.56 kg/m²   LUNGS: Lungs clear   ABDOMEN: soft  WOUNDS: sites good  CT in place  24 HR INTAKE/OUTPUT :     Intake/Output Summary (Last 24 hours) at 5/9/2021 1824  Last data filed at 5/9/2021 1753  Gross per 24 hour   Intake 1540 ml   Output 3200 ml   Net -1660 ml     DRAIN/TUBE OUTPUT :      LABS  CBC :   Lab Results   Component Value Date    WBC 10.9 05/05/2021    HGB 11.8 05/05/2021    HCT 37.2 05/05/2021     05/05/2021     BMP:   Lab Results   Component Value Date     04/19/2021     10/10/2017    K 5.2 04/19/2021    K 4.6 10/10/2017     10/10/2017    CO2 29 10/10/2017    BUN 11 10/10/2017    CREATININE 0.8 05/08/2021       ASSESSMENT  1. Stable        PLAN  1.  Add miralax      Frank Gross  Electronically signed 5/9/2021 at 6:24 PM

## 2021-05-09 NOTE — PROGRESS NOTES
height is 5' 6\" (1.676 m) and weight is 127 lb 6.4 oz (57.8 kg). Her oral temperature is 97.8 °F (36.6 °C). Her blood pressure is 126/77 and her pulse is 82. Her respiration is 18 and oxygen saturation is 97%. Body mass index is 20.56 kg/m². I/O        Intake/Output Summary (Last 24 hours) at 5/9/2021 1854  Last data filed at 5/9/2021 1753  Gross per 24 hour   Intake 1540 ml   Output 3200 ml   Net -1660 ml     I/O last 3 completed shifts: In: 6135 [P.O.:1420]  Out: 3070 [Urine:3000; Chest Tube:70]   Patient Vitals for the past 96 hrs (Last 3 readings):   Weight   05/07/21 0420 127 lb 6.4 oz (57.8 kg)       Exam     HEENT: Normal, Head is normocephalic, atraumatic. Oropharynx is clear and moist.  No oral thrush. PERRLA  Neck - Supple, No JVD present. No tracheal deviation present. Lungs - Bilateral air entry present. Bilateral aeration good with diminished breath sounds at lung bases left > Right. Subcutaneous emphysema palpable. Left-sided chest tube   Cardiovascular - Heart sounds are normal.  Regular rhythm normal rate without murmur, gallop or rub. Abdomen - Soft, nontender, nondistended, no masses or organomegaly  Neurologic - Awake, alert, oriented. There are no focal motor or sensory deficits  Extremities - No cyanosis, clubbing or edema. Musculoskeletal: Normal range of motion. Patient exhibits no tenderness. Skin - No bruising or bleeding. Labs  - Old records and notes have been reviewed in [facility]   ABG  No results found for: PH, PO2, PCO2, HCO3, O2SAT  No results found for: IFIO2, MODE, SETTIDVOL, SETPEEP  CBC  No results for input(s): WBC, RBC, HGB, HCT, MCV, MCH, MCHC, RDW, PLT, MPV in the last 72 hours.    BMP  Recent Labs     05/08/21  0501   CREATININE 0.8     LFT      PFTs   Pulmonary function tests: Performed on 6 April 2021                Sleep studies   None    Cultures    No cultures    4/30/21  Cytology   FINAL DIAGNOSIS:   Slides received from [facility], [id number], DOS 4/23/21:   A - Station 4R, lymph node biopsy:    Negative for malignancy. [address] 7, lymph node biopsy:    Lymphoid tissue and granuloma.    Negative for malignancy. C - Station 4L, lymph node biopsy:    Lymphoid tissue and possible granulomatous inflammation.    Negative for malignancy. D - Station 11L, lymph node biopsy:    Consistent with lung adenocarcinoma.   See microscopic. Echocardiogram   4/27/2021   Narrative & Impression      Transthoracic Echocardiography Report (TTE)   Conclusions      Summary   Normal left ventricle size and systolic function. Ejection fraction was   estimated at 60 %. There were no regional left ventricular wall motion   abnormalities and wall thickness was within normal limits. Aortic aneurysm noted in the ascending aorta . Aortic aneurysm measures 4.1 cm . Signature      ----------------------------------------------------------------   Electronically signed by Gilma Grady MD (Interpreting   physician) on 04/27/2021 at 07:10 PM   ----------------------------------------------------------------      Radiology    CXR  05/06/2021   1 view chest x-ray. Impression:   Minimal left apical pneumothorax along with pneumomediastinum. Patchy bilateral nasal or consolidation. 05/05/2021   1V chest   Impression: Significant improvement left pneumothorax with approximately 20 percent residual pneumothorax. Tue May 4, 2021  2:51 PM   PROCEDURE: XR CHEST PORTABLE   . Normal heart size. Right lung is unremarkable. 2. Postsurgical changes left side of chest. Extensive subcutaneous emphysema left side of chest and neck. A small caliber pigtail catheter is present in the pleural space left side. 3. Diffuse lucency in the left hemithorax, representing a very large pneumothorax post lung resection. Residual lung markings are seen in the left lower lung field.        CT Scans  (See actual reports for details)  CT scan of chest without contrast.  Fri Mar 12, 2021  8:18 AM   PROCEDURE: CT CHEST WO CONTRAST   CLINICAL INFORMATION: Pulmonary nodule   COMPARISON: Chest x-ray dated 2/26/2021   1. There is a cavitary mass demonstrated within the left upper lobe with peripheral mild spiculation measuring 3.9 x 2.1 cm on axial image 34. This corresponds with previous chest x-ray dated 2/26/2021. This is concerning for malignancy until proven otherwise. Further evaluation could be obtained with PET/CT and/or biopsy. An infectious process is also not excluded.     2. There are additional smaller groundglass nodular densities within the right lung as detailed above.     3. Small pericardial effusion is noted. This is nonspecific. CT-guided biopsy by interventional radiology service on 26 March 2021:     Pathology:  Copies To: Cathy Amos     Clinical Information: CAVITATING MASS SHANNON, SMOKER     FINAL DIAGNOSIS:   Lung, left, needle core biopsies:       Adenocarcinoma. PET scan:  4/14/2021 10:04 AM   PROCEDURE: PET CT SKULL BASE TO MID THIGH   1. FDG avid left lung mass corresponding to known malignancy. 2. Small, mildly FDG avid right lung nodules, possibly infectious or inflammatory but satellite malignancy cannot be excluded. MRI scan of the brain with and without contrast:   PROCEDURE: MRI BRAIN W WO CONTRAST   1. No evidence of intracranial metastases or other acute intracranial abnormality. 2. Moderate severity chronic microvascular angiopathy. 3. Mild to moderate mucosal inflammation in the paranasal sinuses with air-fluid levels as evidence for an acute component     Pathology from EBUS performed on 28 April 2021:      Assessment   -.9 x 2.1 cm spiculated cavitary mass present within the left upper lobe S/p CT guided biopsy by IR service on 3/26/2021. The biopsy is consistent with Adenocarcinoma.   She underwent robotic assisted left upper lobectomy and mediastinal dissection along with cryotherapy of the intercostal nerves for postoperative pain

## 2021-05-09 NOTE — PLAN OF CARE
Problem: Pain:  Goal: Pain level will decrease  Description: Pain level will decrease  Outcome: Ongoing  Note: PRN oxycodone administered for complaints of left flank/chest tube site pain. Medication effective for pain control. Problem: Falls - Risk of:  Goal: Will remain free from falls  Description: Will remain free from falls  Outcome: Ongoing  Note: No falls this shift. Bed alarm in use when in bed. Chair alarm in use when up to chair. Up with assist x 1 to [facility]. Problem: Respiratory  Goal: O2 Sat > 90%  Outcome: Ongoing  Note: Chest tube with noted air leak . No changes to respiratory status today. Denies any shortness of breath while at rest.      Problem: Discharge Planning:  Goal: Discharged to appropriate level of care  Description: Discharged to appropriate level of care  Outcome: Ongoing  Note: Planning home with family when medically ready. Care Plan reviewed in detail with patient. Patient has verbalized understanding regarding current treatment plan and is compliant with current treatment.

## 2021-05-09 NOTE — PROGRESS NOTES
exposure to farm dust: NO  History of recent travel to long distances: NO  History of exposure to birds, pigeons, or chickens in the past:NO  Pet animals at home:Yes  Dogs: 2  Cats: 2     History of pulmonary embolism in the past: No            History of DVT in the past:No                         Review of Systems:   General/Constitutional: she lost 6 lbs of weight from the last visit. No fever or chills. HENT: Negative. Eyes: Negative. Upper respiratory tract: No nasal stuffiness or post nasal drip. Lower respiratory tract/ lungs: See HPI  Cardiovascular: No palpitations or chest pain. Gastrointestinal: No nausea or vomiting. Neurological: No focal neurologiacal weakness. Extremities: No edema. Musculoskeletal: No complaints. Genitourinary: No complaints. Hematological: Negative. Psychiatric/Behavioral: Negative. Skin: No itching. Healing wound present in the inferior lateral aspect of left breast from her previous chest tube placement. With a Band-Aid.       Past Medical History:   Diagnosis Date    Arthritis     [facility]     Dr Hammond [facility])     Dr Lilibeth Tapia       Past Surgical History:   Procedure Laterality Date    BRONCHOSCOPY N/A 5/6/2021    BRONCHOSCOPY performed by Edgard Montoya MD at [address]      x4    CHOLECYSTECTOMY      COLONOSCOPY  07/11/2016    CT NEEDLE BIOPSY LUNG PERCUTANEOUS  03/26/2021    CT NEEDLE BIOPSY LUNG PERCUTANEOUS 3/26/2021 [facility] CT SCAN    HYSTERECTOMY      LUNG REMOVAL, TOTAL Left 5/4/2021    ROBOTIC ASSISTED LEFT UPPER LOBECTOMY AND MEDIASTINAL DISSECTION, POSSIBLE OPEN AND CRYOTHERAPY OF INTERCOSTAL NERVES 5, 6, 7 & 8 performed by Briana Mg MD at [address] [facility]  07/11/2016       Social History     Tobacco Use    Smoking status: Current Every Day Smoker     Packs/day: 1.00     Years: 45.00     Pack years: 45.00     Types: Cigarettes    Smokeless tobacco: Never Used   Vaping Use    Vaping Use: Some days    Substances: Nicotine    Devices: Disposable   Substance Use Topics    Alcohol use: Never    Drug use: Yes     Types: Marijuana       Allergies   Allergen Reactions    Tylenol With Codeine #3 [Acetaminophen-Codeine] Nausea And Vomiting    Codeine Nausea And Vomiting       Current Outpatient Medications   Medication Sig Dispense Refill    oxyCODONE (ROXICODONE) 5 MG immediate release tablet Take 5 mg by mouth every 4 hours as needed.  vitamin D3 (CHOLECALCIFEROL) 125 MCG (5000 UT) TABS tablet Cholecalciferol (Vitamin D3) (Vitamin D3) 125 mcg (5,000 unit) Tablet Active 125 MCG PO Daily April 22nd, 2021 4:50pm      prochlorperazine (COMPAZINE) 10 MG tablet Take 1 tablet by mouth every 6 hours as needed (nausea) 120 tablet 3    ondansetron (ZOFRAN-ODT) 8 MG TBDP disintegrating tablet Place 1 tablet under the tongue every 8 hours as needed for Nausea or Vomiting 10 tablet 2    dexamethasone (DECADRON) 4 MG tablet 4 mg Decadron take 1 tablet p.o. twice daily x3 days starting 1 day before chemotherapy with Alimta 6 tablet 3    lidocaine-prilocaine (EMLA) 2.5-2.5 % cream Apply topically as needed. 1 Tube 1    folic acid (FOLVITE) 1 MG tablet Take 1 tablet by mouth daily 30 tablet 3    folic acid (FOLVITE) 1 MG tablet Take 1 tablet by mouth daily for 30 doses Start 7 days before first scheduled dose and continue for 21 days after last dose of PEMEtrexed. 30 tablet 2    dexamethasone (DECADRON) 4 MG tablet Take 1 tablet by mouth See Admin Instructions Take 1 tab twice daily for 3 days starting the day before scheduled PEMEtrexed. 30 tablet 0    cephALEXin (KEFLEX) 500 MG capsule Take 1 capsule by mouth 4 times daily for 7 days 28 capsule 0    mupirocin (BACTROBAN) 2 % cream Apply topically 3 times daily.  15 g 0    acetaminophen (TYLENOL) 500 MG tablet Take 1 tablet by mouth 4 times daily as needed for Pain 120 tablet 0  ketorolac (TORADOL) 10 MG tablet Take 1 tablet by mouth every 6 hours as needed for Pain 20 tablet 0    senna (SENOKOT) 8.6 MG tablet Take 1 tablet by mouth daily      docusate sodium (COLACE) 100 MG capsule Take 100 mg by mouth 2 times daily      albuterol sulfate HFA (VENTOLIN HFA) 108 (90 Base) MCG/ACT inhaler Inhale 2 puffs into the lungs 4 times daily as needed for Wheezing 1 Inhaler 11     No current facility-administered medications for this visit. Family History   Problem Relation Age of Onset    Other Mother         aneursym    No Known Problems Father     Depression Sister     Heart Disease Brother     Diabetes Brother     Other Brother         Lung disease    Lung Cancer Maternal Grandfather     Other Brother         Crohns    Macular Degen Brother     Depression Sister     Other Sister         Blood clots    Arthritis Sister     Brain Cancer Paternal Uncle           Vitals: /84 (Site: Left Upper Arm, Position: Sitting, Cuff Size: Small Adult)   Pulse 84   Temp 98.7 °F (37.1 °C) (Tympanic)   Ht 5' 6\" (1.676 m)   Wt 120 lb (54.4 kg)   LMP  (LMP Unknown)   SpO2 96% Comment: room air  BMI 19.37 kg/m²         Nursing note and vitals reviewed. Constitutional: Patient appears moderately built and moderately nourished. No distress. Patient is oriented to person, place, and time. HENT:   Head: Normocephalic and atraumatic. Right Ear: External ear normal.   Left Ear: External ear normal.   Mouth/Throat: Oropharynx is clear and moist.  No oral thrush. Eyes: Conjunctivae are normal. Pupils are equal, round, and reactive to light. No scleral icterus. Neck: Neck supple. No JVD present. No tracheal deviation present. Cardiovascular: Normal rate, regular rhythm, normal heart sounds. No murmur heard. Pulmonary/Chest: Effort normal and breath sounds normal. No stridor. No respiratory distress. No wheezes. No rales. Patient exhibits no tenderness. Abdominal: Soft. Patient exhibits no distension. No tenderness. Musculoskeletal: Normal range of motion. Extremities: Patient exhibits no edema and no tenderness. Lymphadenopathy:  No cervical adenopathy. Neurological: Patient is alert and oriented to person, place, and time. Skin: Skin is warm and dry. Patient is not diaphoretic. Healing wound present in the inferior lateral aspect of left breast from her previous chest tube placement. With a Band-Aid in place. Psychiatric: Patient  has a normal mood and affect. Patient behavior is normal.      Diagnostic Data:     Radiological Data:  CT scan of chest without contrast.  Fri Mar 12, 2021  8:18 AM   PROCEDURE: CT CHEST WO CONTRAST   CLINICAL INFORMATION: Pulmonary nodule   COMPARISON: Chest x-ray dated 2/26/2021   1. There is a cavitary mass demonstrated within the left upper lobe with peripheral mild spiculation measuring 3.9 x 2.1 cm on axial image 34. This corresponds with previous chest x-ray dated 2/26/2021. This is concerning for malignancy until proven otherwise. Further evaluation could be obtained with PET/CT and/or biopsy. An infectious process is also not excluded. 2. There are additional smaller groundglass nodular densities within the right lung as detailed above. 3. Small pericardial effusion is noted. This is nonspecific. CT-guided biopsy by interventional radiology service on 26 March 2021:     Pathology:  Copies To:   Whitney Crow     Clinical Information: CAVITATING MASS SHANNON, SMOKER     FINAL DIAGNOSIS:   Lung, left, needle core biopsies:       Adenocarcinoma. Chest x-ray PA view:  PROCEDURE: XR CHEST PA INSPIRATION 1 VW   1. No pneumothorax following left lung lesion biopsy. There is redemonstration of a nodular density overlying the left midlung.        CXR  Tue May 11, 2021  3:52 AM   Exam: One View of the chest   Impression: Stable small left pneumothorax.          PET scan:  4/14/2021 10:04 AM   PROCEDURE: PET CT SKULL BASE TO MID THIGH   1. FDG avid left lung mass corresponding to known malignancy. 2. Small, mildly FDG avid right lung nodules, possibly infectious or inflammatory but satellite malignancy cannot be excluded. MRI scan of the brain with and without contrast:   PROCEDURE: MRI BRAIN W WO CONTRAST   1. No evidence of intracranial metastases or other acute intracranial abnormality. 2. Moderate severity chronic microvascular angiopathy. 3. Mild to moderate mucosal inflammation in the paranasal sinuses with air-fluid levels as evidence for an acute component     Pulmonary function tests: Performed on 6 April 2021 4/30/21  Cytology   FINAL DIAGNOSIS:   Slides received from [facility], [id number], [facility] [address] 4/23/21:   [address] [facility] 4R, lymph node biopsy:    Negative for malignancy. [address] [facility] 7, lymph node biopsy:    Lymphoid tissue and granuloma.    Negative for malignancy. C - Station 4L, lymph node biopsy:    Lymphoid tissue and possible granulomatous inflammation.    Negative for malignancy. D - Station 11L, lymph node biopsy:    Consistent with lung adenocarcinoma.   See microscopic. CT scan of chest without contrast performed on 1 June 2021:  Tue Jun 1, 2021  8:35 PM   CT of the chest was performed without contrast.   Impression: Small partially loculated left pleural effusion containing a small amount of gas. Empyema should be excluded. There is a trace left apical pneumothorax benign emphysema. Indeterminate small ill-defined density right upper lobe recommend documentation of long-term stability. Other findings as above. Assessment:  -3.9 x 2.1 cm spiculated cavitary mass present within the left upper lobe S/p CT guided biopsy by IR service on 3/26/2021. The biopsy is consistent with Adenocarcinoma.   She underwent robotic assisted left upper lobectomy and mediastinal dissection along with cryotherapy of the intercostal nerves for postoperative pain control- She is following with Dr. Teena Garcia Deisy Rod MD   -Hx of Subcutaneous emphysema with left sided chest tube placement-persistent airleak underwent bronchoscopy 5/6/2021   -Moderate COPD-she is using albuterol HFA 2 puffs every 6 hourly as needed (Ventolin HFA). She refused to go on any maintenance inhalers.  -Chronic history of tobacco smoking for 45 years with 1 pack of cigarettes per day. She is planning to quit smoking. Recommendations/Plan:  -Patient educated to quit smoking as soon as possible. Patient verbalizes understanding of adverse consequences of tobacco smoking.  -Continue albuterol HFA 90mcg/Spray MDI, 2puffs  Q6Hprn.  -She was advised to continue triple ointment cream application topically to the left side old chest tube site wound.  -Patient advised to continue current inhalers and keep good compliance. Patient verbalizes understanding.  -Patient educated to update his pneumococcal vaccine with family physician and take influenza vaccine in coming season with out fail. Patient verbalizes understanding. Patient told me that she do not take flu shots or pneumonia vaccines. -She was advised to keep her scheduled follow-up appointment with Dr. Stanton Mckeon MD at [address] [place] [address] for further evaluation and management of abnormal CT scan of chest dated 1 June 2021.  -She was advised to keep her scheduled appointment for Medical Oncology consultation with Dr. Dianelys Constantino MD- Medical Oncologist for further evaluation and management of lung adenocarcinoma. She is currently in the process of getting chemotherapy.  -Schedule patient for follow up with my clinic in 6 months for clinical re evaluation regarding her COPD. Ron Peerz Patient advised to make early appointment if needed. -Cristina Quevedo was advised to make early appointment with my clinic if she develops any constitutional symptoms including loss of weight, poor appetite or hemoptysis. She verbalizes under standing.  - Patient  educated about my impression and plan.   She verbalizes understanding.       -I personally reviewed and updated the Past medical hx, Past surgical hx,Social hx, Family hx, Medications, Allergies in the discrete data section of the patient chart. I also reviewed pertaining labs and Pulmonary medicine,Sleep medicine related, Pathological, Microbiological and Radiological investigations.

## 2021-05-10 ENCOUNTER — APPOINTMENT (OUTPATIENT)
Dept: GENERAL RADIOLOGY | Age: 61
DRG: 164 | End: 2021-05-10
Attending: SURGERY
Payer: COMMERCIAL

## 2021-05-10 LAB — AFB CULTURE & SMEAR: NORMAL

## 2021-05-10 PROCEDURE — 6370000000 HC RX 637 (ALT 250 FOR IP): Performed by: SURGERY

## 2021-05-10 PROCEDURE — 6370000000 HC RX 637 (ALT 250 FOR IP): Performed by: NURSE PRACTITIONER

## 2021-05-10 PROCEDURE — 2580000003 HC RX 258: Performed by: SURGERY

## 2021-05-10 PROCEDURE — 6360000002 HC RX W HCPCS: Performed by: INTERNAL MEDICINE

## 2021-05-10 PROCEDURE — 94760 N-INVAS EAR/PLS OXIMETRY 1: CPT

## 2021-05-10 PROCEDURE — 2060000000 HC ICU INTERMEDIATE R&B

## 2021-05-10 PROCEDURE — 99232 SBSQ HOSP IP/OBS MODERATE 35: CPT | Performed by: INTERNAL MEDICINE

## 2021-05-10 PROCEDURE — 71045 X-RAY EXAM CHEST 1 VIEW: CPT

## 2021-05-10 PROCEDURE — 99024 POSTOP FOLLOW-UP VISIT: CPT | Performed by: SURGERY

## 2021-05-10 PROCEDURE — 94640 AIRWAY INHALATION TREATMENT: CPT

## 2021-05-10 PROCEDURE — APPSS30 APP SPLIT SHARED TIME 16-30 MINUTES: Performed by: NURSE PRACTITIONER

## 2021-05-10 RX ADMIN — POLYETHYLENE GLYCOL 3350 17 G: 17 POWDER, FOR SOLUTION ORAL at 10:01

## 2021-05-10 RX ADMIN — OXYCODONE HYDROCHLORIDE 5 MG: 5 TABLET ORAL at 15:29

## 2021-05-10 RX ADMIN — OXYCODONE HYDROCHLORIDE 5 MG: 5 TABLET ORAL at 19:58

## 2021-05-10 RX ADMIN — OXYCODONE HYDROCHLORIDE 5 MG: 5 TABLET ORAL at 23:59

## 2021-05-10 RX ADMIN — SODIUM CHLORIDE, PRESERVATIVE FREE 10 ML: 5 INJECTION INTRAVENOUS at 19:58

## 2021-05-10 RX ADMIN — SODIUM CHLORIDE, PRESERVATIVE FREE 10 ML: 5 INJECTION INTRAVENOUS at 09:59

## 2021-05-10 RX ADMIN — IPRATROPIUM BROMIDE AND ALBUTEROL SULFATE 1 AMPULE: .5; 3 SOLUTION RESPIRATORY (INHALATION) at 16:47

## 2021-05-10 RX ADMIN — IPRATROPIUM BROMIDE AND ALBUTEROL SULFATE 1 AMPULE: .5; 3 SOLUTION RESPIRATORY (INHALATION) at 13:06

## 2021-05-10 RX ADMIN — ACETAMINOPHEN 650 MG: 325 TABLET ORAL at 12:48

## 2021-05-10 RX ADMIN — OXYCODONE HYDROCHLORIDE 5 MG: 5 TABLET ORAL at 05:39

## 2021-05-10 RX ADMIN — OXYCODONE HYDROCHLORIDE 5 MG: 5 TABLET ORAL at 01:34

## 2021-05-10 RX ADMIN — BACITRACIN ZINC: 500 OINTMENT TOPICAL at 09:00

## 2021-05-10 RX ADMIN — ENOXAPARIN SODIUM 40 MG: 40 INJECTION SUBCUTANEOUS at 16:33

## 2021-05-10 RX ADMIN — ACETAMINOPHEN 650 MG: 325 TABLET ORAL at 22:25

## 2021-05-10 RX ADMIN — OXYCODONE HYDROCHLORIDE 5 MG: 5 TABLET ORAL at 09:58

## 2021-05-10 RX ADMIN — IPRATROPIUM BROMIDE AND ALBUTEROL SULFATE 1 AMPULE: .5; 3 SOLUTION RESPIRATORY (INHALATION) at 09:40

## 2021-05-10 RX ADMIN — IPRATROPIUM BROMIDE AND ALBUTEROL SULFATE 1 AMPULE: .5; 3 SOLUTION RESPIRATORY (INHALATION) at 20:05

## 2021-05-10 RX ADMIN — BISACODYL 5 MG: 5 TABLET, COATED ORAL at 09:58

## 2021-05-10 ASSESSMENT — PAIN DESCRIPTION - LOCATION
LOCATION: CHEST

## 2021-05-10 ASSESSMENT — PAIN DESCRIPTION - DESCRIPTORS: DESCRIPTORS: ACHING;CONSTANT;DISCOMFORT

## 2021-05-10 ASSESSMENT — PAIN DESCRIPTION - PAIN TYPE
TYPE: ACUTE PAIN

## 2021-05-10 ASSESSMENT — PAIN SCALES - GENERAL
PAINLEVEL_OUTOF10: 3
PAINLEVEL_OUTOF10: 6
PAINLEVEL_OUTOF10: 0

## 2021-05-10 ASSESSMENT — PAIN DESCRIPTION - FREQUENCY
FREQUENCY: CONTINUOUS

## 2021-05-10 ASSESSMENT — PAIN - FUNCTIONAL ASSESSMENT: PAIN_FUNCTIONAL_ASSESSMENT: ACTIVITIES ARE NOT PREVENTED

## 2021-05-10 ASSESSMENT — PAIN DESCRIPTION - PROGRESSION: CLINICAL_PROGRESSION: GRADUALLY IMPROVING

## 2021-05-10 ASSESSMENT — PAIN DESCRIPTION - ORIENTATION
ORIENTATION: LEFT

## 2021-05-10 ASSESSMENT — PAIN DESCRIPTION - ONSET: ONSET: ON-GOING

## 2021-05-10 NOTE — CARE COORDINATION
5/10/21, 9:17 AM EDT    DISCHARGE ON GOING EVALUATION    KIMBERLY CARRASCO [facility] HOSPITAL day: 6  Location: -06/006-A Reason for admit: S/P lobectomy of lung [Z90.2]   Procedure:   05/05  left upper lobectomy of lung and mediastinal dissection   05/06   bronchoscopy under anesthesia  05/07  CXR: No visible residual pneumothorax. Similar small pneumomediastinum. Left   chest tubes remain in place. Extensive chest wall soft tissue emphysema. 05/10  CXR: 1.  Very small left pneumothorax.  Left-sided chest tubes are unchanged in   position.  Subcutaneous emphysema appears similar to prior exam.   Barriers to Discharge: POD # 5 plan transfer to [facility];  CT continues at -20 cm, follow crepitus, up in room. PCP: Dariana Santamaria MD  Readmission Risk Score: 10%  Patient Goals/Plan/Treatment Preferences:   0910 am  - called access center to set up transfer to [facility] per Dr David Perez request; accepting physician at [facility] Dr Fabricio Vazquez MD - needs further care we cannot provide here. 5/10/21, 2:56 PM EDT    Patient goals/plan/ treatment preferences discussed by  and . Patient goals/plan/ treatment preferences reviewed with patient/ family. Patient/ family verbalize understanding of discharge plan and are in agreement with goal/plan/treatment preferences. Understanding was demonstrated using the teach back method. AVS provided by RN at time of discharge, which includes all necessary medical information pertaining to the patients current course of illness, treatment, post-discharge goals of care, and treatment preferences.

## 2021-05-10 NOTE — PROGRESS NOTES
cultures    4/30/21  Cytology   FINAL DIAGNOSIS:   Slides received from [facility], [id number], [address] 4/23/21:   [facility] 4R, lymph node biopsy:    Negative for malignancy. [address] [facility] 7, lymph node biopsy:    Lymphoid tissue and granuloma.    Negative for malignancy. C - Station 4L, lymph node biopsy:    Lymphoid tissue and possible granulomatous inflammation.    Negative for malignancy. D - Station 11L, lymph node biopsy:    Consistent with lung adenocarcinoma.   See microscopic. Echocardiogram   4/27/2021   Narrative & Impression      Transthoracic Echocardiography Report (TTE)   Conclusions      Summary   Normal left ventricle size and systolic function. Ejection fraction was   estimated at 60 %. There were no regional left ventricular wall motion   abnormalities and wall thickness was within normal limits. Aortic aneurysm noted in the ascending aorta . Aortic aneurysm measures 4.1 cm . Signature      ----------------------------------------------------------------   Electronically signed by Phoebe Almonte MD (Interpreting   physician) on 04/27/2021 at 07:10 PM   ----------------------------------------------------------------      Radiology    CXR  One View of the chest   05/10/2021   Findings: Left-sided chest tube and left pigtail chest tube are unchanged in position. Bilateral subcutaneous emphysema appears unchanged. Small left pneumothorax. No pleural fluid collections. No focal infiltrate. Cardiac silhouette is not enlarged. Impression:   1. Very small left pneumothorax. Left-sided chest tubes are unchanged in position. Subcutaneous emphysema appears similar to prior exam.       05/06/2021   1 view chest x-ray. Impression:   Minimal left apical pneumothorax along with pneumomediastinum. Patchy bilateral nasal or consolidation. 05/05/2021   1V chest   Impression: Significant improvement left pneumothorax with approximately 20 percent residual pneumothorax. Tue May 4, 2021  2:51 PM   PROCEDURE: XR CHEST PORTABLE   . Normal heart size. Right lung is unremarkable. 2. Postsurgical changes left side of chest. Extensive subcutaneous emphysema left side of chest and neck. A small caliber pigtail catheter is present in the pleural space left side. 3. Diffuse lucency in the left hemithorax, representing a very large pneumothorax post lung resection. Residual lung markings are seen in the left lower lung field. CT Scans  (See actual reports for details)  CT scan of chest without contrast.  Fri Mar 12, 2021  8:18 AM   PROCEDURE: CT CHEST WO CONTRAST   CLINICAL INFORMATION: Pulmonary nodule   COMPARISON: Chest x-ray dated 2/26/2021   1. There is a cavitary mass demonstrated within the left upper lobe with peripheral mild spiculation measuring 3.9 x 2.1 cm on axial image 34. This corresponds with previous chest x-ray dated 2/26/2021. This is concerning for malignancy until proven otherwise. Further evaluation could be obtained with PET/CT and/or biopsy. An infectious process is also not excluded.     2. There are additional smaller groundglass nodular densities within the right lung as detailed above.     3. Small pericardial effusion is noted. This is nonspecific. CT-guided biopsy by interventional radiology service on 26 March 2021:     Pathology:  Copies To: Janice Alvarado     Clinical Information: CAVITATING MASS SHANNON, SMOKER     FINAL DIAGNOSIS:   Lung, left, needle core biopsies:       Adenocarcinoma. PET scan:  4/14/2021 10:04 AM   PROCEDURE: PET CT SKULL BASE TO MID THIGH   1. FDG avid left lung mass corresponding to known malignancy. 2. Small, mildly FDG avid right lung nodules, possibly infectious or inflammatory but satellite malignancy cannot be excluded. MRI scan of the brain with and without contrast:   PROCEDURE: MRI BRAIN W WO CONTRAST   1. No evidence of intracranial metastases or other acute intracranial abnormality.  2. Moderate severity chronic microvascular angiopathy. 3. Mild to moderate mucosal inflammation in the paranasal sinuses with air-fluid levels as evidence for an acute component     Pathology from [facility] performed on 28 April 2021:      Assessment   -3.9 x 2.1 cm spiculated cavitary mass present within the left upper lobe S/p CT guided biopsy by IR service on 3/26/2021. The biopsy is consistent with Adenocarcinoma. She underwent robotic assisted left upper lobectomy and mediastinal dissection along with cryotherapy of the intercostal nerves for postoperative pain control.  -Subcutaneous emphysema with left sided chest tube placement-persistent airleak underwent bronchoscopy 5/6/2021   -Moderate COPD. -Chronic history of tobacco smoking for 45 years with 1 pack of cigarettes per day. She is planning to quit smoking  -Full Code     Plan   -Continue Chest tube to suction, Daily CXR in AM  -Duonebs every 4 hours prn   -NRT patches Daily  -Cytology (+) Adenocarcinoma Station 11L lymph node biopsy, Oncology following outpatient with Dr. VARGAS  -Discussed today with patient regarding persistent airleak and need for possible endobronchial valve placement due to persistent airleak. Recommend transfer to tertiary center for evaluation of endobronchial valve placement. Patient verbalized understanding and was agreeable to transfer to [facility] Dr. Naomy Borjas called to initiate transfer at request of Dr. Estefany Moyer. Electronically signed by   ERYN Sloan CNP on 5/10/2021 at 11:21 AM     Addendum by Dr. Naomy Borjas MD:  I have seen and examined the patient independently. Face to face evaluation and examination was performed. The above evaluation and note has been reviewed. Labs and radiographs were reviewed. I Have discussed with Mr. Ricky Correa CNP about this patient in detail. The above assessment and plan has been reviewed. Please see my modifications mentioned below.      My modifications:  She is on room air  She was noted to have persistent air leak from her left side chest tube. At the request of Dr. Lawson Stein MD, I spoke with the Dr. Nancie Warren MD interventional pulmonologist at [facility] regarding placing spiration endobronchial valve placement for persistent air leak from her left side chest tube due to the possibility of bronchopleural fistula. Patient was educated and informed about my plan. She verbalized understanding.     Frannie Justin MD 5/10/2021 5:43 PM

## 2021-05-10 NOTE — PROGRESS NOTES
Lionel Olea M.D. for Dr. Lana Hinson  Daily Progress Note    Pt Name: Edilma Zacarias Record Number: [id number]  Date of Birth 1960   Today's Date: 5/10/2021    ASSESSMENT:     1. POD #6  2. HD # 6  Active Hospital Problems    Diagnosis Date Noted    Moderate [facility] (ch[facility]) ([facility] [J44.9]     S/P left upper lobectomy of lung and mediastinal dissection [Z90.2] 05/04/2021    Primary cancer of left upper lobe of lung ([facility] [C34.12] 04/21/2021    Smoking greater than 40 pack years [F17.210] 04/21/2021       Chief Complaint:  No chief complaint on file. Cancer Staging  Primary cancer of left upper lobe of lung [facility])  Staging form: Lung, AJCC 8th Edition  - Clinical stage from 4/21/2021: Stage IB (cT2a, cN0, cM0) - Signed by Peña Funez MD on 4/21/2021  - Pathologic stage from 5/6/2021: Stage IIB (pT1c, pN1, cM0) - Signed by Peña Funez MD on 5/6/2021         PLAN:     1. Less subcutaneous emphysema  2. Still with air leak  3. Ask pulm about bronchial valve air leak seems too large to be managed with a Heimlich valve. I contacted Dr. Laguna Both who stated he does not have privileges for this and that Dr. Mark Jimenez occur at [facility] is gone for the entire month of May so he is going to make a referral to pulmonary at [facility] for evaluation for this. 4. On RA sats good  5. Lovenox VTE prophylaxis      SUBJECTIVE:     Flores Home is doing well. Pain is better controlled. She has no nausea and no vomiting. Tolerating diet well. Minimal out chest tube remains with air leak. Chest x-ray no pneumothorax. To continue chest tube through the weekend.     OBJECTIVE:     Patient Vitals for the past 24 hrs:   BP Temp Temp src Pulse Resp SpO2   05/10/21 0530 (!) 139/90 98.1 °F (36.7 °C) Oral 86 16 95 %   05/09/21 2115 (!) 136/91 98 °F (36.7 °C) Oral 88 18 95 %   05/09/21 1502 126/77 97.8 °F (36.6 °C) Oral 82 18 97 %   05/09/21 1132 126/79 97.2 °F (36.2 °C) Oral 78 18 97 %   05/09/21 0914 -- -- -- -- -- 97 %   05/09/21 0814 (!) 139/91 97.5 °F (36.4 °C) Oral 86 18 93 %         Intake/Output Summary (Last 24 hours) at 5/10/2021 0723  Last data filed at 5/10/2021 0540  Gross per 24 hour   Intake 1560 ml   Output 3350 ml   Net -1790 ml       In: 840 [P.O.:840]  Out: 7090 [Urine:2700]    I/O last 3 completed shifts: In: 1560 [P.O.:1560]  Out: 3350 [Urine:3200; Chest Tube:150]     Date 05/10/21 0000 - 05/10/21 2359   Shift 8509-0842 4997-6925 5315-4067 24 Hour Total   INTAKE   P.O.(mL/kg/hr) 480   480   I. V.(mL/kg) 0(0)   0(0)   Shift Total(mL/kg) 480(8.3)   480(8.3)   OUTPUT   Urine(mL/kg/hr) 1000   1000   Emesis/NG output(mL/kg) 0(0)   0(0)   Other(mL/kg) 0(0)   0(0)   Stool(mL/kg) 0(0)   0(0)   Blood(mL/kg) 0(0)   0(0)   Shift Total(mL/kg) 1000(17.3)   1000(17.3)   Weight (kg) 57.8 57.8 57.8 57.8       Wt Readings from Last 3 Encounters:   05/07/21 127 lb 6.4 oz (57.8 kg)   05/03/21 125 lb 12.8 oz (57.1 kg)   04/22/21 127 lb (57.6 kg)        Body mass index is 20.56 kg/m². Diet: DIET GENERAL;        MEDS:     Scheduled Meds:   polyethylene glycol  17 g Oral Daily    enoxaparin  40 mg Subcutaneous Q24H    ipratropium-albuterol  1 ampule Inhalation Q4H WA    sodium chloride flush  5-40 mL Intravenous 2 times per day    bacitracin zinc   Topical Daily    bisacodyl  5 mg Oral Daily    nicotine  1 patch Transdermal Daily     Continuous Infusions:   sodium chloride       PRN Meds:oxyCODONE, 5 mg, Q4H PRN  sodium chloride flush, 5-40 mL, PRN  sodium chloride, 25 mL, PRN  acetaminophen, 650 mg, Q4H PRN  promethazine, 12.5 mg, Q6H PRN    Or  ondansetron, 4 mg, Q6H PRN  HYDROmorphone, 0.25 mg, Q3H PRN    Or  HYDROmorphone, 0.5 mg, Q3H PRN          PHYSICAL EXAM:     CONSTITUTIONAL: Alert and oriented times 3, no acute distress and cooperative to examination.   HEENT: less sub q emphysema  NECK: less sub q emphysema  LUNGS: decreased on left CT

## 2021-05-10 NOTE — FLOWSHEET NOTE
05/10/21 1724   Provider Notification   Reason for Communication Review case   Provider Name  Puja Norton   Provider Notification Physician   Method of Communication Secure Message   Response Waiting for response   Notification Time Adam Willis wanted me to update you that he has initiated a transfer to [facility] for this patient to see Dr. Lynette Hussein. I have transfer paperwork that needs a signature from attending.  Papers are in patient chart  Read 4:53       if it is physical papers i am not there to sign them

## 2021-05-11 ENCOUNTER — APPOINTMENT (OUTPATIENT)
Dept: GENERAL RADIOLOGY | Age: 61
DRG: 164 | End: 2021-05-11
Attending: SURGERY
Payer: COMMERCIAL

## 2021-05-11 VITALS
WEIGHT: 123.3 LBS | TEMPERATURE: 97.5 F | DIASTOLIC BLOOD PRESSURE: 85 MMHG | OXYGEN SATURATION: 97 % | BODY MASS INDEX: 19.82 KG/M2 | SYSTOLIC BLOOD PRESSURE: 131 MMHG | HEIGHT: 66 IN | RESPIRATION RATE: 18 BRPM | HEART RATE: 85 BPM

## 2021-05-11 PROCEDURE — 99232 SBSQ HOSP IP/OBS MODERATE 35: CPT | Performed by: INTERNAL MEDICINE

## 2021-05-11 PROCEDURE — 99024 POSTOP FOLLOW-UP VISIT: CPT | Performed by: SURGERY

## 2021-05-11 PROCEDURE — 6370000000 HC RX 637 (ALT 250 FOR IP): Performed by: SURGERY

## 2021-05-11 PROCEDURE — 6370000000 HC RX 637 (ALT 250 FOR IP): Performed by: NURSE PRACTITIONER

## 2021-05-11 PROCEDURE — 6360000002 HC RX W HCPCS: Performed by: SURGERY

## 2021-05-11 PROCEDURE — 2580000003 HC RX 258: Performed by: SURGERY

## 2021-05-11 PROCEDURE — 94640 AIRWAY INHALATION TREATMENT: CPT

## 2021-05-11 PROCEDURE — 71045 X-RAY EXAM CHEST 1 VIEW: CPT

## 2021-05-11 PROCEDURE — 94760 N-INVAS EAR/PLS OXIMETRY 1: CPT

## 2021-05-11 RX ADMIN — ACETAMINOPHEN 650 MG: 325 TABLET ORAL at 02:47

## 2021-05-11 RX ADMIN — SODIUM CHLORIDE, PRESERVATIVE FREE 10 ML: 5 INJECTION INTRAVENOUS at 08:39

## 2021-05-11 RX ADMIN — HYDROMORPHONE HYDROCHLORIDE 0.5 MG: 1 INJECTION, SOLUTION INTRAMUSCULAR; INTRAVENOUS; SUBCUTANEOUS at 08:37

## 2021-05-11 RX ADMIN — IPRATROPIUM BROMIDE AND ALBUTEROL SULFATE 1 AMPULE: .5; 3 SOLUTION RESPIRATORY (INHALATION) at 09:06

## 2021-05-11 RX ADMIN — OXYCODONE HYDROCHLORIDE 5 MG: 5 TABLET ORAL at 09:49

## 2021-05-11 RX ADMIN — OXYCODONE HYDROCHLORIDE 5 MG: 5 TABLET ORAL at 04:32

## 2021-05-11 RX ADMIN — HYDROMORPHONE HYDROCHLORIDE 0.5 MG: 1 INJECTION, SOLUTION INTRAMUSCULAR; INTRAVENOUS; SUBCUTANEOUS at 05:21

## 2021-05-11 ASSESSMENT — PAIN DESCRIPTION - FREQUENCY
FREQUENCY: CONTINUOUS
FREQUENCY: CONTINUOUS

## 2021-05-11 ASSESSMENT — PAIN DESCRIPTION - PROGRESSION
CLINICAL_PROGRESSION: GRADUALLY WORSENING
CLINICAL_PROGRESSION: NOT CHANGED
CLINICAL_PROGRESSION: NOT CHANGED

## 2021-05-11 ASSESSMENT — PAIN DESCRIPTION - DESCRIPTORS
DESCRIPTORS: ACHING;CONSTANT;DISCOMFORT

## 2021-05-11 ASSESSMENT — PAIN DESCRIPTION - ONSET
ONSET: ON-GOING

## 2021-05-11 ASSESSMENT — PAIN SCALES - GENERAL
PAINLEVEL_OUTOF10: 6
PAINLEVEL_OUTOF10: 7
PAINLEVEL_OUTOF10: 6

## 2021-05-11 ASSESSMENT — PAIN DESCRIPTION - PAIN TYPE
TYPE: ACUTE PAIN

## 2021-05-11 ASSESSMENT — PAIN DESCRIPTION - ORIENTATION
ORIENTATION: LEFT

## 2021-05-11 ASSESSMENT — PAIN DESCRIPTION - LOCATION: LOCATION: CHEST

## 2021-05-11 ASSESSMENT — PAIN - FUNCTIONAL ASSESSMENT
PAIN_FUNCTIONAL_ASSESSMENT: ACTIVITIES ARE NOT PREVENTED
PAIN_FUNCTIONAL_ASSESSMENT: PREVENTS OR INTERFERES SOME ACTIVE ACTIVITIES AND ADLS

## 2021-05-11 NOTE — PROGRESS NOTES
Daria Guaman M.D. for Dr. Valentin Roberts  Daily Progress Note    Pt Name: Sridevi Givens Record Number: [id number]  Date of Birth 1960   Today's Date: 5/11/2021    ASSESSMENT:     1. POD #7  2. HD # 7  Active Hospital Problems    Diagnosis Date Noted    Moderate [facility] disease[facility] [J44.9]     S/P left upper lobectomy of lung and mediastinal dissection [Z90.2] 05/04/2021    Primary cancer of left upper lobe of lung ([facility] [C34.12] 04/21/2021    Smoking greater than 40 pack years [F17.210] 04/21/2021       Chief Complaint:  No chief complaint on file. Cancer Staging  Primary cancer of left upper lobe of lung [facility])  Staging form: Lung, AJCC 8th Edition  - Clinical stage from 4/21/2021: Stage IIB (cT2a, cN1, cM0) - Signed by Cristo Melendez MD on 5/10/2021  - Pathologic stage from 5/6/2021: Stage IIB (pT1c, pN1, cM0) - Signed by Cristo Melendez MD on 5/6/2021  - Pathologic: No stage assigned - Unsigned         PLAN:     1. Less subcutaneous emphysema  2. Still with air leak unchanged  3. Ask pulm about bronchial valve air leak seems too large to be managed with a Heimlich valve. I contacted Dr. Andrey Robertson who stated he does not have privileges for this and that Dr. Alfredo Stuart is gone for the entire month of May so he made contact at [facility] pulmonary for evaluation for this. 4. On RA sats good  5. Lovenox VTE prophylaxis      SUBJECTIVE:     Timoteo Haile is doing well. Pain is better this am, had some pain last night. She has no nausea and no vomiting. Tolerating diet well. Minimal out chest tube(210) remains with air leak. Chest x-ray small stable apical pneumothorax. To continue chest tube through the weekend.     OBJECTIVE:     Patient Vitals for the past 24 hrs:   BP Temp Temp src Pulse Resp SpO2 Weight   05/11/21 0304 123/80 98.7 °F (37.1 °C) Oral 82 18 95 % 123 lb 4.8 oz (55.9 kg)   05/10/21 2330 122/84 98.6 °F (37 °C) Oral 87 18 94 % --   05/10/21 2006 -- -- -- -- 16 98 % --   05/10/21 1950 (!) 137/90 97.5 °F (36.4 °C) Oral 86 18 98 % --   05/10/21 1647 -- -- -- -- -- 96 % --   05/10/21 1532 117/82 98 °F (36.7 °C) -- 88 18 94 % --   05/10/21 1116 (!) 145/81 98.1 °F (36.7 °C) Oral 85 16 96 % --   05/10/21 0940 -- -- -- -- 16 97 % --   05/10/21 0930 128/84 98.1 °F (36.7 °C) Oral 94 18 96 % --         Intake/Output Summary (Last 24 hours) at 5/11/2021 0639  Last data filed at 5/11/2021 0525  Gross per 24 hour   Intake 1130 ml   Output 2760 ml   Net -1630 ml       In: 1130 [P.O.:1110; I.V.:20]  Out: 2760 [Urine:2550]    I/O last 3 completed shifts: In: 5072 [P.O.:1390; I.V.:20]  Out: 7670 [Urine:2675; Chest Tube:140]     Date 05/11/21 0000 - 05/11/21 2359   Shift 5636-9524 7458-3873 8153-0399 24 Hour Total   INTAKE   P.O.(mL/kg/hr) 200   200   I. V.(mL/kg) 0(0)   0(0)   Shift Total(mL/kg) 200(3.6)   200(3.6)   OUTPUT   Urine(mL/kg/hr) 450   450   Emesis/NG output(mL/kg) 0(0)   0(0)   Other(mL/kg) 0(0)   0(0)   Stool(mL/kg) 0(0)   0(0)   Blood(mL/kg) 0(0)   0(0)   Chest Tube 70   70   Shift Total(mL/kg) 520(9.3)   520(9.3)   Weight (kg) 55.9 55.9 55.9 55.9       Wt Readings from Last 3 Encounters:   05/11/21 123 lb 4.8 oz (55.9 kg)   05/03/21 125 lb 12.8 oz (57.1 kg)   04/22/21 127 lb (57.6 kg)        Body mass index is 19.9 kg/m².      Diet: DIET GENERAL;        MEDS:     Scheduled Meds:   polyethylene glycol  17 g Oral Daily    enoxaparin  40 mg Subcutaneous Q24H    ipratropium-albuterol  1 ampule Inhalation Q4H WA    sodium chloride flush  5-40 mL Intravenous 2 times per day    bacitracin zinc   Topical Daily    bisacodyl  5 mg Oral Daily    nicotine  1 patch Transdermal Daily     Continuous Infusions:   sodium chloride       PRN Meds:oxyCODONE, 5 mg, Q4H PRN  sodium chloride flush, 5-40 mL, PRN  sodium chloride, 25 mL, PRN  acetaminophen, 650 mg, Q4H PRN  promethazine, 12.5 mg, Q6H PRN    Or  ondansetron, 4 mg, left-sided chest tubes remain in place without pneumothorax. Small stable pneumomediastinum. Heart size is normal.   No right lung consolidation. Diffuse bilateral subcutaneous emphysema is unchanged. No acute fracture.           Impression   Impression:   Stable chest with left-sided chest tubes in place and no pneumothorax.       This document has been electronically signed by: Fatou Dey. Nani Hauser MD    on 55/37/5822 06:46 AM           Narrative   Chest X-ray, 1 View       COMPARISON:  CR,SR  - XR CHEST PORTABLE  - 05/06/2021 12:37 AM EDT       FINDINGS:   No consolidation. Mild bibasilar atelectasis. No pleural effusion. No visible pneumothorax. Similar small pneumomediastinum. No cardiomegaly. No acute fracture. Extensive chest wall soft tissue emphysema. Two unchanged left chest tubes.           Impression   No visible residual pneumothorax. Similar small pneumomediastinum. Left    chest tubes remain in place. Extensive chest wall soft tissue emphysema     Xr Chest Portable    Result Date: 5/5/2021  Impression: Significant improvement left pneumothorax with approximately 20 percent residual pneumothorax. This document has been electronically signed by: Sis Newton MD on 05/05/2021 04:46 AM    Xr Chest Portable    Result Date: 5/4/2021  1. Normal heart size. Right lung is unremarkable. 2. Postsurgical changes left side of chest. Extensive subcutaneous emphysema left side of chest and neck. A small caliber pigtail catheter is present in the pleural space left side. 3. Diffuse lucency in the left hemithorax, representing a very large pneumothorax post lung resection. Residual lung markings are seen in the left lower lung field. **This report has been created using voice recognition software. It may contain minor errors which are inherent in voice recognition technology. ** Final report electronically signed by Dr. Tahmina Mcclure on 5/4/2021 2:51 PM        Marciano Jones M.D. FACS  Electronically signed 5/11/2021 at 6:39 AM

## 2021-05-11 NOTE — PROGRESS NOTES
[facility]    Patient - Mitchell Voss   MRN -  [id number]   [facility] # - [de-identified]   - 1960      Date of Admission -  2021  7:13 AM  Date of evaluation -  2021  Room - --A   Hospital Day - 7  Consulting - No att. providers found Lynnette Burgess MD     Problem List      Active Hospital Problems    Diagnosis Date Noted    Moderate COPD (chronic obstructive pulmonary disease) ([facility] [J44.9]     S/P left upper lobectomy of lung and mediastinal dissection [Z90.2] 2021    Primary cancer of left upper lobe of lung ([facility] [C34.12] 2021    Smoking greater than 40 pack years [F17.210] 2021     Reason for Consult    S/P Left upper lobectomy. HPI   History Obtained From: Patient and electronic medical record. Mitchell Voss is a 62 y.o. female She was admitted under Dr. Kamryn Wilkinson MD service. Pulmonary medicine was consulted for further evaluation of post operative pulmonary management. She underwent:  Type of surgery: Robotic assisted left upper lobectomy and mediastinal dissection along with cryotherapy of the intercostal nerves for postoperative pain control. Date of surgery: 4 May 2021  Surgeon: Dr. Kamryn Wilkinson MD      At the time of my initial evaluation, she is on room air    Input/Out put fluid balance from the time of admission: 1.3 L positive    Prior to current hospitalization: She was diagnosed with moderately COPD however she is not using any inhalers    She is currently not using any oxygen supplementation at rest, exercise or during sleep/at night time.        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 [address]     Past 24 Hours   -On RA  -Reports SOB stable  -Atrium with CT attached to (-20 mmHg) with persistent airleak   -Waiting to be transferred to [facility] for intervention pulmonary evaluation.  -All systems reviewed. Vitals     height is 5' 6\" (1.676 m) and weight is 123 lb 4.8 oz (55.9 kg). Her oral temperature is 97.5 °F (36.4 °C). Her blood pressure is 131/85 and her pulse is 85. Her respiration is 18 and oxygen saturation is 97%. Body mass index is 19.9 kg/m². I/O        Intake/Output Summary (Last 24 hours) at 5/11/2021 1715  Last data filed at 5/11/2021 0525  Gross per 24 hour   Intake 470 ml   Output 1635 ml   Net -1165 ml     I/O last 3 completed shifts: In: 26 [P.O.:450; I.V.:20]  Out: 1835 [Urine:1625; Chest Tube:210]   Patient Vitals for the past 96 hrs (Last 3 readings):   Weight   05/11/21 0304 123 lb 4.8 oz (55.9 kg)       Exam   Physical Exam   Constitutional: Patient appears in no distress on room air. Mouth/Throat: Oropharynx is clear and moist.  No oral thrush. Neck: Neck supple. Cardiovascular: S1 and S2 heard. No murmurs. Pulmonary/Chest: Bilateral air entry present. Good breath sounds on both sides, diminished at bases. No wheezes. No rales. Left-sided chest tube is in place with a persistent air leak. Subcutaneous emphysema still present. Abdominal: Soft. No tenderness. Extremities: Patient exhibits no edema. Neurological: Patient is awake and alert. Labs  - Old records and notes have been reviewed in [facility]   ABG  No results found for: PH, PO2, PCO2, HCO3, O2SAT  No results found for: IFIO2, MODE, SETTIDVOL, SETPEEP  CBC  No results for input(s): WBC, RBC, HGB, HCT, MCV, MCH, MCHC, RDW, PLT, MPV in the last 72 hours. BMP  No results for input(s): NA, K, CL, CO2, BUN, CREATININE, GLUCOSE, MG, PHOS, CALCIUM, IONCA, MG in the last 72 hours.   LFT      PFTs   Pulmonary function tests: Performed on 6 April 2021                Sleep studies   None    Cultures    No cultures    4/30/21  Cytology   FINAL DIAGNOSIS:   Slides received from [facility], [id number], [address] [place] [address] Drive 4/23/21:   [address] [facility] 4R, lymph node biopsy:  Negative for malignancy. [address] 7, lymph node biopsy:    Lymphoid tissue and granuloma.    Negative for malignancy. C - Station 4L, lymph node biopsy:    Lymphoid tissue and possible granulomatous inflammation.    Negative for malignancy. D - Station 11L, lymph node biopsy:    Consistent with lung adenocarcinoma.   See microscopic. Echocardiogram   4/27/2021   Narrative & Impression      Transthoracic Echocardiography Report (TTE)   Conclusions      Summary   Normal left ventricle size and systolic function. Ejection fraction was   estimated at 60 %. There were no regional left ventricular wall motion   abnormalities and wall thickness was within normal limits. Aortic aneurysm noted in the ascending aorta . Aortic aneurysm measures 4.1 cm . Signature      ----------------------------------------------------------------   Electronically signed by Travon Schultz MD (Interpreting   physician) on 04/27/2021 at 07:10 PM   ----------------------------------------------------------------      Radiology    CXR  Tue May 11, 2021  3:52 AM   Exam: One View of the chest   Impression: Stable small left pneumothorax. One View of the chest   05/10/2021   Impression:   1. Very small left pneumothorax. Left-sided chest tubes are unchanged in position. Subcutaneous emphysema appears similar to prior exam.       05/06/2021   1 view chest x-ray. Impression:   Minimal left apical pneumothorax along with pneumomediastinum. Patchy bilateral nasal or consolidation. 05/05/2021   1V chest   Impression: Significant improvement left pneumothorax with approximately 20 percent residual pneumothorax. Tue May 4, 2021  2:51 PM   PROCEDURE: XR CHEST PORTABLE   . Normal heart size. Right lung is unremarkable. 2. Postsurgical changes left side of chest. Extensive subcutaneous emphysema left side of chest and neck. A small caliber pigtail catheter is present in the pleural space left side.    3. Diffuse lucency in the left hemithorax, representing a very large pneumothorax post lung resection. Residual lung markings are seen in the left lower lung field. CT Scans  (See actual reports for details)  CT scan of chest without contrast.  Fri Mar 12, 2021  8:18 AM   PROCEDURE: CT CHEST WO CONTRAST   CLINICAL INFORMATION: Pulmonary nodule   COMPARISON: Chest x-ray dated 2/26/2021   1. There is a cavitary mass demonstrated within the left upper lobe with peripheral mild spiculation measuring 3.9 x 2.1 cm on axial image 34. This corresponds with previous chest x-ray dated 2/26/2021. This is concerning for malignancy until proven otherwise. Further evaluation could be obtained with PET/CT and/or biopsy. An infectious process is also not excluded.     2. There are additional smaller groundglass nodular densities within the right lung as detailed above.     3. Small pericardial effusion is noted. This is nonspecific. CT-guided biopsy by interventional radiology service on 26 March 2021:     Pathology:  Copies To: Cathy Amos     Clinical Information: CAVITATING MASS SHANNON, SMOKER     FINAL DIAGNOSIS:   Lung, left, needle core biopsies:       Adenocarcinoma. PET scan:  4/14/2021 10:04 AM   PROCEDURE: PET CT SKULL BASE TO MID THIGH   1. FDG avid left lung mass corresponding to known malignancy. 2. Small, mildly FDG avid right lung nodules, possibly infectious or inflammatory but satellite malignancy cannot be excluded. MRI scan of the brain with and without contrast:   PROCEDURE: MRI BRAIN W WO CONTRAST   1. No evidence of intracranial metastases or other acute intracranial abnormality. 2. Moderate severity chronic microvascular angiopathy.  3. Mild to moderate mucosal inflammation in the paranasal sinuses with air-fluid levels as evidence for an acute component     Pathology from [facility] performed on 28 April 2021:      Assessment   -3.9 x 2.1 cm spiculated cavitary mass present within the left upper

## 2021-05-11 NOTE — PROGRESS NOTES
[facility] here to transport patient to [facility]. Pt AOX4. Patient in stable condition. Report given to Sue Casey RN from [employer].

## 2021-05-11 NOTE — CARE COORDINATION
5/11/21, 9:25 AM EDT    Patient goals/plan/ treatment preferences discussed by  and . Patient goals/plan/ treatment preferences reviewed with patient/ family. Patient/ family verbalize understanding of discharge plan and are in agreement with goal/plan/treatment preferences. Understanding was demonstrated using the teach back method. AVS provided by RN at time of discharge, which includes all necessary medical information pertaining to the patients current course of illness, treatment, post-discharge goals of care, and treatment preferences. Transferring to [address] [place] [address].

## 2021-05-11 NOTE — PLAN OF CARE
Problem: Respiratory  Goal: O2 Sat > 90%  Outcome: Ongoing  Note: Patient on room air with oxygen saturations above 92%. Continuous pulse ox in place. Goal: No pulmonary complications  Outcome: Ongoing  Note: Patient on room air with oxygen saturations above 92%. Continuous pulse ox in place. Patient gets SOB with ambulation. Patient has a left sided chest tube. Lung sound are absent in the left upper & diminished in the left base. Right upper lobe is clear & diminished in the right base. Problem: Skin Integrity/Risk  Goal: No skin breakdown during hospitalization  Outcome: Ongoing  Note: No new areas of skin breakdown noted tonight. Skin is warm and dry. Patient has scattered bruising and abrasions and blanchable redness to her buttocks & bilateral heels. Patient has left sided incisions from her surgery. Problem: Pain Control  Goal: Maintain pain level at or below patient's acceptable level (or 5 if patient is unable to determine acceptable level)  Outcome: Ongoing  Note: Pain Assessment: 0-10  Pain Level: 6   Patient's Stated Pain Goal: 4   Is pain goal met at this time? No     Non-Pharmaceutical Pain Intervention(s): Rest, Repositioned    Patient having pain at her surgical site/chest tube site. Problem: Cardiovascular  Goal: No DVT, peripheral vascular complications  Outcome: Ongoing  Note: No signs or symptoms of DVT. Patient getting daily Lovenox injections. Goal: Hemodynamic stability  Outcome: Ongoing  Note: Vital signs are stable and within normal limits. Vitals being monitored every 4 hours and as needed. Continuous cardiac monitor in place. Heart rhythm is NSR. Problem: GI  Goal: No bowel complications  Outcome: Ongoing  Note: No BM tonight. Abdomen is soft, rounded and non-tender. Active bowel sounds heard in all 4 quadrants. Patient is passing gas. Problem:   Goal: Adequate urinary output  Outcome: Ongoing  Note: Patient voiding an adequate amount tonight.  Patient voiding clear, yellow urine. Problem: Nutrition  Goal: Optimal nutrition therapy  Outcome: Ongoing  Note: Patient on a general diet and tolerating well. Problem: Musculor/Skeletal Functional Status  Goal: Absence of falls  Outcome: Ongoing  Note: Patient has remained free from falls tonight. Call light and bedside table are within reach. Patient alert and oriented and uses her call light appropriately. Patient ambulates independently to the [facility]. Fall precautions in place for her safety. Nurse checks on the patient every hour. Problem: Discharge Planning:  Goal: Discharged to appropriate level of care  Description: Discharged to appropriate level of care  Outcome: Ongoing  Note: Planning to transfer to [facility] tomorrow. Care plan reviewed with patient. Patient verbalize understanding of the plan of care and contribute to goal setting.

## 2021-05-13 NOTE — ADT AUTH CERT
Lobectomy, Lung - Care Day 7 (5/10/2021) by Mellisa Oleary RN       Review Entered Review Status   5/13/2021 08:37 Completed      Criteria Review      Care Day: 7 Care Date: 5/10/2021 Level of Care: Intermediate Care    Guideline Day 3    Clinical Status    (X) * Pain absent or managed    5/13/2021 8:37 AM EDT by Ale Hodgson      Pain is better controlled. (X) * Respiratory distress absent    5/13/2021 8:37 AM EDT by Ale Hodgson      -On RA  -Reports SOB stable    (X) * CXR acceptable    5/13/2021 8:37 AM EDT by Ale Hodgson      Chest x-ray no pneumothorax    Activity    (X) * Increased ambulation    5/13/2021 8:37 AM EDT by Ale Hodgson      aat    Routes    (X) Advance diet    5/13/2021 8:37 AM EDT by Ale Hodgson      Tolerating diet well    Interventions    ( ) * Chest tube absent    5/13/2021 8:37 AM EDT by Ale Hodgson      Minimal out chest tube remains with air leak.  To continue chest tube through the weekend. (X) * Central lines absent    5/13/2021 8:37 AM EDT by Ale Hodgson      absent    ( ) Oxygen    5/13/2021 8:37 AM EDT by Ale Hodgson      96% RA    Medications    (X) * Epidural analgesics absent    5/13/2021 8:37 AM EDT by Ale Hodgson      absent    * Milestone   Additional Notes   5/10       Vs-  98.1 (36.7)  18  94  128/84  96% RA      No Labs      CXR   1.  Very small left pneumothorax.  Left-sided chest tubes are unchanged in    position.  Subcutaneous emphysema appears similar to prior exam.          Per GS   1. POD #6   2. HD # 6      PLAN:       1. Less subcutaneous emphysema   2. Still with air leak   3. Ask pulm about bronchial valve air leak seems too large to be managed with a Heimlich valve.  I contacted Dr. Lana Valle who stated he does not have privileges for this and that Dr. Rivas Gaming occur at [facility] is gone for the entire month of May so he is going to make a referral to pulmonary at [facility] for evaluation for this. 4. On RA sats good   5. RA      Labs   5/8/2021 05:01   Creatinine: 0.8   Est, Glom Filt Rate: 73 (A)         CXR   Impression:   Stable chest with left-sided chest tubes in place and no pneumothorax.          Per GS   1. POD # 3   2. HD # 4      PLAN:    1. Less subcutaneous emphysema   2. Bronch yesterday Thursday   3. Still with air leak   4. Will watch over weekend, lung is inflated fully on CXR.  Chest x-ray reviewed   5. On RA sats good   6. Lovenox VTE prophylaxis        SUBJECTIVE:    Torin Otto is doing well. Pain is better controlled. She has no nausea and no vomiting. Tolerating diet well.  Minimal out chest tube remains with air leak.  Chest x-ray no pneumothorax.  To continue chest tube through the weekend.          Per Pulm   Past 24 Hours    No acute events   Remained on RA   Right chest tube still with air leak    -All systems reviewed. Assessment   -.9 x 2.1 cm spiculated cavitary mass present within the left upper lobe S/p CT guided biopsy by IR service on 3/26/2021. The biopsy is consistent with Adenocarcinoma. Nazario Smith underwent robotic assisted left upper lobectomy and mediastinal dissection along with cryotherapy of the intercostal nerves for postoperative pain control.   -Subcutaneous emphysema with left sided chest tube placement   -Moderate COPD.    -Chronic history of tobacco smoking for 45 years with 1 pack of cigarettes per day. Nazario Smith is planning to quit smoking   -Full Code        Plan   -Continue Chest tube to suction, daily CXR   -Duonebs every 4 hours w/a    -Cytology (+) Adenocarcinoma Station 11L lymph node biopsy    -VTE prophylaxis: Lovenox             Scheduled Medications   · enoxaparin 40 mg Subcutaneous Q24H   · ipratropium-albuterol 1 ampule Inhalation Q4H WA   · bacitracin zinc Topical Daily   · bisacodyl 5 mg Oral Daily   · nicotine 1 patch Transdermal Daily      PRN Meds   acetaminophen (TYLENOL) tablet 650 mg x2   oxyCODONE (ROXICODONE) immediate release tablet 5 mg x6

## 2021-05-17 NOTE — DISCHARGE SUMMARY
Discharge Summary    Patient:  Karrie Lucas  YOB: 1960  MRN: [id number]   Acct: [de-identified]    Primary Care Physician: Stanley Abraham MD         Admit date:  5/4/2021  7:13 AM        Discharge date:  5/11/2021 10:53 AM      Admitting Diagnosis:   Active Hospital Problems    Diagnosis Date Noted    Moderate COPD (chronic obstructive pulmonary disease) ([facility] [J44.9]     S/P left upper lobectomy of lung and mediastinal dissection [Z90.2] 05/04/2021    Primary cancer of left upper lobe of lung ([facility] [C34.12] 04/21/2021    Smoking greater than 40 pack years [F17.210] 04/21/2021         Discharge Diagnosis:   Active Hospital Problems    Diagnosis Date Noted    Moderate COPD (chronic obstructive pulmonary disease) ([facility] [J44.9]     S/P left upper lobectomy of lung and mediastinal dissection [Z90.2] 05/04/2021    [facility] of [facility] [C34.12] 04/21/2021    Smoking greater than 40 pack years [F17.210] 04/21/2021         Consultants: Pulmonology Dr. Stanley Bender        Procedures/Diagnostic Test: Robotic assisted left upper lobectomy and mediastinal dissection, bronchoscopy and evaluation of stapled bronchial stump         Hospital Course: , Rica Palma is a 62 y.o. female seen in the office today regarding lung cancer involving LEFT upper lobe. She was initially referred to Dr. Stanley Bender pulmonary because work-up by her PCP for generalized weakness and significant weight loss despite a normal appetite a chest x-ray was done on February 26 of 2021. It revealed a 2 cm nodule in the left perihilar region which was new compared to an old x-ray that was performed in 2017. CT scan of the chest subsequently returned a left upper lobe spiculated mass on the CT scan. She does have a chronic cough but no hemoptysis. She does have a smoking history 1 pack/day for 45 years she is still smoking.   She states she has had an unintentional 25 pound weight loss in the past 1 year with normal appetite. She has seen the medical oncologist prior. The patient does need an endobronchial ultrasound for mediastinal lymph node evaluation preoperatively. This is scheduled for this Friday the 23rd by Dr. Casper Silva. She will need cardiac clearance as a pericardial effusion was seen on her CT scan preop. PFT FEV1= 2.10 post bronchodilator 77% predicted(2.72)          DLCO = 18.02- 67% predicted(27.67)  Calculated PPO FEV1= 1.55 = 57% normal  Calculated PPO DLCO = 13.28=48% normal    She was admitted and underwent a robotic assisted left upper lobectomy and mediastinal dissection. Initially we used a 15 Vietnamese pigtail catheter as a chest tube but she developed a fairly significant air leak in unresolved pneumothorax and so an additional 28 Vietnamese chest tube was then placed. She still had a fairly sizable air leak but her pneumothorax was resolved and she did have a fair amount of subcutaneous emphysema which resolved clinically over the ensuing days. But she continued to have this air leak that appeared to be somewhat sizable. She underwent a bronchoscopy to evaluate the bronchial stump and the bronchial stump itself was intact. We left her chest tube on suction and the air leak did not really change much so asked pulmonary to evaluate for possible bronchial valve. Unfortunate his procedure cannot be performed here and Dr. Casper Silva at [facility] was unavailable for the entire month. For this reason arrangements were made to transfer to [address] [place] [address] to evaluate the patient and reviewing her care everywhere records they gave her a trial of chest tube clamping which left the pneumothorax got slightly larger after 1 hour but they felt that they could put a pneumo stat on the end of the chest tube and not put a bronchial valve in and send her home with this in place. So she was discharged after 2 days with a one-way valve on the end of the chest tube for outpatient follow-up.   Cancer Staging  Primary cancer of left upper lobe of lung ([facility]  Staging form: Lung, AJCC 8th Edition  - Clinical stage from 4/21/2021: Stage IIB (cT2a, cN1, cM0) - Signed by Lisset Morales MD on 5/10/2021  - Pathologic stage from 5/6/2021: Stage IIB (pT1c, pN1, cM0) - Signed by Lisset Morales MD on 5/6/2021    Her stage was a stage IIb with 3 and 1 lymph nodes positive and she will be referred on for oncologic management post discharge. Discharge Vitals: /85   Pulse 85   Temp 97.5 °F (36.4 °C) (Oral)   Resp 18   Ht 5' 6\" (1.676 m)   Wt 123 lb 4.8 oz (55.9 kg)   SpO2 97%   BMI 19.90 kg/m²          Physical Exam:  24 hour intake/output:No intake or output data in the 24 hours ending 05/17/21 0818  Last 3 weights: Wt Readings from Last 3 Encounters:   05/11/21 123 lb 4.8 oz (55.9 kg)   05/03/21 125 lb 12.8 oz (57.1 kg)   04/22/21 127 lb (57.6 kg)            Allergies:  Tylenol with codeine #3 [acetaminophen-codeine] and Codeine         Discharge Medications:        Medication List      CONTINUE taking these medications    albuterol sulfate  (90 Base) MCG/ACT inhaler  Commonly known as: Ventolin HFA  Inhale 2 puffs into the lungs 4 times daily as needed for Wheezing     VITAMIN D PO                 Diet: No diet orders on file           Activity: No Valsalva maneuver is 1 month           LABS:    CBC: No results for input(s): WBC, HGB, PLT in the last 72 hours. BMP:  No results for input(s): NA, K, CL, CO2, BUN, CREATININE, GLUCOSE in the last 72 hours. Calcium:No results for input(s): CALCIUM in the last 72 hours. Ionized Calcium:No results for input(s): IONCA in the last 72 hours. Magnesium:No results for input(s): MG in the last 72 hours. Phosphorus:No results for input(s): PHOS in the last 72 hours. BNP:No results for input(s): BNP in the last 72 hours. Glucose:No results for input(s): POCGLU in the last 72 hours. HgbA1C: No results for input(s): LABA1C in the last 72 hours.   INR: No results

## 2021-05-18 ENCOUNTER — TELEPHONE (OUTPATIENT)
Dept: PULMONOLOGY | Age: 61
End: 2021-05-18

## 2021-05-18 ENCOUNTER — APPOINTMENT (OUTPATIENT)
Dept: CT IMAGING | Age: 61
End: 2021-05-18
Payer: COMMERCIAL

## 2021-05-18 ENCOUNTER — HOSPITAL ENCOUNTER (EMERGENCY)
Age: 61
Discharge: HOME | End: 2021-05-18
Attending: EMERGENCY MEDICINE
Payer: COMMERCIAL

## 2021-05-18 VITALS
DIASTOLIC BLOOD PRESSURE: 92 MMHG | BODY MASS INDEX: 19.77 KG/M2 | WEIGHT: 123 LBS | HEIGHT: 66 IN | OXYGEN SATURATION: 100 % | HEART RATE: 74 BPM | SYSTOLIC BLOOD PRESSURE: 126 MMHG | TEMPERATURE: 97.9 F | RESPIRATION RATE: 15 BRPM

## 2021-05-18 DIAGNOSIS — L08.9 LOCAL INFECTION OF SKIN AND SUBCUTANEOUS TISSUE: Primary | ICD-10-CM

## 2021-05-18 LAB
ALBUMIN SERPL-MCNC: 3.6 G/DL (ref 3.5–5.1)
ALP BLD-CCNC: 73 U/L (ref 38–126)
ALT SERPL-CCNC: 11 U/L (ref 11–66)
ANION GAP SERPL CALCULATED.3IONS-SCNC: 8 MEQ/L (ref 8–16)
APTT: 31.8 SECONDS (ref 22–38)
AST SERPL-CCNC: 10 U/L (ref 5–40)
BASOPHILS # BLD: 0.8 %
BASOPHILS ABSOLUTE: 0.1 THOU/MM3 (ref 0–0.1)
BILIRUB SERPL-MCNC: < 0.2 MG/DL (ref 0.3–1.2)
BUN BLDV-MCNC: 9 MG/DL (ref 7–22)
CALCIUM SERPL-MCNC: 9.4 MG/DL (ref 8.5–10.5)
CHLORIDE BLD-SCNC: 107 MEQ/L (ref 98–111)
CO2: 26 MEQ/L (ref 23–33)
CREAT SERPL-MCNC: 0.8 MG/DL (ref 0.4–1.2)
EOSINOPHIL # BLD: 1.9 %
EOSINOPHILS ABSOLUTE: 0.1 THOU/MM3 (ref 0–0.4)
ERYTHROCYTE [DISTWIDTH] IN BLOOD BY AUTOMATED COUNT: 12.7 % (ref 11.5–14.5)
ERYTHROCYTE [DISTWIDTH] IN BLOOD BY AUTOMATED COUNT: 45.1 FL (ref 35–45)
GFR SERPL CREATININE-BSD FRML MDRD: 73 ML/MIN/1.73M2
GLUCOSE BLD-MCNC: 114 MG/DL (ref 70–108)
HCT VFR BLD CALC: 38.2 % (ref 37–47)
HEMOGLOBIN: 12.3 GM/DL (ref 12–16)
IMMATURE GRANS (ABS): 0.02 THOU/MM3 (ref 0–0.07)
IMMATURE GRANULOCYTES: 0.3 %
INR BLD: 0.97 (ref 0.85–1.13)
LYMPHOCYTES # BLD: 11.3 %
LYMPHOCYTES ABSOLUTE: 0.9 THOU/MM3 (ref 1–4.8)
MCH RBC QN AUTO: 31.6 PG (ref 26–33)
MCHC RBC AUTO-ENTMCNC: 32.2 GM/DL (ref 32.2–35.5)
MCV RBC AUTO: 98.2 FL (ref 81–99)
MONOCYTES # BLD: 5 %
MONOCYTES ABSOLUTE: 0.4 THOU/MM3 (ref 0.4–1.3)
NUCLEATED RED BLOOD CELLS: 0 /100 WBC
OSMOLALITY CALCULATION: 280.8 MOSMOL/KG (ref 275–300)
PLATELET # BLD: 176 THOU/MM3 (ref 130–400)
PMV BLD AUTO: 11.8 FL (ref 9.4–12.4)
POTASSIUM SERPL-SCNC: 4.3 MEQ/L (ref 3.5–5.2)
RBC # BLD: 3.89 MILL/MM3 (ref 4.2–5.4)
SEG NEUTROPHILS: 80.7 %
SEGMENTED NEUTROPHILS ABSOLUTE COUNT: 6.3 THOU/MM3 (ref 1.8–7.7)
SODIUM BLD-SCNC: 141 MEQ/L (ref 135–145)
TOTAL PROTEIN: 5.8 G/DL (ref 6.1–8)
WBC # BLD: 7.8 THOU/MM3 (ref 4.8–10.8)

## 2021-05-18 PROCEDURE — 87186 SC STD MICRODIL/AGAR DIL: CPT

## 2021-05-18 PROCEDURE — 87070 CULTURE OTHR SPECIMN AEROBIC: CPT

## 2021-05-18 PROCEDURE — 36415 COLL VENOUS BLD VENIPUNCTURE: CPT

## 2021-05-18 PROCEDURE — 6360000004 HC RX CONTRAST MEDICATION: Performed by: EMERGENCY MEDICINE

## 2021-05-18 PROCEDURE — 87075 CULTR BACTERIA EXCEPT BLOOD: CPT

## 2021-05-18 PROCEDURE — 87205 SMEAR GRAM STAIN: CPT

## 2021-05-18 PROCEDURE — 99284 EMERGENCY DEPT VISIT MOD MDM: CPT

## 2021-05-18 PROCEDURE — 6370000000 HC RX 637 (ALT 250 FOR IP): Performed by: EMERGENCY MEDICINE

## 2021-05-18 PROCEDURE — 85610 PROTHROMBIN TIME: CPT

## 2021-05-18 PROCEDURE — 85025 COMPLETE CBC W/AUTO DIFF WBC: CPT

## 2021-05-18 PROCEDURE — 80053 COMPREHEN METABOLIC PANEL: CPT

## 2021-05-18 PROCEDURE — 2580000003 HC RX 258: Performed by: EMERGENCY MEDICINE

## 2021-05-18 PROCEDURE — 87077 CULTURE AEROBIC IDENTIFY: CPT

## 2021-05-18 PROCEDURE — 71260 CT THORAX DX C+: CPT

## 2021-05-18 PROCEDURE — 96374 THER/PROPH/DIAG INJ IV PUSH: CPT

## 2021-05-18 PROCEDURE — 87147 CULTURE TYPE IMMUNOLOGIC: CPT

## 2021-05-18 PROCEDURE — 85730 THROMBOPLASTIN TIME PARTIAL: CPT

## 2021-05-18 PROCEDURE — 6360000002 HC RX W HCPCS: Performed by: EMERGENCY MEDICINE

## 2021-05-18 RX ORDER — TRAMADOL HYDROCHLORIDE 50 MG/1
50 TABLET ORAL EVERY 6 HOURS PRN
Qty: 10 TABLET | Refills: 0 | Status: SHIPPED | OUTPATIENT
Start: 2021-05-18 | End: 2021-05-23

## 2021-05-18 RX ORDER — SODIUM CHLORIDE 9 MG/ML
INJECTION, SOLUTION INTRAVENOUS CONTINUOUS
Status: DISCONTINUED | OUTPATIENT
Start: 2021-05-18 | End: 2021-05-18 | Stop reason: HOSPADM

## 2021-05-18 RX ORDER — CEPHALEXIN 250 MG/1
500 CAPSULE ORAL ONCE
Status: COMPLETED | OUTPATIENT
Start: 2021-05-18 | End: 2021-05-18

## 2021-05-18 RX ORDER — DOCUSATE SODIUM 100 MG/1
100 CAPSULE, LIQUID FILLED ORAL 2 TIMES DAILY
COMMUNITY
End: 2021-10-25 | Stop reason: ALTCHOICE

## 2021-05-18 RX ORDER — SULFAMETHOXAZOLE AND TRIMETHOPRIM 800; 160 MG/1; MG/1
1 TABLET ORAL ONCE
Status: COMPLETED | OUTPATIENT
Start: 2021-05-18 | End: 2021-05-18

## 2021-05-18 RX ORDER — SULFAMETHOXAZOLE AND TRIMETHOPRIM 800; 160 MG/1; MG/1
1 TABLET ORAL EVERY 12 HOURS SCHEDULED
Status: DISCONTINUED | OUTPATIENT
Start: 2021-05-18 | End: 2021-05-18 | Stop reason: HOSPADM

## 2021-05-18 RX ORDER — SENNA PLUS 8.6 MG/1
1 TABLET ORAL DAILY
COMMUNITY
End: 2021-10-25 | Stop reason: ALTCHOICE

## 2021-05-18 RX ORDER — CEPHALEXIN 500 MG/1
500 CAPSULE ORAL 4 TIMES DAILY
Qty: 28 CAPSULE | Refills: 0 | Status: SHIPPED | OUTPATIENT
Start: 2021-05-18 | End: 2021-05-25

## 2021-05-18 RX ORDER — MORPHINE SULFATE 4 MG/ML
4 INJECTION, SOLUTION INTRAMUSCULAR; INTRAVENOUS ONCE
Status: COMPLETED | OUTPATIENT
Start: 2021-05-18 | End: 2021-05-18

## 2021-05-18 RX ADMIN — SODIUM CHLORIDE: 9 INJECTION, SOLUTION INTRAVENOUS at 17:09

## 2021-05-18 RX ADMIN — CEPHALEXIN 500 MG: 250 CAPSULE ORAL at 18:06

## 2021-05-18 RX ADMIN — SULFAMETHOXAZOLE AND TRIMETHOPRIM 1 TABLET: 800; 160 TABLET ORAL at 18:07

## 2021-05-18 RX ADMIN — IOPAMIDOL 80 ML: 755 INJECTION, SOLUTION INTRAVENOUS at 17:19

## 2021-05-18 RX ADMIN — MORPHINE SULFATE 4 MG: 4 INJECTION, SOLUTION INTRAMUSCULAR; INTRAVENOUS at 17:09

## 2021-05-18 ASSESSMENT — ENCOUNTER SYMPTOMS
SORE THROAT: 0
COUGH: 0
SHORTNESS OF BREATH: 0
PHOTOPHOBIA: 0
ABDOMINAL PAIN: 0
RHINORRHEA: 0
EYE DISCHARGE: 0
EYE REDNESS: 0
STRIDOR: 0
CHEST TIGHTNESS: 0
ABDOMINAL DISTENTION: 0
VOMITING: 0
WHEEZING: 0
EYE PAIN: 0
NAUSEA: 0
CONSTIPATION: 0
EYE ITCHING: 0
DIARRHEA: 0
BACK PAIN: 0

## 2021-05-18 NOTE — ED PROVIDER NOTES
[facility]  EMERGENCY DEPARTMENT ENCOUNTER      PATIENT NAME: Maxim Morocho  MRN: [id number]  : 1960  KERR: 2021  PROVIDER: Aislinn Doherty MD      CHIEF COMPLAINT       Chief Complaint   Patient presents with    Other     chest tube infected       Patient is seen and evaluated in a timely fashion. Nurses Notes are reviewed and I agree except as noted in the HPI. HISTORY OF PRESENT ILLNESS    Maxim Morocho is a 62 y.o. female who presents to Emergency Department with Other (chest tube infected)      She was sent to ED by pulmologist due to \" Site around chest tube, red, pusy, and painful. \" over last three days. Pulmonologist suggested \" She should go to ER for further evaluation. The chest tube was placed by Dr. Teresa Zaidi MD.  Patient need to contact his office regarding chest tube site management. \"     She is s/p \"1. Robotic assisted left upper lobectomy and mediastinal dissection with 5 level 3 nodes, 1 level 6 node, 2 level 7 nodes, 9 level 10 nodes, 211 L nodes, and one special 11 L suspected to be the preoperatively biopsied positive node. 2.  Cryotherapy of intercostal nerves V, VI, VII and VIII for postoperative pain control with Dr. Cristina Patten on 2021, now POD# 14. Dr. Alec Read note on discharge on 2021: \"She was admitted and underwent a robotic assisted left upper lobectomy and mediastinal dissection. Initially we used a 15 Israeli pigtail catheter as a chest tube but she developed a fairly significant air leak in unresolved pneumothorax and so an additional 28 Israeli chest tube was then placed. She still had a fairly sizable air leak but her pneumothorax was resolved and she did have a fair amount of subcutaneous emphysema which resolved clinically over the ensuing days. But she continued to have this air leak that appeared to be somewhat sizable. She underwent a bronchoscopy to evaluate the bronchial stump and the bronchial stump itself was intact. We left her chest tube on suction and the air leak did not really change much so asked pulmonary to evaluate for possible bronchial valve\". She was admitted from 5/11/2021-5/13/2021 at [facility]. Accoring to [facility] note, her chest tube was put to pneumostat and given the stable chest x-ray and the lacg of new symptoms, bronc with valve replacement was not needed. This HPI was provided by patient. REVIEW OF SYSTEMS   Review of Systems   Constitutional: Negative for activity change, appetite change, chills, fatigue, fever and unexpected weight change. HENT: Negative for congestion, ear discharge, ear pain, hearing loss, nosebleeds, rhinorrhea and sore throat. Eyes: Negative for photophobia, pain, discharge, redness and itching. Respiratory: Negative for cough, chest tightness, shortness of breath, wheezing and stridor. See HPI   Cardiovascular: Negative for chest pain, palpitations and leg swelling. Gastrointestinal: Negative for abdominal distention, abdominal pain, constipation, diarrhea, nausea and vomiting. Endocrine: Negative for cold intolerance, heat intolerance, polydipsia and polyphagia. Genitourinary: Negative for dysuria, flank pain, frequency and hematuria. Musculoskeletal: Negative for arthralgias, back pain, gait problem, myalgias, neck pain and neck stiffness. Skin: Positive for wound. Negative for pallor and rash. Some yellow discharge and redness from chest tube site. Allergic/Immunologic: Negative for environmental allergies and food allergies. Neurological: Negative for dizziness, tremors, syncope, weakness and headaches. Psychiatric/Behavioral: Negative for agitation, behavioral problems, confusion, self-injury, sleep disturbance and suicidal ideas.         PAST MEDICAL HISTORY     Past Medical History:   Diagnosis Date    Arthritis     COPD (chronic obstructive pulmonary disease) ([facility]     Dr Jackson [facility])     Dr Ray Braxton HISTORY       Past Surgical History:   Procedure Laterality Date    BRONCHOSCOPY N/A 2021    BRONCHOSCOPY performed by Mackenzie Diaz MD at [address] [place] [address] Drive      x4   [facility] COLONOSCOPY  2016    CT NEEDLE BIOPSY LUNG PERCUTANEOUS  2021    CT NEEDLE BIOPSY LUNG PERCUTANEOUS 3/26/2021 [facility] CT SCAN    HYSTERECTOMY      LUNG REMOVAL, TOTAL Left 2021    ROBOTIC ASSISTED LEFT UPPER LOBECTOMY AND MEDIASTINAL DISSECTION, POSSIBLE OPEN AND CRYOTHERAPY OF INTERCOSTAL NERVES 5, 6, 7 & 8 performed by Miya Waggoner MD at [place] [address]  2016       CURRENT MEDICATIONS       Discharge Medication List as of 2021  5:51 PM      CONTINUE these medications which have NOT CHANGED    Details   senna (SENOKOT) 8.6 MG tablet Take 1 tablet by mouth dailyHistorical Med      docusate sodium (COLACE) 100 MG capsule Take 100 mg by mouth 2 times dailyHistorical Med      VITAMIN D PO Take by mouth Unsure on doseHistorical Med      albuterol sulfate HFA (VENTOLIN HFA) 108 (90 Base) MCG/ACT inhaler Inhale 2 puffs into the lungs 4 times daily as needed for Wheezing, Disp-1 Inhaler, R-11Normal             ALLERGIES     Tylenol with codeine #3 [acetaminophen-codeine] and Codeine    FAMILY HISTORY     She indicated that her mother is . She indicated that her father is . She indicated that both of her sisters are alive. She indicated that all of her three brothers are alive. She indicated that her maternal grandmother is . She indicated that her maternal grandfather is . She indicated that her paternal grandmother is . She indicated that her paternal grandfather is . She indicated that her paternal uncle is .        family history includes Arthritis in her sister; Hannah Jose in her paternal uncle; Depression in her sister and sister; Diabetes in her brother; Heart Disease in her brother; Annamary  in her maternal grandfather; [address] [place] [address] Street in her brother; No Known Problems in her father; Other in her brother, brother, mother, and sister. SOCIAL HISTORY      reports that she has been smoking cigarettes. She has a 45.00 pack-year smoking history. She has never used smokeless tobacco. She reports current drug use. Drug: Marijuana. She reports that she does not drink alcohol. PHYSICAL EXAM      height is 5' 6\" (1.676 m) and weight is 123 lb (55.8 kg). Her temperature is 97.9 °F (36.6 °C). Her blood pressure is 126/92 (abnormal) and her pulse is 74. Her respiration is 15 and oxygen saturation is 100%. Physical Exam  Vitals and nursing note reviewed. Constitutional:       Appearance: She is well-developed. She is not diaphoretic. HENT:      Head: Normocephalic and atraumatic. Nose: Nose normal.   Eyes:      General: No scleral icterus. Right eye: No discharge. Left eye: No discharge. Conjunctiva/sclera: Conjunctivae normal.      Pupils: Pupils are equal, round, and reactive to light. Neck:      Vascular: No JVD. Trachea: No tracheal deviation. Cardiovascular:      Rate and Rhythm: Normal rate and regular rhythm. Heart sounds: Normal heart sounds. No murmur heard. No friction rub. No gallop. Pulmonary:      Effort: Pulmonary effort is normal. No respiratory distress. Breath sounds: Normal breath sounds. No stridor. No wheezing or rales. Comments: Left side chest tube with insertion site redness, discharge and tenderness. Chest:      Chest wall: Tenderness present. Abdominal:      General: Bowel sounds are normal. There is no distension. Palpations: Abdomen is soft. There is no mass. Tenderness: There is no abdominal tenderness. There is no guarding or rebound. Hernia: No hernia is present. Musculoskeletal:         General: No tenderness or deformity. Cervical back: Normal range of motion and neck supple. mg/dL    AST 10 5 - 40 U/L    Alkaline Phosphatase 73 38 - 126 U/L    Total Protein 5.8 (L) 6.1 - 8.0 g/dL    Albumin 3.6 3.5 - 5.1 g/dL    Total Bilirubin <0.2 (L) 0.3 - 1.2 mg/dL    ALT 11 11 - 66 U/L   APTT   Result Value Ref Range    aPTT 31.8 22.0 - 38.0 seconds   Protime-INR   Result Value Ref Range    INR 0.97 0.85 - 1.13   Anion Gap   Result Value Ref Range    Anion Gap 8.0 8.0 - 16.0 meq/L   Glomerular Filtration Rate, Estimated   Result Value Ref Range    Est, Glom Filt Rate 73 (A) ml/min/1.73m2   Osmolality   Result Value Ref Range    Osmolality Calc 280.8 275.0 - 300.0 mOsmol/kg       RADIOLOGY REPORTS  CT CHEST W CONTRAST   Final Result   1. Surgical changes of partial left upper lobectomy with chest tube in    the residual left upper lung. There is adjacent bubbly effusion in the    left upper thorax without pneumothorax. 2.  Additional simple small layering left pleural effusion in the    dependent left thorax. 3.  Soft tissue emphysema in the anterior chest walls greater on the left. 4.  Emphysematous lungs with stable right upper lung nodule measuring 1.1    x 0.8 cm. Recommend follow-up in 3 months for stability. This document has been electronically signed by: Felisha Barnes MD on    05/18/2021 06:16 PM      All CTs at this facility use dose modulation techniques and iterative    reconstructions, and/or weight-based dosing   when appropriate to reduce radiation to a low as reasonably achievable. MEDICAL DEDISION MAKINGS AND RATIONALES     Differential diagnsis: Chest tube site infection, rule out empyema    Actions: Large-bore IV, labs, chest CT with IV contrast.  IV morphine for pain control    ED Vitals:  Vitals:    05/18/21 1606 05/18/21 1612 05/18/21 1711   BP:   (!) 126/92   Pulse:  73 74   Resp:  16 15   Temp: 97.9 °F (36.6 °C)     SpO2:  99% 100%   Weight:  123 lb (55.8 kg)    Height:  5' 6\" (1.676 m)        AVSS. Labs are reassuring. WBC 7.8.  CT chest neg for pneumonia, empyema, or other acute findings. Discharged with cephalexin and Bactrim with first doses given in ED. Dr. Neville Olea also reviewed lab results and CT images. He agreed with discharge. Office follow-up in 2-3 days. Back to ED if she has increasing pain, fever, chills, or feeling sick. CRITICAL CARE   None    CONSULTS   4:35 PM     PROCEDURES   None    FINAL IMPRESSION AND DISPOSITION      1. Local infection of skin and subcutaneous tissue        Discharge home    PATIENT REFERRED TO:  Colleen Navarro MD  [address] [facility].   [place]. [address] [facility]  [phone]    In 3 days  ED discharge follow up      [address]:  Discharge Medication List as of 5/18/2021  5:51 PM      START taking these medications    Details   cephALEXin (KEFLEX) 500 MG capsule Take 1 capsule by mouth 4 times daily for 7 days, Disp-28 capsule, R-0Normal           sulfamethoxazole-trimethoprim (BACTRIM DS) 800-160 MG per tablet  Take 1 tablet by mouth 2 times daily for 7 days, Disp-14 tablet, R-0      (Please note that portions of this note were completed with a voice recognition program.  Efforts were made to edit the dictations but occasionally words aremis-transcribed.)    MD Maury Can MD  05/19/21 5580

## 2021-05-18 NOTE — TELEPHONE ENCOUNTER
She should go to ER for further evaluation. The chest tube was placed by Dr. Kaleb Bonner MD.  Patient need to contact his office regarding chest tube site management. Patient should be scheduled for follow-up with our clinic I.e either with me or Mr. Reyna Ventura CNP in 2 to 3 weeks. She was recently discharged from [facility] interventional pulmonary service to follow her COPD and Pneumothorax.

## 2021-05-18 NOTE — TELEPHONE ENCOUNTER
Site around chest tube, red, pusy, and painful. Patient has no fever. Advised patient to go to the ER to be evaluated.

## 2021-05-18 NOTE — ED TRIAGE NOTES
Pt to the ED with c/o possible chest tube infection. Pt states she had a left upper lung lobectomy on 5/4/21. Pt states her drain bottle was changed a week ago. Pt states she is not having much drainage since the change. Pt has some redness around the insertion site and vilma looks to be some purulent drainage. VSS.

## 2021-05-19 RX ORDER — SULFAMETHOXAZOLE AND TRIMETHOPRIM 800; 160 MG/1; MG/1
1 TABLET ORAL 2 TIMES DAILY
Qty: 14 TABLET | Refills: 0 | Status: SHIPPED | OUTPATIENT
Start: 2021-05-19 | End: 2021-05-26

## 2021-05-22 PROBLEM — Z01.818 PRE-OP EVALUATION: Status: RESOLVED | Noted: 2021-04-22 | Resolved: 2021-05-22

## 2021-05-23 LAB
AEROBIC CULTURE: ABNORMAL
ANAEROBIC CULTURE: ABNORMAL
GRAM STAIN RESULT: ABNORMAL
ORGANISM: ABNORMAL

## 2021-05-24 ENCOUNTER — HOSPITAL ENCOUNTER (EMERGENCY)
Age: 61
Discharge: HOME | End: 2021-05-24
Attending: EMERGENCY MEDICINE
Payer: COMMERCIAL

## 2021-05-24 ENCOUNTER — APPOINTMENT (OUTPATIENT)
Dept: GENERAL RADIOLOGY | Age: 61
End: 2021-05-24
Payer: COMMERCIAL

## 2021-05-24 ENCOUNTER — OFFICE VISIT (OUTPATIENT)
Dept: SURGERY | Age: 61
End: 2021-05-24

## 2021-05-24 VITALS
BODY MASS INDEX: 19.77 KG/M2 | WEIGHT: 123 LBS | SYSTOLIC BLOOD PRESSURE: 110 MMHG | HEART RATE: 87 BPM | OXYGEN SATURATION: 98 % | HEIGHT: 66 IN | DIASTOLIC BLOOD PRESSURE: 62 MMHG | RESPIRATION RATE: 18 BRPM | TEMPERATURE: 96.4 F

## 2021-05-24 VITALS
OXYGEN SATURATION: 100 % | DIASTOLIC BLOOD PRESSURE: 82 MMHG | TEMPERATURE: 97.5 F | WEIGHT: 123 LBS | RESPIRATION RATE: 15 BRPM | BODY MASS INDEX: 19.85 KG/M2 | HEART RATE: 65 BPM | SYSTOLIC BLOOD PRESSURE: 133 MMHG

## 2021-05-24 DIAGNOSIS — Z90.2 S/P LOBECTOMY OF LUNG: ICD-10-CM

## 2021-05-24 DIAGNOSIS — Z72.0 TOBACCO ABUSE: ICD-10-CM

## 2021-05-24 DIAGNOSIS — F17.210 SMOKING GREATER THAN 40 PACK YEARS: ICD-10-CM

## 2021-05-24 DIAGNOSIS — C34.12 PRIMARY CANCER OF LEFT UPPER LOBE OF LUNG (HCC): Primary | ICD-10-CM

## 2021-05-24 DIAGNOSIS — G89.18 POST-OPERATIVE PAIN: Primary | ICD-10-CM

## 2021-05-24 LAB — MISC. #1 REFERENCE GROUP TEST: NORMAL

## 2021-05-24 PROCEDURE — 6360000002 HC RX W HCPCS: Performed by: STUDENT IN AN ORGANIZED HEALTH CARE EDUCATION/TRAINING PROGRAM

## 2021-05-24 PROCEDURE — 99024 POSTOP FOLLOW-UP VISIT: CPT | Performed by: SURGERY

## 2021-05-24 PROCEDURE — 93005 ELECTROCARDIOGRAM TRACING: CPT | Performed by: EMERGENCY MEDICINE

## 2021-05-24 PROCEDURE — 96372 THER/PROPH/DIAG INJ SC/IM: CPT

## 2021-05-24 PROCEDURE — 71046 X-RAY EXAM CHEST 2 VIEWS: CPT

## 2021-05-24 PROCEDURE — 99283 EMERGENCY DEPT VISIT LOW MDM: CPT

## 2021-05-24 PROCEDURE — 6370000000 HC RX 637 (ALT 250 FOR IP): Performed by: STUDENT IN AN ORGANIZED HEALTH CARE EDUCATION/TRAINING PROGRAM

## 2021-05-24 RX ORDER — ACETAMINOPHEN 500 MG
500 TABLET ORAL 4 TIMES DAILY PRN
Qty: 120 TABLET | Refills: 0 | Status: SHIPPED | OUTPATIENT
Start: 2021-05-24

## 2021-05-24 RX ORDER — KETOROLAC TROMETHAMINE 10 MG/1
10 TABLET, FILM COATED ORAL EVERY 6 HOURS PRN
Qty: 20 TABLET | Refills: 0 | Status: SHIPPED | OUTPATIENT
Start: 2021-05-24 | End: 2021-08-18

## 2021-05-24 RX ORDER — METHOCARBAMOL 500 MG/1
1500 TABLET, FILM COATED ORAL ONCE
Status: COMPLETED | OUTPATIENT
Start: 2021-05-24 | End: 2021-05-24

## 2021-05-24 RX ORDER — KETOROLAC TROMETHAMINE 30 MG/ML
30 INJECTION, SOLUTION INTRAMUSCULAR; INTRAVENOUS ONCE
Status: COMPLETED | OUTPATIENT
Start: 2021-05-24 | End: 2021-05-24

## 2021-05-24 RX ORDER — METHOCARBAMOL 750 MG/1
1500 TABLET, FILM COATED ORAL 3 TIMES DAILY
Qty: 60 TABLET | Refills: 0 | Status: SHIPPED | OUTPATIENT
Start: 2021-05-24 | End: 2021-06-03

## 2021-05-24 RX ORDER — ACETAMINOPHEN 500 MG
1000 TABLET ORAL ONCE
Status: COMPLETED | OUTPATIENT
Start: 2021-05-24 | End: 2021-05-24

## 2021-05-24 RX ORDER — LIDOCAINE 4 G/G
1 PATCH TOPICAL ONCE
Status: DISCONTINUED | OUTPATIENT
Start: 2021-05-24 | End: 2021-05-24 | Stop reason: HOSPADM

## 2021-05-24 RX ORDER — OXYCODONE HYDROCHLORIDE 5 MG/1
5 TABLET ORAL EVERY 6 HOURS PRN
Qty: 28 TABLET | Refills: 0 | Status: SHIPPED | OUTPATIENT
Start: 2021-05-24 | End: 2021-05-31

## 2021-05-24 RX ADMIN — METHOCARBAMOL TABLETS 1500 MG: 500 TABLET, COATED ORAL at 21:27

## 2021-05-24 RX ADMIN — KETOROLAC TROMETHAMINE 30 MG: 30 INJECTION, SOLUTION INTRAMUSCULAR; INTRAVENOUS at 21:22

## 2021-05-24 RX ADMIN — ACETAMINOPHEN 1000 MG: 500 TABLET ORAL at 21:22

## 2021-05-24 ASSESSMENT — PAIN DESCRIPTION - PAIN TYPE: TYPE: ACUTE PAIN

## 2021-05-24 ASSESSMENT — ENCOUNTER SYMPTOMS
VOMITING: 0
SINUS PRESSURE: 0
SINUS PAIN: 0
ABDOMINAL PAIN: 0
SHORTNESS OF BREATH: 0
NAUSEA: 0
COUGH: 0
WHEEZING: 0
COLOR CHANGE: 0
TROUBLE SWALLOWING: 0

## 2021-05-24 ASSESSMENT — PAIN DESCRIPTION - FREQUENCY: FREQUENCY: CONTINUOUS

## 2021-05-24 NOTE — PROGRESS NOTES
Mayela Parker MD [facility]  General Surgery  Postprocedure Evaluation in Office  Pt Name: Karrie Lucas  Date of Birth 1960   Today's Date: 5/24/2021  Medical Record Number: [id number]  Referring Provider: No ref. provider found  Primary Care Provider: Stanley Abraham MD  Chief Complaint   Patient presents with   [facility] Post-Op Check     s/p 1. Robotic assisted left upper lobectomy and mediastinal dissection with 5 level 3 nodes, 1 level 6 node, 2 level 7 nodes, 9 level 10 nodes, 211 L nodes, and one special 11 L suspected to be the preoperatively biopsied positive node  2. Cryotherapy of intercostal nerves V, VI, VII and VIII for postoperative pain control 5/4/21-was in [facility] ER 5/18 for infection around chest tube     ASSESSMENT      Problem List Items Addressed This Visit     Primary cancer of left upper lobe of lung ([facility] [address] - Primary    Relevant Medications    oxyCODONE (ROXICODONE) 5 MG immediate release tablet    S/P left upper lobectomy of lung and mediastinal dissection    Relevant Medications    oxyCODONE (ROXICODONE) 5 MG immediate release tablet    Smoking greater than 40 pack years    Tobacco abuse      Cancer Staging  Primary cancer of left upper lobe of lung ([facility] [address]  Staging form: Lung, AJCC 8th Edition  - Clinical stage from 4/21/2021: Stage IIB (cT2a, cN1, cM0) - Signed by Mayela Parker MD on 5/10/2021  - Pathologic stage from 5/6/2021: Stage IIB (pT1c, pN1, cM0) - Signed by Mayela Parker MD on 5/6/2021  - Pathologic: No stage assigned - Unsigned        PLAN       Pathology reviewed with the patient who understands. All questions were answered. There are no Patient Instructions on file for this visit. CT removed in office today  Sees DR Rossy Wilkes June 7th  Sees DR Ferraro June 3rd  Follow up: Return in about 2 weeks (around 6/7/2021). New Prescriptions    OXYCODONE (ROXICODONE) 5 MG IMMEDIATE RELEASE TABLET    Take 1 tablet by mouth every 6 hours as needed for Pain for up to 7 days.  Intended supply: 5 days. Take lowest dose possible to manage pain     Orders Placed This Encounter:  No orders of the defined types were placed in this encounter. Markus Siddiqui is seen today for post-op follow-up. She is 20 day(s) status post left upper lobectomy and mediastinal dissection as well as cryotherapy of intercostal nerves. Postoperatively she had a prolonged air leak which was fairly significant we could not get local evaluation here for possible bronchial valve so she was transferred to [facility] where they did not place a valve they just clamped her tube at that time her pneumo got slightly bigger but they felt it was fine with the valve on and they discharged her to home. She recently presented to the emergency department on 1  [facility] with some redness to the chest tube site as well as some, white exudate material inside the tube. Her labs were normal her CT chest was unremarkable and she was started on antibiotics. Cultures were taken intraoperatively grew a staph which is sensitive to clindamycin. She is now in the office for follow-up with a chest tube in place.   Past Medical History  Past Medical History:   Diagnosis Date    Arthritis     COPD (chronic obstructive pulmonary disease) ([facility] [address]     Dr Da Silva Signs [facility])     Dr Toy Hodgkin     Past Surgical History  Past Surgical History:   Procedure Laterality Date    BRONCHOSCOPY N/A 5/6/2021    BRONCHOSCOPY performed by Milka Rojas MD at [address] [place] [address]      x4   [address] [place] [address] COLONOSCOPY  07/11/2016    CT NEEDLE BIOPSY LUNG PERCUTANEOUS  03/26/2021    CT NEEDLE BIOPSY LUNG PERCUTANEOUS 3/26/2021 [facility] CT SCAN    HYSTERECTOMY      LUNG REMOVAL, TOTAL Left 5/4/2021    ROBOTIC ASSISTED LEFT UPPER LOBECTOMY AND MEDIASTINAL DISSECTION, POSSIBLE OPEN AND CRYOTHERAPY OF INTERCOSTAL NERVES 5, 6, 7 & 8 performed by Vicente Alba MD at [place] [address]  07/11/2016 Medications  Current Outpatient Medications on File Prior to Visit   Medication Sig Dispense Refill    sulfamethoxazole-trimethoprim (BACTRIM DS) 800-160 MG per tablet Take 1 tablet by mouth 2 times daily for 7 days 14 tablet 0    senna (SENOKOT) 8.6 MG tablet Take 1 tablet by mouth daily      docusate sodium (COLACE) 100 MG capsule Take 100 mg by mouth 2 times daily      cephALEXin (KEFLEX) 500 MG capsule Take 1 capsule by mouth 4 times daily for 7 days 28 capsule 0    albuterol sulfate HFA (VENTOLIN HFA) 108 (90 Base) MCG/ACT inhaler Inhale 2 puffs into the lungs 4 times daily as needed for Wheezing 1 Inhaler 11    VITAMIN D PO Take by mouth Unsure on dose       No current facility-administered medications on file prior to visit. Allergies  Allergies   Allergen Reactions    Tylenol With Codeine #3 [Acetaminophen-Codeine] Nausea And Vomiting    Codeine Nausea And Vomiting     Social History  Social History     Socioeconomic History    Marital status:      Spouse name: Not on file    Number of children: Not on file    Years of education: Not on file    Highest education level: Not on file   Occupational History    Not on file   Tobacco Use    Smoking status: Current Every Day Smoker     Packs/day: 1.00     Years: 45.00     Pack years: 45.00     Types: Cigarettes    Smokeless tobacco: Never Used   Vaping Use    Vaping Use: Some days    Substances: Nicotine    Devices: Disposable   Substance and Sexual Activity    Alcohol use: Never    Drug use: Yes     Types: Marijuana    Sexual activity: Not on file   Other Topics Concern    Not on file   Social History Narrative    Not on file     Social Determinants of Health     Financial Resource Strain:     Difficulty of Paying Living Expenses:    Food Insecurity:     Worried About Running Out of Food in the Last Year:     Ran Out of Food in the Last Year:    Transportation Needs:     Lack of Transportation (Medical):      Lack of Transportation (Non-Medical):    Physical Activity:     Days of Exercise per Week:     Minutes of Exercise per Session:    Stress:     Feeling of Stress :    Social Connections:     Frequency of Communication with Friends and Family:     Frequency of Social Gatherings with Friends and Family:     Attends Jew Services:     Active Member of Clubs or Organizations:     Attends Club or Organization Meetings:     Marital Status:    Intimate Partner Violence:     Fear of Current or Ex-Partner:     Emotionally Abused:     Physically Abused:     Sexually Abused:      Health Screening Exams  Health Maintenance   Topic Date Due    Pneumococcal 0-64 years Vaccine (1 of 4 - PCV13) Never done    COVID-19 Vaccine (1) Never done    HIV screen  Never done    DTaP/Tdap/Td vaccine (1 - Tdap) Never done    Cervical cancer screen  Never done    Lipid screen  Never done    Shingles Vaccine (1 of 2) Never done    Colon cancer screen colonoscopy  Never done    Flu vaccine (Season Ended) 09/01/2021    Low dose CT lung screening  05/18/2022    Breast cancer screen  02/26/2023    Hepatitis C screen  Completed    Hepatitis A vaccine  Aged Out    Hepatitis B vaccine  Aged Out    Hib vaccine  Aged Out    Meningococcal (ACWY) vaccine  Aged Out     Review of Systems  History obtained from the patient. Constitutional: Denies any fever, chills, fatigue. Wound: Denies any rash, skin color changes or wound problems. Resp: Denies any cough, shortness of breath. CV: Denies any chest pain, orthopnea or syncope. GI: Positive for incisional discomfort only. Denies any nausea, vomiting, blood in the stool, constipation or diarrhea. : Denies any hematuria, hesitancy or dysuria. OBJECTIVE      VITALS:  height is 5' 6\" (1.676 m) and weight is 123 lb (55.8 kg). Her temporal temperature is 96.4 °F (35.8 °C). Her blood pressure is 110/62 and her pulse is 87. Her respiration is 18 and oxygen saturation is 98%. CONSTITUTIONAL: Alert and oriented times 3, no acute distress and cooperative to examination. SKIN: Skin color, texture, turgor normal. No rashes or lesions. INCISION: wound margins intact and healing well. No signs of infection. No drainage. LUNGS: Chest expands equally bilaterally upon respiration, no accessory muscle used. Ausculation reveals no wheezes, rales or rhonchi. Chest tube in place no air leak with valve on it entry site of the chest tube skin is red which appears to be related to the tube inside of some exudate.                  Electronically signed by Geraldo Chopra MD MD FACS on 5/24/2021 at 9:57 AM

## 2021-05-25 LAB
EKG ATRIAL RATE: 65 BPM
EKG P AXIS: 89 DEGREES
EKG P-R INTERVAL: 150 MS
EKG Q-T INTERVAL: 406 MS
EKG QRS DURATION: 94 MS
EKG QTC CALCULATION (BAZETT): 422 MS
EKG R AXIS: 69 DEGREES
EKG T AXIS: 82 DEGREES
EKG VENTRICULAR RATE: 65 BPM

## 2021-05-25 NOTE — ED PROVIDER NOTES
[place] ENCOUNTER        Pt Name: Dulce Maria Buchanan  MRN: [id number]  [place] 1960  Date of evaluation: 5/24/2021  Treating Resident Physician: Mahesh Thompson DO  Supervising Physician: [address] [place] [address]       Chief Complaint   Patient presents with    Other     Chest tube removed today- still in pain     History obtained from the patient. HISTORY OF PRESENT ILLNESS    HPI  Dulce Maria Buchanan is a 62 y.o. female who presents to the emergency department for evaluation of postoperative pain. Earlier this month, patient underwent left upper lobectomy with Dr. Bradley June. Seen today in the office and had her chest tube removed. Patient with considerable postprocedural pain around the incision site in the lower aspect of her left chest.  Patient notes no active drainage. No fevers, chills, abdominal pain, changes in stooling, palpitations, difficulty breathing. Reports being started on oxycodone with little improvement. The patient has no other acute complaints at this time. REVIEW OF SYSTEMS   Review of Systems   Constitutional: Positive for chills. Negative for fever. HENT: Negative for sinus pressure, sinus pain, tinnitus and trouble swallowing. Respiratory: Negative for cough, shortness of breath and wheezing. Cardiovascular: Positive for chest pain. Gastrointestinal: Negative for abdominal pain, nausea and vomiting. Genitourinary: Negative for difficulty urinating and dysuria. Musculoskeletal: Negative for neck pain and neck stiffness. Skin: Positive for wound. Negative for color change and rash. Neurological: Negative for light-headedness and headaches.          PAST MEDICAL AND SURGICAL HISTORY     Past Medical History:   Diagnosis Date    Arthritis     COPD (chronic obstructive pulmonary disease) ([facility] [address]     Dr Polo Olszewski [facility])     Dr Vicky Yost     Past Surgical History:   Procedure Laterality Date    BRONCHOSCOPY N/A 5/6/2021    BRONCHOSCOPY performed by Dora Brunner, MD at [facility] [address]      x4   [address] [facility] COLONOSCOPY  07/11/2016    CT NEEDLE BIOPSY LUNG PERCUTANEOUS  03/26/2021    CT NEEDLE BIOPSY LUNG PERCUTANEOUS 3/26/2021 [facility] CT SCAN    HYSTERECTOMY      LUNG REMOVAL, TOTAL Left 5/4/2021    ROBOTIC ASSISTED LEFT UPPER LOBECTOMY AND MEDIASTINAL DISSECTION, POSSIBLE OPEN AND CRYOTHERAPY OF INTERCOSTAL NERVES 5, 6, 7 & 8 performed by Silvestre Lees MD at [address]  07/11/2016         MEDICATIONS     Current Facility-Administered Medications:     lidocaine 4 % external patch 1 patch, 1 patch, Transdermal, Once, Dilcia Ryan, DO, 1 patch at 05/24/21 2122    Current Outpatient Medications:     acetaminophen (TYLENOL) 500 MG tablet, Take 1 tablet by mouth 4 times daily as needed for Pain, Disp: 120 tablet, Rfl: 0    ketorolac (TORADOL) 10 MG tablet, Take 1 tablet by mouth every 6 hours as needed for Pain, Disp: 20 tablet, Rfl: 0    methocarbamol (ROBAXIN-750) 750 MG tablet, Take 2 tablets by mouth 3 times daily for 10 days, Disp: 60 tablet, Rfl: 0    oxyCODONE (ROXICODONE) 5 MG immediate release tablet, Take 1 tablet by mouth every 6 hours as needed for Pain for up to 7 days. Intended supply: 5 days.  Take lowest dose possible to manage pain, Disp: 28 tablet, Rfl: 0    sulfamethoxazole-trimethoprim (BACTRIM DS) 800-160 MG per tablet, Take 1 tablet by mouth 2 times daily for 7 days, Disp: 14 tablet, Rfl: 0    senna (SENOKOT) 8.6 MG tablet, Take 1 tablet by mouth daily, Disp: , Rfl:     docusate sodium (COLACE) 100 MG capsule, Take 100 mg by mouth 2 times daily, Disp: , Rfl:     cephALEXin (KEFLEX) 500 MG capsule, Take 1 capsule by mouth 4 times daily for 7 days, Disp: 28 capsule, Rfl: 0    albuterol sulfate HFA (VENTOLIN HFA) 108 (90 Base) MCG/ACT inhaler, Inhale 2 puffs into the lungs 4 times daily as Nose: Nose normal. No congestion or rhinorrhea. Mouth/Throat:      Mouth: Mucous membranes are moist.      Pharynx: Oropharynx is clear. No oropharyngeal exudate or posterior oropharyngeal erythema. Eyes:      General: No scleral icterus. Right eye: No discharge. Left eye: No discharge. Extraocular Movements: Extraocular movements intact. Conjunctiva/sclera: Conjunctivae normal.   Cardiovascular:      Rate and Rhythm: Normal rate and regular rhythm. Pulses: Normal pulses. Heart sounds: No murmur heard. No friction rub. No gallop. Pulmonary:      Effort: Pulmonary effort is normal.      Breath sounds: No wheezing, rhonchi or rales. Abdominal:      Palpations: Abdomen is soft. Tenderness: There is no abdominal tenderness. There is no guarding or rebound. Musculoskeletal:      Cervical back: Normal range of motion. No rigidity. Right lower leg: No edema. Left lower leg: No edema. Skin:     General: Skin is warm and dry. Capillary Refill: Capillary refill takes less than 2 seconds. Findings: Lesion (Small 2 laceration at the prior site of chest tube insertion. It is currently packed with iodoform gauze. There is no erythema, induration or fluctuance. No drainage.) present. Neurological:      General: No focal deficit present. Mental Status: She is alert and oriented to person, place, and time. MEDICAL DECISION MAKING   Initial Assessment:   1. Chronically ill-appearing 80-year-old female. Chest tube removal today. No shortness of breath, persistent left lower chest pain at site of her incision. Wound is clean dry and intact without purulence. No erythema or fluctuance. No hypoxia or tachypnea. Vital signs of been stable. No fever. Nonischemic EKG. No concern this time for ACS. Plan:    Chest x-ray to ensure no postoperative pneumothorax.  Adequate pain control with multimodal pain therapy.   Addition of Tylenol, Robaxin, Toradol.  Patient agreeable to this further therapy and discharge home. Strict return precautions and recommendation of follow-up with her surgeon if her pain continues to remain uncontrolled. ED RESULTS   Laboratory results:  Labs Reviewed - No data to display    Radiologic studies results:  XR CHEST (2 VW)   Final Result   Small left pleural effusion. No pneumothorax. This document has been electronically signed by: Godwin Hines MD on    05/24/2021 07:05 PM          ED Medications administered this visit:   Medications   lidocaine 4 % external patch 1 patch (1 patch Transdermal Patch Applied 5/24/21 2122)   acetaminophen (TYLENOL) tablet 1,000 mg (1,000 mg Oral Given 5/24/21 2122)   methocarbamol (ROBAXIN) tablet 1,500 mg (1,500 mg Oral Given 5/24/21 2127)   ketorolac (TORADOL) injection 30 mg (30 mg Intramuscular Given 5/24/21 2122)         ED COURSE          Strict return precautions and follow up instructions were discussed with the patient prior to discharge, with which the patient agrees. MEDICATION CHANGES     Discharge Medication List as of 5/24/2021  9:29 PM      START taking these medications    Details   acetaminophen (TYLENOL) 500 MG tablet Take 1 tablet by mouth 4 times daily as needed for Pain, Disp-120 tablet, R-0Normal      ketorolac (TORADOL) 10 MG tablet Take 1 tablet by mouth every 6 hours as needed for Pain, Disp-20 tablet, R-0Normal      methocarbamol (ROBAXIN-750) 750 MG tablet Take 2 tablets by mouth 3 times daily for 10 days, Disp-60 tablet, R-0Normal               FINAL DISPOSITION     Final diagnoses:   Post-operative pain     Condition: condition: stable  Dispo: Discharge to home      This transcription was electronically signed. Parts of this transcriptions may have been dictated by use of voice recognition software and electronically transcribed, and parts may have been transcribed with the assistance of an ED scribe.  The transcription may contain

## 2021-05-27 NOTE — PROGRESS NOTES
Pharmacy Note  ED Culture Follow-up    Bruce Arredondo is a 62 y.o. female. Allergies: Tylenol with codeine #3 [acetaminophen-codeine] and Codeine     Labs:  Lab Results   Component Value Date    BUN 9 05/18/2021    CREATININE 0.8 05/18/2021    WBC 7.8 05/18/2021     CrCl cannot be calculated (Unknown ideal weight.). Current antimicrobials:   Bactrim and Keflex    ASSESSMENT:  Micro results:   Wound culture: positive for Staphylococcus pseudintermedius     PLAN:  Need for intervention: No 2/2 Dr. Tito Sweeney saw today in ED and noted that wound was improving w/o erythema or purulence on current therapy. Discussed with: Emerson Braxton DO  Chosen treatment:    Patient already on appropriate treatment as above    Patient response:   No need to contact patient    Called/sent in prescription to: Not applicable    Please call with any questions.  [id number] [facility] [place], PharmD 1:20 PM 5/27/2021

## 2021-06-01 ENCOUNTER — HOSPITAL ENCOUNTER (EMERGENCY)
Age: 61
Discharge: HOME | End: 2021-06-01
Attending: EMERGENCY MEDICINE
Payer: COMMERCIAL

## 2021-06-01 ENCOUNTER — APPOINTMENT (OUTPATIENT)
Dept: GENERAL RADIOLOGY | Age: 61
End: 2021-06-01
Payer: COMMERCIAL

## 2021-06-01 ENCOUNTER — APPOINTMENT (OUTPATIENT)
Dept: CT IMAGING | Age: 61
End: 2021-06-01
Payer: COMMERCIAL

## 2021-06-01 VITALS
TEMPERATURE: 98.2 F | BODY MASS INDEX: 19.29 KG/M2 | HEIGHT: 66 IN | DIASTOLIC BLOOD PRESSURE: 76 MMHG | HEART RATE: 75 BPM | WEIGHT: 120 LBS | RESPIRATION RATE: 16 BRPM | OXYGEN SATURATION: 96 % | SYSTOLIC BLOOD PRESSURE: 125 MMHG

## 2021-06-01 DIAGNOSIS — Z48.89 ENCOUNTER FOR POSTOPERATIVE WOUND CHECK: Primary | ICD-10-CM

## 2021-06-01 DIAGNOSIS — J98.2 PNEUMOMEDIASTINUM (HCC): ICD-10-CM

## 2021-06-01 LAB
BASOPHILS # BLD: 0.8 %
BASOPHILS ABSOLUTE: 0.1 THOU/MM3 (ref 0–0.1)
EOSINOPHIL # BLD: 1.3 %
EOSINOPHILS ABSOLUTE: 0.1 THOU/MM3 (ref 0–0.4)
ERYTHROCYTE [DISTWIDTH] IN BLOOD BY AUTOMATED COUNT: 12.7 % (ref 11.5–14.5)
ERYTHROCYTE [DISTWIDTH] IN BLOOD BY AUTOMATED COUNT: 46 FL (ref 35–45)
HCT VFR BLD CALC: 37.7 % (ref 37–47)
HEMOGLOBIN: 11.9 GM/DL (ref 12–16)
IMMATURE GRANS (ABS): 0.02 THOU/MM3 (ref 0–0.07)
IMMATURE GRANULOCYTES: 0.3 %
LYMPHOCYTES # BLD: 14.8 %
LYMPHOCYTES ABSOLUTE: 1.1 THOU/MM3 (ref 1–4.8)
MCH RBC QN AUTO: 31.2 PG (ref 26–33)
MCHC RBC AUTO-ENTMCNC: 31.6 GM/DL (ref 32.2–35.5)
MCV RBC AUTO: 98.7 FL (ref 81–99)
MONOCYTES # BLD: 5.9 %
MONOCYTES ABSOLUTE: 0.5 THOU/MM3 (ref 0.4–1.3)
NUCLEATED RED BLOOD CELLS: 0 /100 WBC
PLATELET # BLD: 259 THOU/MM3 (ref 130–400)
PMV BLD AUTO: 11.7 FL (ref 9.4–12.4)
RBC # BLD: 3.82 MILL/MM3 (ref 4.2–5.4)
SEG NEUTROPHILS: 76.9 %
SEGMENTED NEUTROPHILS ABSOLUTE COUNT: 5.9 THOU/MM3 (ref 1.8–7.7)
WBC # BLD: 7.7 THOU/MM3 (ref 4.8–10.8)

## 2021-06-01 PROCEDURE — 36415 COLL VENOUS BLD VENIPUNCTURE: CPT

## 2021-06-01 PROCEDURE — 71250 CT THORAX DX C-: CPT

## 2021-06-01 PROCEDURE — 99282 EMERGENCY DEPT VISIT SF MDM: CPT

## 2021-06-01 PROCEDURE — 71046 X-RAY EXAM CHEST 2 VIEWS: CPT

## 2021-06-01 PROCEDURE — 85025 COMPLETE CBC W/AUTO DIFF WBC: CPT

## 2021-06-01 RX ORDER — CEPHALEXIN 500 MG/1
500 CAPSULE ORAL 4 TIMES DAILY
Qty: 28 CAPSULE | Refills: 0 | Status: SHIPPED | OUTPATIENT
Start: 2021-06-01 | End: 2021-06-08

## 2021-06-01 RX ORDER — MUPIROCIN CALCIUM 20 MG/G
CREAM TOPICAL
Qty: 15 G | Refills: 0 | Status: SHIPPED | OUTPATIENT
Start: 2021-06-01 | End: 2021-07-01

## 2021-06-01 ASSESSMENT — ENCOUNTER SYMPTOMS
WHEEZING: 0
EYE PAIN: 0
NAUSEA: 0
STRIDOR: 0
EYE ITCHING: 0
EYE REDNESS: 0
EYE DISCHARGE: 0
CONSTIPATION: 0
COUGH: 0
SHORTNESS OF BREATH: 0
SORE THROAT: 0
PHOTOPHOBIA: 0
CHEST TIGHTNESS: 0
RHINORRHEA: 0
ABDOMINAL DISTENTION: 0
DIARRHEA: 0
VOMITING: 0
ABDOMINAL PAIN: 0
BACK PAIN: 0

## 2021-06-01 ASSESSMENT — PAIN SCALES - WONG BAKER: WONGBAKER_NUMERICALRESPONSE: 0

## 2021-06-01 ASSESSMENT — PAIN DESCRIPTION - ORIENTATION: ORIENTATION: LEFT

## 2021-06-01 ASSESSMENT — PAIN DESCRIPTION - LOCATION: LOCATION: RIB CAGE

## 2021-06-01 NOTE — ED TRIAGE NOTES
Presents to ED with c/o left chest tube site pain. States she got it removed on last Monday and pain started yesterday. Worsening pain today.

## 2021-06-01 NOTE — ED PROVIDER NOTES
[facility]  EMERGENCY DEPARTMENT ENCOUNTER      PATIENT NAME: Jamel Gaytan  MRN: [id number]  : 1960  KERR: 2021  PROVIDER: Juan David Bernardo MD      CHIEF COMPLAINT       Chief Complaint   Patient presents with    Other     chest tube site pain       Patient is seen and evaluated in a timely fashion. Nurses Notes are reviewed and I agree except as noted in the HPI. HISTORY OF PRESENT ILLNESS    Jamel Gaytan is a 62 y.o. female who presents to Emergency Department with Other (chest tube site pain)    Onset: gradual  Location: Left chest  Quality: Pain and redness  Radiation: None  Severity: Mmild  Timing: since CT removed on 2021  Modifying factors: Worse on touch  Duration: Intermittent  Context:   She is s/p \"1. Robotic assisted left upper lobectomy and mediastinal dissection with 5 level 3 nodes, 1 level 6 node, 2 level 7 nodes, 9 level 10 nodes, 211 L nodes, and one special 11 L suspected to be the preoperatively biopsied positive node. 2.  Cryotherapy of intercostal nerves V, VI, VII and VIII for postoperative pain control with Dr. Yana Caban on 2021. CT removed on 2021 in office. Today the visiting nurse thinks chest tube site was infected. No fever or chills. No drainage. This HPI was provided by patient. REVIEW OF SYSTEMS   Review of Systems   Constitutional: Negative for activity change, appetite change, chills, fatigue, fever and unexpected weight change. HENT: Negative for congestion, ear discharge, ear pain, hearing loss, nosebleeds, rhinorrhea and sore throat. Eyes: Negative for photophobia, pain, discharge, redness and itching. Respiratory: Negative for cough, chest tightness, shortness of breath, wheezing and stridor. Cardiovascular: Negative for chest pain, palpitations and leg swelling. Gastrointestinal: Negative for abdominal distention, abdominal pain, constipation, diarrhea, nausea and vomiting.    Endocrine: Negative for cold Head: Normocephalic and atraumatic. Nose: Nose normal.   Eyes:      General: No scleral icterus. Right eye: No discharge. Left eye: No discharge. Conjunctiva/sclera: Conjunctivae normal.      Pupils: Pupils are equal, round, and reactive to light. Neck:      Vascular: No JVD. Trachea: No tracheal deviation. Cardiovascular:      Rate and Rhythm: Normal rate and regular rhythm. Heart sounds: Normal heart sounds. No murmur heard. No friction rub. No gallop. Pulmonary:      Effort: Pulmonary effort is normal. No respiratory distress. Breath sounds: Normal breath sounds. No stridor. No wheezing or rales. Comments: Left chest tube site has a scab with mild erythema. No signs of abscess. Chest:      Chest wall: Tenderness present. Abdominal:      General: Bowel sounds are normal. There is no distension. Palpations: Abdomen is soft. There is no mass. Tenderness: There is no abdominal tenderness. There is no guarding or rebound. Hernia: No hernia is present. Musculoskeletal:         General: No tenderness or deformity. Cervical back: Normal range of motion and neck supple. Lymphadenopathy:      Cervical: No cervical adenopathy. Skin:     General: Skin is warm and dry. Capillary Refill: Capillary refill takes less than 2 seconds. Coloration: Skin is not pale. Findings: No erythema or rash. Neurological:      Mental Status: She is alert and oriented to person, place, and time. Cranial Nerves: No cranial nerve deficit. Sensory: No sensory deficit. Motor: No abnormal muscle tone. Coordination: Coordination normal.      Deep Tendon Reflexes: Reflexes normal.   Psychiatric:         Behavior: Behavior normal.         Thought Content: Thought content normal.         Judgment: Judgment normal.             ANCILLARY TEST RESULTS   EKG:    Interpreted by me  None    LAB RESULTS:  Results for orders placed or performed during the hospital encounter of 06/01/21   CBC Auto Differential   Result Value Ref Range    WBC 7.7 4.8 - 10.8 thou/mm3    RBC 3.82 (L) 4.20 - 5.40 mill/mm3    Hemoglobin 11.9 (L) 12.0 - 16.0 gm/dl    Hematocrit 37.7 37.0 - 47.0 %    MCV 98.7 81.0 - 99.0 fL    MCH 31.2 26.0 - 33.0 pg    MCHC 31.6 (L) 32.2 - 35.5 gm/dl    RDW-CV 12.7 11.5 - 14.5 %    RDW-SD 46.0 (H) 35.0 - 45.0 fL    Platelets 238 811 - 339 thou/mm3    MPV 11.7 9.4 - 12.4 fL    Seg Neutrophils 76.9 %    Lymphocytes 14.8 %    Monocytes 5.9 %    Eosinophils 1.3 %    Basophils 0.8 %    Immature Granulocytes 0.3 %    Segs Absolute 5.9 1 - 7 thou/mm3    Lymphocytes Absolute 1.1 1.0 - 4.8 thou/mm3    Monocytes Absolute 0.5 0.4 - 1.3 thou/mm3    Eosinophils Absolute 0.1 0.0 - 0.4 thou/mm3    Basophils Absolute 0.1 0.0 - 0.1 thou/mm3    Immature Grans (Abs) 0.02 0.00 - 0.07 thou/mm3    nRBC 0 /100 wbc       RADIOLOGY REPORTS  CT CHEST WO CONTRAST   Final Result   Impression:   Small partially loculated left pleural effusion containing a small amount    of gas. Empyema should be excluded. There is a trace left apical pneumothorax benign emphysema. Indeterminate small ill-defined density right upper lobe recommend    documentation of long-term stability. Other findings as above. This document has been electronically signed by: Aimee Guaman MD on    06/01/2021 08:35 PM      All CTs at this facility use dose modulation techniques and iterative    reconstructions, and/or weight-based dosing   when appropriate to reduce radiation to a low as reasonably achievable. XR CHEST (2 VW)   Final Result   Impression:   Probable subpulmonic pleural effusion on the left. Probable minimal left apical pneumothorax recommend follow-up.       This document has been electronically signed by: Aimee Guaman MD on    06/01/2021 06:53 PM             [address] RATIONAL[facility]     Differential diagnsis: wound check    Actions: CBC and CXR    ED Vitals:  Vitals:    06/01/21 1739   BP: 125/76   Pulse: 75   Resp: 16   Temp: 98.2 °F (36.8 °C)   TempSrc: Oral   SpO2: 96%   Weight: 120 lb (54.4 kg)   Height: 5' 6\" (1.676 m)     CBC is 7.7. Chest x-ray shows left side small pleural effusion and minimal left apical pneumothorax. CT chest : Small partially loculated left pleural effusion containing a small amount of gas. Empyema should be excluded. There is a trace left apical pneumothorax benign emphysema. Indeterminate small ill-defined density right upper lobe recommend documentation of long-term stability. I reviewed the CT images, I favor the so-called pneumothorax is more like air trapped in the mediastinum. Patient has no chest pain, no shortness of breath. I feel close outpatient follow-up with his surgeon is acceptable. She is discharged in stable conditions. Dr. Estefany Moyer is out of town, I sent him a message via [email or web address]. She is prescribed Bactroban and Keflex. Office follow up by her surgeon Dr. Estefany Moyer in 3-5 days. CRITICAL CARE   None    CONSULTS   None    PROCEDURES   None    FINAL IMPRESSION AND DISPOSITION      1. Encounter for postoperative wound check    2. Pneumomediastinum ([address]        Home    PATIENT REFERRED TO:  Shailesh Esparza MD  [facility]   [place]. [address] [place] [address]  [phone]    In 3 days  ED discharge follow up      DISCHARGE MEDICATIONS:  Discharge Medication List as of 6/1/2021  8:56 PM      START taking these medications    Details   cephALEXin (KEFLEX) 500 MG capsule Take 1 capsule by mouth 4 times daily for 7 days, Disp-28 capsule, R-0Normal      mupirocin (BACTROBAN) 2 % cream Apply topically 3 times daily. , Disp-15 g, R-0, Normal             (Please note that portions of this note were completed with a voice recognition program.  Efforts were made to edit the dictations but occasionally words aremis-transcribed.)    MD Kristi Gonzalez MD  06/02/21 [id number]

## 2021-06-03 ENCOUNTER — OFFICE VISIT (OUTPATIENT)
Dept: ONCOLOGY | Age: 61
End: 2021-06-03
Payer: COMMERCIAL

## 2021-06-03 ENCOUNTER — HOSPITAL ENCOUNTER (OUTPATIENT)
Dept: INFUSION THERAPY | Age: 61
Discharge: HOME | End: 2021-06-03
Payer: COMMERCIAL

## 2021-06-03 ENCOUNTER — HOSPITAL ENCOUNTER (EMERGENCY)
Age: 61
Discharge: HOME | End: 2021-06-03
Attending: EMERGENCY MEDICINE
Payer: COMMERCIAL

## 2021-06-03 VITALS
DIASTOLIC BLOOD PRESSURE: 88 MMHG | HEART RATE: 61 BPM | RESPIRATION RATE: 17 BRPM | TEMPERATURE: 98 F | SYSTOLIC BLOOD PRESSURE: 133 MMHG | WEIGHT: 120 LBS | HEIGHT: 66 IN | BODY MASS INDEX: 19.29 KG/M2 | OXYGEN SATURATION: 98 %

## 2021-06-03 VITALS
SYSTOLIC BLOOD PRESSURE: 144 MMHG | OXYGEN SATURATION: 96 % | RESPIRATION RATE: 18 BRPM | HEART RATE: 75 BPM | HEIGHT: 66 IN | DIASTOLIC BLOOD PRESSURE: 78 MMHG | WEIGHT: 120 LBS | TEMPERATURE: 98.2 F | BODY MASS INDEX: 19.29 KG/M2

## 2021-06-03 VITALS
RESPIRATION RATE: 18 BRPM | OXYGEN SATURATION: 96 % | SYSTOLIC BLOOD PRESSURE: 144 MMHG | HEIGHT: 66 IN | TEMPERATURE: 98.2 F | BODY MASS INDEX: 19.29 KG/M2 | HEART RATE: 75 BPM | DIASTOLIC BLOOD PRESSURE: 78 MMHG | WEIGHT: 120 LBS

## 2021-06-03 DIAGNOSIS — C34.92 PRIMARY ADENOCARCINOMA OF LEFT LUNG (HCC): Primary | ICD-10-CM

## 2021-06-03 DIAGNOSIS — R89.9 ABNORMAL LABORATORY TEST RESULT: Primary | ICD-10-CM

## 2021-06-03 DIAGNOSIS — C34.12 PRIMARY CANCER OF LEFT UPPER LOBE OF LUNG (HCC): Primary | ICD-10-CM

## 2021-06-03 DIAGNOSIS — C34.92 PRIMARY ADENOCARCINOMA OF LEFT LUNG (HCC): ICD-10-CM

## 2021-06-03 LAB
ABSOLUTE IMMATURE GRANULOCYTE: 0.02 THOU/MM3 (ref 0–0.07)
ALBUMIN SERPL-MCNC: 4.1 G/DL (ref 3.5–5.1)
ALP BLD-CCNC: 98 U/L (ref 38–126)
ALT SERPL-CCNC: 13 U/L (ref 11–66)
ANION GAP SERPL CALCULATED.3IONS-SCNC: 12 MEQ/L (ref 8–16)
AST SERPL-CCNC: 13 U/L (ref 5–40)
BASINOPHIL, AUTOMATED: 1 % (ref 0–3)
BASOPHILS # BLD: 1 %
BASOPHILS ABSOLUTE: 0.1 THOU/MM3 (ref 0–0.1)
BASOPHILS ABSOLUTE: 0.1 THOU/MM3 (ref 0–0.1)
BILIRUB SERPL-MCNC: 0.2 MG/DL (ref 0.3–1.2)
BILIRUBIN DIRECT: < 0.2 MG/DL (ref 0–0.3)
BUN BLDV-MCNC: 9 MG/DL (ref 7–22)
BUN, WHOLE BLOOD: 8 MG/DL (ref 8–26)
CALCIUM SERPL-MCNC: 9.9 MG/DL (ref 8.5–10.5)
CHLORIDE BLD-SCNC: 105 MEQ/L (ref 98–111)
CHLORIDE, WHOLE BLOOD: 108 MEQ/L (ref 98–109)
CO2: 25 MEQ/L (ref 23–33)
CREAT SERPL-MCNC: 0.7 MG/DL (ref 0.4–1.2)
CREATININE, WHOLE BLOOD: 0.9 MG/DL (ref 0.5–1.2)
EKG ATRIAL RATE: 63 BPM
EKG P AXIS: 49 DEGREES
EKG P-R INTERVAL: 114 MS
EKG Q-T INTERVAL: 386 MS
EKG QRS DURATION: 88 MS
EKG QTC CALCULATION (BAZETT): 395 MS
EKG R AXIS: 31 DEGREES
EKG T AXIS: 70 DEGREES
EKG VENTRICULAR RATE: 63 BPM
EOSINOPHIL # BLD: 1.4 %
EOSINOPHILS ABSOLUTE: 0.1 THOU/MM3 (ref 0–0.4)
EOSINOPHILS ABSOLUTE: 0.1 THOU/MM3 (ref 0–0.4)
EOSINOPHILS RELATIVE PERCENT: 2 % (ref 0–4)
ERYTHROCYTE [DISTWIDTH] IN BLOOD BY AUTOMATED COUNT: 12.6 % (ref 11.5–14.5)
ERYTHROCYTE [DISTWIDTH] IN BLOOD BY AUTOMATED COUNT: 45.5 FL (ref 35–45)
GFR SERPL CREATININE-BSD FRML MDRD: 85 ML/MIN/1.73M2
GFR, ESTIMATED: 68 ML/MIN/1.73M2
GLUCOSE BLD-MCNC: 87 MG/DL (ref 70–108)
GLUCOSE, WHOLE BLOOD: 99 MG/DL (ref 70–108)
HCT VFR BLD CALC: 39.2 % (ref 37–47)
HCT VFR BLD CALC: 39.3 % (ref 37–47)
HEMOGLOBIN: 12.2 GM/DL (ref 12–16)
HEMOGLOBIN: 12.8 GM/DL (ref 12–16)
IMMATURE GRANS (ABS): 0.01 THOU/MM3 (ref 0–0.07)
IMMATURE GRANULOCYTES: 0 %
IMMATURE GRANULOCYTES: 0.2 %
IONIZED CALCIUM, WHOLE BLOOD: 1.28 MMOL/L (ref 1.12–1.32)
LYMPHOCYTES # BLD: 16 % (ref 15–47)
LYMPHOCYTES # BLD: 17 %
LYMPHOCYTES ABSOLUTE: 0.9 THOU/MM3 (ref 1–4.8)
LYMPHOCYTES ABSOLUTE: 0.9 THOU/MM3 (ref 1–4.8)
MAGNESIUM: 2.4 MG/DL (ref 1.6–2.4)
MCH RBC QN AUTO: 30.5 PG (ref 26–33)
MCH RBC QN AUTO: 31.9 PG (ref 26–33)
MCHC RBC AUTO-ENTMCNC: 31 GM/DL (ref 32.2–35.5)
MCHC RBC AUTO-ENTMCNC: 32.7 GM/DL (ref 32.2–35.5)
MCV RBC AUTO: 98 FL (ref 81–99)
MCV RBC AUTO: 98.3 FL (ref 81–99)
MONOCYTES # BLD: 5.9 %
MONOCYTES ABSOLUTE: 0.3 THOU/MM3 (ref 0.4–1.3)
MONOCYTES ABSOLUTE: 0.4 THOU/MM3 (ref 0.4–1.3)
MONOCYTES: 7 % (ref 0–12)
NUCLEATED RED BLOOD CELLS: 0 /100 WBC
OSMOLALITY CALCULATION: 281.2 MOSMOL/KG (ref 275–300)
PDW BLD-RTO: 12.3 % (ref 11.5–14.5)
PLATELET # BLD: 243 THOU/MM3 (ref 130–400)
PLATELET # BLD: 278 THOU/MM3 (ref 130–400)
PMV BLD AUTO: 10.1 FL (ref 9.4–12.4)
PMV BLD AUTO: 10.9 FL (ref 9.4–12.4)
POTASSIUM REFLEX MAGNESIUM: 5.2 MEQ/L (ref 3.5–5.2)
POTASSIUM, WHOLE BLOOD: 6 MEQ/L (ref 3.5–4.9)
RBC # BLD: 4 MILL/MM3 (ref 4.2–5.4)
RBC # BLD: 4.01 MILL/MM3 (ref 4.2–5.4)
SEG NEUTROPHILS: 73 % (ref 43–75)
SEG NEUTROPHILS: 74.5 %
SEGMENTED NEUTROPHILS ABSOLUTE COUNT: 3.8 THOU/MM3 (ref 1.8–7.7)
SEGMENTED NEUTROPHILS ABSOLUTE COUNT: 3.8 THOU/MM3 (ref 1.8–7.7)
SODIUM BLD-SCNC: 142 MEQ/L (ref 135–145)
SODIUM, WHOLE BLOOD: 139 MEQ/L (ref 138–146)
TOTAL CO2, WHOLE BLOOD: 28 MEQ/L (ref 23–33)
TOTAL PROTEIN: 7.7 G/DL (ref 6.1–8)
WBC # BLD: 5.1 THOU/MM3 (ref 4.8–10.8)
WBC # BLD: 5.2 THOU/MM3 (ref 4.8–10.8)

## 2021-06-03 PROCEDURE — 96372 THER/PROPH/DIAG INJ SC/IM: CPT

## 2021-06-03 PROCEDURE — 99211 OFF/OP EST MAY X REQ PHY/QHP: CPT

## 2021-06-03 PROCEDURE — 80076 HEPATIC FUNCTION PANEL: CPT

## 2021-06-03 PROCEDURE — 99215 OFFICE O/P EST HI 40 MIN: CPT | Performed by: INTERNAL MEDICINE

## 2021-06-03 PROCEDURE — 36415 COLL VENOUS BLD VENIPUNCTURE: CPT

## 2021-06-03 PROCEDURE — 85025 COMPLETE CBC W/AUTO DIFF WBC: CPT

## 2021-06-03 PROCEDURE — 83735 ASSAY OF MAGNESIUM: CPT

## 2021-06-03 PROCEDURE — 6360000002 HC RX W HCPCS: Performed by: INTERNAL MEDICINE

## 2021-06-03 PROCEDURE — 80048 BASIC METABOLIC PNL TOTAL CA: CPT

## 2021-06-03 PROCEDURE — 80047 BASIC METABLC PNL IONIZED CA: CPT

## 2021-06-03 PROCEDURE — 99283 EMERGENCY DEPT VISIT LOW MDM: CPT

## 2021-06-03 PROCEDURE — 93005 ELECTROCARDIOGRAM TRACING: CPT | Performed by: STUDENT IN AN ORGANIZED HEALTH CARE EDUCATION/TRAINING PROGRAM

## 2021-06-03 RX ORDER — HEPARIN SODIUM (PORCINE) LOCK FLUSH IV SOLN 100 UNIT/ML 100 UNIT/ML
500 SOLUTION INTRAVENOUS PRN
Status: CANCELLED | OUTPATIENT
Start: 2021-06-16

## 2021-06-03 RX ORDER — SODIUM CHLORIDE 9 MG/ML
20 INJECTION, SOLUTION INTRAVENOUS ONCE
Status: CANCELLED | OUTPATIENT
Start: 2021-06-16 | End: 2021-06-16

## 2021-06-03 RX ORDER — HEPARIN SODIUM (PORCINE) LOCK FLUSH IV SOLN 100 UNIT/ML 100 UNIT/ML
500 SOLUTION INTRAVENOUS PRN
Status: CANCELLED | OUTPATIENT
Start: 2021-06-03

## 2021-06-03 RX ORDER — PROCHLORPERAZINE MALEATE 10 MG
10 TABLET ORAL EVERY 6 HOURS PRN
Qty: 120 TABLET | Refills: 3 | Status: SHIPPED | OUTPATIENT
Start: 2021-06-03 | End: 2021-08-18

## 2021-06-03 RX ORDER — OXYCODONE HYDROCHLORIDE 5 MG/1
5 TABLET ORAL EVERY 4 HOURS PRN
COMMUNITY
Start: 2021-05-13 | End: 2021-10-25 | Stop reason: ALTCHOICE

## 2021-06-03 RX ORDER — FOLIC ACID 1 MG/1
1 TABLET ORAL DAILY
Qty: 30 TABLET | Refills: 2 | Status: SHIPPED | OUTPATIENT
Start: 2021-06-03 | End: 2021-06-23 | Stop reason: SDUPTHER

## 2021-06-03 RX ORDER — METHYLPREDNISOLONE SODIUM SUCCINATE 125 MG/2ML
125 INJECTION, POWDER, LYOPHILIZED, FOR SOLUTION INTRAMUSCULAR; INTRAVENOUS ONCE
Status: CANCELLED | OUTPATIENT
Start: 2021-06-16 | End: 2021-06-16

## 2021-06-03 RX ORDER — SODIUM CHLORIDE 9 MG/ML
25 INJECTION, SOLUTION INTRAVENOUS PRN
Status: CANCELLED | OUTPATIENT
Start: 2021-06-03

## 2021-06-03 RX ORDER — DEXAMETHASONE 4 MG/1
4 TABLET ORAL SEE ADMIN INSTRUCTIONS
Qty: 30 TABLET | Refills: 0 | Status: SHIPPED | OUTPATIENT
Start: 2021-06-03 | End: 2021-07-07

## 2021-06-03 RX ORDER — DIPHENHYDRAMINE HYDROCHLORIDE 50 MG/ML
50 INJECTION INTRAMUSCULAR; INTRAVENOUS ONCE
Status: CANCELLED | OUTPATIENT
Start: 2021-06-16 | End: 2021-06-16

## 2021-06-03 RX ORDER — PALONOSETRON 0.05 MG/ML
0.25 INJECTION, SOLUTION INTRAVENOUS ONCE
Status: CANCELLED | OUTPATIENT
Start: 2021-06-16

## 2021-06-03 RX ORDER — LIDOCAINE AND PRILOCAINE 25; 25 MG/G; MG/G
CREAM TOPICAL
Qty: 1 TUBE | Refills: 1 | Status: SHIPPED | OUTPATIENT
Start: 2021-06-03 | End: 2021-10-25 | Stop reason: ALTCHOICE

## 2021-06-03 RX ORDER — SODIUM CHLORIDE 0.9 % (FLUSH) 0.9 %
5-40 SYRINGE (ML) INJECTION PRN
Status: CANCELLED | OUTPATIENT
Start: 2021-06-16

## 2021-06-03 RX ORDER — FOLIC ACID 1 MG/1
1 TABLET ORAL DAILY
Qty: 30 TABLET | Refills: 3 | Status: SHIPPED | OUTPATIENT
Start: 2021-06-03 | End: 2021-09-15 | Stop reason: ALTCHOICE

## 2021-06-03 RX ORDER — SODIUM CHLORIDE 9 MG/ML
INJECTION, SOLUTION INTRAVENOUS CONTINUOUS
Status: CANCELLED | OUTPATIENT
Start: 2021-06-16

## 2021-06-03 RX ORDER — DEXAMETHASONE 4 MG/1
TABLET ORAL
Qty: 6 TABLET | Refills: 3 | Status: SHIPPED | OUTPATIENT
Start: 2021-06-03 | End: 2021-10-25 | Stop reason: ALTCHOICE

## 2021-06-03 RX ORDER — ONDANSETRON 8 MG/1
8 TABLET, ORALLY DISINTEGRATING ORAL EVERY 8 HOURS PRN
Qty: 10 TABLET | Refills: 2 | Status: SHIPPED | OUTPATIENT
Start: 2021-06-03 | End: 2021-07-14 | Stop reason: SDUPTHER

## 2021-06-03 RX ORDER — EPINEPHRINE 1 MG/ML
0.3 INJECTION, SOLUTION, CONCENTRATE INTRAVENOUS PRN
Status: CANCELLED | OUTPATIENT
Start: 2021-06-16

## 2021-06-03 RX ORDER — CYANOCOBALAMIN 1000 UG/ML
1000 INJECTION INTRAMUSCULAR; SUBCUTANEOUS ONCE
Status: CANCELLED | OUTPATIENT
Start: 2021-06-03 | End: 2021-06-03

## 2021-06-03 RX ORDER — CYANOCOBALAMIN 1000 UG/ML
1000 INJECTION INTRAMUSCULAR; SUBCUTANEOUS ONCE
Status: COMPLETED | OUTPATIENT
Start: 2021-06-03 | End: 2021-06-03

## 2021-06-03 RX ORDER — SODIUM CHLORIDE 0.9 % (FLUSH) 0.9 %
5-40 SYRINGE (ML) INJECTION PRN
Status: CANCELLED | OUTPATIENT
Start: 2021-06-03

## 2021-06-03 RX ORDER — SODIUM CHLORIDE 9 MG/ML
25 INJECTION, SOLUTION INTRAVENOUS PRN
Status: CANCELLED | OUTPATIENT
Start: 2021-06-16

## 2021-06-03 RX ADMIN — CYANOCOBALAMIN 1000 MCG: 1000 INJECTION, SOLUTION INTRAMUSCULAR at 11:29

## 2021-06-03 NOTE — ED PROVIDER NOTES
[address] [address]          Pt Name: Jim Garza  MRN: [id number]  [place] 1960  Date of evaluation: 6/3/2021  Treating Resident Physician: Hira Lemon DO  Supervising Physician:  Dr. Jeane Milan       Chief Complaint   Patient presents with    Abnormal Lab     History obtained from the patient. HISTORY OF PRESENT ILLNESS    HPI  Jim Garza is a 62 y.o. female who presents to the emergency department for evaluation of hyperkalemia. The patient has no other acute complaints at this time. Ms. Shelbi Goncalves was seen by her oncologist earlier today and found to have abnormal serum potassium at the time. She denies any feelings of palpitations, chest pain, abdominal pain, muscle weakness, muscle cramps, shortness of breath. She states that she did have some nausea/vomiting and tremor last night, but these are chronic issues for her that she has intermittently. Therefore, she did not think anything of them. She says that they are no different from her usual.    She denies any new medications. She was seen in our ED on 6/1 for concerns regarding her chest tube site. She denies any ongoing pain or problems at that site. REVIEW OF SYSTEMS   Review of Systems  Constitutional: Negative for chills and fever. HENT: Negative for congestion and sore throat. Eyes: Negative for visual disturbance. Respiratory: Negative for cough and shortness of breath. Cardiovascular: Negative for chest pain. Gastrointestinal: Negative for abdominal pain. Chronic nausea and vomiting last night. Has since resolved. Genitourinary: Negative for dysuria. Skin: Negative for rash. No bleeding or drainage from chest tube site. Neurological: Negative for dizziness, light-headedness and headaches. Some tremor last night, which is chronic for her.   Unchanged from usual.  Psychiatric/Behavioral: The patient is not nervous/anxious. PAST MEDICAL AND SURGICAL HISTORY     Past Medical History:   Diagnosis Date    Arthritis     COPD (chronic obstructive pulmonary disease) ([facility] [address]     Dr Rob Weaver [facility]     Dr Mihai Archer     Past Surgical History:   Procedure Laterality Date    BRONCHOSCOPY N/A 5/6/2021    BRONCHOSCOPY performed by Ab Quintanilla MD at [address]      x4    CHOLECYSTECTOMY      COLONOSCOPY  07/11/2016    CT NEEDLE BIOPSY LUNG PERCUTANEOUS  03/26/2021    CT NEEDLE BIOPSY LUNG PERCUTANEOUS 3/26/2021 [facility] CT SCAN    HYSTERECTOMY      LUNG REMOVAL, TOTAL Left 5/4/2021    ROBOTIC ASSISTED LEFT UPPER LOBECTOMY AND MEDIASTINAL DISSECTION, POSSIBLE OPEN AND CRYOTHERAPY OF INTERCOSTAL NERVES 5, 6, 7 & 8 performed by Siria Olivas MD at [address]  07/11/2016         MEDICATIONS   No current facility-administered medications for this encounter. Current Outpatient Medications:     oxyCODONE (ROXICODONE) 5 MG immediate release tablet, Take 5 mg by mouth every 4 hours as needed. , Disp: , Rfl:     vitamin D3 (CHOLECALCIFEROL) 125 MCG (5000 UT) TABS tablet, Cholecalciferol (Vitamin D3) (Vitamin D3) 125 mcg (5,000 unit) Tablet Active 125 MCG PO Daily April 22nd, 2021 4:50pm, Disp: , Rfl:     prochlorperazine (COMPAZINE) 10 MG tablet, Take 1 tablet by mouth every 6 hours as needed (nausea), Disp: 120 tablet, Rfl: 3    ondansetron (ZOFRAN-ODT) 8 MG TBDP disintegrating tablet, Place 1 tablet under the tongue every 8 hours as needed for Nausea or Vomiting, Disp: 10 tablet, Rfl: 2    dexamethasone (DECADRON) 4 MG tablet, 4 mg Decadron take 1 tablet p.o. twice daily x3 days starting 1 day before chemotherapy with Alimta, Disp: 6 tablet, Rfl: 3    lidocaine-prilocaine (EMLA) 2.5-2.5 % cream, Apply topically as needed. , Disp: 1 Tube, Rfl: 1    folic acid (FOLVITE) 1 MG tablet, Take 1 tablet by mouth daily, Disp: 30 tablet, Rfl: 3   folic acid (FOLVITE) 1 MG tablet, Take 1 tablet by mouth daily for 30 doses Start 7 days before first scheduled dose and continue for 21 days after last dose of PEMEtrexed. , Disp: 30 tablet, Rfl: 2    dexamethasone (DECADRON) 4 MG tablet, Take 1 tablet by mouth See Admin Instructions Take 1 tab twice daily for 3 days starting the day before scheduled PEMEtrexed. , Disp: 30 tablet, Rfl: 0    cephALEXin (KEFLEX) 500 MG capsule, Take 1 capsule by mouth 4 times daily for 7 days, Disp: 28 capsule, Rfl: 0    mupirocin (BACTROBAN) 2 % cream, Apply topically 3 times daily. , Disp: 15 g, Rfl: 0    acetaminophen (TYLENOL) 500 MG tablet, Take 1 tablet by mouth 4 times daily as needed for Pain, Disp: 120 tablet, Rfl: 0    ketorolac (TORADOL) 10 MG tablet, Take 1 tablet by mouth every 6 hours as needed for Pain, Disp: 20 tablet, Rfl: 0    methocarbamol (ROBAXIN-750) 750 MG tablet, Take 2 tablets by mouth 3 times daily for 10 days, Disp: 60 tablet, Rfl: 0    senna (SENOKOT) 8.6 MG tablet, Take 1 tablet by mouth daily, Disp: , Rfl:     docusate sodium (COLACE) 100 MG capsule, Take 100 mg by mouth 2 times daily, Disp: , Rfl:     albuterol sulfate HFA (VENTOLIN HFA) 108 (90 Base) MCG/ACT inhaler, Inhale 2 puffs into the lungs 4 times daily as needed for Wheezing, Disp: 1 Inhaler, Rfl: 11      SOCIAL HISTORY     Social History     Social History Narrative    Not on file     Social History     Tobacco Use    Smoking status: Current Every Day Smoker     Packs/day: 1.00     Years: 45.00     Pack years: 45.00     Types: Cigarettes    Smokeless tobacco: Never Used   Vaping Use    Vaping Use: Some days    Substances: Nicotine    Devices: Disposable   Substance Use Topics    Alcohol use: Never    Drug use: Yes     Types: Marijuana         ALLERGIES     Allergies   Allergen Reactions    Tylenol With Codeine #3 [Acetaminophen-Codeine] Nausea And Vomiting    Codeine Nausea And Vomiting         FAMILY HISTORY     Family History   Problem Relation Age of Onset    Other Mother         aneursym    No Known Problems Father     Depression Sister     Heart Disease Brother     Diabetes Brother     Other Brother         Lung disease    Lung Cancer Maternal Grandfather     Other Brother         Crohns    Macular Degen Brother     Depression Sister     Other Sister         Blood clots    Arthritis Sister     Brain Cancer Paternal Uncle          PREVIOUS RECORDS   Previous records reviewed. PHYSICAL EXAM     ED Triage Vitals [06/03/21 1156]   BP Temp Temp Source Pulse Resp SpO2 Height Weight   (!) 152/96 98 °F (36.7 °C) Oral 67 16 99 % 5' 6\" (1.676 m) 120 lb (54.4 kg)     Initial vital signs and nursing assessment reviewed and normal. Body mass index is 19.37 kg/m². Pulsoximetry is normal per my interpretation. Additional Vital Signs:  Vitals:    06/03/21 1252   BP: 133/88   Pulse: 61   Resp: 17   Temp:    SpO2: 98%       Physical Exam  Constitutional:       General: she is not in acute distress. Appearance: Normal appearance. HENT:      Head: Normocephalic. Right Ear: External ear normal.      Left Ear: External ear normal.      Nose: Nose normal.      Mouth/Throat:      Mouth: Mucous membranes are moist.   Eyes:      General: No scleral icterus. Extraocular Movements: Extraocular movements intact. Neck:      Musculoskeletal: Normal range of motion. Cardiovascular:      Rate and Rhythm: Normal rate and regular rhythm. Pulses: Normal pulses. Heart sounds: Normal heart sounds. Pulmonary:      Effort: Pulmonary effort is normal.      Breath sounds: Normal breath sounds. Abdominal:      General: Abdomen is flat. There is no distension. Palpations: Abdomen is soft. Musculoskeletal: Normal range of motion. Skin:     General: Skin is warm and dry. Neurological:      Mental Status: she is alert. Motor: No weakness.    Psychiatric:         Mood and Affect: Mood normal. MEDICAL DECISION MAKING   Initial Assessment:   1. Abnormal Lab - Hyperkalemia    Plan:    EKG, BMP, CBC, urine osm        ED RESULTS   Laboratory results:  Labs Reviewed   CBC WITH AUTO DIFFERENTIAL - Abnormal; Notable for the following components:       Result Value    RBC 4.00 (*)     MCHC 31.0 (*)     RDW-SD 45.5 (*)     Lymphocytes Absolute 0.9 (*)     Monocytes Absolute 0.3 (*)     All other components within normal limits   GLOMERULAR FILTRATION RATE, ESTIMATED - Abnormal; Notable for the following components:    Est, Glom Filt Rate 85 (*)     All other components within normal limits   BASIC METABOLIC PANEL W/ REFLEX TO MG FOR LOW K   ANION GAP   OSMOLALITY       Radiologic studies results:  No orders to display       ED Medications administered this visit: Medications - No data to display      ED COURSE     ED Course as of Jun 03 1340   Thu Jun 03, 2021   1329 Repeat potassium 5.2 here. We are unable to run low blood potassium in our lab. Potassium: 5.2 [KL]   1329 Sodium and calcium normal.   Basic Metabolic Panel w/ Reflex to MG:    Sodium 142   Potassium 5.2   Chloride 105   CO2 25   Glucose 87   BUN 9   Creatinine 0.7   Calcium 9.9 [KL]   1332 Patient resting comfortably in bed. No complaints. To bathroom now. [KL]      ED Course User Index  [KL] Ryan Abarca DO       Strict return precautions and follow up instructions were discussed with the patient prior to discharge, with which the patient agrees. MEDICATION CHANGES     New Prescriptions    No medications on file         FINAL DISPOSITION     Final diagnoses:   Abnormal laboratory test result     Condition: condition: good  Dispo: Discharge to home      This transcription was electronically signed. Parts of this transcriptions may have been dictated by use of voice recognition software and electronically transcribed, and parts may have been transcribed with the assistance of an ED scribe.  The transcription may contain errors not detected in proofreading. Please refer to my supervising physician's documentation if my documentation differs.     Electronically Signed: Michael High DO, 06/03/21, 1:40 PM         Michael High DO  Resident  06/03/21 6505

## 2021-06-03 NOTE — PROGRESS NOTES
Patient tolerated B12 injection without any complications. Last vital signs:   BP (!) 144/78   Pulse 75   Temp 98.2 °F (36.8 °C) (Oral)   Resp 18   Ht 5' 6\" (1.676 m)   Wt 120 lb (54.4 kg)   LMP  (LMP Unknown)   SpO2 96%   BMI 19.37 kg/m²     Patient instructed to the emergency department for further evaluation related to elevated potassium level. Discharge instructions given to patient. Verbalizes understanding. Ambulated off unit per self, with belongings.

## 2021-06-03 NOTE — PLAN OF CARE
Problem: Musculor/Skeletal Functional Status  Goal: Absence of falls  Outcome: Met This Shift  Note: Patient free from falls while in [facility] Oncology. Intervention: Fall precautions  Note: Patient assessed for fall risk on admission to [facility]. Fall band placed on patient. Discussed the need to use the call light for assistance prior to getting up out of chair/bed. Problem: Intellectual/Education/Knowledge Deficit  Goal: Teaching initiated upon admission  Outcome: Met This Shift  Note: Patient verbalizes understanding to verbal information given on B12 injection ,action and possible side effects. Aware to call MD if develop complications. Intervention: Verbal/written education provided  Note: B12 injection reviewed, patient verbalizes understanding of medication being administered and potential side effects. Problem: Discharge Planning:  Goal: Discharged to appropriate level of care  Description: Discharged to appropriate level of care  Outcome: Met This Shift  Note: Verbalized understanding of discharge instructions, follow-up appointments, and when to call the physician. Intervention: Assess knowledge level of healthcare  Description: Assess knowledge level of healthcare - REMINDER(s): Knowledge level, assistive device, Knowledge level, health promotion, Knowledge level, infection control measures  Note: Discuss understanding of discharge instructions,follow-up appointments, and when to call the physician. Care plan reviewed with patient. Patient verbalize understanding of the plan of care and contribute to goal setting.

## 2021-06-03 NOTE — PROGRESS NOTES
[address] CANCER [address] [place] [address]  Dept: [phone]  Loc: [phone]   Hematology/Oncology Consult (Clinic)        6/3/21    Judi Chris   1960     No ref. provider found   Deedee Dutton MD     DIAGNOSIS:  -  Left upper lobe cavitary adenocarcinoma of the lung. 3.9 x 2.1 cm. PET scan negative. Bronchoscopy EBUS-isolated station 11 lymph node with a moderate to poorly differentiated adenocarcinoma. 4L node with a minute focus of atypical cells on path review felt to be benign. Clinical stage: T2a N1M0 / IIB. Pathologic stage: T1 cN1 M0 /IIB (poorly differentiated, 3 of 7 nodes positive including extranodal extension. TREATMENT:  -Left upper lobectomy and mediastinal node dissection scheduled by Dr. Marcel Shahid 5/3/2021  -Adjuvant chemotherapy planned; recommend cis-platinum 75 mg meter squared day 1 and pemetrexed 500 mg meter squared day 1 every 21 days x 4 cycles . Tentative start date 6/16/2021    PARAMETERS:  -Chest CT without contrast 3/12/2021  -PET/CT 4/14/2021  -Brain MRI with and without contrast 4/9/2021    SUBJECTIVE: Patient is down to about 5 cigarettes/day. No new complaints. She had a prolonged postlumpectomy hospital stay including go to [facility] because of an air leak. She is doing better but does have a skin infection at the lateral left breast at a surgical wound site that is healing and starting to scab over with no discharge. She denies any neuropathy. Denies any history of renal insufficiency. Risks and benefits of adjuvant therapy reviewed she agrees to proceed. She also agrees to chemotherapy teaching. HPI: Beth Alvarez is a 72-year-old female with a 45-pack-year smoking habit (ongoing) with a chest CT without contrast on 3/12/2021 revealing a 3.9 x 2.1 cm cavitary mass in the left upper lung corresponding to the abnormality seen on the chest x-ray of 2/26/2021.   Radiologically very suspicious. Patient underwent a needle biopsy on 3/26 revealing adenocarcinoma. PET scan shows no distant mets or adenopathy. PFT's done and reviewed below. She is waiting  for a consult with Dr Luis Antonio Sanchez of Surgery. Reports 30 # weight loss ove 9 months; unintentional. Normal weight @ 145. Appetite has remained good. Chronic cough unchanged, no blood. No chest pain or pain elsewhere. Denied increased sob. Average 1ppd x 45 years; now about 5 cigs/day . Motivated to stop but failed Nicotine replacement products. Chantix not attempted thus far. ROS:  Review of Systems 14 point negative except as above. Limited to unintentional weight loss. Chronic unchanged nonproductive cough and shortness of breath with activity also unchanged.     PMH:   Past Medical History:   Diagnosis Date    Arthritis     COPD (chronic obstructive pulmonary disease) ([facility])     Dr Rivas [facility])     Dr Mihai Archer   Aneurysm    Social HX:   Social History     Socioeconomic History    Marital status:      Spouse name: Not on file    Number of children: Not on file    Years of education: Not on file    Highest education level: Not on file   Occupational History    Not on file   Tobacco Use    Smoking status: Current Every Day Smoker     Packs/day: 1.00     Years: 45.00     Pack years: 45.00     Types: Cigarettes    Smokeless tobacco: Never Used   Vaping Use    Vaping Use: Some days    Substances: Nicotine    Devices: Disposable   Substance and Sexual Activity    Alcohol use: Never    Drug use: Yes     Types: Marijuana    Sexual activity: Not on file   Other Topics Concern    Not on file   Social History Narrative    Not on file     Social Determinants of Health     Financial Resource Strain:     Difficulty of Paying Living Expenses:    Food Insecurity:     Worried About Running Out of Food in the Last Year:     Sylvia of Food in the Last Year:    Transportation Needs:     Lack of Transportation (Medical):  Lack of Transportation (Non-Medical):    Physical Activity:     Days of Exercise per Week:     Minutes of Exercise per Session:    Stress:     Feeling of Stress :    Social Connections:     Frequency of Communication with Friends and Family:     Frequency of Social Gatherings with Friends and Family:     Attends Mandaen Services:     Active Member of Clubs or Organizations:     Attends Club or Organization Meetings:     Marital Status:    Intimate Partner Violence:     Fear of Current or Ex-Partner:     Emotionally Abused:     Physically Abused:     Sexually Abused:       45 pack year smoking history, currently down to 5 cigarettes/day and attempting to stop. Spouse: Maryann Cosme [phone]    Phone: [phone] [address]     Employment: Works at [employer] but cannot I as a ta    Immunizations: There is no immunization history on file for this patient. Health Screenings:  Mammogram:  No results found for this or any previous visit. Results for orders placed during the hospital encounter of 02/26/21   [facility] BOUCHRA DIGITAL SCREEN BILATERAL    Narrative IMPRESSION: Mammogram BI-RADS: Category 1: Negative    There is no mammographic evidence of malignancy. A 1 year   screening mammogram is recommended. The patient has been entered   into our database and they will receive a notification when they   are due for their next exam.      The patient was notified of the results. #[id number] - [facility] BOUCHRA DIGITAL SCREEN BILATERAL  BILATERAL DIGITAL SCREENING MAMMOGRAM 3D/2D WITH CAD: 2/26/2021  CLINICAL: Tomosynthesis. Screening. Comparison is made to exam dated:  2/17/2011 mammogram - .   [facility] Mammography [facility]. The tissue of both breasts is heterogeneously dense. This may   lower the sensitivity of mammography. Current study was also evaluated with a Computer Aided Detection   (CAD) system.     No significant masses, pulmonary disease) [facility])        Surgery:  Past Surgical History:   Procedure Laterality Date    BRONCHOSCOPY N/A 5/6/2021    BRONCHOSCOPY performed by Nai Ludwig MD at [address] [place] [address] Drive      x4   [facility] COLONOSCOPY  07/11/2016    CT NEEDLE BIOPSY LUNG PERCUTANEOUS  03/26/2021    CT NEEDLE BIOPSY LUNG PERCUTANEOUS 3/26/2021 [facility] CT SCAN    HYSTERECTOMY      LUNG REMOVAL, TOTAL Left 5/4/2021    ROBOTIC ASSISTED LEFT UPPER LOBECTOMY AND MEDIASTINAL DISSECTION, POSSIBLE OPEN AND CRYOTHERAPY OF INTERCOSTAL NERVES 5, 6, 7 & 8 performed by Troy Mishra MD at [facility]  07/11/2016        Medications:  Current Outpatient Medications   Medication Sig Dispense Refill    oxyCODONE (ROXICODONE) 5 MG immediate release tablet Take 5 mg by mouth every 4 hours as needed.  vitamin D3 (CHOLECALCIFEROL) 125 MCG (5000 UT) TABS tablet Cholecalciferol (Vitamin D3) (Vitamin D3) 125 mcg (5,000 unit) Tablet Active 125 MCG PO Daily April 22nd, 2021 4:50pm      prochlorperazine (COMPAZINE) 10 MG tablet Take 1 tablet by mouth every 6 hours as needed (nausea) 120 tablet 3    ondansetron (ZOFRAN-ODT) 8 MG TBDP disintegrating tablet Place 1 tablet under the tongue every 8 hours as needed for Nausea or Vomiting 10 tablet 2    dexamethasone (DECADRON) 4 MG tablet 4 mg Decadron take 1 tablet p.o. twice daily x3 days starting 1 day before chemotherapy with Alimta 6 tablet 3    lidocaine-prilocaine (EMLA) 2.5-2.5 % cream Apply topically as needed. 1 Tube 1    folic acid (FOLVITE) 1 MG tablet Take 1 tablet by mouth daily 30 tablet 3    cephALEXin (KEFLEX) 500 MG capsule Take 1 capsule by mouth 4 times daily for 7 days 28 capsule 0    mupirocin (BACTROBAN) 2 % cream Apply topically 3 times daily.  15 g 0    acetaminophen (TYLENOL) 500 MG tablet Take 1 tablet by mouth 4 times daily as needed for Pain 120 tablet 0    ketorolac (TORADOL) 10 MG tablet upper lobe with peripheral mild spiculation measuring 3.9 x 2.1 cm on axial image 34. This corresponds with previous chest x-ray dated 2021. This is concerning for malignancy until proven otherwise. Further evaluation could be obtained with PET/CT and/or biopsy. An infectious process is also not excluded.   2. There are additional smaller groundglass nodular densities within the right lung as detailed above.   3. Small pericardial effusion is noted. This is nonspecific.          Ct Needle Biopsy Lung Percutaneous  Result Date: 3/26/2021  1. Status post successful CT guided lung biopsy. 2. Followup chest radiographs will be obtained. **This report has been created using voice recognition software. It may contain minor errors which are inherent in voice recognition technology. ** Final report electronically signed by Dr. Everette Astudillo on 3/26/2021 4:02 PM    PROCEDURE: PET CT SKULL BASE TO MID THIGH       CLINICAL INFORMATION: 61-year-old woman with recently diagnosed adenocarcinoma of the left lung; initial treatment strategy.       Radiopharmaceutical: 13.8 mCi F-18 FDG, intravenously.       TECHNIQUE: PET/CT imaging was performed from the skull base to the midthigh levels using routine PET acquisition.       Injection site: Left antecubital fossa.       Time of FDG injection: 7:37 AM.       Serum glucose: 86 mg/dL at 7:35 AM.       Time of imagin:37 AM.       COMPARISON: CT chest 3/11/2021       FINDINGS:        Neck: No FDG avid cervical lymphadenopathy. Physiologic activity is noted bilaterally in the lower neck.       Chest: Cardiac size is normal. Atherosclerotic calcifications are present in the thoracic aorta and coronary arteries. There is stable aneurysmal dilation of the ascending thoracic aorta which measures 4.3 cm in maximum diameter (image 103). There is no    pleural or pericardial effusion. Emphysematous changes are again noted in the bilateral lungs.  In the regular mass with areas of recognition technology. ** Final report electronically signed by Dr. Jason Case MD on 4/9/2021 1:50 PM           PROCEDURES:  -Core biopsy cavitary left upper lobe mass 3/26/2021    -Full pulmonary function study with bronchodilators completed 4/6/2021  FEV1 1.68 (61% predicted 25% change with bronchodilator. FVC 2.62, 75% predicted 15% change with bronchodilator  FEV1 over FVC ratio 64  Interpretation-airway obstruction. FVC is reduced relative to the SVC indicating air trapping. While the TLC is within normal limits, the FRC, RV and RV/TLC ratio are increased. Follow administration of bronchodilators there is a good response. Reduced diffusing capacity indicates had mild loss of functional alveolar capillary surface. PATHOLOGY:   3/26/2021  Clinical Information: CAVITATING MASS SHANNON, SMOKER     FINAL DIAGNOSIS:   Lung, left, needle core biopsies:       Adenocarcinoma. Specimen:   5/4/21  BIOPSY OF LUNG, LEFT      Microscopic Examination:   The specimen consists of several scant fragments of lung tissue with   infiltrating malignant glands consistent with adenocarcinoma. Kreg Bull is   also anthracotic pigment. ADDENDUM REPORT 5/18/2021, 5/24/2021:       FINAL DIAGNOSIS:   A. Lymph nodes, level VII, resections:    Negative for neoplasm in 3 out of 3 lymph nodes. B. Left upper lobe of lung, resection:    Invasive, moderately to poorly differentiated adenocarcinoma, acinar   type.    Margins of resection are free of neoplasia.    Maximum tumor size = 2.6 cm.    Moderate emphysema.    pT1c, pN1. C. Lymph node, level V, resection:    Negative for neoplasm in one out of one lymph node. D. Lymph node, level X, resections:    Metastatic carcinoma in one out of 7 lymph nodes. E. Lymph nodes, level XI, resections:    Metastatic carcinoma in one out of 2 lymph nodes. F. Lymph node, level XI \"special\", resection:    Metastatic carcinoma in one out of one lymph node.    G. Lymph node, level VI, Regional Lymph Nodes (pN)   pN1: Metastasis in ipsilateral peribronchial and/or ipsilateral hilar          lymph nodes, and intrapulmonary nodes, including involvement by          direct extension   + Additional Pathologic Findings   Emphysema     [id number]   [id number]   [id number]                                                     <Sign Out Dr. Faith Weber M.D., F.C.A.P. NVML/ [id number] [place] [id number]  Printed on:  5/25/2021   Tiff Schrader [address]   [facility], [employer] [facility]   Original print date: 05/25/2021   Lab and Collection    Surgical Pathology - 5/4/2021  Result History    Surgical Pathology on 5/25/2021 - Result Edited   Result Information    Status: Edited Result - FINAL (Collected: 5/4/2021 08:04) Provider Status: Reviewed   All Reviewers List    Ronda Clemente MD on 5/26/2021 12:23   Jennifer Osman MD on 5/26/2021 08:10         Bronchoscopy EBUS 4/27/2021  Pulmonary at [facility]  4R node-negative,   station 7 node negative,   station 4L-minute group of atypical cells. Station 11 poorly differentiated moderately differentiated adenocarcinoma  Note: Dr. Marcel Shahid had the pathology reviewed elsewhere and it was felt that the station 4L lymph node unequivocally did NOT show malignancy. GENETICS:  Not applicable    MOLECULAR:  Not applicable i.e. patient does not have metastatic disease at this time    ASSESSMENT/PLAN:    1: Diagnosis: 71-year-old female with a heavy smoking history with 3.9 cm cavitary adenocarcinoma of the left upper lung. PET scan shows no hypermetabolic adenopathy or distant mets. Brain MRI shows no mets. [facility] bronchoscopy EBUS as reported above shows T2 a N1 M0/IIB disease. The L4 Station node on reviewed and felt to be benign. Status post left upper lobectomy with pathologic stage T1CN1 with 3 of 7+ nodes with a high-grade tumor. Stage IIb. 2) Prognosis / Disease Status: Potentially curable.   High risk for recurrence and qualifies for adjuvant chemotherapy. 3) Work-up:    Labs: CBC CMP   Imaging: All completed   Procedures: Request [id number]-h-Vefv for chemotherapy   Consults: None   Other: Schedule chemotherapy teaching. 4) Symptom Management: None needed at this time. 5) Supportive care provided. Level of care is appropriate. Teaching done today. It was as the importance of stopping smoking. She is also informed regarding the risks and benefits of adjuvant chemotherapy. She is high risk and meets NCCN criteria for adjuvant chemotherapy. She agrees to proceed. 6) Treatment goal:      Treatment plan:      Left upper lobectomy and mediastinal node dissection. To be followed by adjuvant chemotherapy. Adjuvant chemotherapy will be cis-platinum 75 mg meter squared day 1 along with Alimta 5 mg meter squared day 1 through 21 days x 4. Tentative start date is . Treatment plan submitted today. B12 1 mg today. 7) Medications reviewed. Prescriptions today: Compazine, Zofran ODT, folic acid 1 mg, EMLA cream, Decadron premed for Alimta              Orders Placed This Encounter   Medications    prochlorperazine (COMPAZINE) 10 MG tablet     Sig: Take 1 tablet by mouth every 6 hours as needed (nausea)     Dispense:  120 tablet     Refill:  3    ondansetron (ZOFRAN-ODT) 8 MG TBDP disintegrating tablet     Sig: Place 1 tablet under the tongue every 8 hours as needed for Nausea or Vomiting     Dispense:  10 tablet     Refill:  2    dexamethasone (DECADRON) 4 MG tablet     Si mg Decadron take 1 tablet p.o. twice daily x3 days starting 1 day before chemotherapy with Alimta     Dispense:  6 tablet     Refill:  3    lidocaine-prilocaine (EMLA) 2.5-2.5 % cream     Sig: Apply topically as needed.      Dispense:  1 Tube     Refill:  1    folic acid (FOLVITE) 1 MG tablet     Sig: Take 1 tablet by mouth daily     Dispense:  30 tablet     Refill:  3        [place]ARRS:  Controlled Substance Monitoring:    Acute and Chronic Pain Monitoring:   No flowsheet data found. 8) Research Options:      Not applicable      9) Other:   Encouraged to stop smoking. 10) Follow Up:  Patient will follow up on June 16 with a nurse practitioner for course 1 of adjuvant cisplatin with Alimta. I spent 40 minutes face-to-face with the patient and family. Greater than 50% of time spent on counseling and coordinating the patient's care. Face-to-face counseling included prognosis, review of risks and benefits of any treatment along with compliance and risk reduction.     Sue Call MD

## 2021-06-03 NOTE — ED NOTES
Patient resting in bed. Respirations easy and unlabored. No distress noted. Call light within reach. Updated on POC. Will monitor.       Gricel Buck RN  06/03/21 [id number]

## 2021-06-07 ENCOUNTER — OFFICE VISIT (OUTPATIENT)
Dept: PULMONOLOGY | Age: 61
End: 2021-06-07
Payer: COMMERCIAL

## 2021-06-07 ENCOUNTER — OFFICE VISIT (OUTPATIENT)
Dept: SURGERY | Age: 61
End: 2021-06-07

## 2021-06-07 ENCOUNTER — TELEPHONE (OUTPATIENT)
Dept: SURGERY | Age: 61
End: 2021-06-07

## 2021-06-07 VITALS
SYSTOLIC BLOOD PRESSURE: 130 MMHG | TEMPERATURE: 96.2 F | DIASTOLIC BLOOD PRESSURE: 68 MMHG | BODY MASS INDEX: 19.29 KG/M2 | HEART RATE: 76 BPM | WEIGHT: 120 LBS | RESPIRATION RATE: 18 BRPM | HEIGHT: 66 IN | OXYGEN SATURATION: 93 %

## 2021-06-07 VITALS
HEART RATE: 84 BPM | BODY MASS INDEX: 19.29 KG/M2 | HEIGHT: 66 IN | DIASTOLIC BLOOD PRESSURE: 84 MMHG | SYSTOLIC BLOOD PRESSURE: 108 MMHG | WEIGHT: 120 LBS | TEMPERATURE: 98.7 F | OXYGEN SATURATION: 96 %

## 2021-06-07 DIAGNOSIS — Z90.2 S/P LOBECTOMY OF LUNG: Primary | ICD-10-CM

## 2021-06-07 DIAGNOSIS — J44.9 MODERATE COPD (CHRONIC OBSTRUCTIVE PULMONARY DISEASE) (HCC): Primary | ICD-10-CM

## 2021-06-07 DIAGNOSIS — Z01.818 PRE-OP TESTING: ICD-10-CM

## 2021-06-07 DIAGNOSIS — C34.12 PRIMARY CANCER OF LEFT UPPER LOBE OF LUNG (HCC): ICD-10-CM

## 2021-06-07 PROCEDURE — 99024 POSTOP FOLLOW-UP VISIT: CPT | Performed by: SURGERY

## 2021-06-07 PROCEDURE — 99214 OFFICE O/P EST MOD 30 MIN: CPT | Performed by: INTERNAL MEDICINE

## 2021-06-07 NOTE — PATIENT INSTRUCTIONS
Recommendations/Plan:  -Patient educated to quit smoking as soon as possible. Patient verbalizes understanding of adverse consequences of tobacco smoking.  -Continue albuterol HFA 90mcg/Spray MDI, 2puffs  Q6Hprn.  -She was advised to continue triple ointment cream application topically to the left side old chest tube site wound.  -Patient advised to continue current inhalers and keep good compliance. Patient verbalizes understanding.  -Patient educated to update his pneumococcal vaccine with family physician and take influenza vaccine in coming season with out fail. Patient verbalizes understanding. Patient told me that she do not take flu shots or pneumonia vaccines. -She was advised to keep her scheduled follow-up appointment with Dr. Virgen Isaac MD at [facility] for further evaluation and management of abnormal CT scan of chest dated 1 June 2021.  -She was advised to keep her scheduled appointment for Medical Oncology consultation with Dr. Rula Elizondo MD- Medical Oncologist for further evaluation and management of lung adenocarcinoma. She is currently in the process of getting chemotherapy.  -Schedule patient for follow up with my clinic in 6 months for clinical re evaluation regarding her COPD. Kierra Farah Patient advised to make early appointment if needed. -Virginia Lo was advised to make early appointment with my clinic if she develops any constitutional symptoms including loss of weight, poor appetite or hemoptysis. She verbalizes under standing.  - Patient  educated about my impression and plan. She verbalizes understanding.

## 2021-06-07 NOTE — TELEPHONE ENCOUNTER
Scheduled Kim Arnold for a left subclavian single lumen smart port insertion on Thurs 6/10 at 8:30am & she will arrive at 7am. She will be npo after midnight, consent was signed & antibacterial soap & covid order were given.

## 2021-06-07 NOTE — PROGRESS NOTES
Bernarda Echevarria MD [facility]  General Surgery  Postprocedure Evaluation in Office  Pt Name: Alfonzo Martínez  Date of Birth 1960   Today's Date: 6/7/2021  Medical Record Number: [id number]  Referring Provider: No ref. provider found  Primary Care Provider: Tobias Kay MD  Chief Complaint   Patient presents with   Aixa [facility] Post-Op Check     2 week f/u--s/p 1. Robotic assisted left upper lobectomy and mediastinal dissection with 5 level 3 nodes, 1 level 6 node, 2 level 7 nodes, 9 level 10 nodes, 211 L nodes, and one special 11 L suspected to be the preoperatively biopsied positive node  2. Cryotherapy of intercostal nerves V, VI, VII and VIII for postoperative pain control 5/4/21    Surgical Consult     Dr. Aline Mistry requesting port placement     ASSESSMENT      Problem List Items Addressed This Visit     Primary cancer of left upper lobe of lung ([facility]    Relevant Orders    INSERTION CENTRAL VENOUS ACCESS DEVICE W/ SUBCUTANEOUS PORT    FL VASCULAR ACCESS GUIDANCE    COVID-19    S/P left upper lobectomy of lung and mediastinal dissection - Primary    Relevant Orders    INSERTION CENTRAL VENOUS ACCESS DEVICE W/ SUBCUTANEOUS PORT    FL VASCULAR ACCESS GUIDANCE    COVID-19      Other Visit Diagnoses     Pre-op testing        Relevant Orders    COVID-19      Cancer Staging  Primary cancer of left upper lobe of lung ([facility]  Staging form: Lung, AJCC 8th Edition  - Clinical stage from 4/21/2021: Stage IIB (cT2a, cN1, cM0) - Signed by Bernarda Echevarria MD on 5/10/2021  - Pathologic stage from 5/6/2021: Stage IIB (pT1c, pN1, cM0) - Signed by Bernarda Echevarria MD on 5/6/2021        PLAN       1.  Schedule for left subclavian single-lumen port  2. [address] [facility]  3. Monitored anesthesia care  4. Outpatient  Follow up: No follow-ups on file.    New Prescriptions    No medications on file     Orders Placed This Encounter:  Orders Placed This Encounter   Procedures   [facility] PORT     Standing Status:   Future     Standing Expiration Date:   6/7/2022     Order Specific Question:   Pre-procedure Diagnosis     Answer:   Lung cancer    FL VASCULAR ACCESS GUIDANCE     Standing Status:   Future     Standing Expiration Date:   6/7/2022    COVID-19     Standing Status:   Future     Standing Expiration Date:   6/7/2022     Scheduling Instructions:      1) Due to current limited availability of the COVID-19 test, tests will be prioritized based on responses to questions above. Testing may be delayed due to volume. 2) Print and instruct patient to adhere to CDC home isolation program. (Link Above)              3) Set up or refer patient for a monitoring program.              4) Have patient sign up for and leverage MyChart (if not previously done). Order Specific Question:   Is this test for diagnosis or screening? Answer:   Screening     Order Specific Question:   Symptomatic for COVID-19 as defined by CDC? Answer:   No     Order Specific Question:   Date of Symptom Onset     Answer:   N/A     Order Specific Question:   Hospitalized for COVID-19? Answer:   No     Order Specific Question:   Admitted to ICU for COVID-19? Answer:   No     Order Specific Question:   Employed in healthcare setting? Answer:   No     Order Specific Question:   Resident in a congregate (group) care setting? Answer:   No     Order Specific Question:   Pregnant? Answer:   No     Order Specific Question:   Previously tested for COVID-19? Answer:   Yes         Carloz Jang is seen today for post-op follow-up. She is 33 day(s) status post left upper lobectomy and mediastinal dissection as well as cryotherapy of intercostal nerves. She is referred back to have a port placed for chemotherapy. She is in the office today to have discussion regarding placement of this device.       Postoperatively she had a prolonged air leak which was fairly significant we could not get local evaluation here for possible bronchial valve so she was transferred to [place] where they did not place a valve they just clamped her tube at that time her pneumo got slightly bigger but they felt it was fine with the valve on and they discharged her to home. She recently presented to the emergency department on 56 with some redness to the chest tube site as well as some, white exudate material inside the tube. Her labs were normal her CT chest was unremarkable and she was started on antibiotics. Cultures were taken intraoperatively grew a staph which is sensitive to clindamycin. She is now in the office for follow-up with a chest tube in place. Past Medical History  Past Medical History:   Diagnosis Date    Arthritis     COPD (chronic obstructive pulmonary disease) ([facility]     Dr Onel Hackett [facility])     Dr Ryan Chowdhury     Past Surgical History  Past Surgical History:   Procedure Laterality Date    BRONCHOSCOPY N/A 5/6/2021    BRONCHOSCOPY performed by Anjelica Zamora MD at [address] [place] [address] Drive      x4    CHOLECYSTECTOMY      COLONOSCOPY  07/11/2016    CT NEEDLE BIOPSY LUNG PERCUTANEOUS  03/26/2021    CT NEEDLE BIOPSY LUNG PERCUTANEOUS 3/26/2021 [facility] CT SCAN    HYSTERECTOMY      LUNG REMOVAL, TOTAL Left 5/4/2021    ROBOTIC ASSISTED LEFT UPPER LOBECTOMY AND MEDIASTINAL DISSECTION, POSSIBLE OPEN AND CRYOTHERAPY OF INTERCOSTAL NERVES 5, 6, 7 & 8 performed by Colleen Navarro MD at [address] [place] [address]  07/11/2016     Medications  Current Outpatient Medications on File Prior to Visit   Medication Sig Dispense Refill    oxyCODONE (ROXICODONE) 5 MG immediate release tablet Take 5 mg by mouth every 4 hours as needed.       vitamin D3 (CHOLECALCIFEROL) 125 MCG (5000 UT) TABS tablet Cholecalciferol (Vitamin D3) (Vitamin D3) 125 mcg (5,000 unit) Tablet Active 125 MCG PO Daily April 22nd, 2021 4:50pm      prochlorperazine (COMPAZINE) 10 MG tablet Take 1 tablet by mouth every 6 hours as needed (nausea) 120 tablet 3    ondansetron (ZOFRAN-ODT) 8 MG TBDP disintegrating tablet Place 1 tablet under the tongue every 8 hours as needed for Nausea or Vomiting 10 tablet 2    dexamethasone (DECADRON) 4 MG tablet 4 mg Decadron take 1 tablet p.o. twice daily x3 days starting 1 day before chemotherapy with Alimta 6 tablet 3    lidocaine-prilocaine (EMLA) 2.5-2.5 % cream Apply topically as needed. 1 Tube 1    folic acid (FOLVITE) 1 MG tablet Take 1 tablet by mouth daily 30 tablet 3    folic acid (FOLVITE) 1 MG tablet Take 1 tablet by mouth daily for 30 doses Start 7 days before first scheduled dose and continue for 21 days after last dose of PEMEtrexed. 30 tablet 2    dexamethasone (DECADRON) 4 MG tablet Take 1 tablet by mouth See Admin Instructions Take 1 tab twice daily for 3 days starting the day before scheduled PEMEtrexed. 30 tablet 0    cephALEXin (KEFLEX) 500 MG capsule Take 1 capsule by mouth 4 times daily for 7 days 28 capsule 0    mupirocin (BACTROBAN) 2 % cream Apply topically 3 times daily. 15 g 0    acetaminophen (TYLENOL) 500 MG tablet Take 1 tablet by mouth 4 times daily as needed for Pain 120 tablet 0    ketorolac (TORADOL) 10 MG tablet Take 1 tablet by mouth every 6 hours as needed for Pain 20 tablet 0    senna (SENOKOT) 8.6 MG tablet Take 1 tablet by mouth daily      docusate sodium (COLACE) 100 MG capsule Take 100 mg by mouth 2 times daily      albuterol sulfate HFA (VENTOLIN HFA) 108 (90 Base) MCG/ACT inhaler Inhale 2 puffs into the lungs 4 times daily as needed for Wheezing 1 Inhaler 11     No current facility-administered medications on file prior to visit.      Allergies  Allergies   Allergen Reactions    Tylenol With Codeine #3 [Acetaminophen-Codeine] Nausea And Vomiting    Codeine Nausea And Vomiting     Social History  Social History     Socioeconomic History    Marital status:      Spouse name: Not on file    Number of children: Not on file    Years of education: Not on file    Highest education level: Not on file   Occupational History    Not on file   Tobacco Use    Smoking status: Current Every Day Smoker     Packs/day: 1.00     Years: 45.00     Pack years: 45.00     Types: Cigarettes    Smokeless tobacco: Never Used   Vaping Use    Vaping Use: Some days    Substances: Nicotine    Devices: Disposable   Substance and Sexual Activity    Alcohol use: Never    Drug use: Yes     Types: Marijuana    Sexual activity: Not on file   Other Topics Concern    Not on file   Social History Narrative    Not on file     Social Determinants of Health     Financial Resource Strain:     Difficulty of Paying Living Expenses:    Food Insecurity:     Worried About Running Out of Food in the Last Year:     Ran Out of Food in the Last Year:    Transportation Needs:     Lack of Transportation (Medical):      Lack of Transportation (Non-Medical):    Physical Activity:     Days of Exercise per Week:     Minutes of Exercise per Session:    Stress:     Feeling of Stress :    Social Connections:     Frequency of Communication with Friends and Family:     Frequency of Social Gatherings with Friends and Family:     Attends Sabianism Services:     Active Member of Clubs or Organizations:     Attends Club or Organization Meetings:     Marital Status:    Intimate Partner Violence:     Fear of Current or Ex-Partner:     Emotionally Abused:     Physically Abused:     Sexually Abused:      Health Screening Exams  Health Maintenance   Topic Date Due    Pneumococcal 0-64 years Vaccine (1 of 4 - PCV13) Never done    COVID-19 Vaccine (1) Never done    HIV screen  Never done    DTaP/Tdap/Td vaccine (1 - Tdap) Never done    Cervical cancer screen  Never done    Lipid screen  Never done    Shingles Vaccine (1 of 2) Never done    Colon cancer screen colonoscopy  Never done    Flu vaccine (Season Ended) 09/01/2021    Low dose CT lung

## 2021-06-07 NOTE — LETTER
program.              4) Have patient sign up for and leverage MyChart (if not previously done). Order Specific Question:   Is this test for diagnosis or screening? Answer:   Screening     Order Specific Question:   Symptomatic for COVID-19 as defined by CDC? Answer:   No     Order Specific Question:   Date of Symptom Onset     Answer:   N/A     Order Specific Question:   Hospitalized for COVID-19? Answer:   No     Order Specific Question:   Admitted to ICU for COVID-19? Answer:   No     Order Specific Question:   Employed in healthcare setting? Answer:   No     Order Specific Question:   Resident in a congregate (group) care setting? Answer:   No     Order Specific Question:   Pregnant? Answer:   No     Order Specific Question:   Previously tested for COVID-19? Answer: Yes                If I can provide any additional assistance or you have any concerns, please feel free to contact me. Thank you for allowing to participate in the care of your patients. Sincerely,      Victoria Muse MD FACS  [address]  [address]  [place], [address]  Office: [phone]  Fax: [phone]

## 2021-06-08 PROBLEM — Z45.2 ENCOUNTER FOR INSERTION OF VENOUS ACCESS PORT: Status: ACTIVE | Noted: 2021-06-08

## 2021-06-09 NOTE — H&P
Reji Terrell MD [facility]  General Surgery  Postprocedure Evaluation in Office  Pt Name: Ivonne Hamman  Date of Birth 1960   Today's Date: 6/8/2021  Medical Record Number: [id number]  Referring Provider: No ref. provider found  Primary Care Provider: Alex Harmon MD  No chief complaint on file. ASSESSMENT      Problem List Items Addressed This Visit     Active Hospital Problems    Diagnosis Date Noted    Encounter for insertion of venous access port [Z45.2] 06/08/2021    Primary cancer of left upper lobe of lung ([facility] [C34.12] 04/21/2021         Cancer Staging  Primary cancer of left upper lobe of lung ([facility]  Staging form: Lung, AJCC 8th Edition  - Clinical stage from 4/21/2021: Stage IIB (cT2a, cN1, cM0) - Signed by Reji Terrell MD on 5/10/2021  - Pathologic stage from 5/6/2021: Stage IIB (pT1c, pN1, cM0) - Signed by Reji Terrell MD on 5/6/2021        PLAN       1.  Schedule for left subclavian single-lumen port  2. [address] [address]  3. Monitored anesthesia care  4. Outpatient  Follow up: No follow-ups on file. New Prescriptions    No medications on file     Orders Placed This Encounter:  No orders of the defined types were placed in this encounter. Dominic Robert is seen today for post-op follow-up. She is 33 day(s) status post left upper lobectomy and mediastinal dissection as well as cryotherapy of intercostal nerves. She is referred back to have a port placed for chemotherapy. She is in the office today to have discussion regarding placement of this device. Postoperatively she had a prolonged air leak which was fairly significant we could not get local evaluation here for possible bronchial valve so she was transferred to [place] where they did not place a valve they just clamped her tube at that time her pneumo got slightly bigger but they felt it was fine with the valve on and they discharged her to home.   She recently presented to the emergency department on 518 with some redness to the chest tube site as well as some, white exudate material inside the tube. Her labs were normal her CT chest was unremarkable and she was started on antibiotics. Cultures were taken intraoperatively grew a staph which is sensitive to clindamycin. She is now in the office for follow-up with a chest tube in place. Past Medical History  Past Medical History:   Diagnosis Date    Arthritis     COPD (chronic obstructive pulmonary disease) ([facility]     Dr Polo Olszewski [facility])     Dr Vicky Yost     Past Surgical History  Past Surgical History:   Procedure Laterality Date    BRONCHOSCOPY N/A 5/6/2021    BRONCHOSCOPY performed by Deepali Funez MD at [address]      x4    CHOLECYSTECTOMY      COLONOSCOPY  07/11/2016    CT NEEDLE BIOPSY LUNG PERCUTANEOUS  03/26/2021    CT NEEDLE BIOPSY LUNG PERCUTANEOUS 3/26/2021 [facility] CT SCAN    HYSTERECTOMY      LUNG REMOVAL, TOTAL Left 5/4/2021    ROBOTIC ASSISTED LEFT UPPER LOBECTOMY AND MEDIASTINAL DISSECTION, POSSIBLE OPEN AND CRYOTHERAPY OF INTERCOSTAL NERVES 5, 6, 7 & 8 performed by Theodore Calle MD at [address] [facility]  07/11/2016     Medications  No current facility-administered medications on file prior to encounter. Current Outpatient Medications on File Prior to Encounter   Medication Sig Dispense Refill    oxyCODONE (ROXICODONE) 5 MG immediate release tablet Take 5 mg by mouth every 4 hours as needed.       vitamin D3 (CHOLECALCIFEROL) 125 MCG (5000 UT) TABS tablet Cholecalciferol (Vitamin D3) (Vitamin D3) 125 mcg (5,000 unit) Tablet Active 125 MCG PO Daily April 22nd, 2021 4:50pm      prochlorperazine (COMPAZINE) 10 MG tablet Take 1 tablet by mouth every 6 hours as needed (nausea) 120 tablet 3    ondansetron (ZOFRAN-ODT) 8 MG TBDP disintegrating tablet Place 1 tablet under the tongue every 8 hours as needed for Nausea or Vomiting 10 tablet 2    dexamethasone (DECADRON) 4 MG tablet 4 mg Decadron take 1 tablet p.o. twice daily x3 days starting 1 day before chemotherapy with Alimta 6 tablet 3    lidocaine-prilocaine (EMLA) 2.5-2.5 % cream Apply topically as needed. 1 Tube 1    folic acid (FOLVITE) 1 MG tablet Take 1 tablet by mouth daily 30 tablet 3    folic acid (FOLVITE) 1 MG tablet Take 1 tablet by mouth daily for 30 doses Start 7 days before first scheduled dose and continue for 21 days after last dose of PEMEtrexed. 30 tablet 2    dexamethasone (DECADRON) 4 MG tablet Take 1 tablet by mouth See Admin Instructions Take 1 tab twice daily for 3 days starting the day before scheduled PEMEtrexed. 30 tablet 0    cephALEXin (KEFLEX) 500 MG capsule Take 1 capsule by mouth 4 times daily for 7 days 28 capsule 0    mupirocin (BACTROBAN) 2 % cream Apply topically 3 times daily.  15 g 0    acetaminophen (TYLENOL) 500 MG tablet Take 1 tablet by mouth 4 times daily as needed for Pain 120 tablet 0    ketorolac (TORADOL) 10 MG tablet Take 1 tablet by mouth every 6 hours as needed for Pain 20 tablet 0    senna (SENOKOT) 8.6 MG tablet Take 1 tablet by mouth daily      docusate sodium (COLACE) 100 MG capsule Take 100 mg by mouth 2 times daily      albuterol sulfate HFA (VENTOLIN HFA) 108 (90 Base) MCG/ACT inhaler Inhale 2 puffs into the lungs 4 times daily as needed for Wheezing 1 Inhaler 11     Allergies  Allergies   Allergen Reactions    Tylenol With Codeine #3 [Acetaminophen-Codeine] Nausea And Vomiting    Codeine Nausea And Vomiting     Social History  Social History     Socioeconomic History    Marital status:      Spouse name: Not on file    Number of children: Not on file    Years of education: Not on file    Highest education level: Not on file   Occupational History    Not on file   Tobacco Use    Smoking status: Current Every Day Smoker     Packs/day: 1.00     Years: 45.00     Pack years: 45.00     Types: Cigarettes    Smokeless tobacco: Never Used   Vaping Use    Vaping Use: Some days    Substances: Nicotine    Devices: Disposable   Substance and Sexual Activity    Alcohol use: Never    Drug use: Yes     Types: Marijuana    Sexual activity: Not on file   Other Topics Concern    Not on file   Social History Narrative    Not on file     Social Determinants of Health     Financial Resource Strain:     Difficulty of Paying Living Expenses:    Food Insecurity:     Worried About Running Out of Food in the Last Year:     [address] in the Last Year:    Transportation Needs:     Lack of Transportation (Medical):      Lack of Transportation (Non-Medical):    Physical Activity:     Days of Exercise per Week:     Minutes of Exercise per Session:    Stress:     Feeling of Stress :    Social Connections:     Frequency of Communication with Friends and Family:     Frequency of Social Gatherings with Friends and Family:     Attends Holiness Services:     Active Member of Clubs or Organizations:     Attends Club or Organization Meetings:     Marital Status:    Intimate Partner Violence:     Fear of Current or Ex-Partner:     Emotionally Abused:     Physically Abused:     Sexually Abused:      Health Screening Exams  Health Maintenance   Topic Date Due    Pneumococcal 0-64 years Vaccine (1 of 4 - PCV13) Never done    COVID-19 Vaccine (1) Never done    HIV screen  Never done    DTaP/Tdap/Td vaccine (1 - Tdap) Never done    Cervical cancer screen  Never done    Lipid screen  Never done    Shingles Vaccine (1 of 2) Never done    Colon cancer screen colonoscopy  Never done    Flu vaccine (Season Ended) 09/01/2021    Low dose CT lung screening  06/01/2022    Breast cancer screen  02/26/2023    Hepatitis C screen  Completed    Hepatitis A vaccine  Aged Out    Hepatitis B vaccine  Aged Out    Hib vaccine  Aged Out    Meningococcal (ACWY) vaccine  Aged Out     Review of Systems  History obtained from the patient. Constitutional: Denies any fever, chills, fatigue. Wound: Denies any rash, skin color changes or wound problems. Resp: Denies any cough, shortness of breath. CV: Denies any chest pain, orthopnea or syncope. GI: Positive for incisional discomfort only. Denies any nausea, vomiting, blood in the stool, constipation or diarrhea. : Denies any hematuria, hesitancy or dysuria. OBJECTIVE      VITALS:  vitals were not taken for this visit. CONSTITUTIONAL: Alert and oriented times 3, no acute distress and cooperative to examination. SKIN: Skin color, texture, turgor normal. No rashes or lesions. INCISION: wound margins intact and healing well. No signs of infection. No drainage. LUNGS: Chest expands equally bilaterally upon respiration, no accessory muscle used. Ausculation reveals no wheezes, rales or rhonchi. Chest tube in place no air leak with valve on it entry site of the chest tube skin is red which appears to be related to the tube inside of some exudate.                  Electronically signed by Siria Olivas MD MD FACS on 6/8/2021 at 8:28 PM

## 2021-06-10 ENCOUNTER — APPOINTMENT (OUTPATIENT)
Dept: GENERAL RADIOLOGY | Age: 61
End: 2021-06-10
Attending: SURGERY
Payer: COMMERCIAL

## 2021-06-10 ENCOUNTER — HOSPITAL ENCOUNTER (OUTPATIENT)
Dept: INFUSION THERAPY | Age: 61
Discharge: HOME | End: 2021-06-10
Payer: COMMERCIAL

## 2021-06-10 ENCOUNTER — ANESTHESIA (OUTPATIENT)
Dept: OPERATING ROOM | Age: 61
End: 2021-06-10
Payer: COMMERCIAL

## 2021-06-10 ENCOUNTER — HOSPITAL ENCOUNTER (OUTPATIENT)
Dept: GENERAL RADIOLOGY | Age: 61
Setting detail: OUTPATIENT SURGERY
Discharge: HOME | End: 2021-06-10
Attending: SURGERY
Payer: COMMERCIAL

## 2021-06-10 ENCOUNTER — ANESTHESIA EVENT (OUTPATIENT)
Dept: OPERATING ROOM | Age: 61
End: 2021-06-10
Payer: COMMERCIAL

## 2021-06-10 ENCOUNTER — HOSPITAL ENCOUNTER (OUTPATIENT)
Age: 61
Setting detail: OUTPATIENT SURGERY
Discharge: HOME | End: 2021-06-10
Attending: SURGERY | Admitting: SURGERY
Payer: COMMERCIAL

## 2021-06-10 VITALS
RESPIRATION RATE: 18 BRPM | DIASTOLIC BLOOD PRESSURE: 86 MMHG | SYSTOLIC BLOOD PRESSURE: 153 MMHG | HEIGHT: 66 IN | BODY MASS INDEX: 19.77 KG/M2 | TEMPERATURE: 97.6 F | WEIGHT: 123 LBS | OXYGEN SATURATION: 98 % | HEART RATE: 51 BPM

## 2021-06-10 VITALS — SYSTOLIC BLOOD PRESSURE: 130 MMHG | DIASTOLIC BLOOD PRESSURE: 87 MMHG | OXYGEN SATURATION: 100 %

## 2021-06-10 DIAGNOSIS — Z90.2 S/P LOBECTOMY OF LUNG: ICD-10-CM

## 2021-06-10 DIAGNOSIS — C34.12 PRIMARY CANCER OF LEFT UPPER LOBE OF LUNG (HCC): ICD-10-CM

## 2021-06-10 PROCEDURE — 99212 OFFICE O/P EST SF 10 MIN: CPT

## 2021-06-10 PROCEDURE — 3600000012 HC SURGERY LEVEL 2 ADDTL 15MIN: Performed by: SURGERY

## 2021-06-10 PROCEDURE — 3600000002 HC SURGERY LEVEL 2 BASE: Performed by: SURGERY

## 2021-06-10 PROCEDURE — 3700000000 HC ANESTHESIA ATTENDED CARE: Performed by: SURGERY

## 2021-06-10 PROCEDURE — C1788 PORT, INDWELLING, IMP: HCPCS | Performed by: SURGERY

## 2021-06-10 PROCEDURE — 77001 FLUOROGUIDE FOR VEIN DEVICE: CPT | Performed by: SURGERY

## 2021-06-10 PROCEDURE — 6370000000 HC RX 637 (ALT 250 FOR IP): Performed by: SURGERY

## 2021-06-10 PROCEDURE — 2500000003 HC RX 250 WO HCPCS: Performed by: SURGERY

## 2021-06-10 PROCEDURE — 7100000010 HC PHASE II RECOVERY - FIRST 15 MIN: Performed by: SURGERY

## 2021-06-10 PROCEDURE — 2709999900 HC NON-CHARGEABLE SUPPLY: Performed by: SURGERY

## 2021-06-10 PROCEDURE — 6360000002 HC RX W HCPCS: Performed by: NURSE ANESTHETIST, CERTIFIED REGISTERED

## 2021-06-10 PROCEDURE — 6360000002 HC RX W HCPCS: Performed by: SURGERY

## 2021-06-10 PROCEDURE — 3700000001 HC ADD 15 MINUTES (ANESTHESIA): Performed by: SURGERY

## 2021-06-10 PROCEDURE — 77001 FLUOROGUIDE FOR VEIN DEVICE: CPT

## 2021-06-10 PROCEDURE — 2580000003 HC RX 258: Performed by: SURGERY

## 2021-06-10 PROCEDURE — 36561 INSERT TUNNELED CV CATH: CPT | Performed by: SURGERY

## 2021-06-10 PROCEDURE — 7100000011 HC PHASE II RECOVERY - ADDTL 15 MIN: Performed by: SURGERY

## 2021-06-10 PROCEDURE — 71045 X-RAY EXAM CHEST 1 VIEW: CPT

## 2021-06-10 DEVICE — PORT INFUS 6FR SLIM POLYUR ATTCH OPN END SGL LUMN VEN CATH: Type: IMPLANTABLE DEVICE | Site: SUBCLAVIAN | Status: FUNCTIONAL

## 2021-06-10 RX ORDER — ACETAMINOPHEN 500 MG
1000 TABLET ORAL ONCE
Status: COMPLETED | OUTPATIENT
Start: 2021-06-10 | End: 2021-06-10

## 2021-06-10 RX ORDER — SODIUM CHLORIDE 9 MG/ML
INJECTION, SOLUTION INTRAVENOUS CONTINUOUS
Status: DISCONTINUED | OUTPATIENT
Start: 2021-06-10 | End: 2021-06-10 | Stop reason: HOSPADM

## 2021-06-10 RX ORDER — HEPARIN SODIUM (PORCINE) LOCK FLUSH IV SOLN 100 UNIT/ML 100 UNIT/ML
SOLUTION INTRAVENOUS PRN
Status: DISCONTINUED | OUTPATIENT
Start: 2021-06-10 | End: 2021-06-10 | Stop reason: ALTCHOICE

## 2021-06-10 RX ORDER — IBUPROFEN 400 MG/1
800 TABLET ORAL ONCE
Status: COMPLETED | OUTPATIENT
Start: 2021-06-10 | End: 2021-06-10

## 2021-06-10 RX ORDER — MIDAZOLAM HYDROCHLORIDE 1 MG/ML
INJECTION INTRAMUSCULAR; INTRAVENOUS PRN
Status: DISCONTINUED | OUTPATIENT
Start: 2021-06-10 | End: 2021-06-10 | Stop reason: SDUPTHER

## 2021-06-10 RX ORDER — SODIUM CHLORIDE 0.9 % (FLUSH) 0.9 %
5-40 SYRINGE (ML) INJECTION PRN
Status: DISCONTINUED | OUTPATIENT
Start: 2021-06-10 | End: 2021-06-10 | Stop reason: HOSPADM

## 2021-06-10 RX ORDER — SODIUM CHLORIDE 0.9 % (FLUSH) 0.9 %
5-40 SYRINGE (ML) INJECTION EVERY 12 HOURS SCHEDULED
Status: DISCONTINUED | OUTPATIENT
Start: 2021-06-10 | End: 2021-06-10 | Stop reason: HOSPADM

## 2021-06-10 RX ORDER — SODIUM CHLORIDE 9 MG/ML
25 INJECTION, SOLUTION INTRAVENOUS PRN
Status: DISCONTINUED | OUTPATIENT
Start: 2021-06-10 | End: 2021-06-10 | Stop reason: HOSPADM

## 2021-06-10 RX ORDER — PROPOFOL 10 MG/ML
INJECTION, EMULSION INTRAVENOUS PRN
Status: DISCONTINUED | OUTPATIENT
Start: 2021-06-10 | End: 2021-06-10 | Stop reason: SDUPTHER

## 2021-06-10 RX ORDER — FENTANYL CITRATE 50 UG/ML
INJECTION, SOLUTION INTRAMUSCULAR; INTRAVENOUS PRN
Status: DISCONTINUED | OUTPATIENT
Start: 2021-06-10 | End: 2021-06-10 | Stop reason: SDUPTHER

## 2021-06-10 RX ADMIN — CEFAZOLIN 2000 MG: 10 INJECTION, POWDER, FOR SOLUTION INTRAVENOUS at 08:33

## 2021-06-10 RX ADMIN — PROPOFOL 50 MG: 10 INJECTION, EMULSION INTRAVENOUS at 08:51

## 2021-06-10 RX ADMIN — PROPOFOL 50 MG: 10 INJECTION, EMULSION INTRAVENOUS at 08:38

## 2021-06-10 RX ADMIN — PROPOFOL 50 MG: 10 INJECTION, EMULSION INTRAVENOUS at 08:33

## 2021-06-10 RX ADMIN — IBUPROFEN 800 MG: 400 TABLET, FILM COATED ORAL at 08:09

## 2021-06-10 RX ADMIN — MIDAZOLAM 2 MG: 1 INJECTION INTRAMUSCULAR; INTRAVENOUS at 08:29

## 2021-06-10 RX ADMIN — FENTANYL CITRATE 50 MCG: 50 INJECTION, SOLUTION INTRAMUSCULAR; INTRAVENOUS at 08:33

## 2021-06-10 RX ADMIN — ACETAMINOPHEN 1000 MG: 500 TABLET ORAL at 08:08

## 2021-06-10 RX ADMIN — FENTANYL CITRATE 50 MCG: 50 INJECTION, SOLUTION INTRAMUSCULAR; INTRAVENOUS at 08:40

## 2021-06-10 RX ADMIN — SODIUM CHLORIDE: 9 INJECTION, SOLUTION INTRAVENOUS at 08:00

## 2021-06-10 ASSESSMENT — PULMONARY FUNCTION TESTS
PIF_VALUE: 0
PIF_VALUE: 1
PIF_VALUE: 0
PIF_VALUE: 1
PIF_VALUE: 0

## 2021-06-10 ASSESSMENT — PAIN SCALES - GENERAL
PAINLEVEL_OUTOF10: 0
PAINLEVEL_OUTOF10: 4
PAINLEVEL_OUTOF10: 0

## 2021-06-10 ASSESSMENT — ENCOUNTER SYMPTOMS: SHORTNESS OF BREATH: 1

## 2021-06-10 ASSESSMENT — PAIN - FUNCTIONAL ASSESSMENT: PAIN_FUNCTIONAL_ASSESSMENT: 0-10

## 2021-06-10 ASSESSMENT — LIFESTYLE VARIABLES: SMOKING_STATUS: 1

## 2021-06-10 NOTE — PLAN OF CARE
Problem: Musculor/Skeletal Functional Status  Goal: Absence of falls  Outcome: Met This Shift  Note: Patient free from falls while in O.P. Oncology. Intervention: Fall precautions  Note: Patient assessed for fall risk on admission to [address] [facility]. Fall band placed on patient. Discussed the need to use the call light for assistance prior to getting up out of chair/bed. Problem: Intellectual/Education/Knowledge Deficit  Goal: Teaching initiated upon admission  Outcome: Met This Shift  Note: Patient verbalizes understanding to verbal information given on Alimta/Cisplatin ,action and possible side effects. Aware to call MD if develop complications. Intervention: Verbal/written education provided  Note: Patient and family instructed on chemotherapy specifically the drugs Alimta/Cisplatin and chemotherapy regimen. The [employer] folder along with the following - chemotherapy drug information sheets,chemotherapy side effects handouts,Understanding your blood counts, [place] diet,Importance to hydration,when to call physician sheet,reduce risk infection sheet,bleeding precaution,neutropenia precaution, All questions answered satisfactory and support given. Patient navigator explained to patient and informed that she may be calling him/her if needs assistance. Patient given a tour of facility. Approximately 60 minutes spent with patient and family. Problem: Discharge Planning:Goal: Discharged to appropriate level of care  Description: Discharged to appropriate level of care  Outcome: Met This Shift  Note: Verbalized understanding of discharge instructions, follow-up appointments, and when to call the physician.    Intervention: Assess knowledge level of healthcare  Description: Assess knowledge level of healthcare - REMINDER(s): Knowledge level, assistive device, Knowledge level, health promotion, Knowledge level, infection control measures  Note: Discuss understanding of discharge instructions,follow-up appointments, and when to call the physician. Care plan reviewed with patient and family. Patient and family verbalize understanding of the plan of care and contribute to goal setting.

## 2021-06-10 NOTE — H&P
[facility]  History and Physical Update    Pt Name: Carolyn Carbone  MRN: [id number]  YOB: 1960  Date of evaluation: 6/10/2021    [x] I have examined the patient and reviewed the H&P/Consult and there are no changes to the patient or plans.     [] I have examined the patient and reviewed the H&P/Consult and have noted the following changes:        Siria Olivas MD  Electronically signed 6/10/2021 at 8:14 AM

## 2021-06-10 NOTE — ANESTHESIA PRE PROCEDURE
kg/m².    CBC:   Lab Results   Component Value Date    WBC 5.1 06/03/2021    RBC 4.00 06/03/2021    RBC 4.63 10/10/2017    HGB 12.2 06/03/2021    HCT 39.3 06/03/2021    MCV 98.3 06/03/2021    RDW 12.3 06/03/2021     06/03/2021       CMP:   Lab Results   Component Value Date     06/03/2021    K 5.2 06/03/2021     06/03/2021    CO2 25 06/03/2021    BUN 9 06/03/2021    CREATININE 0.7 06/03/2021    LABGLOM 85 06/03/2021    GLUCOSE 87 06/03/2021    GLUCOSE 79 10/10/2017    PROT 7.7 06/03/2021    CALCIUM 9.9 06/03/2021    BILITOT 0.2 06/03/2021    ALKPHOS 98 06/03/2021    AST 13 06/03/2021    ALT 13 06/03/2021       POC Tests: No results for input(s): POCGLU, POCNA, POCK, POCCL, POCBUN, POCHEMO, POCHCT in the last 72 hours. Coags:   Lab Results   Component Value Date    INR 0.97 05/18/2021    APTT 31.8 05/18/2021       HCG (If Applicable): No results found for: PREGTESTUR, PREGSERUM, HCG, HCGQUANT     ABGs: No results found for: PHART, PO2ART, XIE3IEM, KSW0JXJ, BEART, A8NCESZA     Type & Screen (If Applicable):  Lab Results   Component Value Date    LABRH POS 05/01/2021       Drug/Infectious Status (If Applicable):  Lab Results   Component Value Date    HEPCAB Negative 07/11/2016       COVID-19 Screening (If Applicable):   Lab Results   Component Value Date    COVID19 NOT DETECTED 05/01/2021           Anesthesia Evaluation  Patient summary reviewed and Nursing notes reviewed no history of anesthetic complications:   Airway: Mallampati: II  TM distance: >3 FB   Neck ROM: full  Mouth opening: > = 3 FB Dental:          Pulmonary:normal exam  breath sounds clear to auscultation  (+) COPD:  shortness of breath:  current smoker          Patient did not smoke on day of surgery.                 ROS comment: Lung cancer   Cardiovascular:  Exercise tolerance: good (>4 METS),   (+) KERR:,                   Neuro/Psych:   Negative Neuro/Psych ROS              GI/Hepatic/Renal: Neg GI/Hepatic/Renal ROS

## 2021-06-10 NOTE — ANESTHESIA POSTPROCEDURE EVALUATION
Department of Anesthesiology  Postprocedure Note    Patient: Mary Carrillo  MRN: [id number]  YOB: 1960  Date of evaluation: 6/10/2021  Time:  11:53 AM     Procedure Summary     Date: 06/10/21 Room / Location: [place] [facility]    Anesthesia Start: 0828 Anesthesia Stop: 0022    Procedure: LEFT SUBCLAVIAN SINGLE LUMEN SMART PORT INSERTION (Left Chest) Diagnosis: (LUNG CANCER)    Surgeons: Tre Salcido MD Responsible Provider: [facility],     Anesthesia Type: MAC ASA Status: 3          Anesthesia Type: MAC    Jamel Phase I: Jamel Score: 10    Jamel Phase II: Jamel Score: 10    Last vitals: Reviewed and per EMR flowsheets.        Anesthesia Post Evaluation    Patient location during evaluation: bedside  Patient participation: complete - patient participated  Level of consciousness: awake and alert  Pain score: 0  Airway patency: patent  Nausea & Vomiting: no nausea and no vomiting  Complications: no  Cardiovascular status: hemodynamically stable and blood pressure returned to baseline  Respiratory status: spontaneous ventilation, room air and acceptable  Hydration status: stable

## 2021-06-10 NOTE — PROGRESS NOTES
Patient and family instructed on chemotherapy specifically the drugs Alimta/Cisplatin and chemotherapy regimen. The [employer] folder along with the following - chemotherapy drug information sheets,chemotherapy side effects handouts,Understanding your blood counts, [place] diet,Importance to hydration,when to call physician sheet,reduce risk infection sheet,bleeding precaution,neutropenia precaution, All questions answered satisfactory and support given. Patient navigator explained to patient and informed that she may be calling him/her if needs assistance. Patient given a tour of facility. Approximately 60 minutes spent with patient and family. Patient and family ambulated off the unit, with belongings, per self, with no further questions regarding the teaching today.

## 2021-06-10 NOTE — OP NOTE
[facility]  Operative Report    PATIENT NAME: Glynn Ibrahim [facility] RECORD NO. [id number]  SURGEON: Rodrigo Person MD MD FACS  Primary Care Physician: Jasmyne Sampson MD  Date: 6/10/2021, 9:03 AM     PROCEDURE PERFORMED: LEFT Single Lumen Smart Port Placement   PREOPERATIVE DIAGNOSIS: Active Problems:    Primary cancer of left upper lobe of lung ([address]    Encounter for insertion of venous access port  Resolved Problems:    * No resolved hospital problems. *    POSTOPERATIVE DIAGNOSIS: Same  SURGEON:  Rodrigo Person MD MD FACS  ANESTHESIA:  By anesthesia  ESTIMATED BLOOD LOSS:  10  ml  SPECIMEN:  None  COMPLICATIONS:  None; patient tolerated the procedure well. DISPOSITION: SDS   CONDITION: stable      BRIEF HISTORY: Nato Castillo is a 62 y.o.  female  who was recently diagnosed with Active Problems:    Primary cancer of left upper lobe of lung ([address]    Encounter for insertion of venous access port  Resolved Problems:    * No resolved hospital problems. *  . She  will require a MediPort for long-term IV access. I discussed \"placement of MediPort\" with them including   the procedure , risks and alternatives, and they agreed. PROCEDURE IN DETAIL: The patient was taken to the operative suite and was placed on the table in a supine position . With the arms tucked at their side, and the head turned to the right. The neck, chest, and shoulders were clipped, prepped with Betadine and draped in a sterile fashion. The patient  was placed into Trendelenburg position and was left in Trendelenburg position during the entire procedure. The skin, subcutaneous tissue, and periosteum of the left clavicle were anesthetized with the local mixture. After this, a left subclavian venipuncture was performed and a guidewire was passed through the needle and into the superior vena cava. Its position was confirmed using the fluoroscopy unit.  The dilator/tear away sheath was placed over the guidewire under procedure well. Sponge, needle, and instrument counts were correct. The patient had a stat portable upright chest x-ray done as soon as Suzy Pacheco  was transferred to the cart for transport to the Same Day Surgery Unit.  This revealed No pneumothorax, Central line in proper location, See radiology report    Bethany Boyd MD, MD FACS  Electronically signed 6/10/2021 at 9:03 AM

## 2021-06-10 NOTE — PROGRESS NOTES
ADMITTED TO \[facility]\"" AND ORIENTED TO UNIT. SCDS ON. FALL AND ALLERGY BANDS ON. PT VERBALIZED APPROVAL FOR FIRST NAME, LAST INITIAL AND PHYSICIAN NAME ON UNIT WHITEBOARD. Spouse, Cole Cao with the patient.

## 2021-06-15 DIAGNOSIS — C34.92 PRIMARY ADENOCARCINOMA OF LEFT LUNG (HCC): Primary | ICD-10-CM

## 2021-06-16 ENCOUNTER — HOSPITAL ENCOUNTER (OUTPATIENT)
Dept: INFUSION THERAPY | Age: 61
Discharge: HOME | End: 2021-06-16
Payer: COMMERCIAL

## 2021-06-16 ENCOUNTER — CLINICAL DOCUMENTATION (OUTPATIENT)
Dept: CASE MANAGEMENT | Age: 61
End: 2021-06-16

## 2021-06-16 ENCOUNTER — OFFICE VISIT (OUTPATIENT)
Dept: ONCOLOGY | Age: 61
End: 2021-06-16
Payer: COMMERCIAL

## 2021-06-16 VITALS
OXYGEN SATURATION: 93 % | BODY MASS INDEX: 19.86 KG/M2 | SYSTOLIC BLOOD PRESSURE: 140 MMHG | TEMPERATURE: 98 F | DIASTOLIC BLOOD PRESSURE: 81 MMHG | HEIGHT: 66 IN | HEART RATE: 66 BPM | RESPIRATION RATE: 16 BRPM | WEIGHT: 123.6 LBS

## 2021-06-16 VITALS
SYSTOLIC BLOOD PRESSURE: 138 MMHG | OXYGEN SATURATION: 94 % | HEIGHT: 66 IN | RESPIRATION RATE: 16 BRPM | HEART RATE: 73 BPM | BODY MASS INDEX: 19.85 KG/M2 | TEMPERATURE: 97.9 F | DIASTOLIC BLOOD PRESSURE: 94 MMHG

## 2021-06-16 DIAGNOSIS — Z51.11 ENCOUNTER FOR CHEMOTHERAPY MANAGEMENT: ICD-10-CM

## 2021-06-16 DIAGNOSIS — C34.92 PRIMARY ADENOCARCINOMA OF LEFT LUNG (HCC): Primary | ICD-10-CM

## 2021-06-16 DIAGNOSIS — C34.12 PRIMARY CANCER OF LEFT UPPER LOBE OF LUNG (HCC): ICD-10-CM

## 2021-06-16 DIAGNOSIS — Z72.0 TOBACCO ABUSE: ICD-10-CM

## 2021-06-16 DIAGNOSIS — C34.92 PRIMARY ADENOCARCINOMA OF LEFT LUNG (HCC): ICD-10-CM

## 2021-06-16 DIAGNOSIS — Z90.2 S/P LOBECTOMY OF LUNG: Primary | ICD-10-CM

## 2021-06-16 DIAGNOSIS — D64.9 ANEMIA, UNSPECIFIED TYPE: ICD-10-CM

## 2021-06-16 DIAGNOSIS — F17.210 SMOKING GREATER THAN 40 PACK YEARS: ICD-10-CM

## 2021-06-16 DIAGNOSIS — J43.8 OTHER EMPHYSEMA (HCC): ICD-10-CM

## 2021-06-16 LAB
ABSOLUTE IMMATURE GRANULOCYTE: 0 THOU/MM3 (ref 0–0.07)
BASINOPHIL, AUTOMATED: 1 % (ref 0–3)
BASOPHILS ABSOLUTE: 0.1 THOU/MM3 (ref 0–0.1)
BUN, WHOLE BLOOD: 9 MG/DL (ref 8–26)
CHLORIDE, WHOLE BLOOD: 107 MEQ/L (ref 98–109)
CREATININE, WHOLE BLOOD: 0.8 MG/DL (ref 0.5–1.2)
EOSINOPHILS ABSOLUTE: 0.1 THOU/MM3 (ref 0–0.4)
EOSINOPHILS RELATIVE PERCENT: 3 % (ref 0–4)
GFR, ESTIMATED: 78 ML/MIN/1.73M2
GLUCOSE, WHOLE BLOOD: 89 MG/DL (ref 70–108)
HCT VFR BLD CALC: 34.9 % (ref 37–47)
HEMOGLOBIN: 11.3 GM/DL (ref 12–16)
IMMATURE GRANULOCYTES: 0 %
IONIZED CALCIUM, WHOLE BLOOD: 1.2 MMOL/L (ref 1.12–1.32)
LYMPHOCYTES # BLD: 20 % (ref 15–47)
LYMPHOCYTES ABSOLUTE: 0.9 THOU/MM3 (ref 1–4.8)
MAGNESIUM: 2.2 MG/DL (ref 1.6–2.4)
MCH RBC QN AUTO: 31.4 PG (ref 26–33)
MCHC RBC AUTO-ENTMCNC: 32.4 GM/DL (ref 32.2–35.5)
MCV RBC AUTO: 97 FL (ref 81–99)
MONOCYTES ABSOLUTE: 0.3 THOU/MM3 (ref 0.4–1.3)
MONOCYTES: 7 % (ref 0–12)
PDW BLD-RTO: 13.8 % (ref 11.5–14.5)
PLATELET # BLD: 145 THOU/MM3 (ref 130–400)
PMV BLD AUTO: 12.2 FL (ref 9.4–12.4)
POTASSIUM, WHOLE BLOOD: 4 MEQ/L (ref 3.5–4.9)
RBC # BLD: 3.6 MILL/MM3 (ref 4.2–5.4)
SEG NEUTROPHILS: 69 % (ref 43–75)
SEGMENTED NEUTROPHILS ABSOLUTE COUNT: 3.1 THOU/MM3 (ref 1.8–7.7)
SODIUM, WHOLE BLOOD: 141 MEQ/L (ref 138–146)
TOTAL CO2, WHOLE BLOOD: 28 MEQ/L (ref 23–33)
WBC # BLD: 4.5 THOU/MM3 (ref 4.8–10.8)

## 2021-06-16 PROCEDURE — 96367 TX/PROPH/DG ADDL SEQ IV INF: CPT

## 2021-06-16 PROCEDURE — 6360000002 HC RX W HCPCS: Performed by: INTERNAL MEDICINE

## 2021-06-16 PROCEDURE — 83735 ASSAY OF MAGNESIUM: CPT

## 2021-06-16 PROCEDURE — 96411 CHEMO IV PUSH ADDL DRUG: CPT

## 2021-06-16 PROCEDURE — 85025 COMPLETE CBC W/AUTO DIFF WBC: CPT

## 2021-06-16 PROCEDURE — 99211 OFF/OP EST MAY X REQ PHY/QHP: CPT

## 2021-06-16 PROCEDURE — 96375 TX/PRO/DX INJ NEW DRUG ADDON: CPT

## 2021-06-16 PROCEDURE — 96366 THER/PROPH/DIAG IV INF ADDON: CPT

## 2021-06-16 PROCEDURE — 80047 BASIC METABLC PNL IONIZED CA: CPT

## 2021-06-16 PROCEDURE — 36591 DRAW BLOOD OFF VENOUS DEVICE: CPT

## 2021-06-16 PROCEDURE — 2580000003 HC RX 258: Performed by: INTERNAL MEDICINE

## 2021-06-16 PROCEDURE — 96415 CHEMO IV INFUSION ADDL HR: CPT

## 2021-06-16 PROCEDURE — 99214 OFFICE O/P EST MOD 30 MIN: CPT | Performed by: NURSE PRACTITIONER

## 2021-06-16 PROCEDURE — 96413 CHEMO IV INFUSION 1 HR: CPT

## 2021-06-16 RX ORDER — HEPARIN SODIUM (PORCINE) LOCK FLUSH IV SOLN 100 UNIT/ML 100 UNIT/ML
500 SOLUTION INTRAVENOUS PRN
Status: CANCELLED | OUTPATIENT
Start: 2021-06-16

## 2021-06-16 RX ORDER — SODIUM CHLORIDE 9 MG/ML
25 INJECTION, SOLUTION INTRAVENOUS PRN
Status: CANCELLED | OUTPATIENT
Start: 2021-06-16

## 2021-06-16 RX ORDER — HEPARIN SODIUM (PORCINE) LOCK FLUSH IV SOLN 100 UNIT/ML 100 UNIT/ML
500 SOLUTION INTRAVENOUS PRN
Status: DISCONTINUED | OUTPATIENT
Start: 2021-06-16 | End: 2021-06-17 | Stop reason: HOSPADM

## 2021-06-16 RX ORDER — SODIUM CHLORIDE 9 MG/ML
20 INJECTION, SOLUTION INTRAVENOUS ONCE
Status: COMPLETED | OUTPATIENT
Start: 2021-06-16 | End: 2021-06-16

## 2021-06-16 RX ORDER — SODIUM CHLORIDE 0.9 % (FLUSH) 0.9 %
5-40 SYRINGE (ML) INJECTION PRN
Status: CANCELLED | OUTPATIENT
Start: 2021-06-16

## 2021-06-16 RX ORDER — SODIUM CHLORIDE 0.9 % (FLUSH) 0.9 %
5-40 SYRINGE (ML) INJECTION PRN
Status: DISCONTINUED | OUTPATIENT
Start: 2021-06-16 | End: 2021-06-17 | Stop reason: HOSPADM

## 2021-06-16 RX ORDER — PALONOSETRON 0.05 MG/ML
0.25 INJECTION, SOLUTION INTRAVENOUS ONCE
Status: COMPLETED | OUTPATIENT
Start: 2021-06-16 | End: 2021-06-16

## 2021-06-16 RX ADMIN — SODIUM CHLORIDE 20 ML/HR: 9 INJECTION, SOLUTION INTRAVENOUS at 08:54

## 2021-06-16 RX ADMIN — HEPARIN 500 UNITS: 100 SYRINGE at 15:27

## 2021-06-16 RX ADMIN — SODIUM CHLORIDE 800 MG: 9 INJECTION, SOLUTION INTRAVENOUS at 11:01

## 2021-06-16 RX ADMIN — MANNITOL: 250 INJECTION, SOLUTION INTRAVENOUS at 11:18

## 2021-06-16 RX ADMIN — SODIUM CHLORIDE, PRESERVATIVE FREE 10 ML: 5 INJECTION INTRAVENOUS at 15:27

## 2021-06-16 RX ADMIN — POTASSIUM CHLORIDE: 2 INJECTION, SOLUTION, CONCENTRATE INTRAVENOUS at 14:23

## 2021-06-16 RX ADMIN — SODIUM CHLORIDE, PRESERVATIVE FREE 10 ML: 5 INJECTION INTRAVENOUS at 08:28

## 2021-06-16 RX ADMIN — SODIUM CHLORIDE, PRESERVATIVE FREE 10 ML: 5 INJECTION INTRAVENOUS at 08:27

## 2021-06-16 RX ADMIN — PALONOSETRON 0.25 MG: 0.05 INJECTION, SOLUTION INTRAVENOUS at 09:51

## 2021-06-16 RX ADMIN — DEXAMETHASONE SODIUM PHOSPHATE 12 MG: 4 INJECTION, SOLUTION INTRA-ARTICULAR; INTRALESIONAL; INTRAMUSCULAR; INTRAVENOUS; SOFT TISSUE at 08:55

## 2021-06-16 RX ADMIN — FOSAPREPITANT 150 MG: 150 INJECTION, POWDER, LYOPHILIZED, FOR SOLUTION INTRAVENOUS at 09:18

## 2021-06-16 RX ADMIN — POTASSIUM CHLORIDE: 2 INJECTION, SOLUTION, CONCENTRATE INTRAVENOUS at 09:53

## 2021-06-16 NOTE — PROGRESS NOTES
[facility])     Dr Lisa Olivas [facility])     Dr Dereck Batista      Past Surgical History:   Procedure Laterality Date    BRONCHOSCOPY N/A 5/6/2021    BRONCHOSCOPY performed by Ale Mariee MD at [address] [place] [address] Drive      x4   [facility] COLONOSCOPY  07/11/2016    CT NEEDLE BIOPSY LUNG PERCUTANEOUS  03/26/2021    CT NEEDLE BIOPSY LUNG PERCUTANEOUS 3/26/2021 [facility] CT SCAN    HYSTERECTOMY      LUNG REMOVAL, TOTAL Left 5/4/2021    ROBOTIC ASSISTED LEFT UPPER LOBECTOMY AND MEDIASTINAL DISSECTION, POSSIBLE OPEN AND CRYOTHERAPY OF INTERCOSTAL NERVES 5, 6, 7 & 8 performed by Victoria Muse MD at [address] [place] [address] Thelma Left 6/10/2021    LEFT SUBCLAVIAN SINGLE LUMEN SMART PORT INSERTION performed by Victoria Muse MD at [address] [facility]  07/11/2016      Family History   Problem Relation Age of Onset    Other Mother         aneursym    No Known Problems Father     Depression Sister     Heart Disease Brother     Diabetes Brother     Other Brother         Lung disease    Lung Cancer Maternal Grandfather     Other Brother         Crohns    Macular Degen Brother     Depression Sister     Other Sister         Blood clots    Arthritis Sister     Brain Cancer Paternal Uncle       Social History     Tobacco Use    Smoking status: Current Every Day Smoker     Packs/day: 0.25     Years: 45.00     Pack years: 11.25     Types: Cigarettes    Smokeless tobacco: Never Used   Substance Use Topics    Alcohol use: Never      Current Outpatient Medications   Medication Sig Dispense Refill    oxyCODONE (ROXICODONE) 5 MG immediate release tablet Take 5 mg by mouth every 4 hours as needed.       vitamin D3 (CHOLECALCIFEROL) 125 MCG (5000 UT) TABS tablet Cholecalciferol (Vitamin D3) (Vitamin D3) 125 mcg (5,000 unit) Tablet Active 125 MCG PO Daily April 22nd, 2021 4:50pm      prochlorperazine (COMPAZINE) 10 MG tablet Take 1 tablet by mouth every 6 hours as needed (nausea) 120 tablet 3    ondansetron (ZOFRAN-ODT) 8 MG TBDP disintegrating tablet Place 1 tablet under the tongue every 8 hours as needed for Nausea or Vomiting 10 tablet 2    dexamethasone (DECADRON) 4 MG tablet 4 mg Decadron take 1 tablet p.o. twice daily x3 days starting 1 day before chemotherapy with Alimta 6 tablet 3    lidocaine-prilocaine (EMLA) 2.5-2.5 % cream Apply topically as needed. 1 Tube 1    folic acid (FOLVITE) 1 MG tablet Take 1 tablet by mouth daily 30 tablet 3    folic acid (FOLVITE) 1 MG tablet Take 1 tablet by mouth daily for 30 doses Start 7 days before first scheduled dose and continue for 21 days after last dose of PEMEtrexed. 30 tablet 2    dexamethasone (DECADRON) 4 MG tablet Take 1 tablet by mouth See Admin Instructions Take 1 tab twice daily for 3 days starting the day before scheduled PEMEtrexed. 30 tablet 0    mupirocin (BACTROBAN) 2 % cream Apply topically 3 times daily. 15 g 0    acetaminophen (TYLENOL) 500 MG tablet Take 1 tablet by mouth 4 times daily as needed for Pain 120 tablet 0    ketorolac (TORADOL) 10 MG tablet Take 1 tablet by mouth every 6 hours as needed for Pain 20 tablet 0    senna (SENOKOT) 8.6 MG tablet Take 1 tablet by mouth daily      docusate sodium (COLACE) 100 MG capsule Take 100 mg by mouth 2 times daily      albuterol sulfate HFA (VENTOLIN HFA) 108 (90 Base) MCG/ACT inhaler Inhale 2 puffs into the lungs 4 times daily as needed for Wheezing 1 Inhaler 11     No current facility-administered medications for this visit.      Facility-Administered Medications Ordered in Other Visits   Medication Dose Route Frequency Provider Last Rate Last Admin    sodium chloride flush 0.9 % injection 5-40 mL  5-40 mL Intravenous PRN Rebecca Fuentes MD   10 mL at 06/16/21 0828    heparin flush 100 UNIT/ML injection 500 Units  500 Units Intracatheter PRN Rebecca Fuentes MD        0.9 % sodium chloride infusion  20 mL/hr Intravenous Once Rebecca Fuentes MD 20 mL/hr at 06/16/21 0854 20 mL/hr at 06/16/21 0854    potassium chloride 10 mEq, magnesium sulfate 1,000 mg in sodium chloride 0.9 % 500 mL IVPB   Intravenous Once Grace Benjamin  mL/hr at 06/16/21 0953 New Bag at 06/16/21 0953    PEMEtrexed (ALIMTA) 800 mg in sodium chloride 0.9 % 100 mL chemo infusion  500 mg/m2 (Order-Specific) Intravenous Once Grace Benjamin MD        CISplatin (PLATINOL) 120 mg, mannitol 25 g in sodium chloride 0.9 % 1,000 mL chemo IVPB   Intravenous Once Grace Benjamin MD        potassium chloride 10 mEq, magnesium sulfate 1,000 mg in sodium chloride 0.9 % 500 mL IVPB   Intravenous Once Grace Benjamin MD          Allergies   Allergen Reactions    Tylenol With Codeine #3 [Acetaminophen-Codeine] Nausea And Vomiting    Codeine Nausea And Vomiting          Review of Systems:   Review of Systems   Pertinent review of systems noted in HPI, all other ROS negative. Objective:   Physical Exam   BP (!) 138/94 (Site: Left Upper Arm, Position: Sitting, Cuff Size: Medium Adult)   Pulse 73   Temp 97.9 °F (36.6 °C) (Oral)   Resp 16   Ht 5' 6\" (1.676 m)   LMP  (LMP Unknown)   SpO2 94%   BMI 19.85 kg/m²    General appearance: No apparent distress, calm and cooperative. Slightly anxious. Thin. HEENT: Pupils equal, round, and reactive to light. Conjunctivae/corneas clear. Oral mucosa moist.  Neck: Supple, with full range of motion. Trachea midline. Respiratory:  Normal respiratory effort. Clear to auscultation all lung fields. Cardiovascular:  RRR, S1/S2. Abdomen: Soft, non-tender, non-distended with active BS x 4. Musculoskeletal: No clubbing, cyanosis or edema bilaterally. Pt able to ambulate in office without difficulty or use of assistive device. Skin: Skin color, texture, turgor normal.  No visible rashes or lesions. Neurologic:  Neurovascularly intact without any focal sensory/motor deficits.  Cranial nerves: II-XII intact, grossly non-focal.  Psychiatric: Alert and oriented x 3, thought content appropriate, normal insight  Capillary Refill: Brisk,< 3 seconds   Peripheral Pulses: +2 palpable, equal bilaterally       Imaging Studies and Labs:   CBC:   Lab Results   Component Value Date    WBC 4.5 (L) 06/16/2021    HGB 11.3 (L) 06/16/2021    HCT 34.9 (L) 06/16/2021    MCV 97 06/16/2021     06/16/2021     BMP:   Lab Results   Component Value Date     06/16/2021     06/03/2021    K 4.0 06/16/2021    K 5.2 06/03/2021     06/03/2021    CO2 25 06/03/2021    BUN 9 06/03/2021    CREATININE 0.8 06/16/2021    CREATININE 0.7 06/03/2021    GLUCOSE 87 06/03/2021    GLUCOSE 79 10/10/2017    CALCIUM 9.9 06/03/2021      LFT:   Lab Results   Component Value Date    ALT 13 06/03/2021    AST 13 06/03/2021    ALKPHOS 98 06/03/2021    BILITOT 0.2 (L) 06/03/2021         Assessment and Plan:     1. Primary adenocarcinoma of left lung (HCC)  SHANNON cavitary adenocarcinoma of lung, 3.9 x 2.1 cm. PET (-). Bronchoscopy EBUS-isolated station 11 LN with moderate to poorly differentiated adenocarcinoma. Clinical stage T2a N1M0/ IIB. Pathologic stage T1 cN1 M0/IIB. 2. Encounter for chemotherapy management  Treatment recommendations include adjuvant cis-platinum & pemetrexed x 4 cycles. Lab parameters met, ok to proceed with treatment today. 3.  Anemia  H/H 11.3/34.9. Pt denies any s/s bleeding. Trend. Return in about 1 week (around 6/23/2021). All patient questions answered. Pt voiced understanding. Patient agreed with treatment plan. Follow up as directed. Patient instructed to call for questions or concerns. Electronically signed by   ERYN Mayo CNP     I spent a total of 30 minutes on the day of the visit.

## 2021-06-16 NOTE — PLAN OF CARE
Problem: Musculor/Skeletal Functional Status  Goal: Absence of falls  Outcome: Met This Shift  Note: Patient free from falls while in O.P. Oncology. Intervention: Fall precautions  Note: Patient assessed for fall risk on admission to [facility]. Fall band placed on patient. Discussed the need to use the call light for assistance prior to getting up out of chair/bed. Problem: Intellectual/Education/Knowledge Deficit  Goal: Teaching initiated upon admission  Outcome: Met This Shift  Note: Patient verbalizes understanding to verbal information given on Alimta/Cisplatin ,action and possible side effects. Aware to call MD if develop complications. Intervention: Verbal/written education provided  Note: Chemotherapy Teaching     What is Chemotherapy   Drug action [x]   Method of Administration [x]   Handouts given []     Side Effects  Nausea/vomiting [x]   Diarrhea [x]   Fatigue [x]   Signs / Symptoms of infection [x]   Neutropenia [x]   Thrombocytopenia [x]   Alopecia [x]   neuropathy [x]   [place] diet &  the importance of fluids [x]       Micellaneous  Importance of nutrition [x]   Importance of oral hygiene [x]   When to call the MD [x]   Monitoring labs [x]   Use of supportive services []     Explanation of Drug Regimen / Frequency  Alimta/Cisplatin:  D1C1     Comments  Verbalized understanding to drug,action,side effects and when to call MD         Problem: Discharge Planning:  Goal: Discharged to appropriate level of care  Description: Discharged to appropriate level of care  Outcome: Met This Shift  Note: Verbalized understanding of discharge instructions, follow-up appointments, and when to call the physician.    Intervention: Assess knowledge level of healthcare  Description: Assess knowledge level of healthcare - REMINDER(s): Knowledge level, assistive device, Knowledge level, health promotion, Knowledge level, infection control measures  Note: Discuss understanding of discharge instructions,follow-up appointments, and when to call the physician. Problem: Infection - Central Venous Catheter-Associated Bloodstream Infection:  Goal: Will show no infection signs and symptoms  Description: Will show no infection signs and symptoms  Outcome: Met This Shift  Note: Mediport site with no redness or warmth. Skin over port site intact with no signs of breakdown noted. Patient verbalizes signs/symptoms of port infection and when to notify the physician. Intervention: Infection risk assessment  Description: Infection risk assessment  Note: Discuss port maintenance, infection prevention, signs and when to call Dr Zakia Muller reviewed with patient. Patient verbalize understanding of the plan of care and contribute to goal setting.

## 2021-06-16 NOTE — PROGRESS NOTES
Patient assessed for the following post chemotherapy:    Dizziness   No  Lightheadedness  No      Acute nausea/vomiting No  Headache   No  Chest pain/pressure  No  Rash/itching   No  Shortness of breath  No    Patient kept for 20 minutes observation post infusion chemotherapy. Patient tolerated chemotherapy treatment Alimta and cisplatin without any complications. Last vital signs:   BP (!) 140/81   Pulse 66   Temp 98 °F (36.7 °C) (Oral)   Resp 16   Ht 5' 6\" (1.676 m)   Wt 123 lb 9.6 oz (56.1 kg)   LMP  (LMP Unknown)   SpO2 93%   BMI 19.95 kg/m²       Patient instructed if experience any of the above symptoms following today's infusion,he/she is to notify MD immediately or go to the emergency department. Discharge instructions given to patient. Verbalizes understanding. Ambulated off unit per self in stable condition with belongings.

## 2021-06-16 NOTE — PROGRESS NOTES
Name: Jarrod Gregory  : 1960  MRN: [id number]    Oncology Navigation Follow-Up Note    Contact Type:  Medical Oncology    Notes:  Met with Chanelle Griffith during her first Cycle of Alimta and Cisplatin. Recovered well from surgery. Stated she has slept through most of this treatment. Knows she may not tonight due to IV Steroid. Voiced no needs at present. Knows to call with any questions or concerns.     Electronically signed by Kaykay Butler RN on 2021 at 4:34 PM

## 2021-06-16 NOTE — PATIENT INSTRUCTIONS
1.  Return to clinic next week to see [facility] or Dr. Marylen Ates with labs:  CBC, BMP, LFTs  2.   Next cycle of treatment 7/7/2021

## 2021-06-18 ENCOUNTER — TELEPHONE (OUTPATIENT)
Dept: INFUSION THERAPY | Age: 61
End: 2021-06-18

## 2021-06-22 ENCOUNTER — OFFICE VISIT (OUTPATIENT)
Dept: CARDIOLOGY CLINIC | Age: 61
End: 2021-06-22
Payer: COMMERCIAL

## 2021-06-22 VITALS
HEIGHT: 66 IN | SYSTOLIC BLOOD PRESSURE: 136 MMHG | DIASTOLIC BLOOD PRESSURE: 97 MMHG | WEIGHT: 121.8 LBS | HEART RATE: 80 BPM | BODY MASS INDEX: 19.58 KG/M2

## 2021-06-22 DIAGNOSIS — I77.810 ASCENDING AORTA DILATATION (HCC): ICD-10-CM

## 2021-06-22 DIAGNOSIS — R06.09 DOE (DYSPNEA ON EXERTION): Primary | ICD-10-CM

## 2021-06-22 DIAGNOSIS — C34.92 PRIMARY ADENOCARCINOMA OF LEFT LUNG (HCC): Primary | ICD-10-CM

## 2021-06-22 DIAGNOSIS — R00.2 INTERMITTENT PALPITATIONS: ICD-10-CM

## 2021-06-22 DIAGNOSIS — R94.31 ABNORMAL EKG: ICD-10-CM

## 2021-06-22 DIAGNOSIS — Z72.0 TOBACCO ABUSE: ICD-10-CM

## 2021-06-22 PROCEDURE — 99213 OFFICE O/P EST LOW 20 MIN: CPT | Performed by: INTERNAL MEDICINE

## 2021-06-22 NOTE — PROGRESS NOTES
Chief Complaint   Patient presents with   Maira Pickettdge Follow-up     Originally  patient for pre op eval for lung Ca surgery        Had surgery for left upper lobe lung for stage II  with Dr. Estefany Moyer on 5-3-2021.   dxed with lung ca march 3rd 2021      Pt here for a 2 month f/u  Lung surgery went well    EKG done 6-3-21    Denied chest pain, dizziness or edema    Occasional palpitations- once in a while    KERR   Hx of mod  To severe COPD under F/u with pulm    Lost wt and cough and CT scan and noted lung nodule and Bx done    Smokes    FHX  Brother had and father had heart problem      Patient Active Problem List   Diagnosis    Primary cancer of left upper lobe of lung ([address]    Other emphysema ([address]    Smoking greater than 40 pack years    Tobacco abuse    KERR (dyspnea on exertion)    Intermittent palpitations    Borderline Abnormal EKG with rsr'    S/P left upper lobectomy of lung and mediastinal dissection    Moderate COPD (chronic obstructive pulmonary disease) ([address]    Encounter for insertion of venous access port    Ascending aorta dilatation (HCC) 4.1 cm       Past Surgical History:   Procedure Laterality Date    BRONCHOSCOPY N/A 5/6/2021    BRONCHOSCOPY performed by Selena Florian MD at [facility] [address]      x4    CHOLECYSTECTOMY      COLONOSCOPY  07/11/2016    CT NEEDLE BIOPSY LUNG PERCUTANEOUS  03/26/2021    CT NEEDLE BIOPSY LUNG PERCUTANEOUS 3/26/2021 [facility] CT SCAN    HYSTERECTOMY      LUNG REMOVAL, TOTAL Left 5/4/2021    ROBOTIC ASSISTED LEFT UPPER LOBECTOMY AND MEDIASTINAL DISSECTION, POSSIBLE OPEN AND CRYOTHERAPY OF INTERCOSTAL NERVES 5, 6, 7 & 8 performed by Shailesh Esparza MD at [address] [place] [address] 6/10/2021    LEFT SUBCLAVIAN SINGLE LUMEN SMART PORT INSERTION performed by Shailesh Esparza MD at [address] [facility]  07/11/2016       Allergies   Allergen Reactions    Tylenol With Codeine #3 [Acetaminophen-Codeine] Nausea And Vomiting    Codeine Nausea And Vomiting        Family History   Problem Relation Age of Onset    Other Mother         aneursym    No Known Problems Father     Depression Sister     Heart Disease Brother     Diabetes Brother     Other Brother         Lung disease    Lung Cancer Maternal Grandfather     Other Brother         Crohns    Macular Degen Brother     Depression Sister     Other Sister         Blood clots    Arthritis Sister     Brain Cancer Paternal Uncle         Social History     Socioeconomic History    Marital status:      Spouse name: Not on file    Number of children: Not on file    Years of education: Not on file    Highest education level: Not on file   Occupational History    Not on file   Tobacco Use    Smoking status: Current Every Day Smoker     Packs/day: 0.25     Years: 45.00     Pack years: 11.25     Types: Cigarettes    Smokeless tobacco: Never Used   Vaping Use    Vaping Use: Never used   Substance and Sexual Activity    Alcohol use: Never    Drug use: Yes     Types: Marijuana     Comment: past month    Sexual activity: Not on file   Other Topics Concern    Not on file   Social History Narrative    Not on file     Social Determinants of Health     Financial Resource Strain:     Difficulty of Paying Living Expenses:    Food Insecurity:     Worried About Running Out of Food in the Last Year:     [address] in the Last Year:    Transportation Needs:     Lack of Transportation (Medical):      Lack of Transportation (Non-Medical):    Physical Activity:     Days of Exercise per Week:     Minutes of Exercise per Session:    Stress:     Feeling of Stress :    Social Connections:     Frequency of Communication with Friends and Family:     Frequency of Social Gatherings with Friends and Family:     Attends Alevism Services:     Active Member of Clubs or Organizations:     Attends Club or Organization Meetings:     Marital Status: Intimate Partner Violence:     Fear of Current or Ex-Partner:     Emotionally Abused:     Physically Abused:     Sexually Abused:        Current Outpatient Medications   Medication Sig Dispense Refill    oxyCODONE (ROXICODONE) 5 MG immediate release tablet Take 5 mg by mouth every 4 hours as needed.  vitamin D3 (CHOLECALCIFEROL) 125 MCG (5000 UT) TABS tablet Cholecalciferol (Vitamin D3) (Vitamin D3) 125 mcg (5,000 unit) Tablet Active 125 MCG PO Daily April 22nd, 2021 4:50pm      prochlorperazine (COMPAZINE) 10 MG tablet Take 1 tablet by mouth every 6 hours as needed (nausea) 120 tablet 3    ondansetron (ZOFRAN-ODT) 8 MG TBDP disintegrating tablet Place 1 tablet under the tongue every 8 hours as needed for Nausea or Vomiting 10 tablet 2    dexamethasone (DECADRON) 4 MG tablet 4 mg Decadron take 1 tablet p.o. twice daily x3 days starting 1 day before chemotherapy with Alimta 6 tablet 3    lidocaine-prilocaine (EMLA) 2.5-2.5 % cream Apply topically as needed. 1 Tube 1    folic acid (FOLVITE) 1 MG tablet Take 1 tablet by mouth daily 30 tablet 3    folic acid (FOLVITE) 1 MG tablet Take 1 tablet by mouth daily for 30 doses Start 7 days before first scheduled dose and continue for 21 days after last dose of PEMEtrexed. 30 tablet 2    dexamethasone (DECADRON) 4 MG tablet Take 1 tablet by mouth See Admin Instructions Take 1 tab twice daily for 3 days starting the day before scheduled PEMEtrexed. 30 tablet 0    mupirocin (BACTROBAN) 2 % cream Apply topically 3 times daily.  15 g 0    acetaminophen (TYLENOL) 500 MG tablet Take 1 tablet by mouth 4 times daily as needed for Pain 120 tablet 0    ketorolac (TORADOL) 10 MG tablet Take 1 tablet by mouth every 6 hours as needed for Pain 20 tablet 0    senna (SENOKOT) 8.6 MG tablet Take 1 tablet by mouth daily      docusate sodium (COLACE) 100 MG capsule Take 100 mg by mouth 2 times daily      albuterol sulfate HFA (VENTOLIN HFA) 108 (90 Base) MCG/ACT inhaler Inhale 2 puffs into the lungs 4 times daily as needed for Wheezing 1 Inhaler 11     No current facility-administered medications for this visit. Review of Systems -     General ROS: negative  Psychological ROS: negative  Hematological and Lymphatic ROS: No history of blood clots or bleeding disorder. Respiratory ROS: no cough,  or wheezing, the rest see HPI  Cardiovascular ROS: See HPI  Gastrointestinal ROS: negative  Genito-Urinary ROS: no dysuria, trouble voiding, or hematuria  Musculoskeletal ROS: negative  Neurological ROS: no TIA or stroke symptoms  Dermatological ROS: negative      Blood pressure (!) 136/97, pulse 80, height 5' 6\" (1.676 m), weight 121 lb 12.8 oz (55.2 kg). Physical Examination:    General appearance - alert, well appearing, and in no distress  HEENT- Pink conjunctiva  , Non-icteri sclera,PERRLA  Mental status - alert, oriented to person, place, and time  Neck - supple, no significant adenopathy, no JVD, or carotid bruits  Chest - clear to auscultation, no wheezes, rales or rhonchi, symmetric air entry  Heart - normal rate, regular rhythm, normal S1, S2, no murmurs, rubs, clicks or gallops  Abdomen - soft, nontender, nondistended, no masses or organomegaly  BERNARD- no CVA or flank tenderness, no suprapubic tenderness  Neurological - alert, oriented, normal speech, no focal findings or movement disorder noted  Musculoskeletal/limbs - no joint tenderness, deformity or swelling   - peripheral pulses normal, no pedal edema, no clubbing or cyanosis  Skin - normal coloration and turgor, no rashes, no suspicious skin lesions noted  Psych- appropriate mood and affect    Lab  No results for input(s): CKTOTAL, CKMB, CKMBINDEX, TROPONINI in the last 72 hours.   CBC:   Lab Results   Component Value Date    WBC 4.5 06/16/2021    RBC 3.60 06/16/2021    RBC 4.63 10/10/2017    HGB 11.3 06/16/2021    HCT 34.9 06/16/2021    MCV 97 06/16/2021    MCH 31.4 06/16/2021    MCHC 32.4 06/16/2021    RDW 13.8 06/16/2021     06/16/2021    MPV 12.2 06/16/2021     BMP:    Lab Results   Component Value Date     06/16/2021     06/03/2021    K 4.0 06/16/2021    K 5.2 06/03/2021     06/03/2021    CO2 25 06/03/2021    BUN 9 06/03/2021    LABALBU 4.1 06/03/2021    CREATININE 0.8 06/16/2021    CREATININE 0.7 06/03/2021    CALCIUM 9.9 06/03/2021    LABGLOM 85 06/03/2021    GLUCOSE 87 06/03/2021    GLUCOSE 79 10/10/2017     Hepatic Function Panel:    Lab Results   Component Value Date    ALKPHOS 98 06/03/2021    ALT 13 06/03/2021    AST 13 06/03/2021    PROT 7.7 06/03/2021    BILITOT 0.2 06/03/2021    BILIDIR <0.2 06/03/2021    LABALBU 4.1 06/03/2021     Magnesium:    Lab Results   Component Value Date    MG 2.2 06/16/2021     Warfarin PT/INR:  No components found for: PTPATWAR, PTINRWAR  HgBA1c:  No results found for: LABA1C  FLP:  No results found for: TRIG, HDL, LDLCALC, LDLDIRECT, LABVLDL  TSH:    Lab Results   Component Value Date    TSH 4.750 10/10/2017   Conclusions      Summary   Normal left ventricle size and systolic function. Ejection fraction was   estimated at 60 %. There were no regional left ventricular wall motion   abnormalities and wall thickness was within normal limits. Aortic aneurysm noted in the ascending aorta . Aortic aneurysm measures 4.1 cm . Signature      ----------------------------------------------------------------   Electronically signed by Sarabjit Redmond MD (Interpreting   physician) on 04/27/2021 at 07:10 PM    June 2021 CTA chest  The ascending aorta is upper limits of normal in diameter    Conclusions      Summary   Lexiscan EKG stress test is not suggestive for ischemia. The nuclear images is not suggestive for myocardial ischemia.       Signatures      ----------------------------------------------------------------   Electronically signed by Sarabjit Redmond MD (Interpreting   Cardiologist) on 04/29/2021 at 18:54 ----------------------------------------------------------------        ekg 4/22/21  NSR,RSR' no acute abn    Assessment   Diagnosis Orders   1. KERR (dyspnea on exertion)     2. Intermittent palpitations     3. Borderline Abnormal EKG with rsr'     4. Tobacco abuse     5. Ascending aorta dilatation (HCC) 4.1 cm         -3.9 x 2.1 cm spiculated cavitary mass present within the left upper lobe S/p CT guided biopsy by IR service on 3/26/2021. The biopsy is consistent with Adenocarcinoma. Plan     The current eds and labs reviewed    Minimal abn ekg  KERR  COPD  Tobacco abuse  The  Echo and catracho nuc stress- WNL    Smoking: discussed with the patient the importance of smoke cessation especially with the risk of CAD. Aortic aneurysm measures 4.1 cm .   June 2021 CTA chest  The ascending aorta is upper limits of normal in diameter  Need f/u  And d.w the pat      D.w the pat the plan of care    RTC in 12 months          Henry Staley, [facility]

## 2021-06-23 ENCOUNTER — HOSPITAL ENCOUNTER (OUTPATIENT)
Dept: INFUSION THERAPY | Age: 61
Discharge: HOME | End: 2021-06-23
Payer: COMMERCIAL

## 2021-06-23 ENCOUNTER — OFFICE VISIT (OUTPATIENT)
Dept: ONCOLOGY | Age: 61
End: 2021-06-23
Payer: COMMERCIAL

## 2021-06-23 VITALS
SYSTOLIC BLOOD PRESSURE: 118 MMHG | WEIGHT: 122 LBS | DIASTOLIC BLOOD PRESSURE: 87 MMHG | TEMPERATURE: 98.5 F | OXYGEN SATURATION: 99 % | HEART RATE: 67 BPM | HEIGHT: 66 IN | BODY MASS INDEX: 19.61 KG/M2 | RESPIRATION RATE: 16 BRPM

## 2021-06-23 VITALS
TEMPERATURE: 98.5 F | HEART RATE: 67 BPM | RESPIRATION RATE: 16 BRPM | BODY MASS INDEX: 19.61 KG/M2 | DIASTOLIC BLOOD PRESSURE: 87 MMHG | WEIGHT: 122 LBS | OXYGEN SATURATION: 99 % | SYSTOLIC BLOOD PRESSURE: 118 MMHG | HEIGHT: 66 IN

## 2021-06-23 DIAGNOSIS — Z51.11 ENCOUNTER FOR CHEMOTHERAPY MANAGEMENT: Primary | ICD-10-CM

## 2021-06-23 DIAGNOSIS — J43.8 OTHER EMPHYSEMA (HCC): ICD-10-CM

## 2021-06-23 DIAGNOSIS — C34.92 PRIMARY ADENOCARCINOMA OF LEFT LUNG (HCC): Primary | ICD-10-CM

## 2021-06-23 DIAGNOSIS — F17.210 SMOKING GREATER THAN 40 PACK YEARS: ICD-10-CM

## 2021-06-23 DIAGNOSIS — Z72.0 TOBACCO ABUSE: ICD-10-CM

## 2021-06-23 DIAGNOSIS — C34.12 PRIMARY CANCER OF LEFT UPPER LOBE OF LUNG (HCC): ICD-10-CM

## 2021-06-23 DIAGNOSIS — Z90.2 S/P LOBECTOMY OF LUNG: ICD-10-CM

## 2021-06-23 PROCEDURE — 36593 DECLOT VASCULAR DEVICE: CPT

## 2021-06-23 PROCEDURE — 99213 OFFICE O/P EST LOW 20 MIN: CPT | Performed by: INTERNAL MEDICINE

## 2021-06-23 PROCEDURE — 2580000003 HC RX 258: Performed by: INTERNAL MEDICINE

## 2021-06-23 PROCEDURE — 99211 OFF/OP EST MAY X REQ PHY/QHP: CPT

## 2021-06-23 PROCEDURE — 6360000002 HC RX W HCPCS: Performed by: INTERNAL MEDICINE

## 2021-06-23 RX ORDER — SODIUM CHLORIDE 0.9 % (FLUSH) 0.9 %
5-40 SYRINGE (ML) INJECTION PRN
Status: DISCONTINUED | OUTPATIENT
Start: 2021-06-23 | End: 2021-06-24 | Stop reason: HOSPADM

## 2021-06-23 RX ORDER — SODIUM CHLORIDE 0.9 % (FLUSH) 0.9 %
5-40 SYRINGE (ML) INJECTION PRN
Status: CANCELLED | OUTPATIENT
Start: 2021-06-23

## 2021-06-23 RX ORDER — HEPARIN SODIUM (PORCINE) LOCK FLUSH IV SOLN 100 UNIT/ML 100 UNIT/ML
500 SOLUTION INTRAVENOUS PRN
Status: CANCELLED | OUTPATIENT
Start: 2021-06-23

## 2021-06-23 RX ORDER — HEPARIN SODIUM (PORCINE) LOCK FLUSH IV SOLN 100 UNIT/ML 100 UNIT/ML
500 SOLUTION INTRAVENOUS PRN
Status: DISCONTINUED | OUTPATIENT
Start: 2021-06-23 | End: 2021-06-24 | Stop reason: HOSPADM

## 2021-06-23 RX ORDER — SODIUM CHLORIDE 9 MG/ML
25 INJECTION, SOLUTION INTRAVENOUS PRN
Status: CANCELLED | OUTPATIENT
Start: 2021-06-23

## 2021-06-23 RX ADMIN — WATER 2.2 ML: 1 INJECTION INTRAMUSCULAR; INTRAVENOUS; SUBCUTANEOUS at 09:32

## 2021-06-23 RX ADMIN — ALTEPLASE 2 MG: 2.2 INJECTION, POWDER, LYOPHILIZED, FOR SOLUTION INTRAVENOUS at 09:33

## 2021-06-23 RX ADMIN — HEPARIN 500 UNITS: 100 SYRINGE at 11:18

## 2021-06-23 RX ADMIN — SODIUM CHLORIDE, PRESERVATIVE FREE 30 ML: 5 INJECTION INTRAVENOUS at 09:20

## 2021-06-23 RX ADMIN — SODIUM CHLORIDE, PRESERVATIVE FREE 20 ML: 5 INJECTION INTRAVENOUS at 11:18

## 2021-06-23 NOTE — PLAN OF CARE
Problem: Discharge Planning:  Goal: Discharged to appropriate level of care  Description: Discharged to appropriate level of care  Outcome: Met This Shift  Note: Verbalized understanding of discharge instructions, follow-up appointments, and when to call the physician. Intervention: Assess knowledge level of healthcare  Description: Assess knowledge level of healthcare - REMINDER(s): Knowledge level, assistive device, Knowledge level, health promotion, Knowledge level, infection control measures  Note: Discuss understanding of discharge instructions,follow-up appointments, and when to call the physician. Problem: Infection - Central Venous Catheter-Associated Bloodstream Infection:  Goal: Will show no infection signs and symptoms  Description: Will show no infection signs and symptoms  Outcome: Met This Shift  Note: Mediport site with no redness or warmth. Skin over port site intact with no signs of breakdown noted. Patient verbalizes signs/symptoms of port infection and when to notify the physician. Intervention: Infection risk assessment  Description: Infection risk assessment  Note: Instructed to monitor for signs/symptoms of infection at [address] [facility] [address] [facility] and call MD if problems develop. Care plan reviewed with patient . Patient  verbalize understanding of the plan of care and contribute to goal setting.

## 2021-06-23 NOTE — PROGRESS NOTES
[address] CANCER [address] [place] [address]  Dept: [phone]  Loc: [phone]   Hematology/Oncology Consult (Clinic)        6/23/21    Graylon Redo   1960     No ref. provider found   Rafael Eugene MD     DIAGNOSIS:  -  Left upper lobe cavitary adenocarcinoma of the lung. 3.9 x 2.1 cm. PET scan negative. Bronchoscopy EBUS-isolated station 11 lymph node with a moderate to poorly differentiated adenocarcinoma. 4L node with a minute focus of atypical cells on path review felt to be benign. Clinical stage: T2a N1M0 / IIB. Pathologic stage: T1 cN1 M0 /IIB (poorly differentiated, 3 of 7 nodes positive including extranodal extension. TREATMENT:  -Left upper lobectomy and mediastinal node dissection scheduled by Dr. Cinda Garcia 5/3/2021  -Adjuvant chemotherapy planned; recommend cis-platinum 75 mg meter squared day 1 and pemetrexed 500 mg meter squared day 1 every 21 days x 4 cycles . Tentative start date 6/16/2021    PARAMETERS:  -Chest CT without contrast 3/12/2021  -PET/CT 4/14/2021  -Brain MRI with and without contrast 4/9/2021    SUBJECTIVE: Patient is down to about 5 cigarettes/day. No new complaints. .  She denies any neuropathy. Denies any history of renal insufficiency. She began course 1 of chemotherapy last week with cisplatin and Alimta on June 16. Before this she had some a.m. nausea from her gastroparesis and this is continued but is unchanged. States the Compazine aggravates the nausea but Zofran is very helpful. Nausea Q am controlled with Zofran. Has Am nausea from gastroparesis. HPI: Rubén Almonte is a 60-year-old female with a 45-pack-year smoking habit (ongoing) with a chest CT without contrast on 3/12/2021 revealing a 3.9 x 2.1 cm cavitary mass in the left upper lung corresponding to the abnormality seen on the chest x-ray of 2/26/2021. Radiologically very suspicious.  Patient underwent a needle biopsy on 3/26 revealing adenocarcinoma. PET scan shows no distant mets or adenopathy. PFT's done and reviewed below. She is waiting  for a consult with Dr Shaheen Wong of Surgery. Reports 30 # weight loss ove 9 months; unintentional. Normal weight @ 145. Appetite has remained good. Chronic cough unchanged, no blood. No chest pain or pain elsewhere. Denied increased sob. Average 1ppd x 45 years; now about 5 cigs/day . Motivated to stop but failed Nicotine replacement products. Chantix not attempted thus far. ROS:  Review of Systems 14 point negative except as above. Limited to unintentional weight loss. Chronic unchanged nonproductive cough and shortness of breath with activity also unchanged. PMH:   Past Medical History:   Diagnosis Date    Arthritis     COPD (chronic obstructive pulmonary disease) ([facility])     Dr Wei Blocker [facility])     Dr Olga Reynolds   Aneurysm    Social HX:   Social History     Socioeconomic History    Marital status:      Spouse name: Not on file    Number of children: Not on file    Years of education: Not on file    Highest education level: Not on file   Occupational History    Not on file   Tobacco Use    Smoking status: Current Every Day Smoker     Packs/day: 0.25     Years: 45.00     Pack years: 11.25     Types: Cigarettes    Smokeless tobacco: Never Used   Vaping Use    Vaping Use: Never used   Substance and Sexual Activity    Alcohol use: Never    Drug use: Yes     Types: Marijuana     Comment: past month    Sexual activity: Not on file   Other Topics Concern    Not on file   Social History Narrative    Not on file     Social Determinants of Health     Financial Resource Strain:     Difficulty of Paying Living Expenses:    Food Insecurity:     Worried About Running Out of Food in the Last Year:     Ran Out of Food in the Last Year:    Transportation Needs:     Lack of Transportation (Medical):      Lack of Transportation (Non-Medical):    Physical been no significant interval change. Dr. Amadou Carr            nn/nacho:2021 13:05:44      Imaging Technologist: Joaquina DIALLO)(MARTHA), [facility]  letter sent: Normal-All Doctor Names    [id number]        Pap / Pelvic:na  C-Scope: Approximately 5 years ago with 2 benign polyps      Gyn HX:   GPA:  DARREL/BSO:31 years ago  LMP: No LMP recorded (lmp unknown). Patient has had a hysterectomy. Health Maintenance Due   Topic Date Due    Pneumococcal 0-64 years Vaccine (1 of 4 - PCV13) Never done    COVID-19 Vaccine (1) Never done    HIV screen  Never done    DTaP/Tdap/Td vaccine (1 - Tdap) Never done    Cervical cancer screen  Never done    Lipid screen  Never done    Shingles Vaccine (1 of 2) Never done    Colon cancer screen colonoscopy  Never done        Interests:   none    Fam HX:   Family History   Problem Relation Age of Onset    Other Mother         aneursym    No Known Problems Father     Depression Sister     Heart Disease Brother     Diabetes Brother     Other Brother         Lung disease    Lung Cancer Maternal Grandfather     Other Brother         Crohns    Macular Degen Brother     Depression Sister     Other Sister         Blood clots    Arthritis Sister     Brain Cancer Paternal Uncle         Hospitalizations:   None recent    Allergies:   Allergies   Allergen Reactions    Tylenol With Codeine #3 [Acetaminophen-Codeine] Nausea And Vomiting    Codeine Nausea And Vomiting        Adult Illness:  Patient Active Problem List   Diagnosis    Primary cancer of left upper lobe of lung ([address]    Other emphysema (HCC)    Smoking greater than 40 pack years    Tobacco abuse    KERR (dyspnea on exertion)    Intermittent palpitations    Borderline Abnormal EKG with rsr'    S/P left upper lobectomy of lung and mediastinal dissection    Moderate COPD (chronic obstructive pulmonary disease) ([address]    Encounter for insertion of venous access port    Ascending aorta dilatation (HCC) 4.1 cm        Surgery:  Past Surgical History:   Procedure Laterality Date    BRONCHOSCOPY N/A 5/6/2021    BRONCHOSCOPY performed by Kirk Cordero MD at [address] [place] [address] Drive      x4   [facility] COLONOSCOPY  07/11/2016    CT NEEDLE BIOPSY LUNG PERCUTANEOUS  03/26/2021    CT NEEDLE BIOPSY LUNG PERCUTANEOUS 3/26/2021 [facility] CT SCAN    HYSTERECTOMY      LUNG REMOVAL, TOTAL Left 5/4/2021    ROBOTIC ASSISTED LEFT UPPER LOBECTOMY AND MEDIASTINAL DISSECTION, POSSIBLE OPEN AND CRYOTHERAPY OF INTERCOSTAL NERVES 5, 6, 7 & 8 performed by Elver Alba MD at [facility] 6/10/2021    LEFT SUBCLAVIAN SINGLE LUMEN SMART PORT INSERTION performed by Elver Alba MD at [facility]  07/11/2016        Medications:  Current Outpatient Medications   Medication Sig Dispense Refill    oxyCODONE (ROXICODONE) 5 MG immediate release tablet Take 5 mg by mouth every 4 hours as needed.  vitamin D3 (CHOLECALCIFEROL) 125 MCG (5000 UT) TABS tablet Cholecalciferol (Vitamin D3) (Vitamin D3) 125 mcg (5,000 unit) Tablet Active 125 MCG PO Daily April 22nd, 2021 4:50pm      prochlorperazine (COMPAZINE) 10 MG tablet Take 1 tablet by mouth every 6 hours as needed (nausea) 120 tablet 3    ondansetron (ZOFRAN-ODT) 8 MG TBDP disintegrating tablet Place 1 tablet under the tongue every 8 hours as needed for Nausea or Vomiting 10 tablet 2    dexamethasone (DECADRON) 4 MG tablet 4 mg Decadron take 1 tablet p.o. twice daily x3 days starting 1 day before chemotherapy with Alimta 6 tablet 3    lidocaine-prilocaine (EMLA) 2.5-2.5 % cream Apply topically as needed. 1 Tube 1    folic acid (FOLVITE) 1 MG tablet Take 1 tablet by mouth daily 30 tablet 3    dexamethasone (DECADRON) 4 MG tablet Take 1 tablet by mouth See Admin Instructions Take 1 tab twice daily for 3 days starting the day before scheduled PEMEtrexed.  30 tablet 0  mupirocin (BACTROBAN) 2 % cream Apply topically 3 times daily. 15 g 0    acetaminophen (TYLENOL) 500 MG tablet Take 1 tablet by mouth 4 times daily as needed for Pain 120 tablet 0    senna (SENOKOT) 8.6 MG tablet Take 1 tablet by mouth daily      docusate sodium (COLACE) 100 MG capsule Take 100 mg by mouth 2 times daily      albuterol sulfate HFA (VENTOLIN HFA) 108 (90 Base) MCG/ACT inhaler Inhale 2 puffs into the lungs 4 times daily as needed for Wheezing 1 Inhaler 11    ketorolac (TORADOL) 10 MG tablet Take 1 tablet by mouth every 6 hours as needed for Pain (Patient not taking: Reported on 2021) 20 tablet 0     No current facility-administered medications for this visit. Facility-Administered Medications Ordered in Other Visits   Medication Dose Route Frequency Provider Last Rate Last Admin    sodium chloride flush 0.9 % injection 5-40 mL  5-40 mL Intravenous PRN Ronda Clemente MD   30 mL at 21 0920    heparin flush 100 UNIT/ML injection 500 Units  500 Units Intracatheter PRN Ronda Clemente MD             EXAM:    /87 (Site: Left Upper Arm, Position: Sitting, Cuff Size: Medium Adult)   Pulse 67   Temp 98.5 °F (36.9 °C) (Oral)   Resp 16   Ht 5' 6\" (1.676 m)   Wt 122 lb (55.3 kg)   LMP  (LMP Unknown)   SpO2 99%   BMI 19.69 kg/m²      ECO  General: Non-ill appearing. Appears comfortable and in no distress. HEENT: NC/AT,nonicteric, perrla,eom intact, no mucosal lesions  Neck: normal thyroid, no masses. pulses nl, no bruits,   Nodes: No adenopathy  Lungs/chest: clear, no rales,rhonchi or wheezing, lung bases clear. There is a dime sized healing wound with a scab and border with erythema that appears to be healing with no discharge or odor. CV: rrr, no rubs ,gallops or murmurs  Breasts: Not examined  Abd/Rectal: soft, non-tender,bowel sounds normal , no HSM,no masses  Back: normal curvature, No midline tenderness. flanks nontender  : Not Examined  Extremities: no cyanosis,clubbing or edema. Skin: unremarkable  Neuro: A and O x 4, CN exam nonfocal, Motor- no deficits, Sensory- no deficits, gait-nl, speech- fluent, no ataxia.   Devices: [id number] left chest unremarkable      DATA:      CBC with Differential:      Lab Results   Component Value Date    WBC 4.5 06/16/2021    RBC 3.60 06/16/2021    RBC 4.63 10/10/2017    HGB 11.3 06/16/2021    HCT 34.9 06/16/2021     06/16/2021    MCV 97 06/16/2021    MCH 31.4 06/16/2021    MCHC 32.4 06/16/2021    RDW 13.8 06/16/2021    NRBC 0 06/03/2021    SEGSPCT 74.5 06/03/2021    LYMPHOPCT 15.8 10/10/2017    MONOPCT 5.9 06/03/2021    EOSPCT 1.4 10/10/2017    BASOPCT 0.7 10/10/2017    MONOSABS 0.3 06/16/2021    LYMPHSABS 0.9 06/16/2021    EOSABS 0.1 06/16/2021    BASOSABS 0.1 06/16/2021       CMP:    Lab Results   Component Value Date     06/16/2021     06/03/2021    K 4.0 06/16/2021    K 5.2 06/03/2021     06/03/2021    CO2 25 06/03/2021    BUN 9 06/03/2021    CREATININE 0.8 06/16/2021    CREATININE 0.7 06/03/2021    LABGLOM 85 06/03/2021    GLUCOSE 87 06/03/2021    GLUCOSE 79 10/10/2017    PROT 7.7 06/03/2021    LABALBU 4.1 06/03/2021    CALCIUM 9.9 06/03/2021    BILITOT 0.2 06/03/2021    ALKPHOS 98 06/03/2021    AST 13 06/03/2021    ALT 13 06/03/2021       BMP:    Lab Results   Component Value Date     06/16/2021     06/03/2021    K 4.0 06/16/2021    K 5.2 06/03/2021     06/03/2021    CO2 25 06/03/2021    BUN 9 06/03/2021    LABALBU 4.1 06/03/2021    CREATININE 0.8 06/16/2021    CREATININE 0.7 06/03/2021    CALCIUM 9.9 06/03/2021    LABGLOM 85 06/03/2021    GLUCOSE 87 06/03/2021    GLUCOSE 79 10/10/2017       Magnesium:    Lab Results   Component Value Date    MG 2.2 06/16/2021     PT/INR:    Lab Results   Component Value Date    INR 0.97 05/18/2021     TSH:    Lab Results   Component Value Date    TSH 4.750 10/10/2017           IMAGING:  PROCEDURE: XR CHEST (2 VW)       2/26/2021       CLINICAL INFORMATION: Cough       COMPARISON: 10/10/2017       TECHNIQUE: PA and lateral views of the chest were obtained.               Impression   1. Normal heart size. No acute infiltrates or effusions are seen. 2. Interval development of a 2 cm nodule left perihilar region since prior study which should be considered malignant until proven otherwise. CT thorax recommended for further evaluation.                 Radiological Data:  CT scan of chest without contrast.  Fri Mar 12, 2021  8:18 AM   PROCEDURE: CT CHEST WO CONTRAST   CLINICAL INFORMATION: Pulmonary nodule   COMPARISON: Chest x-ray dated 2/26/2021   1. There is a cavitary mass demonstrated within the left upper lobe with peripheral mild spiculation measuring 3.9 x 2.1 cm on axial image 34. This corresponds with previous chest x-ray dated 2/26/2021. This is concerning for malignancy until proven otherwise. Further evaluation could be obtained with PET/CT and/or biopsy. An infectious process is also not excluded.   2. There are additional smaller groundglass nodular densities within the right lung as detailed above.   3. Small pericardial effusion is noted. This is nonspecific.          Ct Needle Biopsy Lung Percutaneous  Result Date: 3/26/2021  1. Status post successful CT guided lung biopsy. 2. Followup chest radiographs will be obtained. **This report has been created using voice recognition software. It may contain minor errors which are inherent in voice recognition technology. ** Final report electronically signed by Dr. Katherine Stanton on 3/26/2021 4:02 PM    PROCEDURE: PET CT SKULL BASE TO MID THIGH       CLINICAL INFORMATION: 44-year-old woman with recently diagnosed adenocarcinoma of the left lung; initial treatment strategy.       Radiopharmaceutical: 13.8 mCi F-18 FDG, intravenously.       TECHNIQUE: PET/CT imaging was performed from the skull base to the midthigh levels using routine PET acquisition.       Injection site: Left antecubital fossa.     Time of FDG injection: 7:37 AM.       Serum glucose: 86 mg/dL at 7:35 AM.       Time of imagin:37 AM.       COMPARISON: CT chest 3/11/2021       FINDINGS:        Neck: No FDG avid cervical lymphadenopathy. Physiologic activity is noted bilaterally in the lower neck.       Chest: Cardiac size is normal. Atherosclerotic calcifications are present in the thoracic aorta and coronary arteries. There is stable aneurysmal dilation of the ascending thoracic aorta which measures 4.3 cm in maximum diameter (image 103). There is no    pleural or pericardial effusion. Emphysematous changes are again noted in the bilateral lungs. In the regular mass with areas of cavitation in the lingula is stable in size, again measuring approximately 3.7 cm and is associated with a maximum SUV of 6.7    (image 107). Adjacent opacities are likely postobstructive atelectasis. A 0.4 cm nodule at the right lung apex has a maximum SUV of 1.9 (image 70). A 0.4 cm nodule in the right upper lobe has a maximum SUV of 0.9 (image 80). An indistinct 0.5 cm nodular    density at the right mid lung is not associated with activity above background. There is no FDG avid mediastinal, hilar or axillary lymphadenopathy. Degenerative changes are present in the thoracic spine without evidence of hypermetabolic osseous    metastatic disease.       Abdomen/pelvis: Physiologic activity is present in the liver, spleen, urinary collecting system and gastrointestinal tract. The gallbladder is surgically absent. Atherosclerotic calcifications are seen in the abdominal aorta without evidence of aneurysm.    The urinary bladder is unremarkable. There are phleboliths in the pelvis. There is no FDG avid mesenteric, retroperitoneal, pelvic or inguinal lymphadenopathy. Degenerative changes are present in the lumbar spine and pelvis without evidence of    hypermetabolic osseous metastatic disease.           Impression   1.  FDG avid left lung mass corresponding to known malignancy. 2. Small, mildly FDG avid right lung nodules, possibly infectious or inflammatory but satellite malignancy cannot be excluded.       Final report electronically signed by Dr. Aguilar January on 4/14/2021 10:02 AM       Mri Brain W Wo Contrast  Result Date: 4/9/2021   1. No evidence of intracranial metastases or other acute intracranial abnormality. 2. Moderate severity chronic microvascular angiopathy. 3. Mild to moderate mucosal inflammation in the paranasal sinuses with air-fluid levels as evidence for an acute component. **This report has been created using voice recognition software. It may contain minor errors which are inherent in voice recognition technology. ** Final report electronically signed by Dr. Marielos Sumner MD on 4/9/2021 1:50 PM           PROCEDURES:  -Core biopsy cavitary left upper lobe mass 3/26/2021    -Full pulmonary function study with bronchodilators completed 4/6/2021  FEV1 1.68 (61% predicted 25% change with bronchodilator. FVC 2.62, 75% predicted 15% change with bronchodilator  FEV1 over FVC ratio 64  Interpretation-airway obstruction. FVC is reduced relative to the SVC indicating air trapping. While the TLC is within normal limits, the FRC, RV and RV/TLC ratio are increased. Follow administration of bronchodilators there is a good response. Reduced diffusing capacity indicates had mild loss of functional alveolar capillary surface. PATHOLOGY:   3/26/2021  Clinical Information: CAVITATING MASS SHANNON, SMOKER     FINAL DIAGNOSIS:   Lung, left, needle core biopsies:       Adenocarcinoma. Specimen:   5/4/21  BIOPSY OF LUNG, LEFT      Microscopic Examination:   The specimen consists of several scant fragments of lung tissue with   infiltrating malignant glands consistent with adenocarcinoma. Ceil Che is   also anthracotic pigment. ADDENDUM REPORT 5/18/2021, 5/24/2021:       FINAL DIAGNOSIS:   A.  Lymph nodes, level VII, resections:    Negative for neoplasm in 3 out of 3 lymph nodes. B. Left upper lobe of lung, resection:    Invasive, moderately to poorly differentiated adenocarcinoma, acinar   type.    Margins of resection are free of neoplasia.    Maximum tumor size = 2.6 cm.    Moderate emphysema.    pT1c, pN1. C. Lymph node, level V, resection:    Negative for neoplasm in one out of one lymph node. D. Lymph node, level X, resections:    Metastatic carcinoma in one out of 7 lymph nodes. E. Lymph nodes, level XI, resections:    Metastatic carcinoma in one out of 2 lymph nodes. F. Lymph node, level XI \"special\", resection:    Metastatic carcinoma in one out of one lymph node. G. Lymph node, level VI, resection:    Benign fibroadipose tissue and blood, no lymphoid tissue identified. COMMENT 5/18/2021:  Selected slides and pathology report were reviewed   by Dr. Nick Mullins and archival tissue was selected for the following testing:   EGFR (ordered by Dr. Moi Infante). ADDENDUM REPORT 5/24/2021:   EGFR Mutation by Pyrosequencing:  Not Detected     Please see the separately issued reference report for the results to   these test(s). Specimen:   A) LYMPH NODE(S), LEVEL 7, X 2   B) LUNG, LEFT UPPER LOBE   C) LYMPH NODE(S), LEVEL 5, X 3   D) LYMPH NODE(S), LEVEL 10, X 9   E) LYMPH NODE(S), LEVEL 11, X 2   F) LYMPH NODE(S), LEVEL 11, SPECIAL, X 1   G) LYMPH NODE(S), LEVEL 6, X 1       Gross Examination:   A - The container is labeled [facility], lymph nodes, level VII. Received fresh are two fragments of gray-tan tissue, each about 1.1 cm.    1 ns. B - The container is labeled [employer], left upper lobe, lung. Received fresh is a lobe of lung measuring 15 x 7 x 4 cm.  The pleural   surface is blue-gray.  No peribronchial lymph nodes are identified. There is a firm palpable mass.  The pleural surface is inked blue.    Sections through the specimen reveal a well-circumscribed, white mass   measuring grossly 2.6 x 2.6 x 2 cm.  The lesion is about 2 cm from the   bronchial margin.  The lesion does not grossly involve the pleural   surface.  No additional lesions are identified.  The parenchyma is   congested.  Representative sections are submitted.  Cassette #1 -   bronchial margin; cassette #2 - vascular margins; cassettes #3, #4, and   #5 - lesion with inked pleural surface; and cassette #6 - uninvolved   lung.  ss. C - The container is labeled [employer], lymph nodes, level V x3. Received in formalin is a single fragment of gray-tan tissue measuring   2 cm in greatest dimension.  The specimen is bisected and entirely   submitted.  1 ns. D - The container is labeled [employer], lymph nodes, level X x9. Received in formalin are seven fragments of gray-tan tissue varying in   size from 0.4 cm up to 1.7 cm.  The smaller fragments are submitted as   received in cassette #1.  The larger fragment is bisected and submitted   in cassette #2.  ns.     E - The container is labeled [employer], lymph nodes, level XI, 2. Received fresh are two fragments of gray-tan tissue measuring 1 and 1.2   cm.  1 ns. F - The container is labeled [employer], lymph node, level XI,   special x1.  Received in formalin is a single fragment of gray-tan   tissue measuring 1.5 cm in greatest dimension.  The specimen is friable   upon sectioning.  The specimen is trisected.  2 ns. G - The container is labeled [employer], lymph node, level VI x1. Received in formalin is a single fragment of gray-tan tissue measuring   0.5 cm.  1 ns.  JESSEE/DANIKAR:[id number]         Microscopic Examination:   A-G.    Procedure   Lobectomy   Specimen Laterality   Left   Tumor Site   Upper lobe of lung   Tumor Size      Total tumor size (size of entire tumor)#    2.6 x 2.6 x 2 cm   Tumor Focality   Single focus   Histologic Type   Invasive adenocarcinoma, acinar predominant   + Histologic Grade   G3: Poorly differentiated   + Spread Through ArriveBefore (HILDA)   Not identified Visceral Pleura Invasion   Not identified   Lymphovascular Invasion   Cannot be determined   Direct Invasion of Adjacent Structures   No adjacent structures present   Margins   All margins are uninvolved by tumor   Margins examined (specify): Bronchial, vascular and parenchymal       Distance of invasive carcinoma from closest margin (centimeters): 2       mm, pleural   Number of Lymph Nodes Involved: 3   Number of Lymph Nodes Examined: 14   + Extranodal Extension   Not identified   Treatment Effect   No known presurgical therapy   Pathologic Stage Classification (pTNM) (AJCC 8th Edition)   Primary Tumor (pT)   pT1c: Tumor >2 cm but <=3 cm in greatest dimension   Regional Lymph Nodes (pN)   pN1: Metastasis in ipsilateral peribronchial and/or ipsilateral hilar          lymph nodes, and intrapulmonary nodes, including involvement by          direct extension   + Additional Pathologic Findings   Emphysema     [id number]   [id number]   [id number]                                                     <Sign Out Dr. Chintan Addison M.D., F.C.A.P. [facility]  Printed on:  5/25/2021   [address]   [facility] Hospital[facility] BRYANT MIX IIMARSHA, [employer] [facility]   Original print date: 05/25/2021   Lab and Collection    Surgical Pathology - 5/4/2021  Result History    Surgical Pathology on 5/25/2021 - Result Edited   Result Information    Status: Edited Result - FINAL (Collected: 5/4/2021 08:04) Provider Status: Reviewed   All Reviewers List    Jameson Quezada MD on 5/26/2021 12:23   Yarelis Hackett MD on 5/26/2021 08:10         Bronchoscopy EBUS 4/27/2021  Pulmonary at [facility]  4R node-negative,   station 7 node negative,   station 4L-minute group of atypical cells.   Station 11 poorly differentiated moderately differentiated adenocarcinoma  Note: Dr. Santos Leon had the pathology reviewed elsewhere and it was felt that the station 4L lymph node unequivocally did NOT show malignancy. GENETICS:  Not applicable    MOLECULAR:  Not applicable i.e. patient does not have metastatic disease at this time    ASSESSMENT/PLAN:    1: Diagnosis: 78-year-old female with a heavy smoking history with 3.9 cm cavitary adenocarcinoma of the left upper lung. PET scan shows no hypermetabolic adenopathy or distant mets. Brain MRI shows no mets. [facility] bronchoscopy EBUS as reported above shows T2 a N1 M0/IIB disease. The L4 Station node on reviewed and felt to be benign. Status post left upper lobectomy with pathologic stage T1CN1 with 3 of 7+ nodes with a high-grade tumor. Stage IIb. 2) Prognosis / Disease Status: Potentially curable. High risk for recurrence and qualifies for adjuvant chemotherapy. 3) Work-up:    Labs: None today 1 week out from start of chemotherapy   Imaging: All completed   Procedures: Luzfbh-t-Koym already placed. Consults: None   Other: Patient has a.m. gastroparesis nausea the predated chemotherapy. 4) Symptom Management: None needed at this time. 5) Supportive care provided. Level of care is appropriate. Teaching done today. It was as the importance of stopping smoking. She is also informed regarding the risks and benefits of adjuvant chemotherapy. She is high risk and meets NCCN criteria for adjuvant chemotherapy. She agrees to proceed. 6) Treatment goal:      Treatment plan:      Left upper lobectomy and mediastinal node dissection. To be followed by adjuvant chemotherapy. Adjuvant chemotherapy - cis-platinum 75 mg meter squared day 1 along with Alimta 500 mg meter squared day 1 through 21 days x 4. Patient's start date was June 16.     7) Medications reviewed. Prescriptions today:   None Compazine, Zofran ODT, folic acid 1 mg, EMLA cream, Decadron premed for Alimta              No orders of the defined types were placed in this encounter.        OARRS:  Controlled Substance Monitoring:    Acute and Chronic Pain Monitoring:   No

## 2021-06-23 NOTE — PROGRESS NOTES
Patient tolerated  Cath ganesh instillation without any complications. Discharge instructions given to patient-verbalizes understanding. Ambulated off unit per self with belongings.

## 2021-06-24 RX ORDER — DIPHENHYDRAMINE HYDROCHLORIDE 50 MG/ML
50 INJECTION INTRAMUSCULAR; INTRAVENOUS ONCE
Status: CANCELLED | OUTPATIENT
Start: 2021-07-07 | End: 2021-07-07

## 2021-06-24 RX ORDER — SODIUM CHLORIDE 9 MG/ML
25 INJECTION, SOLUTION INTRAVENOUS PRN
Status: CANCELLED | OUTPATIENT
Start: 2021-07-07

## 2021-06-24 RX ORDER — SODIUM CHLORIDE 9 MG/ML
INJECTION, SOLUTION INTRAVENOUS CONTINUOUS
Status: CANCELLED | OUTPATIENT
Start: 2021-07-07

## 2021-06-24 RX ORDER — PALONOSETRON 0.05 MG/ML
0.25 INJECTION, SOLUTION INTRAVENOUS ONCE
Status: CANCELLED | OUTPATIENT
Start: 2021-07-07

## 2021-06-24 RX ORDER — HEPARIN SODIUM (PORCINE) LOCK FLUSH IV SOLN 100 UNIT/ML 100 UNIT/ML
500 SOLUTION INTRAVENOUS PRN
Status: CANCELLED | OUTPATIENT
Start: 2021-07-07

## 2021-06-24 RX ORDER — EPINEPHRINE 1 MG/ML
0.3 INJECTION, SOLUTION, CONCENTRATE INTRAVENOUS PRN
Status: CANCELLED | OUTPATIENT
Start: 2021-07-07

## 2021-06-24 RX ORDER — SODIUM CHLORIDE 0.9 % (FLUSH) 0.9 %
5-40 SYRINGE (ML) INJECTION PRN
Status: CANCELLED | OUTPATIENT
Start: 2021-07-07

## 2021-06-24 RX ORDER — METHYLPREDNISOLONE SODIUM SUCCINATE 125 MG/2ML
125 INJECTION, POWDER, LYOPHILIZED, FOR SOLUTION INTRAMUSCULAR; INTRAVENOUS ONCE
Status: CANCELLED | OUTPATIENT
Start: 2021-07-07 | End: 2021-07-07

## 2021-06-24 RX ORDER — SODIUM CHLORIDE 9 MG/ML
20 INJECTION, SOLUTION INTRAVENOUS ONCE
Status: CANCELLED | OUTPATIENT
Start: 2021-07-07 | End: 2021-07-07

## 2021-06-25 ENCOUNTER — CLINICAL DOCUMENTATION (OUTPATIENT)
Dept: CASE MANAGEMENT | Age: 61
End: 2021-06-25

## 2021-06-25 NOTE — PROGRESS NOTES
Name: Judi Perez  : 1960  MRN: [id number]    Oncology Navigation Follow-Up Note    Contact Type:  Telephone    Notes: This office received faxed paperwork from [address] re: signing papers for [address] addressed to our NP-Kayleen. She did not order HH. and first encounter with patient was . Upon investigating, Mary Crews discharged from [facility] -verbal orders given  to [address]. Discharge papers signed by Jennifer Linares NP with order for ambulatory [facility]. Overviewed by Dr. Edwardo Hernandez. [employer] was notified and given above information.     Electronically signed by Anatoly Macedo RN on 2021 at 11:42 AM

## 2021-07-07 ENCOUNTER — HOSPITAL ENCOUNTER (OUTPATIENT)
Dept: INFUSION THERAPY | Age: 61
Discharge: HOME | End: 2021-07-07
Payer: COMMERCIAL

## 2021-07-07 ENCOUNTER — OFFICE VISIT (OUTPATIENT)
Dept: ONCOLOGY | Age: 61
End: 2021-07-07
Payer: COMMERCIAL

## 2021-07-07 VITALS
HEART RATE: 60 BPM | RESPIRATION RATE: 16 BRPM | BODY MASS INDEX: 20.28 KG/M2 | TEMPERATURE: 97.8 F | WEIGHT: 126.2 LBS | HEIGHT: 66 IN | SYSTOLIC BLOOD PRESSURE: 162 MMHG | OXYGEN SATURATION: 99 % | DIASTOLIC BLOOD PRESSURE: 91 MMHG

## 2021-07-07 VITALS
SYSTOLIC BLOOD PRESSURE: 148 MMHG | HEART RATE: 61 BPM | RESPIRATION RATE: 16 BRPM | BODY MASS INDEX: 20.28 KG/M2 | OXYGEN SATURATION: 99 % | WEIGHT: 126.2 LBS | TEMPERATURE: 97.1 F | HEIGHT: 66 IN | DIASTOLIC BLOOD PRESSURE: 71 MMHG

## 2021-07-07 DIAGNOSIS — J43.8 OTHER EMPHYSEMA (HCC): ICD-10-CM

## 2021-07-07 DIAGNOSIS — Z51.11 ENCOUNTER FOR CHEMOTHERAPY MANAGEMENT: Primary | ICD-10-CM

## 2021-07-07 DIAGNOSIS — Z90.2 S/P LOBECTOMY OF LUNG: ICD-10-CM

## 2021-07-07 DIAGNOSIS — Z72.0 TOBACCO ABUSE: ICD-10-CM

## 2021-07-07 DIAGNOSIS — C34.12 PRIMARY CANCER OF LEFT UPPER LOBE OF LUNG (HCC): ICD-10-CM

## 2021-07-07 DIAGNOSIS — F17.210 SMOKING GREATER THAN 40 PACK YEARS: ICD-10-CM

## 2021-07-07 LAB
ABSOLUTE IMMATURE GRANULOCYTE: 0.01 THOU/MM3 (ref 0–0.07)
BASINOPHIL, AUTOMATED: 1 % (ref 0–3)
BASOPHILS ABSOLUTE: 0.1 THOU/MM3 (ref 0–0.1)
BUN, WHOLE BLOOD: 7 MG/DL (ref 8–26)
CHLORIDE, WHOLE BLOOD: 108 MEQ/L (ref 98–109)
CREATININE, WHOLE BLOOD: 0.7 MG/DL (ref 0.5–1.2)
EOSINOPHILS ABSOLUTE: 0.1 THOU/MM3 (ref 0–0.4)
EOSINOPHILS RELATIVE PERCENT: 2 % (ref 0–4)
GFR, ESTIMATED: > 90 ML/MIN/1.73M2
GLUCOSE, WHOLE BLOOD: 94 MG/DL (ref 70–108)
HCT VFR BLD CALC: 37.2 % (ref 37–47)
HEMOGLOBIN: 12.2 GM/DL (ref 12–16)
IMMATURE GRANULOCYTES: 0 %
IONIZED CALCIUM, WHOLE BLOOD: 1.24 MMOL/L (ref 1.12–1.32)
LYMPHOCYTES # BLD: 25 % (ref 15–47)
LYMPHOCYTES ABSOLUTE: 1 THOU/MM3 (ref 1–4.8)
MAGNESIUM: 2.4 MG/DL (ref 1.6–2.4)
MCH RBC QN AUTO: 32.4 PG (ref 26–33)
MCHC RBC AUTO-ENTMCNC: 32.8 GM/DL (ref 32.2–35.5)
MCV RBC AUTO: 99 FL (ref 81–99)
MONOCYTES ABSOLUTE: 0.3 THOU/MM3 (ref 0.4–1.3)
MONOCYTES: 7 % (ref 0–12)
PDW BLD-RTO: 15 % (ref 11.5–14.5)
PLATELET # BLD: 177 THOU/MM3 (ref 130–400)
PMV BLD AUTO: 11.3 FL (ref 9.4–12.4)
POTASSIUM, WHOLE BLOOD: 3.8 MEQ/L (ref 3.5–4.9)
RBC # BLD: 3.77 MILL/MM3 (ref 4.2–5.4)
SEG NEUTROPHILS: 65 % (ref 43–75)
SEGMENTED NEUTROPHILS ABSOLUTE COUNT: 2.7 THOU/MM3 (ref 1.8–7.7)
SODIUM, WHOLE BLOOD: 142 MEQ/L (ref 138–146)
TOTAL CO2, WHOLE BLOOD: 27 MEQ/L (ref 23–33)
WBC # BLD: 4.2 THOU/MM3 (ref 4.8–10.8)

## 2021-07-07 PROCEDURE — 96367 TX/PROPH/DG ADDL SEQ IV INF: CPT

## 2021-07-07 PROCEDURE — 99211 OFF/OP EST MAY X REQ PHY/QHP: CPT

## 2021-07-07 PROCEDURE — 83735 ASSAY OF MAGNESIUM: CPT

## 2021-07-07 PROCEDURE — 96366 THER/PROPH/DIAG IV INF ADDON: CPT

## 2021-07-07 PROCEDURE — 2580000003 HC RX 258: Performed by: INTERNAL MEDICINE

## 2021-07-07 PROCEDURE — 80047 BASIC METABLC PNL IONIZED CA: CPT

## 2021-07-07 PROCEDURE — 96413 CHEMO IV INFUSION 1 HR: CPT

## 2021-07-07 PROCEDURE — 99214 OFFICE O/P EST MOD 30 MIN: CPT | Performed by: INTERNAL MEDICINE

## 2021-07-07 PROCEDURE — 96375 TX/PRO/DX INJ NEW DRUG ADDON: CPT

## 2021-07-07 PROCEDURE — 6360000002 HC RX W HCPCS: Performed by: INTERNAL MEDICINE

## 2021-07-07 PROCEDURE — 85025 COMPLETE CBC W/AUTO DIFF WBC: CPT

## 2021-07-07 PROCEDURE — 36591 DRAW BLOOD OFF VENOUS DEVICE: CPT

## 2021-07-07 PROCEDURE — 96411 CHEMO IV PUSH ADDL DRUG: CPT

## 2021-07-07 PROCEDURE — 96415 CHEMO IV INFUSION ADDL HR: CPT

## 2021-07-07 RX ORDER — SODIUM CHLORIDE 0.9 % (FLUSH) 0.9 %
5-40 SYRINGE (ML) INJECTION PRN
Status: CANCELLED | OUTPATIENT
Start: 2021-07-07

## 2021-07-07 RX ORDER — SODIUM CHLORIDE 9 MG/ML
25 INJECTION, SOLUTION INTRAVENOUS PRN
Status: CANCELLED | OUTPATIENT
Start: 2021-07-07

## 2021-07-07 RX ORDER — SODIUM CHLORIDE 0.9 % (FLUSH) 0.9 %
5-40 SYRINGE (ML) INJECTION PRN
Status: DISCONTINUED | OUTPATIENT
Start: 2021-07-07 | End: 2021-07-08 | Stop reason: HOSPADM

## 2021-07-07 RX ORDER — PALONOSETRON 0.05 MG/ML
0.25 INJECTION, SOLUTION INTRAVENOUS ONCE
Status: COMPLETED | OUTPATIENT
Start: 2021-07-07 | End: 2021-07-07

## 2021-07-07 RX ORDER — HEPARIN SODIUM (PORCINE) LOCK FLUSH IV SOLN 100 UNIT/ML 100 UNIT/ML
500 SOLUTION INTRAVENOUS PRN
Status: CANCELLED | OUTPATIENT
Start: 2021-07-07

## 2021-07-07 RX ORDER — SODIUM CHLORIDE 9 MG/ML
20 INJECTION, SOLUTION INTRAVENOUS ONCE
Status: COMPLETED | OUTPATIENT
Start: 2021-07-07 | End: 2021-07-07

## 2021-07-07 RX ORDER — HEPARIN SODIUM (PORCINE) LOCK FLUSH IV SOLN 100 UNIT/ML 100 UNIT/ML
500 SOLUTION INTRAVENOUS PRN
Status: DISCONTINUED | OUTPATIENT
Start: 2021-07-07 | End: 2021-07-08 | Stop reason: HOSPADM

## 2021-07-07 RX ADMIN — SODIUM CHLORIDE, PRESERVATIVE FREE 20 ML: 5 INJECTION INTRAVENOUS at 08:16

## 2021-07-07 RX ADMIN — MANNITOL: 250 INJECTION, SOLUTION INTRAVENOUS at 11:27

## 2021-07-07 RX ADMIN — PALONOSETRON HYDROCHLORIDE 0.25 MG: 0.25 INJECTION, SOLUTION INTRAVENOUS at 10:09

## 2021-07-07 RX ADMIN — HEPARIN 500 UNITS: 100 SYRINGE at 15:37

## 2021-07-07 RX ADMIN — SODIUM CHLORIDE 800 MG: 9 INJECTION, SOLUTION INTRAVENOUS at 11:06

## 2021-07-07 RX ADMIN — SODIUM CHLORIDE, PRESERVATIVE FREE 10 ML: 5 INJECTION INTRAVENOUS at 08:15

## 2021-07-07 RX ADMIN — SODIUM CHLORIDE, PRESERVATIVE FREE 10 ML: 5 INJECTION INTRAVENOUS at 15:37

## 2021-07-07 RX ADMIN — FOSAPREPITANT 150 MG: 150 INJECTION, POWDER, LYOPHILIZED, FOR SOLUTION INTRAVENOUS at 10:31

## 2021-07-07 RX ADMIN — MAGNESIUM SULFATE HEPTAHYDRATE: 500 INJECTION, SOLUTION INTRAMUSCULAR; INTRAVENOUS at 09:03

## 2021-07-07 RX ADMIN — SODIUM CHLORIDE 20 ML/HR: 9 INJECTION, SOLUTION INTRAVENOUS at 09:00

## 2021-07-07 RX ADMIN — MAGNESIUM SULFATE HEPTAHYDRATE: 500 INJECTION, SOLUTION INTRAMUSCULAR; INTRAVENOUS at 14:27

## 2021-07-07 RX ADMIN — SODIUM CHLORIDE, PRESERVATIVE FREE 10 ML: 5 INJECTION INTRAVENOUS at 09:00

## 2021-07-07 RX ADMIN — DEXAMETHASONE SODIUM PHOSPHATE 12 MG: 4 INJECTION, SOLUTION INTRAMUSCULAR; INTRAVENOUS at 10:11

## 2021-07-07 NOTE — PLAN OF CARE
Problem: Discharge Planning:  Goal: Discharged to appropriate level of care  Description: Discharged to appropriate level of care  Outcome: Met This Shift  Note: Verbalized understanding of discharge instructions, follow-up appointments, and when to call the physician. Intervention: Assess knowledge level of healthcare  Description: Assess knowledge level of healthcare - REMINDER(s): Knowledge level, assistive device, Knowledge level, health promotion, Knowledge level, infection control measures  Note: Discuss understanding of discharge instructions,follow-up appointments, and when to call the physician. Problem: Infection - Central Venous Catheter-Associated Bloodstream Infection:  Goal: Will show no infection signs and symptoms  Description: Will show no infection signs and symptoms  Outcome: Met This Shift  Note: Mediport site with no redness or warmth. Skin over port site intact with no signs of breakdown noted. Patient verbalizes signs/symptoms of port infection and when to notify the physician. Intervention: Infection risk assessment  Description: Infection risk assessment  Note: Instructed to monitor for signs/symptoms of infection at [facility] [place] [address]- At [facility] and call MD if problems develop. Problem: Musculor/Skeletal Functional Status  Goal: Absence of falls  Outcome: Met This Shift  Note: No falls occurred with visit today. Intervention: Fall precautions  Note: Verbalized understanding of fall prevention to ask for assistance with ambulation. Call light within reach. Problem: Intellectual/Education/Knowledge Deficit  Goal: Teaching initiated upon admission  Outcome: Met This Shift  Note: Patient verbalizes understanding to verbal information given on alimta/DDP,action and possible side effects. Aware to call MD if develop complications.     Intervention: Verbal/written education provided  Note: Chemotherapy Teaching     What is Chemotherapy   Drug action [x]   Method of Administration [x]   Handouts given []     Side Effects  Nausea/vomiting [x]   Diarrhea [x]   Fatigue [x]   Signs / Symptoms of infection [x]   Neutropenia [x]   Thrombocytopenia [x]   Alopecia [x]   neuropathy [x]   [place] diet &  the importance of fluids [x]       Micellaneous  Importance of nutrition [x]   Importance of oral hygiene [x]   When to call the MD [x]   Monitoring labs [x]   Use of supportive services []     Explanation of Drug Regimen / Frequency  Alimta/cisplatin     Comments  Verbalized understanding to drug,action,side effects and when to call MD       Care plan reviewed with patient . Patient  verbalize understanding of the plan of care and contribute to goal setting.

## 2021-07-07 NOTE — PROGRESS NOTES
Patient assessed for the following post chemotherapy:    Dizziness   No  Lightheadedness  No      Acute nausea/vomiting No  Headache   No  Chest pain/pressure  No  Rash/itching   No  Shortness of breath  No    Patient kept for 20 minutes observation post infusion chemotherapy. Patient tolerated chemotherapy treatment alimta/cisplatin without any complications. Last vital signs:   BP (!) 148/71   Pulse 61   Temp 97.1 °F (36.2 °C) (Oral)   Resp 16   Ht 5' 6\" (1.676 m)   Wt 126 lb 3.2 oz (57.2 kg)   LMP  (LMP Unknown)   SpO2 99%   BMI 20.37 kg/m²         Patient instructed if experience any of the above symptoms following today's infusion,he/she is to notify MD immediately or go to the emergency department. Discharge instructions given to patient. Verbalizes understanding. Ambulated off unit per self with belongings.

## 2021-07-07 NOTE — PROGRESS NOTES
[address] CANCER [address] [place] [address]  Dept: [phone]  Loc: [phone]   Hematology/Oncology Consult (Clinic)        7/7/21    Papito Elaine   1960     No ref. provider found   Mele Roth MD     DIAGNOSIS:  -  Left upper lobe cavitary adenocarcinoma of the lung. 3.9 x 2.1 cm. PET scan negative. Bronchoscopy EBUS-isolated station 11 lymph node with a moderate to poorly differentiated adenocarcinoma. 4L node with a minute focus of atypical cells on path review felt to be benign. Clinical stage: T2a N1M0 / IIB. Pathologic stage: T1 cN1 M0 /IIB (poorly differentiated, 3 of 7 nodes positive including extranodal extension. TREATMENT:  -Left upper lobectomy and mediastinal node dissection scheduled by Dr. Saulo Nuno 5/3/2021  -Adjuvant chemotherapy planned; recommend cis-platinum 75 mg meter squared day 1 and pemetrexed 500 mg meter squared day 1 every 21 days x 4 cycles . Start date 6/16/2021. Course #2 due 7/7/2021    PARAMETERS:  -Chest CT without contrast 3/12/2021  -PET/CT 4/14/2021  -Brain MRI with and without contrast 4/9/2021    SUBJECTIVE: Patient is down to about 5 cigarettes/day. No new complaints. .  She denies any neuropathy. Denies any history of renal insufficiency. She began course 1 of chemotherapy last week with cisplatin and Alimta on June 16. Before this she had some a.m. nausea from her gastroparesis and this is continued but is unchanged. States the Compazine aggravates the nausea but Zofran is very helpful. Other than 3 days of nausea controlled with Zofran she has had no other complications with this chemotherapy thus far. Appetite and energy level are preserved.       HPI: Lady Canas is a 79-year-old female with a 45-pack-year smoking habit (ongoing) with a chest CT without contrast on 3/12/2021 revealing a 3.9 x 2.1 cm cavitary mass in the left upper lung corresponding to the abnormality seen on the chest x-ray of 2/26/2021. Radiologically very suspicious. Patient underwent a needle biopsy on 3/26 revealing adenocarcinoma. PET scan shows no distant mets or adenopathy. PFT's done and reviewed below. She is waiting  for a consult with Dr Abigail Garcia of Surgery. Reports 30 # weight loss ove 9 months; unintentional. Normal weight @ 145. Appetite has remained good. Chronic cough unchanged, no blood. No chest pain or pain elsewhere. Denied increased sob. Average 1ppd x 45 years; now about 5 cigs/day . Motivated to stop but failed Nicotine replacement products. Chantix not attempted thus far. ROS:  Review of Systems 14 point negative except as above. Limited to unintentional weight loss. Chronic unchanged nonproductive cough and shortness of breath with activity also unchanged.     PMH:   Past Medical History:   Diagnosis Date    Arthritis     COPD (chronic obstructive pulmonary disease) ([facility])     Dr Yao Morrow [facility])     Dr Mary Burton   Aneurysm    Social HX:   Social History     Socioeconomic History    Marital status:      Spouse name: Not on file    Number of children: Not on file    Years of education: Not on file    Highest education level: Not on file   Occupational History    Not on file   Tobacco Use    Smoking status: Current Every Day Smoker     Packs/day: 0.25     Years: 45.00     Pack years: 11.25     Types: Cigarettes    Smokeless tobacco: Never Used   Vaping Use    Vaping Use: Never used   Substance and Sexual Activity    Alcohol use: Never    Drug use: Yes     Types: Marijuana     Comment: past month    Sexual activity: Not on file   Other Topics Concern    Not on file   Social History Narrative    Not on file     Social Determinants of Health     Financial Resource Strain:     Difficulty of Paying Living Expenses:    Food Insecurity:     Worried About Running Out of Food in the Last Year:     [address] in the Last Year: (CAD) system. No significant masses, calcifications, or other findings are seen   in either breast.    There has been no significant interval change. Dr. Jimmie deluna/nacho:2021 13:05:44      Imaging Technologist: Chintan SUTTON(NISHA)(MARTHA), [facility]  letter sent: Normal-All Doctor Names    [id number]        Pap / Pelvic:na  C-Scope: Approximately 5 years ago with 2 benign polyps      Gyn HX:   GPA:  DARREL/BSO:31 years ago  LMP: No LMP recorded (lmp unknown). Patient has had a hysterectomy. Health Maintenance Due   Topic Date Due    Pneumococcal 0-64 years Vaccine (1 of 4 - PCV13) Never done    COVID-19 Vaccine (1) Never done    HIV screen  Never done    DTaP/Tdap/Td vaccine (1 - Tdap) Never done    Cervical cancer screen  Never done    Lipid screen  Never done    Colon cancer screen colonoscopy  Never done    Shingles Vaccine (1 of 2) Never done        Interests:   none    Fam HX:   Family History   Problem Relation Age of Onset    Other Mother         aneursym    No Known Problems Father     Depression Sister     Heart Disease Brother     Diabetes Brother     Other Brother         Lung disease    Lung Cancer Maternal Grandfather     Other Brother         Crohns    Macular Degen Brother     Depression Sister     Other Sister         Blood clots    Arthritis Sister     Brain Cancer Paternal Uncle         Hospitalizations:   None recent    Allergies:   Allergies   Allergen Reactions    Tylenol With Codeine #3 [Acetaminophen-Codeine] Nausea And Vomiting    Codeine Nausea And Vomiting        Adult Illness:  Patient Active Problem List   Diagnosis    Primary cancer of left upper lobe of lung ([address]    Other emphysema (HCC)    Smoking greater than 40 pack years    Tobacco abuse    KERR (dyspnea on exertion)    Intermittent palpitations    Borderline Abnormal EKG with rsr'    S/P left upper lobectomy of lung and mediastinal dissection  Moderate COPD (chronic obstructive pulmonary disease) [facility]    Encounter for insertion of venous access port    Ascending aorta dilatation (HCC) 4.1 cm        Surgery:  Past Surgical History:   Procedure Laterality Date    BRONCHOSCOPY N/A 5/6/2021    BRONCHOSCOPY performed by Katrin Cortez MD at [facility] [address]      x4    CHOLECYSTECTOMY      COLONOSCOPY  07/11/2016    CT NEEDLE BIOPSY LUNG PERCUTANEOUS  03/26/2021    CT NEEDLE BIOPSY LUNG PERCUTANEOUS 3/26/2021 [facility] CT SCAN    HYSTERECTOMY      LUNG REMOVAL, TOTAL Left 5/4/2021    ROBOTIC ASSISTED LEFT UPPER LOBECTOMY AND MEDIASTINAL DISSECTION, POSSIBLE OPEN AND CRYOTHERAPY OF INTERCOSTAL NERVES 5, 6, 7 & 8 performed by Maryse Lechuga MD at [address] [place] [address] [place] Left 6/10/2021    LEFT SUBCLAVIAN SINGLE LUMEN SMART PORT INSERTION performed by Maryse Lechuga MD at [facility]  07/11/2016        Medications:  Current Outpatient Medications   Medication Sig Dispense Refill    vitamin D3 (CHOLECALCIFEROL) 125 MCG (5000 UT) TABS tablet Cholecalciferol (Vitamin D3) (Vitamin D3) 125 mcg (5,000 unit) Tablet Active 125 MCG PO Daily April 22nd, 2021 4:50pm      ondansetron (ZOFRAN-ODT) 8 MG TBDP disintegrating tablet Place 1 tablet under the tongue every 8 hours as needed for Nausea or Vomiting 10 tablet 2    dexamethasone (DECADRON) 4 MG tablet 4 mg Decadron take 1 tablet p.o. twice daily x3 days starting 1 day before chemotherapy with Alimta 6 tablet 3    lidocaine-prilocaine (EMLA) 2.5-2.5 % cream Apply topically as needed. 1 Tube 1    folic acid (FOLVITE) 1 MG tablet Take 1 tablet by mouth daily 30 tablet 3    dexamethasone (DECADRON) 4 MG tablet Take 1 tablet by mouth See Admin Instructions Take 1 tab twice daily for 3 days starting the day before scheduled PEMEtrexed.  30 tablet 0    acetaminophen (TYLENOL) 500 MG tablet Take 1 tablet by mouth 4 times daily as needed for Pain 120 tablet 0    senna (SENOKOT) 8.6 MG tablet Take 1 tablet by mouth daily      docusate sodium (COLACE) 100 MG capsule Take 100 mg by mouth 2 times daily      albuterol sulfate HFA (VENTOLIN HFA) 108 (90 Base) MCG/ACT inhaler Inhale 2 puffs into the lungs 4 times daily as needed for Wheezing 1 Inhaler 11    oxyCODONE (ROXICODONE) 5 MG immediate release tablet Take 5 mg by mouth every 4 hours as needed. (Patient not taking: Reported on 7/7/2021)      prochlorperazine (COMPAZINE) 10 MG tablet Take 1 tablet by mouth every 6 hours as needed (nausea) (Patient not taking: Reported on 7/7/2021) 120 tablet 3    ketorolac (TORADOL) 10 MG tablet Take 1 tablet by mouth every 6 hours as needed for Pain (Patient not taking: Reported on 6/23/2021) 20 tablet 0     No current facility-administered medications for this visit.      Facility-Administered Medications Ordered in Other Visits   Medication Dose Route Frequency Provider Last Rate Last Admin    sodium chloride flush 0.9 % injection 5-40 mL  5-40 mL Intravenous PRN Claudette Aran, MD   10 mL at 07/07/21 0900    heparin flush 100 UNIT/ML injection 500 Units  500 Units Intracatheter PRN Claudette Aran, MD        0.9 % sodium chloride infusion  20 mL/hr Intravenous Once Claudette Aran, MD 20 mL/hr at 07/07/21 0900 20 mL/hr at 07/07/21 0900    potassium chloride 10 mEq, magnesium sulfate 1,000 mg in sodium chloride 0.9 % 500 mL IVPB   Intravenous Once Claudette Aran,  mL/hr at 07/07/21 0903 New Bag at 07/07/21 0903    fosaprepitant (EMEND) 150 mg in sodium chloride 0.9 % 250 mL IVPB  150 mg Intravenous Once Claudette Aran, MD        palonosetron (ALOXI) injection 0.25 mg  0.25 mg Intravenous Once Claudette Aran, MD        dexamethasone (DECADRON) 12 mg in sodium chloride 0.9 % IVPB  12 mg Intravenous Once Claudette Aran, MD        potassium chloride 10 mEq, magnesium sulfate 1,000 mg in sodium chloride 0.9 % 500 mL IVPB   Intravenous Once Claudette Aran, MD EXAM:    BP (!) 162/91 (Site: Right Upper Arm, Position: Sitting, Cuff Size: Medium Adult)   Pulse 60   Temp 97.8 °F (36.6 °C) (Oral)   Resp 16   Ht 5' 6\" (1.676 m)   Wt 126 lb 3.2 oz (57.2 kg)   LMP  (LMP Unknown)   SpO2 99%   BMI 20.37 kg/m²      ECO  General: Non-ill appearing. Appears comfortable and in no distress. HEENT: NC/AT,nonicteric, perrla,eom intact, no mucosal lesions  Neck: normal thyroid, no masses. pulses nl, no bruits,   Nodes: No adenopathy  Lungs/chest: clear, no rales,rhonchi or wheezing, lung bases clear. There is a dime sized healing wound with a scab and border with erythema that appears to be healing with no discharge or odor. CV: rrr, no rubs ,gallops or murmurs  Breasts: Not examined  Abd/Rectal: soft, non-tender,bowel sounds normal , no HSM,no masses  Back: normal curvature, No midline tenderness. flanks nontender  : Not Examined  Extremities: no cyanosis,clubbing or edema. Skin: unremarkable  Neuro: A and O x 4, CN exam nonfocal, Motor- no deficits, Sensory- no deficits, gait-nl, speech- fluent, no ataxia.   Devices: [id number] left chest unremarkable      DATA:      CBC with Differential:      Lab Results   Component Value Date    WBC 4.2 2021    RBC 3.77 2021    RBC 4.63 10/10/2017    HGB 12.2 2021    HCT 37.2 2021     2021    MCV 99 2021    MCH 32.4 2021    MCHC 32.8 2021    RDW 15.0 2021    NRBC 0 2021    SEGSPCT 74.5 2021    LYMPHOPCT 15.8 10/10/2017    MONOPCT 5.9 2021    EOSPCT 1.4 10/10/2017    BASOPCT 0.7 10/10/2017    MONOSABS 0.3 2021    LYMPHSABS 1.0 2021    EOSABS 0.1 2021    BASOSABS 0.1 2021       CMP:    Lab Results   Component Value Date     2021     2021    K 3.8 2021    K 5.2 2021     2021    CO2 25 2021    BUN 9 2021    CREATININE 0.7 2021    CREATININE 0.7 2021 nodular densities within the right lung as detailed above.   3. Small pericardial effusion is noted. This is nonspecific.          Ct Needle Biopsy Lung Percutaneous  Result Date: 3/26/2021  1. Status post successful CT guided lung biopsy. 2. Followup chest radiographs will be obtained. **This report has been created using voice recognition software. It may contain minor errors which are inherent in voice recognition technology. ** Final report electronically signed by Dr. Jt Dempsey on 3/26/2021 4:02 PM    PROCEDURE: PET CT SKULL BASE TO MID THIGH       CLINICAL INFORMATION: 80-year-old woman with recently diagnosed adenocarcinoma of the left lung; initial treatment strategy.       Radiopharmaceutical: 13.8 mCi F-18 FDG, intravenously.       TECHNIQUE: PET/CT imaging was performed from the skull base to the midthigh levels using routine PET acquisition.       Injection site: Left antecubital fossa.       Time of FDG injection: 7:37 AM.       Serum glucose: 86 mg/dL at 7:35 AM.       Time of imagin:37 AM.       COMPARISON: CT chest 3/11/2021       FINDINGS:        Neck: No FDG avid cervical lymphadenopathy. Physiologic activity is noted bilaterally in the lower neck.       Chest: Cardiac size is normal. Atherosclerotic calcifications are present in the thoracic aorta and coronary arteries. There is stable aneurysmal dilation of the ascending thoracic aorta which measures 4.3 cm in maximum diameter (image 103). There is no    pleural or pericardial effusion. Emphysematous changes are again noted in the bilateral lungs. In the regular mass with areas of cavitation in the lingula is stable in size, again measuring approximately 3.7 cm and is associated with a maximum SUV of 6.7    (image 107). Adjacent opacities are likely postobstructive atelectasis. A 0.4 cm nodule at the right lung apex has a maximum SUV of 1.9 (image 70). A 0.4 cm nodule in the right upper lobe has a maximum SUV of 0.9 (image 80).  An indistinct 0.5 cm nodular    density at the right mid lung is not associated with activity above background. There is no FDG avid mediastinal, hilar or axillary lymphadenopathy. Degenerative changes are present in the thoracic spine without evidence of hypermetabolic osseous    metastatic disease.       Abdomen/pelvis: Physiologic activity is present in the liver, spleen, urinary collecting system and gastrointestinal tract. The gallbladder is surgically absent. Atherosclerotic calcifications are seen in the abdominal aorta without evidence of aneurysm.    The urinary bladder is unremarkable. There are phleboliths in the pelvis. There is no FDG avid mesenteric, retroperitoneal, pelvic or inguinal lymphadenopathy. Degenerative changes are present in the lumbar spine and pelvis without evidence of    hypermetabolic osseous metastatic disease.           Impression   1. FDG avid left lung mass corresponding to known malignancy. 2. Small, mildly FDG avid right lung nodules, possibly infectious or inflammatory but satellite malignancy cannot be excluded.       Final report electronically signed by Dr. Sophia Manrique on 4/14/2021 10:02 AM       Mri Brain W Wo Contrast  Result Date: 4/9/2021   1. No evidence of intracranial metastases or other acute intracranial abnormality. 2. Moderate severity chronic microvascular angiopathy. 3. Mild to moderate mucosal inflammation in the paranasal sinuses with air-fluid levels as evidence for an acute component. **This report has been created using voice recognition software. It may contain minor errors which are inherent in voice recognition technology. ** Final report electronically signed by Dr. Adeola Bae MD on 4/9/2021 1:50 PM           PROCEDURES:  -Core biopsy cavitary left upper lobe mass 3/26/2021    -Full pulmonary function study with bronchodilators completed 4/6/2021  FEV1 1.68 (61% predicted 25% change with bronchodilator.   FVC 2.62, 75% predicted 15% change with bronchodilator  FEV1 over FVC ratio 64  Interpretation-airway obstruction. FVC is reduced relative to the SVC indicating air trapping. While the TLC is within normal limits, the FRC, RV and RV/TLC ratio are increased. Follow administration of bronchodilators there is a good response. Reduced diffusing capacity indicates had mild loss of functional alveolar capillary surface. PATHOLOGY:   3/26/2021  Clinical Information: CAVITATING MASS SHANNON, SMOKER     FINAL DIAGNOSIS:   Lung, left, needle core biopsies:       Adenocarcinoma. Specimen:   5/4/21  BIOPSY OF LUNG, LEFT      Microscopic Examination:   The specimen consists of several scant fragments of lung tissue with   infiltrating malignant glands consistent with adenocarcinoma. Ian Dalal is   also anthracotic pigment. ADDENDUM REPORT 5/18/2021, 5/24/2021:       FINAL DIAGNOSIS:   A. Lymph nodes, level VII, resections:    Negative for neoplasm in 3 out of 3 lymph nodes. B. Left upper lobe of lung, resection:    Invasive, moderately to poorly differentiated adenocarcinoma, acinar   type.    Margins of resection are free of neoplasia.    Maximum tumor size = 2.6 cm.    Moderate emphysema.    pT1c, pN1. C. Lymph node, level V, resection:    Negative for neoplasm in one out of one lymph node. D. Lymph node, level X, resections:    Metastatic carcinoma in one out of 7 lymph nodes. E. Lymph nodes, level XI, resections:    Metastatic carcinoma in one out of 2 lymph nodes. F. Lymph node, level XI \"special\", resection:    Metastatic carcinoma in one out of one lymph node. G. Lymph node, level VI, resection:    Benign fibroadipose tissue and blood, no lymphoid tissue identified. COMMENT 5/18/2021:  Selected slides and pathology report were reviewed   by Dr. Lilibeth Dietz and archival tissue was selected for the following testing:   EGFR (ordered by Dr. Esau Box).      ADDENDUM REPORT 5/24/2021:   EGFR Mutation by Pyrosequencing:  Not Detected Please see the separately issued reference report for the results to   these test(s). Specimen:   A) LYMPH NODE(S), LEVEL 7, X 2   B) LUNG, LEFT UPPER LOBE   C) LYMPH NODE(S), LEVEL 5, X 3   D) LYMPH NODE(S), LEVEL 10, X 9   E) LYMPH NODE(S), LEVEL 11, X 2   F) LYMPH NODE(S), LEVEL 11, SPECIAL, X 1   G) LYMPH NODE(S), LEVEL 6, X 1       Gross Examination:   A - The container is labeled [employer], lymph nodes, level VII. Received fresh are two fragments of gray-tan tissue, each about 1.1 cm.    1 ns. B - The container is labeled [employer], left upper lobe, lung. Received fresh is a lobe of lung measuring 15 x 7 x 4 cm.  The pleural   surface is blue-gray.  No peribronchial lymph nodes are identified. There is a firm palpable mass.  The pleural surface is inked blue. Sections through the specimen reveal a well-circumscribed, white mass   measuring grossly 2.6 x 2.6 x 2 cm.  The lesion is about 2 cm from the   bronchial margin.  The lesion does not grossly involve the pleural   surface.  No additional lesions are identified.  The parenchyma is   congested.  Representative sections are submitted.  Cassette #1 -   bronchial margin; cassette #2 - vascular margins; cassettes #3, #4, and   #5 - lesion with inked pleural surface; and cassette #6 - uninvolved   lung.  ss. C - The container is labeled [employer], lymph nodes, level V x3. Received in formalin is a single fragment of gray-tan tissue measuring   2 cm in greatest dimension.  The specimen is bisected and entirely   submitted.  1 ns. D - The container is labeled [employer], lymph nodes, level X x9. Received in formalin are seven fragments of gray-tan tissue varying in   size from 0.4 cm up to 1.7 cm.  The smaller fragments are submitted as   received in cassette #1.  The larger fragment is bisected and submitted   in cassette #2.  ns.     E - The container is labeled [employer], lymph nodes, level XI, 2.    Received fresh are two fragments of gray-tan tissue measuring 1 and 1.2   cm.  1 ns. F - The container is labeled [employer], lymph node, level XI,   special x1.  Received in formalin is a single fragment of gray-tan   tissue measuring 1.5 cm in greatest dimension.  The specimen is friable   upon sectioning.  The specimen is trisected.  2 ns. G - The container is labeled [employer], lymph node, level VI x1. Received in formalin is a single fragment of gray-tan tissue measuring   0.5 cm.  1 ns.  JESSEE/DANIKAR:[id number]         Microscopic Examination:   A-G.    Procedure   Lobectomy   Specimen Laterality   Left   Tumor Site   Upper lobe of lung   Tumor Size      Total tumor size (size of entire tumor)#    2.6 x 2.6 x 2 cm   Tumor Focality   Single focus   Histologic Type   Invasive adenocarcinoma, acinar predominant   + Histologic Grade   G3: Poorly differentiated   + Spread Through Air Spaces (HILDA)   Not identified   Visceral Pleura Invasion   Not identified   Lymphovascular Invasion   Cannot be determined   Direct Invasion of Adjacent Structures   No adjacent structures present   Margins   All margins are uninvolved by tumor   Margins examined (specify): Bronchial, vascular and parenchymal       Distance of invasive carcinoma from closest margin (centimeters): 2       mm, pleural   Number of Lymph Nodes Involved: 3   Number of Lymph Nodes Examined: 14   + Extranodal Extension   Not identified   Treatment Effect   No known presurgical therapy   Pathologic Stage Classification (pTNM) (AJCC 8th Edition)   Primary Tumor (pT)   pT1c: Tumor >2 cm but <=3 cm in greatest dimension   Regional Lymph Nodes (pN)   pN1: Metastasis in ipsilateral peribronchial and/or ipsilateral hilar          lymph nodes, and intrapulmonary nodes, including involvement by          direct extension   + Additional Pathologic Findings   Emphysema     [id number]   [id number]   [id number]                                                     <Sign Out  Signature>                                            Venancio Collado M.D., F.C.A.P. NVML/ [id number] [facility] [address]  Printed on:  5/25/2021   [address]   [facility], [address]   Original print date: 05/25/2021   Lab and Collection    Surgical Pathology - 5/4/2021  Result History    Surgical Pathology on 5/25/2021 - Result Edited   Result Information    Status: Edited Result - FINAL (Collected: 5/4/2021 08:04) Provider Status: Reviewed   All Reviewers List    Olga Storm MD on 5/26/2021 12:23   Lashay Posada MD on 5/26/2021 08:10         Bronchoscopy EBUS 4/27/2021  Pulmonary at [facility]  4R node-negative,   station 7 node negative,   station 4L-minute group of atypical cells. Station 11 poorly differentiated moderately differentiated adenocarcinoma  Note: Dr. Saulo Nuno had the pathology reviewed elsewhere and it was felt that the station 4L lymph node unequivocally did NOT show malignancy. GENETICS:  Not applicable    MOLECULAR:  Not applicable i.e. patient does not have metastatic disease at this time    ASSESSMENT/PLAN:    1: Diagnosis: 64-year-old female with a heavy smoking history with 3.9 cm cavitary adenocarcinoma of the left upper lung. PET scan shows no hypermetabolic adenopathy or distant mets. Brain MRI shows no mets. Bronchoscopy EBUS as reported above shows T2 a N1 M0/IIB disease. The L4 Station node on review and felt to be benign. Status post left upper lobectomy with pathologic stage T1CN1 with 3 of 7+ nodes with a high-grade tumor. Stage IIb. 2) Prognosis / Disease Status: Potentially curable. High risk for recurrence and qualifies for adjuvant chemotherapy. 3) Work-up:    Labs: CBC, BMP and magnesium   Imaging: All completed   Procedures: Cswypz-k-Dcfe already placed. Consults: None   Other: Patient has a.m. gastroparesis nausea the predated chemotherapy. 4) Symptom Management: None needed at this time. 5) Supportive care provided.        Level of

## 2021-07-07 NOTE — PATIENT INSTRUCTIONS
Proceed with course #2 of adjuvant Cis-platinum with Alimta  Ensure patient is taking her Decadron premed and also folic acid every day please  Follow-up 3 weeks with me for course #3 of chemotherapy and labs. 97

## 2021-07-14 DIAGNOSIS — C34.92 PRIMARY ADENOCARCINOMA OF LEFT LUNG (HCC): ICD-10-CM

## 2021-07-14 RX ORDER — ONDANSETRON 8 MG/1
8 TABLET, ORALLY DISINTEGRATING ORAL EVERY 8 HOURS PRN
Qty: 10 TABLET | Refills: 2 | Status: SHIPPED | OUTPATIENT
Start: 2021-07-14 | End: 2021-08-18 | Stop reason: SDUPTHER

## 2021-07-16 RX ORDER — METHYLPREDNISOLONE SODIUM SUCCINATE 125 MG/2ML
125 INJECTION, POWDER, LYOPHILIZED, FOR SOLUTION INTRAMUSCULAR; INTRAVENOUS ONCE
Status: CANCELLED | OUTPATIENT
Start: 2021-07-28 | End: 2021-07-28

## 2021-07-16 RX ORDER — SODIUM CHLORIDE 0.9 % (FLUSH) 0.9 %
5-40 SYRINGE (ML) INJECTION PRN
Status: CANCELLED | OUTPATIENT
Start: 2021-07-28

## 2021-07-16 RX ORDER — EPINEPHRINE 1 MG/ML
0.3 INJECTION, SOLUTION, CONCENTRATE INTRAVENOUS PRN
Status: CANCELLED | OUTPATIENT
Start: 2021-07-28

## 2021-07-16 RX ORDER — SODIUM CHLORIDE 9 MG/ML
25 INJECTION, SOLUTION INTRAVENOUS PRN
Status: CANCELLED | OUTPATIENT
Start: 2021-07-28

## 2021-07-16 RX ORDER — DIPHENHYDRAMINE HYDROCHLORIDE 50 MG/ML
50 INJECTION INTRAMUSCULAR; INTRAVENOUS ONCE
Status: CANCELLED | OUTPATIENT
Start: 2021-07-28 | End: 2021-07-28

## 2021-07-16 RX ORDER — HEPARIN SODIUM (PORCINE) LOCK FLUSH IV SOLN 100 UNIT/ML 100 UNIT/ML
500 SOLUTION INTRAVENOUS PRN
Status: CANCELLED | OUTPATIENT
Start: 2021-07-28

## 2021-07-16 RX ORDER — SODIUM CHLORIDE 9 MG/ML
INJECTION, SOLUTION INTRAVENOUS CONTINUOUS
Status: CANCELLED | OUTPATIENT
Start: 2021-07-28

## 2021-07-16 RX ORDER — SODIUM CHLORIDE 9 MG/ML
20 INJECTION, SOLUTION INTRAVENOUS ONCE
Status: CANCELLED | OUTPATIENT
Start: 2021-07-28 | End: 2021-07-28

## 2021-07-16 RX ORDER — PALONOSETRON 0.05 MG/ML
0.25 INJECTION, SOLUTION INTRAVENOUS ONCE
Status: CANCELLED | OUTPATIENT
Start: 2021-07-28

## 2021-07-16 RX ORDER — CYANOCOBALAMIN 1000 UG/ML
1000 INJECTION INTRAMUSCULAR; SUBCUTANEOUS ONCE
Status: CANCELLED | OUTPATIENT
Start: 2021-07-28 | End: 2021-07-28

## 2021-07-28 ENCOUNTER — HOSPITAL ENCOUNTER (OUTPATIENT)
Dept: INFUSION THERAPY | Age: 61
Discharge: HOME | End: 2021-07-28
Payer: COMMERCIAL

## 2021-07-28 ENCOUNTER — OFFICE VISIT (OUTPATIENT)
Dept: ONCOLOGY | Age: 61
End: 2021-07-28
Payer: COMMERCIAL

## 2021-07-28 ENCOUNTER — CLINICAL DOCUMENTATION (OUTPATIENT)
Dept: CASE MANAGEMENT | Age: 61
End: 2021-07-28

## 2021-07-28 VITALS
WEIGHT: 125 LBS | SYSTOLIC BLOOD PRESSURE: 157 MMHG | HEIGHT: 66 IN | HEART RATE: 62 BPM | TEMPERATURE: 98.4 F | RESPIRATION RATE: 16 BRPM | BODY MASS INDEX: 20.09 KG/M2 | DIASTOLIC BLOOD PRESSURE: 90 MMHG | OXYGEN SATURATION: 98 %

## 2021-07-28 VITALS
TEMPERATURE: 98.4 F | HEIGHT: 66 IN | BODY MASS INDEX: 20.09 KG/M2 | DIASTOLIC BLOOD PRESSURE: 84 MMHG | RESPIRATION RATE: 68 BRPM | OXYGEN SATURATION: 99 % | WEIGHT: 125 LBS | HEART RATE: 68 BPM | SYSTOLIC BLOOD PRESSURE: 144 MMHG

## 2021-07-28 DIAGNOSIS — Z90.2 S/P LOBECTOMY OF LUNG: Primary | ICD-10-CM

## 2021-07-28 DIAGNOSIS — J43.8 OTHER EMPHYSEMA (HCC): ICD-10-CM

## 2021-07-28 DIAGNOSIS — Z51.11 ENCOUNTER FOR CHEMOTHERAPY MANAGEMENT: ICD-10-CM

## 2021-07-28 DIAGNOSIS — Z72.0 TOBACCO ABUSE: ICD-10-CM

## 2021-07-28 DIAGNOSIS — F17.210 SMOKING GREATER THAN 40 PACK YEARS: ICD-10-CM

## 2021-07-28 DIAGNOSIS — C34.12 PRIMARY CANCER OF LEFT UPPER LOBE OF LUNG (HCC): ICD-10-CM

## 2021-07-28 DIAGNOSIS — C34.92 PRIMARY ADENOCARCINOMA OF LEFT LUNG (HCC): Primary | ICD-10-CM

## 2021-07-28 LAB
ABSOLUTE IMMATURE GRANULOCYTE: 0.02 THOU/MM3 (ref 0–0.07)
BASINOPHIL, AUTOMATED: 1 % (ref 0–3)
BASOPHILS ABSOLUTE: 0 THOU/MM3 (ref 0–0.1)
BUN, WHOLE BLOOD: 10 MG/DL (ref 8–26)
CHLORIDE, WHOLE BLOOD: 106 MEQ/L (ref 98–109)
CREATININE, WHOLE BLOOD: 0.8 MG/DL (ref 0.5–1.2)
EOSINOPHILS ABSOLUTE: 0.1 THOU/MM3 (ref 0–0.4)
EOSINOPHILS RELATIVE PERCENT: 1 % (ref 0–4)
GFR, ESTIMATED: 78 ML/MIN/1.73M2
GLUCOSE, WHOLE BLOOD: 150 MG/DL (ref 70–108)
HCT VFR BLD CALC: 36.6 % (ref 37–47)
HEMOGLOBIN: 12 GM/DL (ref 12–16)
IMMATURE GRANULOCYTES: 0 %
IONIZED CALCIUM, WHOLE BLOOD: 1.25 MMOL/L (ref 1.12–1.32)
LYMPHOCYTES # BLD: 19 % (ref 15–47)
LYMPHOCYTES ABSOLUTE: 0.9 THOU/MM3 (ref 1–4.8)
MAGNESIUM: 2.2 MG/DL (ref 1.6–2.4)
MCH RBC QN AUTO: 32.3 PG (ref 26–33)
MCHC RBC AUTO-ENTMCNC: 32.8 GM/DL (ref 32.2–35.5)
MCV RBC AUTO: 99 FL (ref 81–99)
MONOCYTES ABSOLUTE: 0.3 THOU/MM3 (ref 0.4–1.3)
MONOCYTES: 7 % (ref 0–12)
PDW BLD-RTO: 15.4 % (ref 11.5–14.5)
PLATELET # BLD: 148 THOU/MM3 (ref 130–400)
PMV BLD AUTO: 11.5 FL (ref 9.4–12.4)
POTASSIUM, WHOLE BLOOD: 3.6 MEQ/L (ref 3.5–4.9)
RBC # BLD: 3.71 MILL/MM3 (ref 4.2–5.4)
SEG NEUTROPHILS: 71 % (ref 43–75)
SEGMENTED NEUTROPHILS ABSOLUTE COUNT: 3.5 THOU/MM3 (ref 1.8–7.7)
SODIUM, WHOLE BLOOD: 142 MEQ/L (ref 138–146)
TOTAL CO2, WHOLE BLOOD: 25 MEQ/L (ref 23–33)
WBC # BLD: 4.9 THOU/MM3 (ref 4.8–10.8)

## 2021-07-28 PROCEDURE — 6360000002 HC RX W HCPCS: Performed by: INTERNAL MEDICINE

## 2021-07-28 PROCEDURE — 96366 THER/PROPH/DIAG IV INF ADDON: CPT

## 2021-07-28 PROCEDURE — 96411 CHEMO IV PUSH ADDL DRUG: CPT

## 2021-07-28 PROCEDURE — 96415 CHEMO IV INFUSION ADDL HR: CPT

## 2021-07-28 PROCEDURE — 2580000003 HC RX 258: Performed by: INTERNAL MEDICINE

## 2021-07-28 PROCEDURE — 99214 OFFICE O/P EST MOD 30 MIN: CPT | Performed by: INTERNAL MEDICINE

## 2021-07-28 PROCEDURE — 36591 DRAW BLOOD OFF VENOUS DEVICE: CPT

## 2021-07-28 PROCEDURE — 80047 BASIC METABLC PNL IONIZED CA: CPT

## 2021-07-28 PROCEDURE — 96367 TX/PROPH/DG ADDL SEQ IV INF: CPT

## 2021-07-28 PROCEDURE — 96413 CHEMO IV INFUSION 1 HR: CPT

## 2021-07-28 PROCEDURE — 83735 ASSAY OF MAGNESIUM: CPT

## 2021-07-28 PROCEDURE — 96375 TX/PRO/DX INJ NEW DRUG ADDON: CPT

## 2021-07-28 PROCEDURE — 96372 THER/PROPH/DIAG INJ SC/IM: CPT

## 2021-07-28 PROCEDURE — 85025 COMPLETE CBC W/AUTO DIFF WBC: CPT

## 2021-07-28 PROCEDURE — 99211 OFF/OP EST MAY X REQ PHY/QHP: CPT

## 2021-07-28 RX ORDER — SODIUM CHLORIDE 0.9 % (FLUSH) 0.9 %
5-40 SYRINGE (ML) INJECTION PRN
Status: CANCELLED | OUTPATIENT
Start: 2021-07-28

## 2021-07-28 RX ORDER — SODIUM CHLORIDE 9 MG/ML
20 INJECTION, SOLUTION INTRAVENOUS ONCE
Status: COMPLETED | OUTPATIENT
Start: 2021-07-28 | End: 2021-07-28

## 2021-07-28 RX ORDER — SODIUM CHLORIDE 0.9 % (FLUSH) 0.9 %
5-40 SYRINGE (ML) INJECTION PRN
Status: DISCONTINUED | OUTPATIENT
Start: 2021-07-28 | End: 2021-07-29 | Stop reason: HOSPADM

## 2021-07-28 RX ORDER — SODIUM CHLORIDE 9 MG/ML
25 INJECTION, SOLUTION INTRAVENOUS PRN
Status: CANCELLED | OUTPATIENT
Start: 2021-07-28

## 2021-07-28 RX ORDER — HEPARIN SODIUM (PORCINE) LOCK FLUSH IV SOLN 100 UNIT/ML 100 UNIT/ML
500 SOLUTION INTRAVENOUS PRN
Status: CANCELLED | OUTPATIENT
Start: 2021-07-28

## 2021-07-28 RX ORDER — CYANOCOBALAMIN 1000 UG/ML
1000 INJECTION INTRAMUSCULAR; SUBCUTANEOUS ONCE
Status: COMPLETED | OUTPATIENT
Start: 2021-07-28 | End: 2021-07-28

## 2021-07-28 RX ORDER — HEPARIN SODIUM (PORCINE) LOCK FLUSH IV SOLN 100 UNIT/ML 100 UNIT/ML
500 SOLUTION INTRAVENOUS PRN
Status: DISCONTINUED | OUTPATIENT
Start: 2021-07-28 | End: 2021-07-29 | Stop reason: HOSPADM

## 2021-07-28 RX ORDER — PALONOSETRON 0.05 MG/ML
0.25 INJECTION, SOLUTION INTRAVENOUS ONCE
Status: COMPLETED | OUTPATIENT
Start: 2021-07-28 | End: 2021-07-28

## 2021-07-28 RX ADMIN — SODIUM CHLORIDE 20 ML/HR: 9 INJECTION, SOLUTION INTRAVENOUS at 10:21

## 2021-07-28 RX ADMIN — SODIUM CHLORIDE, PRESERVATIVE FREE 20 ML: 5 INJECTION INTRAVENOUS at 09:16

## 2021-07-28 RX ADMIN — POTASSIUM CHLORIDE: 2 INJECTION, SOLUTION, CONCENTRATE INTRAVENOUS at 15:52

## 2021-07-28 RX ADMIN — MANNITOL: 250 INJECTION, SOLUTION INTRAVENOUS at 12:52

## 2021-07-28 RX ADMIN — Medication 500 UNITS: at 17:26

## 2021-07-28 RX ADMIN — PALONOSETRON 0.25 MG: 0.25 INJECTION, SOLUTION INTRAVENOUS at 11:52

## 2021-07-28 RX ADMIN — FOSAPREPITANT 150 MG: 150 INJECTION, POWDER, LYOPHILIZED, FOR SOLUTION INTRAVENOUS at 11:57

## 2021-07-28 RX ADMIN — SODIUM CHLORIDE, PRESERVATIVE FREE 20 ML: 5 INJECTION INTRAVENOUS at 17:26

## 2021-07-28 RX ADMIN — DEXAMETHASONE SODIUM PHOSPHATE 12 MG: 4 INJECTION, SOLUTION INTRA-ARTICULAR; INTRALESIONAL; INTRAMUSCULAR; INTRAVENOUS; SOFT TISSUE at 11:36

## 2021-07-28 RX ADMIN — SODIUM CHLORIDE, PRESERVATIVE FREE 10 ML: 5 INJECTION INTRAVENOUS at 09:15

## 2021-07-28 RX ADMIN — POTASSIUM CHLORIDE: 2 INJECTION, SOLUTION, CONCENTRATE INTRAVENOUS at 10:21

## 2021-07-28 RX ADMIN — SODIUM CHLORIDE 800 MG: 9 INJECTION, SOLUTION INTRAVENOUS at 12:33

## 2021-07-28 RX ADMIN — SODIUM CHLORIDE, PRESERVATIVE FREE 10 ML: 5 INJECTION INTRAVENOUS at 17:25

## 2021-07-28 RX ADMIN — CYANOCOBALAMIN 1000 MCG: 1000 INJECTION, SOLUTION INTRAMUSCULAR at 13:56

## 2021-07-28 NOTE — PROGRESS NOTES
[address] CANCER [address] [place] [address]  Dept: [phone]  Loc: [phone]   Hematology/Oncology Consult (Clinic)        7/28/21    Kenyatta Murillo   1960     No ref. provider found   Tena Timmons MD     DIAGNOSIS:  -  Left upper lobe cavitary adenocarcinoma of the lung. 3.9 x 2.1 cm. PET scan negative. Bronchoscopy EBUS-isolated station 11 lymph node with a moderate to poorly differentiated adenocarcinoma. 4L node with a minute focus of atypical cells on path review felt to be benign. Clinical stage: T2a N1M0 / IIB. Pathologic stage: T1 cN1 M0 /IIB (poorly differentiated, 3 of 7 nodes positive including extranodal extension. TREATMENT:  -Left upper lobectomy and mediastinal node dissection scheduled by Dr. Sharyne Burkitt 5/3/2021  -Adjuvant chemotherapy planned; recommend cis-platinum 75 mg meter squared day 1 and pemetrexed 500 mg meter squared day 1 every 21 days x 4 cycles . Start date 6/16/2021. Course 3 due 7/28    PARAMETERS:  -Chest CT without contrast 3/12/2021  -PET/CT 4/14/2021  -Brain MRI with and without contrast 4/9/2021    SUBJECTIVE: Patient is down to about 5 cigarettes/day. She denies any neuropathy. Only a few days of mild nausea that appears to be related to food that she eats. Ready to proceed with course 3 of adjuvant chemotherapy. Overall doing well. HPI: Kee Kennedy is a 71-year-old female with a 45-pack-year smoking habit (ongoing) with a chest CT without contrast on 3/12/2021 revealing a 3.9 x 2.1 cm cavitary mass in the left upper lung corresponding to the abnormality seen on the chest x-ray of 2/26/2021. Radiologically very suspicious. Patient underwent a needle biopsy on 3/26 revealing adenocarcinoma. PET scan shows no distant mets or adenopathy. PFT's done and reviewed below. She is waiting  for a consult with Dr Sharyne Burkitt of Surgery.    Reports 30 # weight loss ove 9 months; unintentional. Normal weight @ 145. Appetite has remained good. Chronic cough unchanged, no blood. No chest pain or pain elsewhere. Denied increased sob. Average 1ppd x 45 years; now about 5 cigs/day . Motivated to stop but failed Nicotine replacement products. Chantix not attempted thus far. ROS:  Review of Systems 14 point negative except as above. Limited to unintentional weight loss. Chronic unchanged nonproductive cough and shortness of breath with activity also unchanged. PMH:   Past Medical History:   Diagnosis Date    Arthritis     COPD (chronic obstructive pulmonary disease) ([facility])     Dr Ocasio Marking [facility])     Dr Ellyn Baptiste   Aneurysm    Social HX:   Social History     Socioeconomic History    Marital status:      Spouse name: Not on file    Number of children: Not on file    Years of education: Not on file    Highest education level: Not on file   Occupational History    Not on file   Tobacco Use    Smoking status: Current Every Day Smoker     Packs/day: 0.25     Years: 45.00     Pack years: 11.25     Types: Cigarettes    Smokeless tobacco: Never Used   Vaping Use    Vaping Use: Never used   Substance and Sexual Activity    Alcohol use: Never    Drug use: Yes     Types: Marijuana     Comment: past month    Sexual activity: Not on file   Other Topics Concern    Not on file   Social History Narrative    Not on file     Social Determinants of Health     Financial Resource Strain:     Difficulty of Paying Living Expenses:    Food Insecurity:     Worried About Running Out of Food in the Last Year:     Ran Out of Food in the Last Year:    Transportation Needs:     Lack of Transportation (Medical):      Lack of Transportation (Non-Medical):    Physical Activity:     Days of Exercise per Week:     Minutes of Exercise per Session:    Stress:     Feeling of Stress :    Social Connections:     Frequency of Communication with Friends and Family:     Frequency of Social Gatherings with Friends and Family:     Attends Jewish Services:     Active Member of Clubs or Organizations:     Attends Club or Organization Meetings:     Marital Status:    Intimate Partner Violence:     Fear of Current or Ex-Partner:     Emotionally Abused:     Physically Abused:     Sexually Abused:       45 pack year smoking history, currently down to 5 cigarettes/day and attempting to stop. Spouse: Marcos Acharya [phone]    Phone: [phone] [address]     Employment: Works at [employer] and walk but cannot I as a ta    Immunizations: There is no immunization history on file for this patient. Health Screenings:  Mammogram:  No results found for this or any previous visit. Results for orders placed during the hospital encounter of 02/26/21   [facility] BOUCHRA DIGITAL SCREEN BILATERAL    Narrative IMPRESSION: Mammogram BI-RADS: Category 1: Negative    There is no mammographic evidence of malignancy. A 1 year   screening mammogram is recommended. The patient has been entered   into our database and they will receive a notification when they   are due for their next exam.      The patient was notified of the results. #[id number] - [facility] BOUCHRA DIGITAL SCREEN BILATERAL  BILATERAL DIGITAL SCREENING MAMMOGRAM 3D/2D WITH CAD: 2/26/2021  CLINICAL: Tomosynthesis. Screening. Comparison is made to exam dated:  2/17/2011 mammogram - [facility]. The tissue of both breasts is heterogeneously dense. This may   lower the sensitivity of mammography. Current study was also evaluated with a Computer Aided Detection   (CAD) system. No significant masses, calcifications, or other findings are seen   in either breast.    There has been no significant interval change.         Dr. Jair deluna/nacho:2/26/2021 13:05:44      Imaging Technologist: Sue DIALLO)(M), [facility]  letter sent: [facility] Doctor Names    [id number]        Pap / Pelvic:na  C-Scope: Approximately 5 years ago with 2 benign polyps      Gyn HX:   GPA:  DARREL/BSO:31 years ago  LMP: No LMP recorded (lmp unknown). Patient has had a hysterectomy. Health Maintenance Due   Topic Date Due    Pneumococcal 0-64 years Vaccine (1 of 4 - PCV13) Never done    COVID-19 Vaccine (1) Never done    HIV screen  Never done    DTaP/Tdap/Td vaccine (1 - Tdap) Never done    Cervical cancer screen  Never done    Lipid screen  Never done    Colon cancer screen colonoscopy  Never done    Shingles Vaccine (1 of 2) Never done        Interests:   none    Fam HX:   Family History   Problem Relation Age of Onset    Other Mother         aneursym    No Known Problems Father     Depression Sister     Heart Disease Brother     Diabetes Brother     Other Brother         Lung disease    Lung Cancer Maternal Grandfather     Other Brother         Crohns    Macular Degen Brother     Depression Sister     Other Sister         Blood clots    Arthritis Sister     Brain Cancer Paternal Uncle         Hospitalizations:   None recent    Allergies:   Allergies   Allergen Reactions    Tylenol With Codeine #3 [Acetaminophen-Codeine] Nausea And Vomiting    Codeine Nausea And Vomiting        Adult Illness:  Patient Active Problem List   Diagnosis    Primary cancer of left upper lobe of lung ([address]    Other emphysema (HCC)    Smoking greater than 40 pack years    Tobacco abuse    KERR (dyspnea on exertion)    Intermittent palpitations    Borderline Abnormal EKG with rsr'    S/P left upper lobectomy of lung and mediastinal dissection    Moderate COPD (chronic obstructive pulmonary disease) ([address]    Encounter for insertion of venous access port    Ascending aorta dilatation (HCC) 4.1 cm        Surgery:  Past Surgical History:   Procedure Laterality Date    BRONCHOSCOPY N/A 2021    BRON[facility] SECTION      x4    CHOLECYSTECTOMY      COLONOSCOPY  07/11/2016    CT NEEDLE BIOPSY LUNG PERCUTANEOUS  03/26/2021    CT NEEDLE BIOPSY LUNG PERCUTANEOUS 3/26/2021 [facility] CT SCAN    HYSTERECTOMY      LUNG REMOVAL, TOTAL Left 5/4/2021    ROBOTIC ASSISTED LEFT UPPER LOBECTOMY AND MEDIASTINAL DISSECTION, POSSIBLE OPEN AND CRYOTHERAPY OF INTERCOSTAL NERVES 5, 6, 7 & 8 performed by Radha Pickens MD at [address] Left 6/10/2021    LEFT SUBCLAVIAN SINGLE LUMEN SMART PORT INSERTION performed by Radha Pickens MD at [address]  07/11/2016        Medications:  Current Outpatient Medications   Medication Sig Dispense Refill    ondansetron (ZOFRAN-ODT) 8 MG TBDP disintegrating tablet Place 1 tablet under the tongue every 8 hours as needed for Nausea or Vomiting 10 tablet 2    oxyCODONE (ROXICODONE) 5 MG immediate release tablet Take 5 mg by mouth every 4 hours as needed.  vitamin D3 (CHOLECALCIFEROL) 125 MCG (5000 UT) TABS tablet Cholecalciferol (Vitamin D3) (Vitamin D3) 125 mcg (5,000 unit) Tablet Active 125 MCG PO Daily April 22nd, 2021 4:50pm      prochlorperazine (COMPAZINE) 10 MG tablet Take 1 tablet by mouth every 6 hours as needed (nausea) 120 tablet 3    dexamethasone (DECADRON) 4 MG tablet 4 mg Decadron take 1 tablet p.o. twice daily x3 days starting 1 day before chemotherapy with Alimta 6 tablet 3    lidocaine-prilocaine (EMLA) 2.5-2.5 % cream Apply topically as needed.  1 Tube 1    folic acid (FOLVITE) 1 MG tablet Take 1 tablet by mouth daily 30 tablet 3    acetaminophen (TYLENOL) 500 MG tablet Take 1 tablet by mouth 4 times daily as needed for Pain 120 tablet 0    ketorolac (TORADOL) 10 MG tablet Take 1 tablet by mouth every 6 hours as needed for Pain 20 tablet 0    senna (SENOKOT) 8.6 MG tablet Take 1 tablet by mouth daily      docusate sodium (COLACE) 100 MG capsule Take 100 mg by mouth 2 times daily      albuterol sulfate HFA (VENTOLIN HFA) 108 (90 Base) MCG/ACT inhaler Inhale 2 puffs into the lungs 4 times daily as needed for Wheezing 1 Inhaler 11     No current facility-administered medications for this visit. Facility-Administered Medications Ordered in Other Visits   Medication Dose Route Frequency Provider Last Rate Last Admin    sodium chloride flush 0.9 % injection 5-40 mL  5-40 mL Intravenous PRN Adriana Tucker MD   20 mL at 21 0916    heparin flush 100 UNIT/ML injection 500 Units  500 Units Intracatheter PRN Adriana Tucker MD        0.9 % sodium chloride infusion  20 mL/hr Intravenous Once Adriana Tucker MD 20 mL/hr at 21 1021 20 mL/hr at 21 1021    PEMEtrexed (ALIMTA) 800 mg in sodium chloride 0.9 % 100 mL chemo infusion  500 mg/m2 (Order-Specific) Intravenous Once Adriana Tucker MD        CISplatin (PLATINOL) 120 mg, mannitol 25 g in sodium chloride 0.9 % 1,000 mL chemo IVPB   Intravenous Once Adriana Tucker MD        potassium chloride 10 mEq, magnesium sulfate 1,000 mg in sodium chloride 0.9 % 500 mL IVPB   Intravenous Once Adriana Tucker MD             EXAM:    LMP  (LMP Unknown)      ECO  General: Non-ill appearing. Appears comfortable and in no distress. HEENT: NC/AT,nonicteric, perrla,eom intact, no mucosal lesions  Neck: normal thyroid, no masses. pulses nl, no bruits,   Nodes: No adenopathy  Lungs/chest: clear, no rales,rhonchi or wheezing, lung bases clear. There is a dime sized healing wound with a scab and border with erythema that appears to be healing with no discharge or odor. CV: rrr, no rubs ,gallops or murmurs  Breasts: Not examined  Abd/Rectal: soft, non-tender,bowel sounds normal , no HSM,no masses  Back: normal curvature, No midline tenderness. flanks nontender  : Not Examined  Extremities: no cyanosis,clubbing or edema. Skin: unremarkable  Neuro: A and O x 4, CN exam nonfocal, Motor- no deficits, Sensory- no deficits, gait-nl, speech- fluent, no ataxia.   Devices: [id number] left chest unremarkable      DATA:      CBC with Differential:      Lab Results   Component Value Date    WBC 4.9 07/28/2021    RBC 3.71 07/28/2021    RBC 4.63 10/10/2017    HGB 12.0 07/28/2021    HCT 36.6 07/28/2021     07/28/2021    MCV 99 07/28/2021    MCH 32.3 07/28/2021    MCHC 32.8 07/28/2021    RDW 15.4 07/28/2021    NRBC 0 06/03/2021    SEGSPCT 74.5 06/03/2021    LYMPHOPCT 15.8 10/10/2017    MONOPCT 5.9 06/03/2021    EOSPCT 1.4 10/10/2017    BASOPCT 0.7 10/10/2017    MONOSABS 0.3 07/28/2021    LYMPHSABS 0.9 07/28/2021    EOSABS 0.1 07/28/2021    BASOSABS 0.0 07/28/2021       CMP:    Lab Results   Component Value Date     07/28/2021     06/03/2021    K 3.6 07/28/2021    K 5.2 06/03/2021     06/03/2021    CO2 25 06/03/2021    BUN 9 06/03/2021    CREATININE 0.8 07/28/2021    CREATININE 0.7 06/03/2021    LABGLOM 85 06/03/2021    GLUCOSE 87 06/03/2021    GLUCOSE 79 10/10/2017    PROT 7.7 06/03/2021    LABALBU 4.1 06/03/2021    CALCIUM 9.9 06/03/2021    BILITOT 0.2 06/03/2021    ALKPHOS 98 06/03/2021    AST 13 06/03/2021    ALT 13 06/03/2021       BMP:    Lab Results   Component Value Date     07/28/2021     06/03/2021    K 3.6 07/28/2021    K 5.2 06/03/2021     06/03/2021    CO2 25 06/03/2021    BUN 9 06/03/2021    LABALBU 4.1 06/03/2021    CREATININE 0.8 07/28/2021    CREATININE 0.7 06/03/2021    CALCIUM 9.9 06/03/2021    LABGLOM 85 06/03/2021    GLUCOSE 87 06/03/2021    GLUCOSE 79 10/10/2017       Magnesium:    Lab Results   Component Value Date    MG 2.2 07/28/2021     PT/INR:    Lab Results   Component Value Date    INR 0.97 05/18/2021     TSH:    Lab Results   Component Value Date    TSH 4.750 10/10/2017           IMAGING:  PROCEDURE: XR CHEST (2 VW)       2/26/2021       CLINICAL INFORMATION: Cough       COMPARISON: 10/10/2017       TECHNIQUE: PA and lateral views of the chest were obtained.               Impression   1. Normal heart size.  No acute infiltrates or effusions are seen. 2. Interval development of a 2 cm nodule left perihilar region since prior study which should be considered malignant until proven otherwise. CT thorax recommended for further evaluation.                 Radiological Data:  CT scan of chest without contrast.  Fri Mar 12, 2021  8:18 AM   PROCEDURE: CT CHEST WO CONTRAST   CLINICAL INFORMATION: Pulmonary nodule   COMPARISON: Chest x-ray dated 2021   1. There is a cavitary mass demonstrated within the left upper lobe with peripheral mild spiculation measuring 3.9 x 2.1 cm on axial image 34. This corresponds with previous chest x-ray dated 2021. This is concerning for malignancy until proven otherwise. Further evaluation could be obtained with PET/CT and/or biopsy. An infectious process is also not excluded.   2. There are additional smaller groundglass nodular densities within the right lung as detailed above.   3. Small pericardial effusion is noted. This is nonspecific.          Ct Needle Biopsy Lung Percutaneous  Result Date: 3/26/2021  1. Status post successful CT guided lung biopsy. 2. Followup chest radiographs will be obtained. **This report has been created using voice recognition software. It may contain minor errors which are inherent in voice recognition technology. ** Final report electronically signed by Dr. Tracy Collins on 3/26/2021 4:02 PM    PROCEDURE: PET CT SKULL BASE TO MID THIGH       CLINICAL INFORMATION: 50-year-old woman with recently diagnosed adenocarcinoma of the left lung; initial treatment strategy.       Radiopharmaceutical: 13.8 mCi F-18 FDG, intravenously.       TECHNIQUE: PET/CT imaging was performed from the skull base to the midthigh levels using routine PET acquisition.       Injection site: Left antecubital fossa.       Time of FDG injection: 7:37 AM.       Serum glucose: 86 mg/dL at 7:35 AM.       Time of imagin:37 AM.       COMPARISON: CT chest 3/11/2021       FINDINGS:        Neck: No FDG avid cervical lymphadenopathy. Physiologic activity is noted bilaterally in the lower neck.       Chest: Cardiac size is normal. Atherosclerotic calcifications are present in the thoracic aorta and coronary arteries. There is stable aneurysmal dilation of the ascending thoracic aorta which measures 4.3 cm in maximum diameter (image 103). There is no    pleural or pericardial effusion. Emphysematous changes are again noted in the bilateral lungs. In the regular mass with areas of cavitation in the lingula is stable in size, again measuring approximately 3.7 cm and is associated with a maximum SUV of 6.7    (image 107). Adjacent opacities are likely postobstructive atelectasis. A 0.4 cm nodule at the right lung apex has a maximum SUV of 1.9 (image 70). A 0.4 cm nodule in the right upper lobe has a maximum SUV of 0.9 (image 80). An indistinct 0.5 cm nodular    density at the right mid lung is not associated with activity above background. There is no FDG avid mediastinal, hilar or axillary lymphadenopathy. Degenerative changes are present in the thoracic spine without evidence of hypermetabolic osseous    metastatic disease.       Abdomen/pelvis: Physiologic activity is present in the liver, spleen, urinary collecting system and gastrointestinal tract. The gallbladder is surgically absent. Atherosclerotic calcifications are seen in the abdominal aorta without evidence of aneurysm.    The urinary bladder is unremarkable. There are phleboliths in the pelvis. There is no FDG avid mesenteric, retroperitoneal, pelvic or inguinal lymphadenopathy. Degenerative changes are present in the lumbar spine and pelvis without evidence of    hypermetabolic osseous metastatic disease.           Impression   1. FDG avid left lung mass corresponding to known malignancy.    2. Small, mildly FDG avid right lung nodules, possibly infectious or inflammatory but satellite malignancy cannot be excluded.       Final report electronically signed by Dr. Maritza Christensen on 4/14/2021 10:02 AM       Mri Brain W Wo Contrast  Result Date: 4/9/2021   1. No evidence of intracranial metastases or other acute intracranial abnormality. 2. Moderate severity chronic microvascular angiopathy. 3. Mild to moderate mucosal inflammation in the paranasal sinuses with air-fluid levels as evidence for an acute component. **This report has been created using voice recognition software. It may contain minor errors which are inherent in voice recognition technology. ** Final report electronically signed by Dr. Lopez Shaw MD on 4/9/2021 1:50 PM           PROCEDURES:  -Core biopsy cavitary left upper lobe mass 3/26/2021    -Full pulmonary function study with bronchodilators completed 4/6/2021  FEV1 1.68 (61% predicted 25% change with bronchodilator. FVC 2.62, 75% predicted 15% change with bronchodilator  FEV1 over FVC ratio 64  Interpretation-airway obstruction. FVC is reduced relative to the SVC indicating air trapping. While the TLC is within normal limits, the FRC, RV and RV/TLC ratio are increased. Follow administration of bronchodilators there is a good response. Reduced diffusing capacity indicates had mild loss of functional alveolar capillary surface. PATHOLOGY:   3/26/2021  Clinical Information: CAVITATING MASS SHANNON, SMOKER     FINAL DIAGNOSIS:   Lung, left, needle core biopsies:       Adenocarcinoma. Specimen:   5/4/21  BIOPSY OF LUNG, LEFT      Microscopic Examination:   The specimen consists of several scant fragments of lung tissue with   infiltrating malignant glands consistent with adenocarcinoma. Mayda Jose is   also anthracotic pigment. ADDENDUM REPORT 5/18/2021, 5/24/2021:       FINAL DIAGNOSIS:   A. Lymph nodes, level VII, resections:    Negative for neoplasm in 3 out of 3 lymph nodes.    B. Left upper lobe of lung, resection:    Invasive, moderately to poorly differentiated adenocarcinoma, acinar   type.    Margins of resection are free of neoplasia.    Maximum tumor size = 2.6 cm.    Moderate emphysema.    pT1c, pN1. C. Lymph node, level V, resection:    Negative for neoplasm in one out of one lymph node. D. Lymph node, level X, resections:    Metastatic carcinoma in one out of 7 lymph nodes. E. Lymph nodes, level XI, resections:    Metastatic carcinoma in one out of 2 lymph nodes. F. Lymph node, level XI \"special\", resection:    Metastatic carcinoma in one out of one lymph node. G. Lymph node, level VI, resection:    Benign fibroadipose tissue and blood, no lymphoid tissue identified. COMMENT 5/18/2021:  Selected slides and pathology report were reviewed   by Dr. Shannan Potrillo and archival tissue was selected for the following testing:   EGFR (ordered by Dr. Roldan Nelson). ADDENDUM REPORT 5/24/2021:   EGFR Mutation by Pyrosequencing:  Not Detected     Please see the separately issued reference report for the results to   these test(s). Specimen:   A) LYMPH NODE(S), LEVEL 7, X 2   B) LUNG, LEFT UPPER LOBE   C) LYMPH NODE(S), LEVEL 5, X 3   D) LYMPH NODE(S), LEVEL 10, X 9   E) LYMPH NODE(S), LEVEL 11, X 2   F) LYMPH NODE(S), LEVEL 11, SPECIAL, X 1   G) LYMPH NODE(S), LEVEL 6, X 1       Gross Examination:   A - The container is labeled [employer], lymph nodes, level VII. Received fresh are two fragments of gray-tan tissue, each about 1.1 cm.    1 ns. B - The container is labeled [employer], left upper lobe, lung. Received fresh is a lobe of lung measuring 15 x 7 x 4 cm.  The pleural   surface is blue-gray.  No peribronchial lymph nodes are identified. There is a firm palpable mass.  The pleural surface is inked blue.    Sections through the specimen reveal a well-circumscribed, white mass   measuring grossly 2.6 x 2.6 x 2 cm.  The lesion is about 2 cm from the   bronchial margin.  The lesion does not grossly involve the pleural   surface.  No additional lesions are identified.  The parenchyma is   congested.  Representative sections are submitted.  Cassette #1 -   bronchial margin; cassette #2 - vascular margins; cassettes #3, #4, and   #5 - lesion with inked pleural surface; and cassette #6 - uninvolved   lung.  ss. C - The container is labeled [employer], lymph nodes, level V x3. Received in formalin is a single fragment of gray-tan tissue measuring   2 cm in greatest dimension.  The specimen is bisected and entirely   submitted.  1 ns. D - The container is labeled [employer], lymph nodes, level X x9. Received in formalin are seven fragments of gray-tan tissue varying in   size from 0.4 cm up to 1.7 cm.  The smaller fragments are submitted as   received in cassette #1.  The larger fragment is bisected and submitted   in cassette #2.  ns.     E - The container is labeled [employer], lymph nodes, level XI, 2. Received fresh are two fragments of gray-tan tissue measuring 1 and 1.2   cm.  1 ns. F - The container is labeled [employer], lymph node, level XI,   special x1.  Received in formalin is a single fragment of gray-tan   tissue measuring 1.5 cm in greatest dimension.  The specimen is friable   upon sectioning.  The specimen is trisected.  2 ns. G - The container is labeled [employer], lymph node, level VI x1. Received in formalin is a single fragment of gray-tan tissue measuring   0.5 cm.  1 ns.  JESSEE/ELLI:[id number]         Microscopic Examination:   A-G.    Procedure   Lobectomy   Specimen Laterality   Left   Tumor Site   Upper lobe of lung   Tumor Size      Total tumor size (size of entire tumor)#    2.6 x 2.6 x 2 cm   Tumor Focality   Single focus   Histologic Type   Invasive adenocarcinoma, acinar predominant   + Histologic Grade   G3: Poorly differentiated   + Spread Through Air Spaces (HILDA)   Not identified   Visceral Pleura Invasion   Not identified   Lymphovascular Invasion   Cannot be determined   Direct Invasion of Adjacent Structures   No adjacent structures present   Margins   All margins are uninvolved by tumor   Margins examined (specify): Bronchial, vascular and parenchymal       Distance of invasive carcinoma from closest margin (centimeters): 2       mm, pleural   Number of Lymph Nodes Involved: 3   Number of Lymph Nodes Examined: 14   + Extranodal Extension   Not identified   Treatment Effect   No known presurgical therapy   Pathologic Stage Classification (pTNM) (AJCC 8th Edition)   Primary Tumor (pT)   pT1c: Tumor >2 cm but <=3 cm in greatest dimension   Regional Lymph Nodes (pN)   pN1: Metastasis in ipsilateral peribronchial and/or ipsilateral hilar          lymph nodes, and intrapulmonary nodes, including involvement by          direct extension   + Additional Pathologic Findings   Emphysema     [id number]   [id number]   [id number]                                                     <Sign Out Dr. Genia Cranker Raymondo Floras, M.D., F.C.A.P. NVML/ Perez  Printed on:  5/25/2021   Daniloedward Dno [address] [address] [facility], [address]   Original print date: 05/25/2021   Lab and Collection    Surgical Pathology - 5/4/2021  Result History    Surgical Pathology on 5/25/2021 - Result Edited   Result Information    Status: Edited Result - FINAL (Collected: 5/4/2021 08:04) Provider Status: Reviewed   All Reviewers List    Jade Noel MD on 5/26/2021 12:23   Tabatha Capps MD on 5/26/2021 08:10         Bronchoscopy EBUS 4/27/2021  Pulmonary at [facility]  4R node-negative,   station 7 node negative,   station 4L-minute group of atypical cells. Station 11 poorly differentiated moderately differentiated adenocarcinoma  Note: Dr. Cassandra Vallejo had the pathology reviewed elsewhere and it was felt that the station 4L lymph node unequivocally did NOT show malignancy.     GENETICS:  Not applicable    MOLECULAR:  Not applicable i.e. 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 PET scan shows no hypermetabolic adenopathy or distant mets. Brain MRI shows no mets. Bronchoscopy EBUS as reported above shows T2 a N1 M0/IIB disease. The L4 Station node on review and felt to be benign. Status post left upper lobectomy with pathologic stage T1CN1 with 3 of 7+ nodes with a high-grade tumor. Stage IIb. 2) Prognosis / Disease Status: Potentially curable. High risk for recurrence and qualifies for adjuvant chemotherapy. 3) Work-up:    Labs: Labs reviewed today   Imaging: All completed   Procedures: Ejrnhu-i-Njda already placed. Consults: None   Other: Patient has a.m. gastroparesis nausea the predated chemotherapy. 4) Symptom Management: None needed at this time. 5) Supportive care provided. Level of care is appropriate. Teaching done today. It was as the importance of stopping smoking. She is also informed regarding the risks and benefits of adjuvant chemotherapy. She is high risk and meets NCCN criteria for adjuvant chemotherapy. She agrees to proceed. 6) Treatment goal:      Treatment plan:      Left upper lobectomy and mediastinal node dissection. To be followed by adjuvant chemotherapy. Adjuvant chemotherapy - cis-platinum 75 mg meter squared day 1 along with Alimta 500 mg meter squared day 1 through 21 days x 4. Patient's start date was June 16. Proceed with course #3 . Thus far well-tolerated other than minor nausea controlled with Zofran. 7) Medications reviewed. Prescriptions today: None  None Compazine, Zofran ODT, folic acid 1 mg, EMLA cream, Decadron premed for Alimta              No orders of the defined types were placed in this encounter. OARRS:  Controlled Substance Monitoring:    Acute and Chronic Pain Monitoring:   No flowsheet data found. 8) Research Options:      Not applicable      9) Other:   Encouraged to stop smoking.     10) Follow

## 2021-07-28 NOTE — PROGRESS NOTES
Name: Adán Ya  : 1960  MRN: [id number]    Oncology Navigation Follow-Up Note    Contact Type:  Medical Oncology    Notes:  Met with Amanda Lange during her Cycle #3 of Alimta and Cisplatin for her Stage II Lung Cancer. Feels good-hasn't lost hair! Minimal side effects from treatment. No needs today. Will return to Clinic for Cycle #4 and follow up with Dr. Medina Yang . Amanda Lange knows to call with any questions or concerns.     Electronically signed by Diego Laird RN on 2021 at 5:50 PM

## 2021-07-28 NOTE — PLAN OF CARE
Problem: Musculor/Skeletal Functional Status  Goal: Absence of falls  7/28/2021 1345 by Hardy Sims RN  Outcome: Met This Shift  Note: Free from falls while in O.P. Oncology. 7/28/2021 1202 by Hardy Sims RN  Outcome: Met This Shift  Note: Free from falls while in O.P. Oncology. Intervention: Fall precautions  7/28/2021 1345 by Hardy Sims RN  Note: Discussed the need to use the call light for assistance when getting up to ambulate. 7/28/2021 1202 by Hardy Sims RN  Note: Discussed the need to use the call light for assistance when getting up to ambulate. Problem: Intellectual/Education/Knowledge Deficit  Goal: Teaching initiated upon admission  7/28/2021 1345 by Hardy Sims RN  Outcome: Met This Shift  Note: Patient verbalizes understanding to verbal information given on alimta and cisplatin,action and possible side effects. Aware to call MD if develop complications. 7/28/2021 1202 by Hardy Sims RN  Outcome: Met This Shift  Note: Patient verbalizes understanding to verbal information given on Alimta and Cisplatin,action and possible side effects. Aware to call MD if develop complications.     Intervention: Verbal/written education provided  7/28/2021 1345 by Hardy Sims RN  Note: Chemotherapy Teaching     What is Chemotherapy   Drug action [x]   Method of Administration [x]   Handouts given []     Side Effects  Nausea/vomiting [x]   Diarrhea [x]   Fatigue [x]   Signs / Symptoms of infection [x]   Neutropenia [x]   Thrombocytopenia [x]   Alopecia [x]   neuropathy [x]   [place] diet &  the importance of fluids [x]       Micellaneous  Importance of nutrition [x]   Importance of oral hygiene [x]   When to call the MD [x]   Monitoring labs [x]   Use of supportive services []     Explanation of Drug Regimen / Frequency  Alimta and carboplatin     Comments  Verbalized understanding to drug,action,side effects and when to call MD      7/28/2021 1202 by Hardy Sims RN  Note: Chemotherapy Teaching     What is Chemotherapy   Drug action [x]   Method of Administration [x]   Handouts given []     Side Effects  Nausea/vomiting [x]   Diarrhea [x]   Fatigue [x]   Signs / Symptoms of infection [x]   Neutropenia [x]   Thrombocytopenia [x]   Alopecia [x]   neuropathy [x]   [place] diet &  the importance of fluids [x]       Micellaneous  Importance of nutrition [x]   Importance of oral hygiene [x]   When to call the MD [x]   Monitoring labs [x]   Use of supportive services []     Explanation of Drug Regimen / Frequency  Alimta and cisplatin     Comments  Verbalized understanding to drug,action,side effects and when to call MD         Problem: Discharge Planning:  Goal: Discharged to appropriate level of care  Description: Discharged to appropriate level of care  7/28/2021 1345 by Patrice Archer RN  Outcome: Met This Shift  Note: Verbalize understanding of discharge instructions, follow up appointments, and when to call Physician.   7/28/2021 1202 by Patrice Archer, RN  Outcome: Met This Shift  Note: Verbalize understanding of discharge instructions, follow up appointments, and when to call Physician. Intervention: Assess knowledge level of healthcare  7/28/2021 1345 by Patrice Archer, RN  Note: Discuss understanding of discharge instructions, follow up appointments and when to call Physician.   7/28/2021 1202 by Patrice Archer, RN  Note: Discuss understanding of discharge instructions, follow up appointments and when to call Physician. Problem: Infection - Central Venous Catheter-Associated Bloodstream Infection:  Goal: Will show no infection signs and symptoms  Description: Will show no infection signs and symptoms  7/28/2021 1345 by Patrice Archer, RN  Outcome: Met This Shift  Note: Instructed to monitor for signs/symptoms of infection at [facility] [address] At [facility] and call MD if problems develop.    7/28/2021 1202 by Patrice Archer, RN  Outcome: Met This Shift  Note: Instructed to monitor for signs/symptoms of infection at [facility] [address] [facility] and call MD if problems develop. Intervention: Infection risk assessment  7/28/2021 1345 by Kiara Kwong RN  Note: Mediport site with no redness or warmth. Skin over port site intact with no signs of breakdown noted. Patient verbalizes signs/symptoms of port infection and when to notify the physician.   7/28/2021 1202 by Kiara Kwong RN  Note: Mediport site with no redness or warmth. Skin over port site intact with no signs of breakdown noted. Patient verbalizes signs/symptoms of port infection and when to notify the physician. Care plan reviewed with patient. Patient  verbalize understanding of the plan of care and contribute to goal setting.

## 2021-07-28 NOTE — PROGRESS NOTES
Patient assessed for the following post chemotherapy:    Dizziness   No  Lightheadedness  No      Acute nausea/vomiting No  Headache   No  Chest pain/pressure  No  Rash/itching   No  Shortness of breath  No    Patient kept for 20 minutes observation post infusion chemotherapy. Patient tolerated chemotherapy treatment Alimta and cisplatin without any complications. Last vital signs:   BP (!) 144/84   Pulse 68   Temp 98.4 °F (36.9 °C) (Oral)   Resp (!) 68   Ht 5' 6\" (1.676 m)   Wt 125 lb (56.7 kg)   LMP  (LMP Unknown)   SpO2 99%   BMI 20.18 kg/m²       Patient instructed if experience any of the above symptoms following today's infusion,he/she is to notify MD immediately or go to the emergency department. Discharge instructions given to patient. Verbalizes understanding. Ambulated off unit per self with belongings.

## 2021-07-28 NOTE — PLAN OF CARE
Problem: Musculor/Skeletal Functional Status  Goal: Absence of falls  Outcome: Met This Shift  Note: Free from falls while in O.P. Oncology. Intervention: Fall precautions  Note: Discussed the need to use the call light for assistance when getting up to ambulate. Problem: Intellectual/Education/Knowledge Deficit  Goal: Teaching initiated upon admission  Outcome: Met This Shift  Note: Patient verbalizes understanding to verbal information given on Alimta and Carboplatin,action and possible side effects. Aware to call MD if develop complications. Intervention: Verbal/written education provided  Note: Chemotherapy Teaching     What is Chemotherapy   Drug action [x]   Method of Administration [x]   Handouts given []     Side Effects  Nausea/vomiting [x]   Diarrhea [x]   Fatigue [x]   Signs / Symptoms of infection [x]   Neutropenia [x]   Thrombocytopenia [x]   Alopecia [x]   neuropathy [x]   [place] diet &  the importance of fluids [x]       Micellaneous  Importance of nutrition [x]   Importance of oral hygiene [x]   When to call the MD [x]   Monitoring labs [x]   Use of supportive services []     Explanation of Drug Regimen / Frequency  Alimta and Carboplatin     Comments  Verbalized understanding to drug,action,side effects and when to call MD         Problem: Discharge Planning:  Goal: Discharged to appropriate level of care  Description: Discharged to appropriate level of care  Outcome: Met This Shift  Note: Verbalize understanding of discharge instructions, follow up appointments, and when to call Physician. Intervention: Assess knowledge level of healthcare  Note: Discuss understanding of discharge instructions, follow up appointments and when to call Physician.       Problem: Infection - Central Venous Catheter-Associated Bloodstream Infection:  Goal: Will show no infection signs and symptoms  Description: Will show no infection signs and symptoms  Outcome: Met This Shift  Note: Instructed to monitor for signs/symptoms of infection at [address] [facility] [address] At [facility] and call MD if problems develop. Intervention: Infection risk assessment  Note: Mediport site with no redness or warmth. Skin over port site intact with no signs of breakdown noted. Patient verbalizes signs/symptoms of port infection and when to notify the physician. Care plan reviewed with patient. Patient  verbalize understanding of the plan of care and contribute to goal setting.

## 2021-07-28 NOTE — PROGRESS NOTES
unintentional. Normal weight @ 145. Appetite has remained good. Chronic cough unchanged, no blood. No chest pain or pain elsewhere. Denied increased sob. Average 1ppd x 45 years; now about 5 cigs/day . Motivated to stop but failed Nicotine replacement products. Chantix not attempted thus far. ROS:  Review of Systems 14 point negative except as above. Limited to unintentional weight loss. Chronic unchanged nonproductive cough and shortness of breath with activity also unchanged. PMH:   Past Medical History:   Diagnosis Date    Arthritis     COPD (chronic obstructive pulmonary disease) ([facility])     Dr Megan Vee [facility])     Dr Andrea Sanchez   Aneurysm    Social HX:   Social History     Socioeconomic History    Marital status:      Spouse name: Not on file    Number of children: Not on file    Years of education: Not on file    Highest education level: Not on file   Occupational History    Not on file   Tobacco Use    Smoking status: Current Every Day Smoker     Packs/day: 0.25     Years: 45.00     Pack years: 11.25     Types: Cigarettes    Smokeless tobacco: Never Used   Vaping Use    Vaping Use: Never used   Substance and Sexual Activity    Alcohol use: Never    Drug use: Yes     Types: Marijuana     Comment: past month    Sexual activity: Not on file   Other Topics Concern    Not on file   Social History Narrative    Not on file     Social Determinants of Health     Financial Resource Strain:     Difficulty of Paying Living Expenses:    Food Insecurity:     Worried About Running Out of Food in the Last Year:     Ran Out of Food in the Last Year:    Transportation Needs:     Lack of Transportation (Medical):      Lack of Transportation (Non-Medical):    Physical Activity:     Days of Exercise per Week:     Minutes of Exercise per Session:    Stress:     Feeling of Stress :    Social Connections:     Frequency of Communication with Friends and Family:     Frequency of Social Gatherings with Friends and Family:     Attends Mu-ism Services:     Active Member of Clubs or Organizations:     Attends Club or Organization Meetings:     Marital Status:    Intimate Partner Violence:     Fear of Current or Ex-Partner:     Emotionally Abused:     Physically Abused:     Sexually Abused:       45 pack year smoking history, currently down to 5 cigarettes/day and attempting to stop. Spouse: Mayda Cleveland [phone]    Phone: [phone] [address]     Employment: Works at [employer] and walk but cannot I as a ta    Immunizations: There is no immunization history on file for this patient. Health Screenings:  Mammogram:  No results found for this or any previous visit. Results for orders placed during the hospital encounter of 02/26/21   [facility] BOUCHRA DIGITAL SCREEN BILATERAL    Narrative IMPRESSION: Mammogram BI-RADS: Category 1: Negative    There is no mammographic evidence of malignancy. A 1 year   screening mammogram is recommended. The patient has been entered   into our database and they will receive a notification when they   are due for their next exam.      The patient was notified of the results. #[id number] - [facility] BOUCHRA DIGITAL SCREEN BILATERAL  BILATERAL DIGITAL SCREENING MAMMOGRAM 3D/2D WITH CAD: 2/26/2021  CLINICAL: Tomosynthesis. Screening. Comparison is made to exam dated:  2/17/2011 mammogram - [facility]. The tissue of both breasts is heterogeneously dense. This may   lower the sensitivity of mammography. Current study was also evaluated with a Computer Aided Detection   (CAD) system. No significant masses, calcifications, or other findings are seen   in either breast.    There has been no significant interval change.         Dr. Dang deluna/nacho:2/26/2021 13:05:44      Imaging Technologist: Azeem Beck RT(R)(M), [facility]  letter sent: [facility] Doctor Names    [id number]        Pap / Pelvic:na  C-Scope: Approximately 5 years ago with 2 benign polyps      Gyn HX:   GPA:  DARREL/BSO:31 years ago  LMP: No LMP recorded (lmp unknown). Patient has had a hysterectomy. Health Maintenance Due   Topic Date Due    Pneumococcal 0-64 years Vaccine (1 of 4 - PCV13) Never done    COVID-19 Vaccine (1) Never done    HIV screen  Never done    DTaP/Tdap/Td vaccine (1 - Tdap) Never done    Cervical cancer screen  Never done    Lipid screen  Never done    Colon cancer screen colonoscopy  Never done    Shingles Vaccine (1 of 2) Never done        Interests:   none    Fam HX:   Family History   Problem Relation Age of Onset    Other Mother         aneursym    No Known Problems Father     Depression Sister     Heart Disease Brother     Diabetes Brother     Other Brother         Lung disease    Lung Cancer Maternal Grandfather     Other Brother         Crohns    Macular Degen Brother     Depression Sister     Other Sister         Blood clots    Arthritis Sister     Brain Cancer Paternal Uncle         Hospitalizations:   None recent    Allergies:   Allergies   Allergen Reactions    Tylenol With Codeine #3 [Acetaminophen-Codeine] Nausea And Vomiting    Codeine Nausea And Vomiting        Adult Illness:  Patient Active Problem List   Diagnosis    Primary cancer of left upper lobe of lung ([address]    Other emphysema (HCC)    Smoking greater than 40 pack years    Tobacco abuse    KERR (dyspnea on exertion)    Intermittent palpitations    Borderline Abnormal EKG with rsr'    S/P left upper lobectomy of lung and mediastinal dissection    Moderate COPD (chronic obstructive pulmonary disease) ([address]    Encounter for insertion of venous access port    Ascending aorta dilatation (HCC) 4.1 cm        Surgery:  Past Surgical History:   Procedure Laterality Date    BRONCHOSCOPY N/A 2021    BRONCHOS[facility] SECTION      x4    CHOLECYSTECTOMY      COLONOSCOPY  07/11/2016    CT NEEDLE BIOPSY LUNG PERCUTANEOUS  03/26/2021    CT NEEDLE BIOPSY LUNG PERCUTANEOUS 3/26/2021 [facility] CT SCAN    HYSTERECTOMY      LUNG REMOVAL, TOTAL Left 5/4/2021    ROBOTIC ASSISTED LEFT UPPER LOBECTOMY AND MEDIASTINAL DISSECTION, POSSIBLE OPEN AND CRYOTHERAPY OF INTERCOSTAL NERVES 5, 6, 7 & 8 performed by Uzair Verdugo MD at [address] [facility] [address] Left 6/10/2021    LEFT SUBCLAVIAN SINGLE LUMEN SMART PORT INSERTION performed by Uzair Verdugo MD at [facility]  07/11/2016        Medications:  Current Outpatient Medications   Medication Sig Dispense Refill    ondansetron (ZOFRAN-ODT) 8 MG TBDP disintegrating tablet Place 1 tablet under the tongue every 8 hours as needed for Nausea or Vomiting 10 tablet 2    oxyCODONE (ROXICODONE) 5 MG immediate release tablet Take 5 mg by mouth every 4 hours as needed.  vitamin D3 (CHOLECALCIFEROL) 125 MCG (5000 UT) TABS tablet Cholecalciferol (Vitamin D3) (Vitamin D3) 125 mcg (5,000 unit) Tablet Active 125 MCG PO Daily April 22nd, 2021 4:50pm      prochlorperazine (COMPAZINE) 10 MG tablet Take 1 tablet by mouth every 6 hours as needed (nausea) 120 tablet 3    dexamethasone (DECADRON) 4 MG tablet 4 mg Decadron take 1 tablet p.o. twice daily x3 days starting 1 day before chemotherapy with Alimta 6 tablet 3    lidocaine-prilocaine (EMLA) 2.5-2.5 % cream Apply topically as needed.  1 Tube 1    folic acid (FOLVITE) 1 MG tablet Take 1 tablet by mouth daily 30 tablet 3    acetaminophen (TYLENOL) 500 MG tablet Take 1 tablet by mouth 4 times daily as needed for Pain 120 tablet 0    ketorolac (TORADOL) 10 MG tablet Take 1 tablet by mouth every 6 hours as needed for Pain 20 tablet 0    senna (SENOKOT) 8.6 MG tablet Take 1 tablet by mouth daily      docusate sodium (COLACE) 100 MG capsule Take 100 mg by mouth 2 times daily      albuterol sulfate HFA (VENTOLIN HFA) NRBC 0 06/03/2021    SEGSPCT 74.5 06/03/2021    LYMPHOPCT 15.8 10/10/2017    MONOPCT 5.9 06/03/2021    EOSPCT 1.4 10/10/2017    BASOPCT 0.7 10/10/2017    MONOSABS 0.3 07/28/2021    LYMPHSABS 0.9 07/28/2021    EOSABS 0.1 07/28/2021    BASOSABS 0.0 07/28/2021       CMP:    Lab Results   Component Value Date     07/28/2021     06/03/2021    K 3.6 07/28/2021    K 5.2 06/03/2021     06/03/2021    CO2 25 06/03/2021    BUN 9 06/03/2021    CREATININE 0.8 07/28/2021    CREATININE 0.7 06/03/2021    LABGLOM 85 06/03/2021    GLUCOSE 87 06/03/2021    GLUCOSE 79 10/10/2017    PROT 7.7 06/03/2021    LABALBU 4.1 06/03/2021    CALCIUM 9.9 06/03/2021    BILITOT 0.2 06/03/2021    ALKPHOS 98 06/03/2021    AST 13 06/03/2021    ALT 13 06/03/2021       BMP:    Lab Results   Component Value Date     07/28/2021     06/03/2021    K 3.6 07/28/2021    K 5.2 06/03/2021     06/03/2021    CO2 25 06/03/2021    BUN 9 06/03/2021    LABALBU 4.1 06/03/2021    CREATININE 0.8 07/28/2021    CREATININE 0.7 06/03/2021    CALCIUM 9.9 06/03/2021    LABGLOM 85 06/03/2021    GLUCOSE 87 06/03/2021    GLUCOSE 79 10/10/2017       Magnesium:    Lab Results   Component Value Date    MG 2.4 07/07/2021     PT/INR:    Lab Results   Component Value Date    INR 0.97 05/18/2021     TSH:    Lab Results   Component Value Date    TSH 4.750 10/10/2017           IMAGING:  PROCEDURE: XR CHEST (2 VW)       2/26/2021       CLINICAL INFORMATION: Cough       COMPARISON: 10/10/2017       TECHNIQUE: PA and lateral views of the chest were obtained.               Impression   1. Normal heart size. No acute infiltrates or effusions are seen. 2. Interval development of a 2 cm nodule left perihilar region since prior study which should be considered malignant until proven otherwise.  CT thorax recommended for further evaluation.                 Radiological Data:  CT scan of chest without contrast.  Fri Mar 12, 2021  8:18 AM   PROCEDURE: CT CHEST WO CONTRAST   CLINICAL INFORMATION: Pulmonary nodule   COMPARISON: Chest x-ray dated 2021   1. There is a cavitary mass demonstrated within the left upper lobe with peripheral mild spiculation measuring 3.9 x 2.1 cm on axial image 34. This corresponds with previous chest x-ray dated 2021. This is concerning for malignancy until proven otherwise. Further evaluation could be obtained with PET/CT and/or biopsy. An infectious process is also not excluded.   2. There are additional smaller groundglass nodular densities within the right lung as detailed above.   3. Small pericardial effusion is noted. This is nonspecific.          Ct Needle Biopsy Lung Percutaneous  Result Date: 3/26/2021  1. Status post successful CT guided lung biopsy. 2. Followup chest radiographs will be obtained. **This report has been created using voice recognition software. It may contain minor errors which are inherent in voice recognition technology. ** Final report electronically signed by Dr. Cali Bautista on 3/26/2021 4:02 PM    PROCEDURE: PET CT SKULL BASE TO MID THIGH       CLINICAL INFORMATION: 60-year-old woman with recently diagnosed adenocarcinoma of the left lung; initial treatment strategy.       Radiopharmaceutical: 13.8 mCi F-18 FDG, intravenously.       TECHNIQUE: PET/CT imaging was performed from the skull base to the midthigh levels using routine PET acquisition.       Injection site: Left antecubital fossa.       Time of FDG injection: 7:37 AM.       Serum glucose: 86 mg/dL at 7:35 AM.       Time of imagin:37 AM.       COMPARISON: CT chest 3/11/2021       FINDINGS:        Neck: No FDG avid cervical lymphadenopathy. Physiologic activity is noted bilaterally in the lower neck.       Chest: Cardiac size is normal. Atherosclerotic calcifications are present in the thoracic aorta and coronary arteries.  There is stable aneurysmal dilation of the ascending thoracic aorta which measures 4.3 cm in maximum diameter (image 103). There is no    pleural or pericardial effusion. Emphysematous changes are again noted in the bilateral lungs. In the regular mass with areas of cavitation in the lingula is stable in size, again measuring approximately 3.7 cm and is associated with a maximum SUV of 6.7    (image 107). Adjacent opacities are likely postobstructive atelectasis. A 0.4 cm nodule at the right lung apex has a maximum SUV of 1.9 (image 70). A 0.4 cm nodule in the right upper lobe has a maximum SUV of 0.9 (image 80). An indistinct 0.5 cm nodular    density at the right mid lung is not associated with activity above background. There is no FDG avid mediastinal, hilar or axillary lymphadenopathy. Degenerative changes are present in the thoracic spine without evidence of hypermetabolic osseous    metastatic disease.       Abdomen/pelvis: Physiologic activity is present in the liver, spleen, urinary collecting system and gastrointestinal tract. The gallbladder is surgically absent. Atherosclerotic calcifications are seen in the abdominal aorta without evidence of aneurysm.    The urinary bladder is unremarkable. There are phleboliths in the pelvis. There is no FDG avid mesenteric, retroperitoneal, pelvic or inguinal lymphadenopathy. Degenerative changes are present in the lumbar spine and pelvis without evidence of    hypermetabolic osseous metastatic disease.           Impression   1. FDG avid left lung mass corresponding to known malignancy. 2. Small, mildly FDG avid right lung nodules, possibly infectious or inflammatory but satellite malignancy cannot be excluded.       Final report electronically signed by Dr. Nikky Johnson on 4/14/2021 10:02 AM       Mri Brain W Wo Contrast  Result Date: 4/9/2021   1. No evidence of intracranial metastases or other acute intracranial abnormality. 2. Moderate severity chronic microvascular angiopathy.  3. Mild to moderate mucosal inflammation in the paranasal sinuses with air-fluid levels as evidence for an acute component. **This report has been created using voice recognition software. It may contain minor errors which are inherent in voice recognition technology. ** Final report electronically signed by Dr. Dorothea Schneider MD on 4/9/2021 1:50 PM           PROCEDURES:  -Core biopsy cavitary left upper lobe mass 3/26/2021    -Full pulmonary function study with bronchodilators completed 4/6/2021  FEV1 1.68 (61% predicted 25% change with bronchodilator. FVC 2.62, 75% predicted 15% change with bronchodilator  FEV1 over FVC ratio 64  Interpretation-airway obstruction. FVC is reduced relative to the SVC indicating air trapping. While the TLC is within normal limits, the FRC, RV and RV/TLC ratio are increased. Follow administration of bronchodilators there is a good response. Reduced diffusing capacity indicates had mild loss of functional alveolar capillary surface. PATHOLOGY:   3/26/2021  Clinical Information: CAVITATING MASS SHANNON, SMOKER     FINAL DIAGNOSIS:   Lung, left, needle core biopsies:       Adenocarcinoma. Specimen:   5/4/21  BIOPSY OF LUNG, LEFT      Microscopic Examination:   The specimen consists of several scant fragments of lung tissue with   infiltrating malignant glands consistent with adenocarcinoma. Bruno Kamara is   also anthracotic pigment. ADDENDUM REPORT 5/18/2021, 5/24/2021:       FINAL DIAGNOSIS:   A. Lymph nodes, level VII, resections:    Negative for neoplasm in 3 out of 3 lymph nodes. B. Left upper lobe of lung, resection:    Invasive, moderately to poorly differentiated adenocarcinoma, acinar   type.    Margins of resection are free of neoplasia.    Maximum tumor size = 2.6 cm.    Moderate emphysema.    pT1c, pN1. C. Lymph node, level V, resection:    Negative for neoplasm in one out of one lymph node. D. Lymph node, level X, resections:    Metastatic carcinoma in one out of 7 lymph nodes.    E. Lymph nodes, level XI, resections:    Metastatic carcinoma in one out of 2 lymph nodes. F. Lymph node, level XI \"special\", resection:    Metastatic carcinoma in one out of one lymph node. G. Lymph node, level VI, resection:    Benign fibroadipose tissue and blood, no lymphoid tissue identified. COMMENT 5/18/2021:  Selected slides and pathology report were reviewed   by Dr. Valentina Cruz and archival tissue was selected for the following testing:   EGFR (ordered by Dr. Flavio Aragon). ADDENDUM REPORT 5/24/2021:   EGFR Mutation by Pyrosequencing:  Not Detected     Please see the separately issued reference report for the results to   these test(s). Specimen:   A) LYMPH NODE(S), LEVEL 7, X 2   B) LUNG, LEFT UPPER LOBE   C) LYMPH NODE(S), LEVEL 5, X 3   D) LYMPH NODE(S), LEVEL 10, X 9   E) LYMPH NODE(S), LEVEL 11, X 2   F) LYMPH NODE(S), LEVEL 11, SPECIAL, X 1   G) LYMPH NODE(S), LEVEL 6, X 1       Gross Examination:   A - The container is labeled [employer], lymph nodes, level VII. Received fresh are two fragments of gray-tan tissue, each about 1.1 cm.    1 ns. B - The container is labeled [employer], left upper lobe, lung. Received fresh is a lobe of lung measuring 15 x 7 x 4 cm.  The pleural   surface is blue-gray.  No peribronchial lymph nodes are identified. There is a firm palpable mass.  The pleural surface is inked blue. Sections through the specimen reveal a well-circumscribed, white mass   measuring grossly 2.6 x 2.6 x 2 cm.  The lesion is about 2 cm from the   bronchial margin.  The lesion does not grossly involve the pleural   surface.  No additional lesions are identified.  The parenchyma is   congested.  Representative sections are submitted.  Cassette #1 -   bronchial margin; cassette #2 - vascular margins; cassettes #3, #4, and   #5 - lesion with inked pleural surface; and cassette #6 - uninvolved   lung.  ss. C - The container is labeled [employer], lymph nodes, level V x3.    Received in formalin is a single fragment of gray-tan tissue measuring   2 cm in greatest dimension.  The specimen is bisected and entirely   submitted.  1 ns. D - The container is labeled [employer], lymph nodes, level X x9. Received in formalin are seven fragments of gray-tan tissue varying in   size from 0.4 cm up to 1.7 cm.  The smaller fragments are submitted as   received in cassette #1.  The larger fragment is bisected and submitted   in cassette #2.  ns.     E - The container is labeled [employer], lymph nodes, level XI, 2. Received fresh are two fragments of gray-tan tissue measuring 1 and 1.2   cm.  1 ns. F - The container is labeled [employer], lymph node, level XI,   special x1.  Received in formalin is a single fragment of gray-tan   tissue measuring 1.5 cm in greatest dimension.  The specimen is friable   upon sectioning.  The specimen is trisected.  2 ns. G - The container is labeled [employer], lymph node, level VI x1. Received in formalin is a single fragment of gray-tan tissue measuring   0.5 cm.  1 ns.  JESSEE/DANIKAR:[id number]         Microscopic Examination:   A-G.    Procedure   Lobectomy   Specimen Laterality   Left   Tumor Site   Upper lobe of lung   Tumor Size      Total tumor size (size of entire tumor)#    2.6 x 2.6 x 2 cm   Tumor Focality   Single focus   Histologic Type   Invasive adenocarcinoma, acinar predominant   + Histologic Grade   G3: Poorly differentiated   + Spread Through Air Spaces (HILDA)   Not identified   Visceral Pleura Invasion   Not identified   Lymphovascular Invasion   Cannot be determined   Direct Invasion of Adjacent Structures   No adjacent structures present   Margins   All margins are uninvolved by tumor   Margins examined (specify): Bronchial, vascular and parenchymal       Distance of invasive carcinoma from closest margin (centimeters): 2       mm, pleural   Number of Lymph Nodes Involved: 3   Number of Lymph Nodes Examined: 14   + Extranodal Extension   Not identified   Treatment Effect   No known presurgical therapy   Pathologic Stage Classification (pTNM) (AJCC 8th Edition)   Primary Tumor (pT)   pT1c: Tumor >2 cm but <=3 cm in greatest dimension   Regional Lymph Nodes (pN)   pN1: Metastasis in ipsilateral peribronchial and/or ipsilateral hilar          lymph nodes, and intrapulmonary nodes, including involvement by          direct extension   + Additional Pathologic Findings   Emphysema     [id number]   [id number]   [id number]                                                     <Sign Out Dr. Flor Ibrahim M.D., F.C.A.P. [facility]/ [id number] [id number]  Printed on:  5/25/2021   [address]   [place]  [place] [address]   Original print date: 05/25/2021   Lab and Collection    Surgical Pathology - 5/4/2021  Result History    Surgical Pathology on 5/25/2021 - Result Edited   Result Information    Status: Edited Result - FINAL (Collected: 5/4/2021 08:04) Provider Status: Reviewed   All Reviewers List    Juan Alberto Hutchison MD on 5/26/2021 12:23   Laura Rebolledo MD on 5/26/2021 08:10         Bronchoscopy EBUS 4/27/2021  Pulmonary at [facility]  4R node-negative,   station 7 node negative,   station 4L-minute group of atypical cells. Station 11 poorly differentiated moderately differentiated adenocarcinoma  Note: Dr. Shola Posada had the pathology reviewed elsewhere and it was felt that the station 4L lymph node unequivocally did NOT show malignancy. GENETICS:  Not applicable    MOLECULAR:  Not applicable i.e. patient does not have metastatic disease at this time    ASSESSMENT/PLAN:    1: Diagnosis: 71-year-old female with a heavy smoking history with 3.9 cm cavitary adenocarcinoma of the left upper lung. PET scan shows no hypermetabolic adenopathy or distant mets. Brain MRI shows no mets. Bronchoscopy EBUS as reported above shows T2 a N1 M0/IIB disease. The L4 Station node on review and felt to be benign.   Status post left upper lobectomy with pathologic stage T1CN1 with 3 of 7+ nodes with a high-grade tumor. Stage IIb. 2) Prognosis / Disease Status: Potentially curable. High risk for recurrence and qualifies for adjuvant chemotherapy. 3) Work-up:    Labs: Labs reviewed today   Imaging: All completed   Procedures: Odfyvw-h-Cred already placed. Consults: None   Other: Patient has a.m. gastroparesis nausea the predated chemotherapy. 4) Symptom Management: None needed at this time. 5) Supportive care provided. Level of care is appropriate. Teaching done today. It was as the importance of stopping smoking. She is also informed regarding the risks and benefits of adjuvant chemotherapy. She is high risk and meets NCCN criteria for adjuvant chemotherapy. She agrees to proceed. 6) Treatment goal:      Treatment plan:      Left upper lobectomy and mediastinal node dissection. To be followed by adjuvant chemotherapy. Adjuvant chemotherapy - cis-platinum 75 mg meter squared day 1 along with Alimta 500 mg meter squared day 1 through 21 days x 4. Patient's start date was June 16. Proceed with course #3 see any bedbugs she does have a protocol for when she do today. Thus far well-tolerated other than minor nausea controlled with Zofran. 7) Medications reviewed. Prescriptions today: None  None Compazine, Zofran ODT, folic acid 1 mg, EMLA cream, Decadron premed for Alimta              No orders of the defined types were placed in this encounter. [place]ARRS:  Controlled Substance Monitoring:    Acute and Chronic Pain Monitoring:   No flowsheet data found. 8) Research Options:      Not applicable      9) Other:   Encouraged to stop smoking. 10) Follow Up:  Patient will follow up weeks for course 4 of adjuvant cisplatin with Alimta. I spent 25 minutes face-to-face with the patient and family. Greater than 50% of time spent on counseling and coordinating the patient's care.   Face-to-face counseling included prognosis, review of risks and benefits of any treatment along with compliance and risk reduction.     Cole Castaneda MD

## 2021-07-28 NOTE — PATIENT INSTRUCTIONS
Course 3 of adjuvant chemotherapy today  Follow-up 3 weeks with me for course 4 of chemotherapy and labs.

## 2021-08-12 RX ORDER — METHYLPREDNISOLONE SODIUM SUCCINATE 125 MG/2ML
125 INJECTION, POWDER, LYOPHILIZED, FOR SOLUTION INTRAMUSCULAR; INTRAVENOUS ONCE
Status: CANCELLED | OUTPATIENT
Start: 2021-08-18 | End: 2021-08-18

## 2021-08-12 RX ORDER — PALONOSETRON 0.05 MG/ML
0.25 INJECTION, SOLUTION INTRAVENOUS ONCE
Status: CANCELLED | OUTPATIENT
Start: 2021-08-18

## 2021-08-12 RX ORDER — DIPHENHYDRAMINE HYDROCHLORIDE 50 MG/ML
50 INJECTION INTRAMUSCULAR; INTRAVENOUS ONCE
Status: CANCELLED | OUTPATIENT
Start: 2021-08-18 | End: 2021-08-18

## 2021-08-12 RX ORDER — SODIUM CHLORIDE 0.9 % (FLUSH) 0.9 %
5-40 SYRINGE (ML) INJECTION PRN
Status: CANCELLED | OUTPATIENT
Start: 2021-08-18

## 2021-08-12 RX ORDER — SODIUM CHLORIDE 9 MG/ML
INJECTION, SOLUTION INTRAVENOUS CONTINUOUS
Status: CANCELLED | OUTPATIENT
Start: 2021-08-18

## 2021-08-12 RX ORDER — SODIUM CHLORIDE 9 MG/ML
20 INJECTION, SOLUTION INTRAVENOUS ONCE
Status: CANCELLED | OUTPATIENT
Start: 2021-08-18 | End: 2021-08-18

## 2021-08-12 RX ORDER — SODIUM CHLORIDE 9 MG/ML
25 INJECTION, SOLUTION INTRAVENOUS PRN
Status: CANCELLED | OUTPATIENT
Start: 2021-08-18

## 2021-08-12 RX ORDER — HEPARIN SODIUM (PORCINE) LOCK FLUSH IV SOLN 100 UNIT/ML 100 UNIT/ML
500 SOLUTION INTRAVENOUS PRN
Status: CANCELLED | OUTPATIENT
Start: 2021-08-18

## 2021-08-12 RX ORDER — EPINEPHRINE 1 MG/ML
0.3 INJECTION, SOLUTION, CONCENTRATE INTRAVENOUS PRN
Status: CANCELLED | OUTPATIENT
Start: 2021-08-18

## 2021-08-18 ENCOUNTER — HOSPITAL ENCOUNTER (OUTPATIENT)
Dept: INFUSION THERAPY | Age: 61
Discharge: HOME | End: 2021-08-18
Payer: COMMERCIAL

## 2021-08-18 ENCOUNTER — OFFICE VISIT (OUTPATIENT)
Dept: ONCOLOGY | Age: 61
End: 2021-08-18
Payer: COMMERCIAL

## 2021-08-18 VITALS
RESPIRATION RATE: 16 BRPM | OXYGEN SATURATION: 98 % | WEIGHT: 124 LBS | TEMPERATURE: 98 F | BODY MASS INDEX: 19.93 KG/M2 | SYSTOLIC BLOOD PRESSURE: 149 MMHG | HEIGHT: 66 IN | HEART RATE: 77 BPM | DIASTOLIC BLOOD PRESSURE: 93 MMHG

## 2021-08-18 VITALS
OXYGEN SATURATION: 98 % | SYSTOLIC BLOOD PRESSURE: 149 MMHG | TEMPERATURE: 98 F | WEIGHT: 124 LBS | HEART RATE: 77 BPM | RESPIRATION RATE: 16 BRPM | BODY MASS INDEX: 19.93 KG/M2 | HEIGHT: 66 IN | DIASTOLIC BLOOD PRESSURE: 93 MMHG

## 2021-08-18 DIAGNOSIS — F17.210 SMOKING GREATER THAN 40 PACK YEARS: ICD-10-CM

## 2021-08-18 DIAGNOSIS — C34.92 PRIMARY ADENOCARCINOMA OF LEFT LUNG (HCC): Primary | ICD-10-CM

## 2021-08-18 DIAGNOSIS — C34.92 PRIMARY ADENOCARCINOMA OF LEFT LUNG (HCC): ICD-10-CM

## 2021-08-18 DIAGNOSIS — Z72.0 TOBACCO ABUSE: ICD-10-CM

## 2021-08-18 DIAGNOSIS — Z90.2 S/P LOBECTOMY OF LUNG: ICD-10-CM

## 2021-08-18 DIAGNOSIS — J43.8 OTHER EMPHYSEMA (HCC): ICD-10-CM

## 2021-08-18 DIAGNOSIS — C34.12 PRIMARY CANCER OF LEFT UPPER LOBE OF LUNG (HCC): ICD-10-CM

## 2021-08-18 LAB
ABSOLUTE IMMATURE GRANULOCYTE: 0.02 THOU/MM3 (ref 0–0.07)
ALBUMIN SERPL-MCNC: 4.6 G/DL (ref 3.5–5.1)
ALP BLD-CCNC: 84 U/L (ref 38–126)
ALT SERPL-CCNC: 7 U/L (ref 11–66)
AST SERPL-CCNC: 9 U/L (ref 5–40)
BASINOPHIL, AUTOMATED: 0 % (ref 0–3)
BASOPHILS ABSOLUTE: 0 THOU/MM3 (ref 0–0.1)
BILIRUB SERPL-MCNC: 0.2 MG/DL (ref 0.3–1.2)
BILIRUBIN DIRECT: < 0.2 MG/DL (ref 0–0.3)
BUN, WHOLE BLOOD: 10 MG/DL (ref 8–26)
CHLORIDE, WHOLE BLOOD: 104 MEQ/L (ref 98–109)
CREATININE, WHOLE BLOOD: 0.7 MG/DL (ref 0.5–1.2)
EOSINOPHILS ABSOLUTE: 0 THOU/MM3 (ref 0–0.4)
EOSINOPHILS RELATIVE PERCENT: 0 % (ref 0–4)
GFR, ESTIMATED: > 90 ML/MIN/1.73M2
GLUCOSE, WHOLE BLOOD: 107 MG/DL (ref 70–108)
HCT VFR BLD CALC: 34.6 % (ref 37–47)
HEMOGLOBIN: 11.7 GM/DL (ref 12–16)
IMMATURE GRANULOCYTES: 0 %
IONIZED CALCIUM, WHOLE BLOOD: 1.27 MMOL/L (ref 1.12–1.32)
LYMPHOCYTES # BLD: 15 % (ref 15–47)
LYMPHOCYTES ABSOLUTE: 0.8 THOU/MM3 (ref 1–4.8)
MAGNESIUM: 2.2 MG/DL (ref 1.6–2.4)
MCH RBC QN AUTO: 33.1 PG (ref 26–33)
MCHC RBC AUTO-ENTMCNC: 33.8 GM/DL (ref 32.2–35.5)
MCV RBC AUTO: 98 FL (ref 81–99)
MONOCYTES ABSOLUTE: 0.6 THOU/MM3 (ref 0.4–1.3)
MONOCYTES: 11 % (ref 0–12)
PDW BLD-RTO: 15.7 % (ref 11.5–14.5)
PLATELET # BLD: 189 THOU/MM3 (ref 130–400)
PMV BLD AUTO: 11.4 FL (ref 9.4–12.4)
POTASSIUM, WHOLE BLOOD: 4.1 MEQ/L (ref 3.5–4.9)
RBC # BLD: 3.54 MILL/MM3 (ref 4.2–5.4)
SEG NEUTROPHILS: 73 % (ref 43–75)
SEGMENTED NEUTROPHILS ABSOLUTE COUNT: 4 THOU/MM3 (ref 1.8–7.7)
SODIUM, WHOLE BLOOD: 140 MEQ/L (ref 138–146)
TOTAL CO2, WHOLE BLOOD: 26 MEQ/L (ref 23–33)
TOTAL PROTEIN: 6.6 G/DL (ref 6.1–8)
WBC # BLD: 5.5 THOU/MM3 (ref 4.8–10.8)

## 2021-08-18 PROCEDURE — 96415 CHEMO IV INFUSION ADDL HR: CPT

## 2021-08-18 PROCEDURE — 96366 THER/PROPH/DIAG IV INF ADDON: CPT

## 2021-08-18 PROCEDURE — 96375 TX/PRO/DX INJ NEW DRUG ADDON: CPT

## 2021-08-18 PROCEDURE — 96413 CHEMO IV INFUSION 1 HR: CPT

## 2021-08-18 PROCEDURE — 96367 TX/PROPH/DG ADDL SEQ IV INF: CPT

## 2021-08-18 PROCEDURE — 80076 HEPATIC FUNCTION PANEL: CPT

## 2021-08-18 PROCEDURE — 99214 OFFICE O/P EST MOD 30 MIN: CPT | Performed by: INTERNAL MEDICINE

## 2021-08-18 PROCEDURE — 96411 CHEMO IV PUSH ADDL DRUG: CPT

## 2021-08-18 PROCEDURE — 99211 OFF/OP EST MAY X REQ PHY/QHP: CPT

## 2021-08-18 PROCEDURE — 2580000003 HC RX 258: Performed by: INTERNAL MEDICINE

## 2021-08-18 PROCEDURE — 83735 ASSAY OF MAGNESIUM: CPT

## 2021-08-18 PROCEDURE — 85025 COMPLETE CBC W/AUTO DIFF WBC: CPT

## 2021-08-18 PROCEDURE — 80047 BASIC METABLC PNL IONIZED CA: CPT

## 2021-08-18 PROCEDURE — 6360000002 HC RX W HCPCS: Performed by: INTERNAL MEDICINE

## 2021-08-18 PROCEDURE — 36591 DRAW BLOOD OFF VENOUS DEVICE: CPT

## 2021-08-18 RX ORDER — PALONOSETRON 0.05 MG/ML
0.25 INJECTION, SOLUTION INTRAVENOUS ONCE
Status: COMPLETED | OUTPATIENT
Start: 2021-08-18 | End: 2021-08-18

## 2021-08-18 RX ORDER — SODIUM CHLORIDE 9 MG/ML
20 INJECTION, SOLUTION INTRAVENOUS ONCE
Status: COMPLETED | OUTPATIENT
Start: 2021-08-18 | End: 2021-08-18

## 2021-08-18 RX ORDER — HEPARIN SODIUM (PORCINE) LOCK FLUSH IV SOLN 100 UNIT/ML 100 UNIT/ML
500 SOLUTION INTRAVENOUS PRN
Status: DISCONTINUED | OUTPATIENT
Start: 2021-08-18 | End: 2021-08-19 | Stop reason: HOSPADM

## 2021-08-18 RX ORDER — SODIUM CHLORIDE 0.9 % (FLUSH) 0.9 %
5-40 SYRINGE (ML) INJECTION PRN
Status: DISCONTINUED | OUTPATIENT
Start: 2021-08-18 | End: 2021-08-19 | Stop reason: HOSPADM

## 2021-08-18 RX ORDER — HEPARIN SODIUM (PORCINE) LOCK FLUSH IV SOLN 100 UNIT/ML 100 UNIT/ML
500 SOLUTION INTRAVENOUS PRN
Status: CANCELLED | OUTPATIENT
Start: 2021-08-18

## 2021-08-18 RX ORDER — SODIUM CHLORIDE 9 MG/ML
25 INJECTION, SOLUTION INTRAVENOUS PRN
Status: CANCELLED | OUTPATIENT
Start: 2021-08-18

## 2021-08-18 RX ORDER — ONDANSETRON 8 MG/1
8 TABLET, ORALLY DISINTEGRATING ORAL EVERY 8 HOURS PRN
Qty: 10 TABLET | Refills: 2 | Status: SHIPPED | OUTPATIENT
Start: 2021-08-18 | End: 2021-09-15 | Stop reason: SDUPTHER

## 2021-08-18 RX ORDER — SODIUM CHLORIDE 0.9 % (FLUSH) 0.9 %
5-40 SYRINGE (ML) INJECTION PRN
Status: CANCELLED | OUTPATIENT
Start: 2021-08-18

## 2021-08-18 RX ADMIN — POTASSIUM CHLORIDE: 2 INJECTION, SOLUTION, CONCENTRATE INTRAVENOUS at 16:22

## 2021-08-18 RX ADMIN — Medication 500 UNITS: at 17:33

## 2021-08-18 RX ADMIN — SODIUM CHLORIDE, PRESERVATIVE FREE 10 ML: 5 INJECTION INTRAVENOUS at 09:41

## 2021-08-18 RX ADMIN — SODIUM CHLORIDE 20 ML/HR: 9 INJECTION, SOLUTION INTRAVENOUS at 11:06

## 2021-08-18 RX ADMIN — PALONOSETRON 0.25 MG: 0.25 INJECTION, SOLUTION INTRAVENOUS at 12:22

## 2021-08-18 RX ADMIN — DEXAMETHASONE SODIUM PHOSPHATE 12 MG: 4 INJECTION, SOLUTION INTRA-ARTICULAR; INTRALESIONAL; INTRAMUSCULAR; INTRAVENOUS; SOFT TISSUE at 12:07

## 2021-08-18 RX ADMIN — SODIUM CHLORIDE, PRESERVATIVE FREE 20 ML: 5 INJECTION INTRAVENOUS at 17:33

## 2021-08-18 RX ADMIN — MANNITOL: 250 INJECTION, SOLUTION INTRAVENOUS at 13:10

## 2021-08-18 RX ADMIN — POTASSIUM CHLORIDE: 2 INJECTION, SOLUTION, CONCENTRATE INTRAVENOUS at 11:07

## 2021-08-18 RX ADMIN — SODIUM CHLORIDE, PRESERVATIVE FREE 10 ML: 5 INJECTION INTRAVENOUS at 11:06

## 2021-08-18 RX ADMIN — FOSAPREPITANT 150 MG: 150 INJECTION, POWDER, LYOPHILIZED, FOR SOLUTION INTRAVENOUS at 12:25

## 2021-08-18 RX ADMIN — SODIUM CHLORIDE 800 MG: 9 INJECTION, SOLUTION INTRAVENOUS at 13:00

## 2021-08-18 RX ADMIN — SODIUM CHLORIDE, PRESERVATIVE FREE 10 ML: 5 INJECTION INTRAVENOUS at 09:40

## 2021-08-18 NOTE — PROGRESS NOTES
reviewed below. She is waiting  for a consult with Dr Socorro Kearns of Surgery. Reports 30 # weight loss ove 9 months; unintentional. Normal weight @ 145. Appetite has remained good. Chronic cough unchanged, no blood. No chest pain or pain elsewhere. Denied increased sob. Average 1ppd x 45 years; now about 5 cigs/day . Motivated to stop but failed Nicotine replacement products. Chantix not attempted thus far. ROS:  Review of Systems 14 point negative except as above. Limited to unintentional weight loss. Chronic unchanged nonproductive cough and shortness of breath with activity also unchanged. PMH:   Past Medical History:   Diagnosis Date    Arthritis     COPD (chronic obstructive pulmonary disease) ([facility])     Dr Daren [facility])     Dr Ben Ivory   Aneurysm    Social HX:   Social History     Socioeconomic History    Marital status:      Spouse name: Not on file    Number of children: Not on file    Years of education: Not on file    Highest education level: Not on file   Occupational History    Not on file   Tobacco Use    Smoking status: Current Every Day Smoker     Packs/day: 0.25     Years: 45.00     Pack years: 11.25     Types: Cigarettes    Smokeless tobacco: Never Used   Vaping Use    Vaping Use: Never used   Substance and Sexual Activity    Alcohol use: Never    Drug use: Yes     Types: Marijuana     Comment: past month    Sexual activity: Not on file   Other Topics Concern    Not on file   Social History Narrative    Not on file     Social Determinants of Health     Financial Resource Strain:     Difficulty of Paying Living Expenses:    Food Insecurity:     Worried About Running Out of Food in the Last Year:     Ran Out of Food in the Last Year:    Transportation Needs:     Lack of Transportation (Medical):      Lack of Transportation (Non-Medical):    Physical Activity:     Days of Exercise per Week:     Minutes of Exercise per Session:    Stress:     Feeling of Stress :    Social Connections:     Frequency of Communication with Friends and Family:     Frequency of Social Gatherings with Friends and Family:     Attends [employer]ats[employer] Services:     Active Member of Clubs or Organizations:     Attends Club or Organization Meetings:     Marital Status:    Intimate Partner Violence:     Fear of Current or Ex-Partner:     Emotionally Abused:     Physically Abused:     Sexually Abused:       45 pack year smoking history, currently down to 5 cigarettes/day and attempting to stop. Spouse: Maria Antonia Sinha [phone]    Phone: [phone] [address]     Employment: Works at [employer] and walk but cannot I as a ta    Immunizations: There is no immunization history on file for this patient. Health Screenings:  Mammogram:  No results found for this or any previous visit. Results for orders placed during the hospital encounter of 02/26/21   [facility] BOUCHRA DIGITAL SCREEN BILATERAL    Narrative IMPRESSION: Mammogram BI-RADS: Category 1: Negative    There is no mammographic evidence of malignancy. A 1 year   screening mammogram is recommended. The patient has been entered   into our database and they will receive a notification when they   are due for their next exam.      The patient was notified of the results. #[id number] - [facility] BOUCHRA DIGITAL SCREEN BILATERAL  BILATERAL DIGITAL SCREENING MAMMOGRAM 3D/2D WITH CAD: 2/26/2021  CLINICAL: Tomosynthesis. Screening. Comparison is made to exam dated:  2/17/2011 mammogram - [facility]. The tissue of both breasts is heterogeneously dense. This may   lower the sensitivity of mammography. Current study was also evaluated with a Computer Aided Detection   (CAD) system. No significant masses, calcifications, or other findings are seen   in either breast.    There has been no significant interval change.         Dr. Anrdes Silence            nn/penrad:2/26/2021 13:05:44 Imaging Technologist: Lulu Brewer RT(R)(M), [facility]  letter sent: Normal-All Doctor Names    [id number]        Pap / Pelvic:na  C-Scope: Approximately 5 years ago with 2 benign polyps      Gyn HX:   GPA:  DARREL/BSO:31 years ago  LMP: No LMP recorded (lmp unknown). Patient has had a hysterectomy. Health Maintenance Due   Topic Date Due    Pneumococcal 0-64 years Vaccine (1 of 4 - PCV13) Never done    COVID-19 Vaccine (1) Never done    HIV screen  Never done    DTaP/Tdap/Td vaccine (1 - Tdap) Never done    Cervical cancer screen  Never done    Lipid screen  Never done    Colon cancer screen colonoscopy  Never done    Shingles Vaccine (1 of 2) Never done        Interests:   none    Fam HX:   Family History   Problem Relation Age of Onset    Other Mother         aneursym    No Known Problems Father     Depression Sister     Heart Disease Brother     Diabetes Brother     Other Brother         Lung disease    Lung Cancer Maternal Grandfather     Other Brother         Crohns    Macular Degen Brother     Depression Sister     Other Sister         Blood clots    Arthritis Sister     Brain Cancer Paternal Uncle         Hospitalizations:   None recent    Allergies:   Allergies   Allergen Reactions    Tylenol With Codeine #3 [Acetaminophen-Codeine] Nausea And Vomiting    Codeine Nausea And Vomiting        Adult Illness:  Patient Active Problem List   Diagnosis    Primary cancer of left upper lobe of lung ([address]    Other emphysema (HCC)    Smoking greater than 40 pack years    Tobacco abuse    KERR (dyspnea on exertion)    Intermittent palpitations    Borderline Abnormal EKG with rsr'    S/P left upper lobectomy of lung and mediastinal dissection    Moderate COPD (chronic obstructive pulmonary disease) ([address]    Encounter for insertion of venous access port    Ascending aorta dilatation (HCC) 4.1 cm        Surgery:  Past Surgical History:   Procedure Laterality Date    BRONCHOSCOPY N/A 5/6/2021    BRONCHOSCOPY performed by Tito Moctezuma MD at [address]      x4    CHOLECYSTECTOMY      COLONOSCOPY  07/11/2016    CT NEEDLE BIOPSY LUNG PERCUTANEOUS  03/26/2021    CT NEEDLE BIOPSY LUNG PERCUTANEOUS 3/26/2021 [facility] CT SCAN    HYSTERECTOMY      LUNG REMOVAL, TOTAL Left 5/4/2021    ROBOTIC ASSISTED LEFT UPPER LOBECTOMY AND MEDIASTINAL DISSECTION, POSSIBLE OPEN AND CRYOTHERAPY OF INTERCOSTAL NERVES 5, 6, 7 & 8 performed by Maria E Prieto MD at [address] [place] Left 6/10/2021    LEFT SUBCLAVIAN SINGLE LUMEN SMART PORT INSERTION performed by Maria E Prieto MD at [address]  07/11/2016        Medications:  Current Outpatient Medications   Medication Sig Dispense Refill    ondansetron (ZOFRAN-ODT) 8 MG TBDP disintegrating tablet Place 1 tablet under the tongue every 8 hours as needed for Nausea or Vomiting 10 tablet 2    vitamin D3 (CHOLECALCIFEROL) 125 MCG (5000 UT) TABS tablet Cholecalciferol (Vitamin D3) (Vitamin D3) 125 mcg (5,000 unit) Tablet Active 125 MCG PO Daily April 22nd, 2021 4:50pm      dexamethasone (DECADRON) 4 MG tablet 4 mg Decadron take 1 tablet p.o. twice daily x3 days starting 1 day before chemotherapy with Alimta 6 tablet 3    lidocaine-prilocaine (EMLA) 2.5-2.5 % cream Apply topically as needed.  1 Tube 1    folic acid (FOLVITE) 1 MG tablet Take 1 tablet by mouth daily 30 tablet 3    acetaminophen (TYLENOL) 500 MG tablet Take 1 tablet by mouth 4 times daily as needed for Pain 120 tablet 0    senna (SENOKOT) 8.6 MG tablet Take 1 tablet by mouth daily      docusate sodium (COLACE) 100 MG capsule Take 100 mg by mouth 2 times daily      albuterol sulfate HFA (VENTOLIN HFA) 108 (90 Base) MCG/ACT inhaler Inhale 2 puffs into the lungs 4 times daily as needed for Wheezing 1 Inhaler 11    oxyCODONE (ROXICODONE) 5 MG immediate release tablet Take 5 mg by mouth every 4 hours as 15.8 10/10/2017    MONOPCT 5.9 06/03/2021    EOSPCT 1.4 10/10/2017    BASOPCT 0.7 10/10/2017    MONOSABS 0.6 08/18/2021    LYMPHSABS 0.8 08/18/2021    EOSABS 0.0 08/18/2021    BASOSABS 0.0 08/18/2021       CMP:    Lab Results   Component Value Date     08/18/2021     06/03/2021    K 4.1 08/18/2021    K 5.2 06/03/2021     06/03/2021    CO2 25 06/03/2021    BUN 9 06/03/2021    CREATININE 0.7 08/18/2021    CREATININE 0.7 06/03/2021    LABGLOM 85 06/03/2021    GLUCOSE 87 06/03/2021    GLUCOSE 79 10/10/2017    PROT 7.7 06/03/2021    LABALBU 4.1 06/03/2021    CALCIUM 9.9 06/03/2021    BILITOT 0.2 06/03/2021    ALKPHOS 98 06/03/2021    AST 13 06/03/2021    ALT 13 06/03/2021       BMP:    Lab Results   Component Value Date     08/18/2021     06/03/2021    K 4.1 08/18/2021    K 5.2 06/03/2021     06/03/2021    CO2 25 06/03/2021    BUN 9 06/03/2021    LABALBU 4.1 06/03/2021    CREATININE 0.7 08/18/2021    CREATININE 0.7 06/03/2021    CALCIUM 9.9 06/03/2021    LABGLOM 85 06/03/2021    GLUCOSE 87 06/03/2021    GLUCOSE 79 10/10/2017       Magnesium:    Lab Results   Component Value Date    MG 2.2 07/28/2021     PT/INR:    Lab Results   Component Value Date    INR 0.97 05/18/2021     TSH:    Lab Results   Component Value Date    TSH 4.750 10/10/2017           IMAGING:  PROCEDURE: XR CHEST (2 VW)       2/26/2021       CLINICAL INFORMATION: Cough       COMPARISON: 10/10/2017       TECHNIQUE: PA and lateral views of the chest were obtained.               Impression   1. Normal heart size. No acute infiltrates or effusions are seen. 2. Interval development of a 2 cm nodule left perihilar region since prior study which should be considered malignant until proven otherwise.  CT thorax recommended for further evaluation.                 Radiological Data:  CT scan of chest without contrast.  Fri Mar 12, 2021  8:18 AM   PROCEDURE: CT CHEST WO CONTRAST   CLINICAL INFORMATION: Pulmonary nodule   COMPARISON: Chest x-ray dated 2021   1. There is a cavitary mass demonstrated within the left upper lobe with peripheral mild spiculation measuring 3.9 x 2.1 cm on axial image 34. This corresponds with previous chest x-ray dated 2021. This is concerning for malignancy until proven otherwise. Further evaluation could be obtained with PET/CT and/or biopsy. An infectious process is also not excluded.   2. There are additional smaller groundglass nodular densities within the right lung as detailed above.   3. Small pericardial effusion is noted. This is nonspecific.          Ct Needle Biopsy Lung Percutaneous  Result Date: 3/26/2021  1. Status post successful CT guided lung biopsy. 2. Followup chest radiographs will be obtained. **This report has been created using voice recognition software. It may contain minor errors which are inherent in voice recognition technology. ** Final report electronically signed by Dr. Emerson Babin on 3/26/2021 4:02 PM    PROCEDURE: PET CT SKULL BASE TO MID THIGH       CLINICAL INFORMATION: 28-year-old woman with recently diagnosed adenocarcinoma of the left lung; initial treatment strategy.       Radiopharmaceutical: 13.8 mCi F-18 FDG, intravenously.       TECHNIQUE: PET/CT imaging was performed from the skull base to the midthigh levels using routine PET acquisition.       Injection site: Left antecubital fossa.       Time of FDG injection: 7:37 AM.       Serum glucose: 86 mg/dL at 7:35 AM.       Time of imagin:37 AM.       COMPARISON: CT chest 3/11/2021       FINDINGS:        Neck: No FDG avid cervical lymphadenopathy. Physiologic activity is noted bilaterally in the lower neck.       Chest: Cardiac size is normal. Atherosclerotic calcifications are present in the thoracic aorta and coronary arteries. There is stable aneurysmal dilation of the ascending thoracic aorta which measures 4.3 cm in maximum diameter (image 103). There is no    pleural or pericardial effusion. Emphysematous changes are again noted in the bilateral lungs. In the regular mass with areas of cavitation in the lingula is stable in size, again measuring approximately 3.7 cm and is associated with a maximum SUV of 6.7    (image 107). Adjacent opacities are likely postobstructive atelectasis. A 0.4 cm nodule at the right lung apex has a maximum SUV of 1.9 (image 70). A 0.4 cm nodule in the right upper lobe has a maximum SUV of 0.9 (image 80). An indistinct 0.5 cm nodular    density at the right mid lung is not associated with activity above background. There is no FDG avid mediastinal, hilar or axillary lymphadenopathy. Degenerative changes are present in the thoracic spine without evidence of hypermetabolic osseous    metastatic disease.       Abdomen/pelvis: Physiologic activity is present in the liver, spleen, urinary collecting system and gastrointestinal tract. The gallbladder is surgically absent. Atherosclerotic calcifications are seen in the abdominal aorta without evidence of aneurysm.    The urinary bladder is unremarkable. There are phleboliths in the pelvis. There is no FDG avid mesenteric, retroperitoneal, pelvic or inguinal lymphadenopathy. Degenerative changes are present in the lumbar spine and pelvis without evidence of    hypermetabolic osseous metastatic disease.           Impression   1. FDG avid left lung mass corresponding to known malignancy. 2. Small, mildly FDG avid right lung nodules, possibly infectious or inflammatory but satellite malignancy cannot be excluded.       Final report electronically signed by Dr. Tami Gregorio on 4/14/2021 10:02 AM       Mri Brain W Wo Contrast  Result Date: 4/9/2021   1. No evidence of intracranial metastases or other acute intracranial abnormality. 2. Moderate severity chronic microvascular angiopathy. 3. Mild to moderate mucosal inflammation in the paranasal sinuses with air-fluid levels as evidence for an acute component.  **This report has been created using voice recognition software. It may contain minor errors which are inherent in voice recognition technology. ** Final report electronically signed by Dr. Tomi Grimes MD on 4/9/2021 1:50 PM           PROCEDURES:  -Core biopsy cavitary left upper lobe mass 3/26/2021    -Full pulmonary function study with bronchodilators completed 4/6/2021  FEV1 1.68 (61% predicted 25% change with bronchodilator. FVC 2.62, 75% predicted 15% change with bronchodilator  FEV1 over FVC ratio 64  Interpretation-airway obstruction. FVC is reduced relative to the SVC indicating air trapping. While the TLC is within normal limits, the FRC, RV and RV/TLC ratio are increased. Follow administration of bronchodilators there is a good response. Reduced diffusing capacity indicates had mild loss of functional alveolar capillary surface. PATHOLOGY:   3/26/2021  Clinical Information: CAVITATING MASS SHANNON, SMOKER     FINAL DIAGNOSIS:   Lung, left, needle core biopsies:       Adenocarcinoma. Specimen:   5/4/21  BIOPSY OF LUNG, LEFT      Microscopic Examination:   The specimen consists of several scant fragments of lung tissue with   infiltrating malignant glands consistent with adenocarcinoma. Dewitte Peto is   also anthracotic pigment. ADDENDUM REPORT 5/18/2021, 5/24/2021:       FINAL DIAGNOSIS:   A. Lymph nodes, level VII, resections:    Negative for neoplasm in 3 out of 3 lymph nodes. B. Left upper lobe of lung, resection:    Invasive, moderately to poorly differentiated adenocarcinoma, acinar   type.    Margins of resection are free of neoplasia.    Maximum tumor size = 2.6 cm.    Moderate emphysema.    pT1c, pN1. C. Lymph node, level V, resection:    Negative for neoplasm in one out of one lymph node. D. Lymph node, level X, resections:    Metastatic carcinoma in one out of 7 lymph nodes. E. Lymph nodes, level XI, resections:    Metastatic carcinoma in one out of 2 lymph nodes.    F. Lymph node, level XI \"special\", resection:    Metastatic carcinoma in one out of one lymph node. G. Lymph node, level VI, resection:    Benign fibroadipose tissue and blood, no lymphoid tissue identified. COMMENT 5/18/2021:  Selected slides and pathology report were reviewed   by Dr. Joann Smith and archival tissue was selected for the following testing:   EGFR (ordered by Dr. Hanny Klein). ADDENDUM REPORT 5/24/2021:   EGFR Mutation by Pyrosequencing:  Not Detected     Please see the separately issued reference report for the results to   these test(s). Specimen:   A) LYMPH NODE(S), LEVEL 7, X 2   B) LUNG, LEFT UPPER LOBE   C) LYMPH NODE(S), LEVEL 5, X 3   D) LYMPH NODE(S), LEVEL 10, X 9   E) LYMPH NODE(S), LEVEL 11, X 2   F) LYMPH NODE(S), LEVEL 11, SPECIAL, X 1   G) LYMPH NODE(S), LEVEL 6, X 1       Gross Examination:   A - The container is labeled [employer], lymph nodes, level VII. Received fresh are two fragments of gray-tan tissue, each about 1.1 cm.    1 ns. B - The container is labeled [employer], left upper lobe, lung. Received fresh is a lobe of lung measuring 15 x 7 x 4 cm.  The pleural   surface is blue-gray.  No peribronchial lymph nodes are identified. There is a firm palpable mass.  The pleural surface is inked blue. Sections through the specimen reveal a well-circumscribed, white mass   measuring grossly 2.6 x 2.6 x 2 cm.  The lesion is about 2 cm from the   bronchial margin.  The lesion does not grossly involve the pleural   surface.  No additional lesions are identified.  The parenchyma is   congested.  Representative sections are submitted.  Cassette #1 -   bronchial margin; cassette #2 - vascular margins; cassettes #3, #4, and   #5 - lesion with inked pleural surface; and cassette #6 - uninvolved   lung.  ss. C - The container is labeled [employer], lymph nodes, level V x3.    Received in formalin is a single fragment of gray-tan tissue measuring   2 cm in greatest dimension.  The specimen is bisected and entirely   submitted.  1 ns. D - The container is labeled [employer], lymph nodes, level X x9. Received in formalin are seven fragments of gray-tan tissue varying in   size from 0.4 cm up to 1.7 cm.  The smaller fragments are submitted as   received in cassette #1.  The larger fragment is bisected and submitted   in cassette #2.  ns.     E - The container is labeled [employer], lymph nodes, level XI, 2. Received fresh are two fragments of gray-tan tissue measuring 1 and 1.2   cm.  1 ns. F - The container is labeled [employer], lymph node, level XI,   special x1.  Received in formalin is a single fragment of gray-tan   tissue measuring 1.5 cm in greatest dimension.  The specimen is friable   upon sectioning.  The specimen is trisected.  2 ns. G - The container is labeled [employer], lymph node, level VI x1. Received in formalin is a single fragment of gray-tan tissue measuring   0.5 cm.  1 ns.  JLILIANA/DKR:[id number]         Microscopic Examination:   A-G.    Procedure   Lobectomy   Specimen Laterality   Left   Tumor Site   Upper lobe of lung   Tumor Size      Total tumor size (size of entire tumor)#    2.6 x 2.6 x 2 cm   Tumor Focality   Single focus   Histologic Type   Invasive adenocarcinoma, acinar predominant   + Histologic Grade   G3: Poorly differentiated   + Spread Through Air Spaces (HILDA)   Not identified   Visceral Pleura Invasion   Not identified   Lymphovascular Invasion   Cannot be determined   Direct Invasion of Adjacent Structures   No adjacent structures present   Margins   All margins are uninvolved by tumor   Margins examined (specify): Bronchial, vascular and parenchymal       Distance of invasive carcinoma from closest margin (centimeters): 2       mm, pleural   Number of Lymph Nodes Involved: 3   Number of Lymph Nodes Examined: 14   + Extranodal Extension   Not identified   Treatment Effect   No known presurgical therapy   Pathologic Stage Classification (pTNM) (AJCC 8th Edition)   Primary Tumor (pT)   pT1c: Tumor >2 cm but <=3 cm in greatest dimension   Regional Lymph Nodes (pN)   pN1: Metastasis in ipsilateral peribronchial and/or ipsilateral hilar          lymph nodes, and intrapulmonary nodes, including involvement by          direct extension   + Additional Pathologic Findings   Emphysema     [id number]   [id number]   [id number]                                                     <Sign Out Dr. Glory Braxton M.D., F.C.A.P. NVML/ Perez  Printed on:  5/25/2021   [address] [place] [address]   [facility], [facility] [address]   Original print date: 05/25/2021   Lab and Collection    Surgical Pathology - 5/4/2021  Result History    Surgical Pathology on 5/25/2021 - Result Edited   Result Information    Status: Edited Result - FINAL (Collected: 5/4/2021 08:04) Provider Status: Reviewed   All Reviewers List    Yoav Shine MD on 5/26/2021 12:23   Edgar Ortega MD on 5/26/2021 08:10         Bronchoscopy EBUS 4/27/2021  Pulmonary at [facility]  4R node-negative,   station 7 node negative,   station 4L-minute group of atypical cells. Station 11 poorly differentiated moderately differentiated adenocarcinoma  Note: Dr. Ashlyn Lowry had the pathology reviewed elsewhere and it was felt that the station 4L lymph node unequivocally did NOT show malignancy. GENETICS:  Not applicable    MOLECULAR:  Not applicable i.e. 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 PET scan shows no hypermetabolic adenopathy or distant mets. Brain MRI shows no mets. Bronchoscopy EBUS as reported above shows T2 a N1 M0/IIB disease. The L4 Station node on review and felt to be benign.   Status post left upper lobectomy with pathologic stage T1CN1 with 3 of 7+ nodes with a high-grade tumor. Stage IIb. 2) Prognosis / Disease Status: Potentially curable. High risk for recurrence and is receiving adjuvant chemotherapy. 3) Work-up:    Labs: Labs reviewed today   Imaging: All completed   Procedures: Port removal within the next 2 months. Consults: None   Other: Patient has a.m. gastroparesis nausea the predated chemotherapy. 4) Symptom Management: None needed at this time. 5) Supportive care provided. Level of care is appropriate. Teaching done today. It was as the importance of stopping smoking. She is also informed regarding the risks and benefits of adjuvant chemotherapy. She is high risk and meets NCCN criteria for adjuvant chemotherapy. She agrees to proceed. 6) Treatment goal:      Treatment plan:      Left upper lobectomy and mediastinal node dissection. To be followed by adjuvant chemotherapy. Adjuvant chemotherapy - cis-platinum 75 mg meter squared day 1 along with Alimta 500 mg meter squared day 1 through 21 days x 4. Patient's start date was June 16. Proceed with course #4 . Thus far well-tolerated other than minor nausea controlled with Zofran. Renewed Zofran prescription today. This will be her fourth and last course of adjuvant therapy for her lung cancer. 7) Medications reviewed. Prescriptions today: Zofran ODT  None Compazine, Zofran ODT, folic acid 1 mg, EMLA cream, Decadron premed for Alimta              Orders Placed This Encounter   Medications    ondansetron (ZOFRAN-ODT) 8 MG TBDP disintegrating tablet     Sig: Place 1 tablet under the tongue every 8 hours as needed for Nausea or Vomiting     Dispense:  10 tablet     Refill:  2        [place]:  Controlled Substance Monitoring:    Acute and Chronic Pain Monitoring:   No flowsheet data found. 8) Research Options:      Not applicable      9) Other:   Encouraged to stop smoking. 10) Follow Up:  Patient will follow up with me in 1 month and check labs.   Anticipate port removal shortly thereafter. Patient will get a posttreatment CT of the chest after her next appointment. I spent 25 minutes face-to-face with the patient and family. Greater than 50% of time spent on counseling and coordinating the patient's care. Face-to-face counseling included prognosis, review of risks and benefits of any treatment along with compliance and risk reduction.     Yuri Cheng MD

## 2021-08-18 NOTE — PROGRESS NOTES
Patient assessed for the following post chemotherapy:    Dizziness   No  Lightheadedness  No      Acute nausea/vomiting No  Headache   No  Chest pain/pressure  No  Rash/itching   No  Shortness of breath  No    Patient kept for 20 minutes observation post infusion chemotherapy. Patient tolerated chemotherapy treatment Cisplatin and Alimta without any complications. Last vital signs:   BP (!) 149/93   Pulse 77   Temp 98 °F (36.7 °C) (Oral)   Resp 16   Ht 5' 6\" (1.676 m)   Wt 124 lb (56.2 kg)   LMP  (LMP Unknown)   SpO2 98%   BMI 20.01 kg/m²       Patient instructed if experience any of the above symptoms following today's infusion,he/she is to notify MD immediately or go to the emergency department. Discharge instructions given to patient. Verbalizes understanding. Ambulated off unit per self with belongings.

## 2021-08-18 NOTE — PLAN OF CARE
Problem: Musculor/Skeletal Functional Status  Goal: Absence of falls  Outcome: Met This Shift  Note: Free from falls while in O.P. Oncology. Intervention: Fall precautions  Note: Discussed the need to use the call light for assistance when getting up to ambulate. Problem: Intellectual/Education/Knowledge Deficit  Goal: Teaching initiated upon admission  Outcome: Met This Shift  Note: Patient verbalizes understanding to verbal information given on Cisplatin and Alimta,action and possible side effects. Aware to call MD if develop complications. Intervention: Verbal/written education provided  Note: Chemotherapy Teaching     What is Chemotherapy   Drug action [x]   Method of Administration [x]   Handouts given []     Side Effects  Nausea/vomiting [x]   Diarrhea [x]   Fatigue [x]   Signs / Symptoms of infection [x]   Neutropenia [x]   Thrombocytopenia [x]   Alopecia [x]   neuropathy [x]   Donovan diet &  the importance of fluids [x]       Micellaneous  Importance of nutrition [x]   Importance of oral hygiene [x]   When to call the MD [x]   Monitoring labs [x]   Use of supportive services []     Explanation of Drug Regimen / Frequency  Cisplatin and Alimta     Comments  Verbalized understanding to drug,action,side effects and when to call MD         Problem: Discharge Planning:  Goal: Discharged to appropriate level of care  Description: Discharged to appropriate level of care  Outcome: Met This Shift  Note: Verbalize understanding of discharge instructions, follow up appointments, and when to call Physician. Intervention: Assess knowledge level of healthcare  Note: Discuss understanding of discharge instructions, follow up appointments and when to call Physician.       Problem: Infection - Central Venous Catheter-Associated Bloodstream Infection:  Goal: Will show no infection signs and symptoms  Description: Will show no infection signs and symptoms  Outcome: Met This Shift  Note: Instructed to monitor for signs/symptoms of infection at [address] [facility] [address] [facility] and call MD if problems develop. Intervention: Infection risk assessment  Note: Mediport site with no redness or warmth. Skin over port site intact with no signs of breakdown noted. Patient verbalizes signs/symptoms of port infection and when to notify the physician. Care plan reviewed with patient. Patient verbalize understanding of the plan of care and contribute to goal setting.

## 2021-08-18 NOTE — ONCOLOGY
Chemotherapy Administration    Pre-assessment Data: Antineoplastic Agents  See toxicity flow sheet for assessment                                          [x]         Interventions:   Chemotherapy SQ injection given []   Taxol administered-VS per protocol []   Blood pressure meds held 12 hours prior to Rituxan/Ruxience []   Rituxan/Ruxience administered- VS and precautions per guidelines []   Emergency drugs available as appropriate [x]   Anaphylaxis assessment completed [x]   Pre-medications administered as ordered [x]   Blood return noted upon initiation of chemotherapy [x]   Blood return noted each 1-2ml of a vesicant medication if given IV push []   Navelbine, Vincristine and Velban given as a monitored wide open drip, blood return noted before during and after infusion.  [x]   Blood return noted each 2-3ml of a non-vesicant medication if given IV push []   Patient aware of potential Immunotherapy toxicities []   Monitor for signs / symptoms of hypersensitivity reaction [x]   Chemotherapy orders (drug/dose/rate) verified by 2 Chemo certified RNs [x]   Monitor IV site and blood return throughout the infusion of the medication [x]   Document IV site checks on the IV assessment form [x]   Document chemotherapy teaching on the Patient Education tab [x]   Document patient verbalizes understanding of medications being administered   Alimta [x]   If IV infiltration, see ONS Guidelines []   Other:      []

## 2021-09-15 ENCOUNTER — OFFICE VISIT (OUTPATIENT)
Dept: ONCOLOGY | Age: 61
End: 2021-09-15
Payer: COMMERCIAL

## 2021-09-15 ENCOUNTER — HOSPITAL ENCOUNTER (OUTPATIENT)
Dept: INFUSION THERAPY | Age: 61
Discharge: HOME | End: 2021-09-15
Payer: COMMERCIAL

## 2021-09-15 VITALS
WEIGHT: 124.38 LBS | TEMPERATURE: 98.2 F | HEART RATE: 66 BPM | SYSTOLIC BLOOD PRESSURE: 132 MMHG | RESPIRATION RATE: 16 BRPM | BODY MASS INDEX: 19.99 KG/M2 | DIASTOLIC BLOOD PRESSURE: 89 MMHG | HEIGHT: 66 IN | OXYGEN SATURATION: 100 %

## 2021-09-15 DIAGNOSIS — C34.92 PRIMARY ADENOCARCINOMA OF LEFT LUNG (HCC): Primary | ICD-10-CM

## 2021-09-15 DIAGNOSIS — J43.8 OTHER EMPHYSEMA (HCC): ICD-10-CM

## 2021-09-15 DIAGNOSIS — Z90.2 S/P LOBECTOMY OF LUNG: ICD-10-CM

## 2021-09-15 DIAGNOSIS — C34.12 PRIMARY CANCER OF LEFT UPPER LOBE OF LUNG (HCC): ICD-10-CM

## 2021-09-15 DIAGNOSIS — C34.92 PRIMARY ADENOCARCINOMA OF LEFT LUNG (HCC): ICD-10-CM

## 2021-09-15 DIAGNOSIS — Z72.0 TOBACCO ABUSE: ICD-10-CM

## 2021-09-15 DIAGNOSIS — F17.210 SMOKING GREATER THAN 40 PACK YEARS: ICD-10-CM

## 2021-09-15 LAB
ABSOLUTE IMMATURE GRANULOCYTE: 0.02 THOU/MM3 (ref 0–0.07)
ALBUMIN SERPL-MCNC: 4.4 G/DL (ref 3.5–5.1)
ALP BLD-CCNC: 72 U/L (ref 38–126)
ALT SERPL-CCNC: 8 U/L (ref 11–66)
AST SERPL-CCNC: 12 U/L (ref 5–40)
BASINOPHIL, AUTOMATED: 1 % (ref 0–3)
BASOPHILS ABSOLUTE: 0 THOU/MM3 (ref 0–0.1)
BILIRUB SERPL-MCNC: 0.2 MG/DL (ref 0.3–1.2)
BILIRUBIN DIRECT: < 0.2 MG/DL (ref 0–0.3)
BUN, WHOLE BLOOD: 10 MG/DL (ref 8–26)
CHLORIDE, WHOLE BLOOD: 104 MEQ/L (ref 98–109)
CREATININE, WHOLE BLOOD: 0.9 MG/DL (ref 0.5–1.2)
EOSINOPHILS ABSOLUTE: 0.1 THOU/MM3 (ref 0–0.4)
EOSINOPHILS RELATIVE PERCENT: 1 % (ref 0–4)
GFR, ESTIMATED: 68 ML/MIN/1.73M2
GLUCOSE, WHOLE BLOOD: 84 MG/DL (ref 70–108)
HCT VFR BLD CALC: 35.5 % (ref 37–47)
HEMOGLOBIN: 11.8 GM/DL (ref 12–16)
IMMATURE GRANULOCYTES: 0 %
IONIZED CALCIUM, WHOLE BLOOD: 1.24 MMOL/L (ref 1.12–1.32)
LYMPHOCYTES # BLD: 22 % (ref 15–47)
LYMPHOCYTES ABSOLUTE: 1.2 THOU/MM3 (ref 1–4.8)
MCH RBC QN AUTO: 33.8 PG (ref 26–33)
MCHC RBC AUTO-ENTMCNC: 33.2 GM/DL (ref 32.2–35.5)
MCV RBC AUTO: 102 FL (ref 81–99)
MONOCYTES ABSOLUTE: 0.4 THOU/MM3 (ref 0.4–1.3)
MONOCYTES: 8 % (ref 0–12)
PDW BLD-RTO: 15 % (ref 11.5–14.5)
PLATELET # BLD: 116 THOU/MM3 (ref 130–400)
PMV BLD AUTO: 11.1 FL (ref 9.4–12.4)
POTASSIUM, WHOLE BLOOD: 4.3 MEQ/L (ref 3.5–4.9)
RBC # BLD: 3.49 MILL/MM3 (ref 4.2–5.4)
SEG NEUTROPHILS: 68 % (ref 43–75)
SEGMENTED NEUTROPHILS ABSOLUTE COUNT: 3.7 THOU/MM3 (ref 1.8–7.7)
SODIUM, WHOLE BLOOD: 138 MEQ/L (ref 138–146)
TOTAL CO2, WHOLE BLOOD: 29 MEQ/L (ref 23–33)
TOTAL PROTEIN: 6.8 G/DL (ref 6.1–8)
WBC # BLD: 5.4 THOU/MM3 (ref 4.8–10.8)

## 2021-09-15 PROCEDURE — 2580000003 HC RX 258: Performed by: INTERNAL MEDICINE

## 2021-09-15 PROCEDURE — 80076 HEPATIC FUNCTION PANEL: CPT

## 2021-09-15 PROCEDURE — 80047 BASIC METABLC PNL IONIZED CA: CPT

## 2021-09-15 PROCEDURE — 36591 DRAW BLOOD OFF VENOUS DEVICE: CPT

## 2021-09-15 PROCEDURE — 99211 OFF/OP EST MAY X REQ PHY/QHP: CPT

## 2021-09-15 PROCEDURE — 6360000002 HC RX W HCPCS: Performed by: INTERNAL MEDICINE

## 2021-09-15 PROCEDURE — 85025 COMPLETE CBC W/AUTO DIFF WBC: CPT

## 2021-09-15 PROCEDURE — 99214 OFFICE O/P EST MOD 30 MIN: CPT | Performed by: INTERNAL MEDICINE

## 2021-09-15 RX ORDER — HEPARIN SODIUM (PORCINE) LOCK FLUSH IV SOLN 100 UNIT/ML 100 UNIT/ML
500 SOLUTION INTRAVENOUS PRN
Status: DISCONTINUED | OUTPATIENT
Start: 2021-09-15 | End: 2021-09-16 | Stop reason: HOSPADM

## 2021-09-15 RX ORDER — SODIUM CHLORIDE 9 MG/ML
25 INJECTION, SOLUTION INTRAVENOUS PRN
Status: CANCELLED | OUTPATIENT
Start: 2021-09-15

## 2021-09-15 RX ORDER — ONDANSETRON 8 MG/1
8 TABLET, ORALLY DISINTEGRATING ORAL EVERY 8 HOURS PRN
Qty: 10 TABLET | Refills: 2 | Status: SHIPPED | OUTPATIENT
Start: 2021-09-15 | End: 2021-10-25 | Stop reason: ALTCHOICE

## 2021-09-15 RX ORDER — HEPARIN SODIUM (PORCINE) LOCK FLUSH IV SOLN 100 UNIT/ML 100 UNIT/ML
500 SOLUTION INTRAVENOUS PRN
Status: CANCELLED | OUTPATIENT
Start: 2021-09-15

## 2021-09-15 RX ORDER — SODIUM CHLORIDE 0.9 % (FLUSH) 0.9 %
5-40 SYRINGE (ML) INJECTION PRN
Status: DISCONTINUED | OUTPATIENT
Start: 2021-09-15 | End: 2021-09-16 | Stop reason: HOSPADM

## 2021-09-15 RX ORDER — SODIUM CHLORIDE 0.9 % (FLUSH) 0.9 %
5-40 SYRINGE (ML) INJECTION PRN
Status: CANCELLED | OUTPATIENT
Start: 2021-09-15

## 2021-09-15 RX ADMIN — SODIUM CHLORIDE, PRESERVATIVE FREE 10 ML: 5 INJECTION INTRAVENOUS at 12:05

## 2021-09-15 RX ADMIN — Medication 500 UNITS: at 12:24

## 2021-09-15 RX ADMIN — SODIUM CHLORIDE, PRESERVATIVE FREE 20 ML: 5 INJECTION INTRAVENOUS at 12:06

## 2021-09-15 RX ADMIN — SODIUM CHLORIDE, PRESERVATIVE FREE 10 ML: 5 INJECTION INTRAVENOUS at 12:24

## 2021-09-15 NOTE — PROGRESS NOTES
Lab draw completed from [address] [facility] [address] [facility] on arrival to unit. Patient off the unit at this time to office appointment, accompanied by office staff and family, per self.

## 2021-09-15 NOTE — PROGRESS NOTES
Patient tolerated port flush and lab draw without any complications. Last vital signs:   LMP  (LMP Unknown)     Patient instructed if experience any symptoms following today's port flush and lab draw ,she is to notify MD immediately or go to the emergency department. Discharge instructions given to patient. Verbalizes understanding. Ambulated off unit per self, accompanied by family, with belongings.

## 2021-09-15 NOTE — PLAN OF CARE
Problem: Musculor/Skeletal Functional Status  Goal: Absence of falls  Outcome: Met This Shift  Note: Patient free from falls while in O.P. Oncology. Intervention: Fall precautions  Note: Patient assessed for fall risk on admission to [address] [facility]. Fall band placed on patient. Discussed the need to use the call light for assistance prior to getting up out of chair/bed. Problem: Discharge Planning:  Goal: Discharged to appropriate level of care  Description: Discharged to appropriate level of care  Outcome: Met This Shift  Note: Verbalized understanding of discharge instructions, follow-up appointments, and when to call the physician. Intervention: Assess knowledge level of healthcare  Description: Assess knowledge level of healthcare - REMINDER(s): Knowledge level, assistive device, Knowledge level, health promotion, Knowledge level, infection control measures  Note: Discuss understanding of discharge instructions,follow-up appointments, and when to call the physician. Problem: Infection - Central Venous Catheter-Associated Bloodstream Infection:  Goal: Will show no infection signs and symptoms  Description: Will show no infection signs and symptoms  Outcome: Met This Shift  Note: Mediport site with no redness or warmth. Skin over port site intact with no signs of breakdown noted. Patient verbalizes signs/symptoms of port infection and when to notify the physician. Intervention: Infection risk assessment  Description: Infection risk assessment  Note: Discuss port maintenance, infection prevention, signs and when to call Dr Darrow Sacks reviewed with patient. Patient verbalize understanding of the plan of care and contribute to goal setting.

## 2021-09-15 NOTE — PROGRESS NOTES
[address] CANCER [address] [place] [address]  Dept: [phone]  Loc: [phone]   Hematology/Oncology Consult (Clinic)        9/15/2021    Shawncharlette Machadoe   1960     No ref. provider found   Raymond Granados MD     DIAGNOSIS:  -  Left upper lobe cavitary adenocarcinoma of the lung. 3.9 x 2.1 cm. PET scan negative. Bronchoscopy EBUS-isolated station 11 lymph node with a moderate to poorly differentiated adenocarcinoma. 4L node with a minute focus of atypical cells on path review felt to be benign. Clinical stage: T2a N1M0 / IIB. Pathologic stage: T1 cN1 M0 /IIB (poorly differentiated, 3 of 7 nodes positive including extranodal extension. EGFR- Not Detected    TREATMENT:  -Left upper lobectomy and mediastinal node dissection scheduled by Dr. Adrian Cain 5/3/2021  -Adjuvant chemotherapy planned; recommend cis-platinum 75 mg meter squared day 1 and pemetrexed 500 mg meter squared day 1 every 21 days x 4 cycles . Start date 6/16/2021. Course 4 due 8/18/21; this will be her last treatment to complete adjuvant therapy. No adjuvant immune therapy as she has stage II disease. PARAMETERS:  -Chest CT without contrast 3/12/2021  -PET/CT 4/14/2021  -Brain MRI with and without contrast 4/9/2021    SUBJECTIVE: Patient is still using about 5 cigarettes/day. She plans to attempt to get over-the-counter patches to help her stop. She denies any neuropathy. Overall doing well. Has gastroparesis; 5 years of am nausea. Zofran helps this tremendously. She is not sure if she has tried Reglan in the past.  She is not a diabetic and was told by her GI doctor she had gastroparesis. HPI: ENOCH OSHEA [facility] is a 61-year-old female with a 45-pack-year smoking habit (ongoing) with a chest CT without contrast on 3/12/2021 revealing a 3.9 x 2.1 cm cavitary mass in the left upper lung corresponding to the abnormality seen on the chest x-ray of 2/26/2021. (Medical):  Lack of Transportation (Non-Medical):    Physical Activity:     Days of Exercise per Week:     Minutes of Exercise per Session:    Stress:     Feeling of Stress :    Social Connections:     Frequency of Communication with Friends and Family:     Frequency of Social Gatherings with Friends and Family:     Attends Shinto Services:     Active Member of Clubs or Organizations:     Attends Club or Organization Meetings:     Marital Status:    Intimate Partner Violence:     Fear of Current or Ex-Partner:     Emotionally Abused:     Physically Abused:     Sexually Abused:       45 pack year smoking history, currently down to 5 cigarettes/day and attempting to stop. Spouse: Sarah Lackey [phone]    Phone: [phone] [address]     Employment: Works at [employer] and walk but cannot I as a ta    Immunizations: There is no immunization history on file for this patient. Health Screenings:  Mammogram:  No results found for this or any previous visit. Results for orders placed during the hospital encounter of 02/26/21   [facility] BOUCHRA DIGITAL SCREEN BILATERAL    Narrative IMPRESSION: Mammogram BI-RADS: Category 1: Negative    There is no mammographic evidence of malignancy. A 1 year   screening mammogram is recommended. The patient has been entered   into our database and they will receive a notification when they   are due for their next exam.      The patient was notified of the results. #[id number] - [facility] BOUCHRA DIGITAL SCREEN BILATERAL  BILATERAL DIGITAL SCREENING MAMMOGRAM 3D/2D WITH CAD: 2/26/2021  CLINICAL: Tomosynthesis. Screening. Comparison is made to exam dated:  2/17/2011 mammogram - .   [facility]. The tissue of both breasts is heterogeneously dense. This may   lower the sensitivity of mammography. Current study was also evaluated with a Computer Aided Detection   (CAD) system.     No significant masses, calcifications, or other findings are seen   in either breast.    There has been no significant interval change. Dr. Rayne Lundborg            nn/penrad:2021 13:05:44      Imaging Technologist: Blake SUTTON(NISHA)(M),  [facility]  letter sent: Normal-All Doctor Names    [id number]        Pap / Pelvic:na  C-Scope: Approximately 5 years ago with 2 benign polyps      Gyn HX:   GPA:  DARREL/BSO:31 years ago  LMP: No LMP recorded (lmp unknown). Patient has had a hysterectomy. Health Maintenance Due   Topic Date Due    Pneumococcal 0-64 years Vaccine (1 of 4 - PCV13) Never done    COVID-19 Vaccine (1) Never done    HIV screen  Never done    DTaP/Tdap/Td vaccine (1 - Tdap) Never done    Lipid screen  Never done    Colon cancer screen colonoscopy  Never done    Shingles Vaccine (1 of 2) Never done    Flu vaccine (1) Never done        Interests:   none    Fam HX:   Family History   Problem Relation Age of Onset    Other Mother         aneursym    No Known Problems Father     Depression Sister     Heart Disease Brother     Diabetes Brother     Other Brother         Lung disease    Lung Cancer Maternal Grandfather     Other Brother         Crohns    Macular Degen Brother     Depression Sister     Other Sister         Blood clots    Arthritis Sister     Brain Cancer Paternal Uncle         Hospitalizations:   None recent    Allergies:   Allergies   Allergen Reactions    Tylenol With Codeine #3 [Acetaminophen-Codeine] Nausea And Vomiting    Codeine Nausea And Vomiting        Adult Illness:  Patient Active Problem List   Diagnosis    Primary cancer of left upper lobe of lung ([address]    Other emphysema (HCC)    Smoking greater than 40 pack years    Tobacco abuse    KERR (dyspnea on exertion)    Intermittent palpitations    Borderline Abnormal EKG with rsr'    S/P left upper lobectomy of lung and mediastinal dissection    Moderate COPD (chronic obstructive pulmonary disease) ([facility]    Encounter for insertion of venous access port    Ascending aorta dilatation (HCC) 4.1 cm        Surgery:  Past Surgical History:   Procedure Laterality Date    BRONCHOSCOPY N/A 5/6/2021    BRONCHOSCOPY performed by Cosimo Boas, MD at [address] [place] [address] Drive      x4   [address] [place] [address] COLONOSCOPY  07/11/2016    CT NEEDLE BIOPSY LUNG PERCUTANEOUS  03/26/2021    CT NEEDLE BIOPSY LUNG PERCUTANEOUS 3/26/2021 [facility] CT SCAN    HYSTERECTOMY      LUNG REMOVAL, TOTAL Left 5/4/2021    ROBOTIC ASSISTED LEFT UPPER LOBECTOMY AND MEDIASTINAL DISSECTION, POSSIBLE OPEN AND CRYOTHERAPY OF INTERCOSTAL NERVES 5, 6, 7 & 8 performed by Jorgito Lawrence MD at [address] [place] [address] [place] Left 6/10/2021    LEFT SUBCLAVIAN SINGLE LUMEN SMART PORT INSERTION performed by Jorgito Lawrence MD at [facility]  07/11/2016        Medications:  Current Outpatient Medications   Medication Sig Dispense Refill    ondansetron (ZOFRAN-ODT) 8 MG TBDP disintegrating tablet Place 1 tablet under the tongue every 8 hours as needed for Nausea or Vomiting 10 tablet 2    vitamin D3 (CHOLECALCIFEROL) 125 MCG (5000 UT) TABS tablet Cholecalciferol (Vitamin D3) (Vitamin D3) 125 mcg (5,000 unit) Tablet Active 125 MCG PO Daily April 22nd, 2021 4:50pm      lidocaine-prilocaine (EMLA) 2.5-2.5 % cream Apply topically as needed. 1 Tube 1    acetaminophen (TYLENOL) 500 MG tablet Take 1 tablet by mouth 4 times daily as needed for Pain 120 tablet 0    oxyCODONE (ROXICODONE) 5 MG immediate release tablet Take 5 mg by mouth every 4 hours as needed.   (Patient not taking: Reported on 8/18/2021)      dexamethasone (DECADRON) 4 MG tablet 4 mg Decadron take 1 tablet p.o. twice daily x3 days starting 1 day before chemotherapy with Alimta (Patient not taking: Reported on 9/15/2021) 6 tablet 3    senna (SENOKOT) 8.6 MG tablet Take 1 tablet by mouth daily (Patient not taking: Reported on 9/15/2021)      docusate sodium (COLACE) 100 MG capsule Take 100 mg by mouth 2 times daily (Patient not taking: Reported on 9/15/2021)      albuterol sulfate HFA (VENTOLIN HFA) 108 (90 Base) MCG/ACT inhaler Inhale 2 puffs into the lungs 4 times daily as needed for Wheezing (Patient not taking: Reported on 9/15/2021) 1 Inhaler 11     No current facility-administered medications for this visit. Facility-Administered Medications Ordered in Other Visits   Medication Dose Route Frequency Provider Last Rate Last Admin    sodium chloride flush 0.9 % injection 5-40 mL  5-40 mL IntraVENous PRN Beverly Lacey MD   20 mL at 09/15/21 1206    heparin flush 100 UNIT/ML injection 500 Units  500 Units IntraCATHeter PRN Beverly Lacey MD             EXAM:    /89 (Site: Left Upper Arm, Position: Sitting, Cuff Size: Medium Adult)   Pulse 66   Temp 98.2 °F (36.8 °C) (Oral)   Resp 16   Ht 5' 6\" (1.676 m)   Wt 124 lb 6 oz (56.4 kg)   LMP  (LMP Unknown)   SpO2 100%   BMI 20.07 kg/m²      ECO  General: Non-ill appearing. Appears comfortable and in no distress. Her grandaughter Earlean Dearth is with her today  HEENT: NC/AT,nonicteric, perrla,eom intact, no mucosal lesions  Neck: normal thyroid, no masses. pulses nl, no bruits,   Nodes: No adenopathy  Lungs/chest: clear, no rales,rhonchi or wheezing, lung bases clear. There is a dime sized healing wound with a scab and border with erythema that appears to be healing with no discharge or odor. CV: rrr, no rubs ,gallops or murmurs  Breasts: Not examined  Abd/Rectal: soft, non-tender,bowel sounds normal , no HSM,no masses  Back: normal curvature, No midline tenderness. flanks nontender  : Not Examined  Extremities: no cyanosis,clubbing or edema. Skin: unremarkable  Neuro: A and O x 4, CN exam nonfocal, Motor- no deficits, Sensory- no deficits, gait-nl, speech- fluent, no ataxia.   Devices: [id number] left chest unremarkable      DATA:      CBC with Differential: Lab Results   Component Value Date    WBC 5.4 09/15/2021    RBC 3.49 09/15/2021    RBC 4.63 10/10/2017    HGB 11.8 09/15/2021    HCT 35.5 09/15/2021     09/15/2021     09/15/2021    MCH 33.8 09/15/2021    MCHC 33.2 09/15/2021    RDW 15.0 09/15/2021    NRBC 0 06/03/2021    SEGSPCT 74.5 06/03/2021    LYMPHOPCT 15.8 10/10/2017    MONOPCT 5.9 06/03/2021    EOSPCT 1.4 10/10/2017    BASOPCT 0.7 10/10/2017    MONOSABS 0.4 09/15/2021    LYMPHSABS 1.2 09/15/2021    EOSABS 0.1 09/15/2021    BASOSABS 0.0 09/15/2021       CMP:    Lab Results   Component Value Date     09/15/2021     06/03/2021    K 4.3 09/15/2021    K 5.2 06/03/2021     06/03/2021    CO2 25 06/03/2021    BUN 9 06/03/2021    CREATININE 0.9 09/15/2021    CREATININE 0.7 06/03/2021    LABGLOM 85 06/03/2021    GLUCOSE 87 06/03/2021    GLUCOSE 79 10/10/2017    PROT 6.6 08/18/2021    LABALBU 4.6 08/18/2021    CALCIUM 9.9 06/03/2021    BILITOT 0.2 08/18/2021    ALKPHOS 84 08/18/2021    AST 9 08/18/2021    ALT 7 08/18/2021       BMP:    Lab Results   Component Value Date     09/15/2021     06/03/2021    K 4.3 09/15/2021    K 5.2 06/03/2021     06/03/2021    CO2 25 06/03/2021    BUN 9 06/03/2021    LABALBU 4.6 08/18/2021    CREATININE 0.9 09/15/2021    CREATININE 0.7 06/03/2021    CALCIUM 9.9 06/03/2021    LABGLOM 85 06/03/2021    GLUCOSE 87 06/03/2021    GLUCOSE 79 10/10/2017       Magnesium:    Lab Results   Component Value Date    MG 2.2 08/18/2021     PT/INR:    Lab Results   Component Value Date    INR 0.97 05/18/2021     TSH:    Lab Results   Component Value Date    TSH 4.750 10/10/2017           IMAGING:  PROCEDURE: XR CHEST (2 VW)       2/26/2021       CLINICAL INFORMATION: Cough       COMPARISON: 10/10/2017       TECHNIQUE: PA and lateral views of the chest were obtained.               Impression   1. Normal heart size. No acute infiltrates or effusions are seen.    2. Interval development of a 2 cm nodule left perihilar region since prior study which should be considered malignant until proven otherwise. CT thorax recommended for further evaluation.                 Radiological Data:  CT scan of chest without contrast.  Fri Mar 12, 2021  8:18 AM   PROCEDURE: CT CHEST WO CONTRAST   CLINICAL INFORMATION: Pulmonary nodule   COMPARISON: Chest x-ray dated 2021   1. There is a cavitary mass demonstrated within the left upper lobe with peripheral mild spiculation measuring 3.9 x 2.1 cm on axial image 34. This corresponds with previous chest x-ray dated 2021. This is concerning for malignancy until proven otherwise. Further evaluation could be obtained with PET/CT and/or biopsy. An infectious process is also not excluded.   2. There are additional smaller groundglass nodular densities within the right lung as detailed above.   3. Small pericardial effusion is noted. This is nonspecific.          Ct Needle Biopsy Lung Percutaneous  Result Date: 3/26/2021  1. Status post successful CT guided lung biopsy. 2. Followup chest radiographs will be obtained. **This report has been created using voice recognition software. It may contain minor errors which are inherent in voice recognition technology. ** Final report electronically signed by Dr. Calvin Singh on 3/26/2021 4:02 PM    PROCEDURE: PET CT SKULL BASE TO MID THIGH       CLINICAL INFORMATION: 71-year-old woman with recently diagnosed adenocarcinoma of the left lung; initial treatment strategy.       Radiopharmaceutical: 13.8 mCi F-18 FDG, intravenously.       TECHNIQUE: PET/CT imaging was performed from the skull base to the midthigh levels using routine PET acquisition.       Injection site: Left antecubital fossa.       Time of FDG injection: 7:37 AM.       Serum glucose: 86 mg/dL at 7:35 AM.       Time of imagin:37 AM.       COMPARISON: CT chest 3/11/2021       FINDINGS:        Neck: No FDG avid cervical lymphadenopathy.  Physiologic activity is noted bilaterally in the lower neck.       Chest: Cardiac size is normal. Atherosclerotic calcifications are present in the thoracic aorta and coronary arteries. There is stable aneurysmal dilation of the ascending thoracic aorta which measures 4.3 cm in maximum diameter (image 103). There is no    pleural or pericardial effusion. Emphysematous changes are again noted in the bilateral lungs. In the regular mass with areas of cavitation in the lingula is stable in size, again measuring approximately 3.7 cm and is associated with a maximum SUV of 6.7    (image 107). Adjacent opacities are likely postobstructive atelectasis. A 0.4 cm nodule at the right lung apex has a maximum SUV of 1.9 (image 70). A 0.4 cm nodule in the right upper lobe has a maximum SUV of 0.9 (image 80). An indistinct 0.5 cm nodular    density at the right mid lung is not associated with activity above background. There is no FDG avid mediastinal, hilar or axillary lymphadenopathy. Degenerative changes are present in the thoracic spine without evidence of hypermetabolic osseous    metastatic disease.       Abdomen/pelvis: Physiologic activity is present in the liver, spleen, urinary collecting system and gastrointestinal tract. The gallbladder is surgically absent. Atherosclerotic calcifications are seen in the abdominal aorta without evidence of aneurysm.    The urinary bladder is unremarkable. There are phleboliths in the pelvis. There is no FDG avid mesenteric, retroperitoneal, pelvic or inguinal lymphadenopathy. Degenerative changes are present in the lumbar spine and pelvis without evidence of    hypermetabolic osseous metastatic disease.           Impression   1. FDG avid left lung mass corresponding to known malignancy.    2. Small, mildly FDG avid right lung nodules, possibly infectious or inflammatory but satellite malignancy cannot be excluded.       Final report electronically signed by Dr. Mara Snyder on 4/14/2021 10:02 AM       Mri Brain W Wo Contrast  Result Date: 4/9/2021   1. No evidence of intracranial metastases or other acute intracranial abnormality. 2. Moderate severity chronic microvascular angiopathy. 3. Mild to moderate mucosal inflammation in the paranasal sinuses with air-fluid levels as evidence for an acute component. **This report has been created using voice recognition software. It may contain minor errors which are inherent in voice recognition technology. ** Final report electronically signed by Dr. Yamini Talley MD on 4/9/2021 1:50 PM           PROCEDURES:  -Core biopsy cavitary left upper lobe mass 3/26/2021    -Full pulmonary function study with bronchodilators completed 4/6/2021  FEV1 1.68 (61% predicted 25% change with bronchodilator. FVC 2.62, 75% predicted 15% change with bronchodilator  FEV1 over FVC ratio 64  Interpretation-airway obstruction. FVC is reduced relative to the SVC indicating air trapping. While the TLC is within normal limits, the FRC, RV and RV/TLC ratio are increased. Follow administration of bronchodilators there is a good response. Reduced diffusing capacity indicates had mild loss of functional alveolar capillary surface. PATHOLOGY:   3/26/2021  Clinical Information: CAVITATING MASS SHANNON, SMOKER     FINAL DIAGNOSIS:   Lung, left, needle core biopsies:       Adenocarcinoma. Specimen:   5/4/21  BIOPSY OF LUNG, LEFT      Microscopic Examination:   The specimen consists of several scant fragments of lung tissue with   infiltrating malignant glands consistent with adenocarcinoma. Dora Collar is   also anthracotic pigment. ADDENDUM REPORT 5/18/2021, 5/24/2021:       FINAL DIAGNOSIS:   A. Lymph nodes, level VII, resections:    Negative for neoplasm in 3 out of 3 lymph nodes. B. Left upper lobe of lung, resection:    Invasive, moderately to poorly differentiated adenocarcinoma, acinar   type.    Margins of resection are free of neoplasia.    Maximum tumor size = 2.6 cm.    Moderate emphysema.  pT1c, pN1. C. Lymph node, level V, resection:    Negative for neoplasm in one out of one lymph node. D. Lymph node, level X, resections:    Metastatic carcinoma in one out of 7 lymph nodes. E. Lymph nodes, level XI, resections:    Metastatic carcinoma in one out of 2 lymph nodes. F. Lymph node, level XI \"special\", resection:    Metastatic carcinoma in one out of one lymph node. G. Lymph node, level VI, resection:    Benign fibroadipose tissue and blood, no lymphoid tissue identified. COMMENT 5/18/2021:  Selected slides and pathology report were reviewed   by Dr. Trey Barboza and archival tissue was selected for the following testing:   EGFR (ordered by Dr. Orlin Marsh). ADDENDUM REPORT 5/24/2021:   EGFR Mutation by Pyrosequencing:  Not Detected     Please see the separately issued reference report for the results to   these test(s). Specimen:   A) LYMPH NODE(S), LEVEL 7, X 2   B) LUNG, LEFT UPPER LOBE   C) LYMPH NODE(S), LEVEL 5, X 3   D) LYMPH NODE(S), LEVEL 10, X 9   E) LYMPH NODE(S), LEVEL 11, X 2   F) LYMPH NODE(S), LEVEL 11, SPECIAL, X 1   G) LYMPH NODE(S), LEVEL 6, X 1       Gross Examination:   A - The container is labeled [employer], lymph nodes, level VII. Received fresh are two fragments of gray-tan tissue, each about 1.1 cm.    1 ns. B - The container is labeled [employer], left upper lobe, lung. Received fresh is a lobe of lung measuring 15 x 7 x 4 cm.  The pleural   surface is blue-gray.  No peribronchial lymph nodes are identified. There is a firm palpable mass.  The pleural surface is inked blue.    Sections through the specimen reveal a well-circumscribed, white mass   measuring grossly 2.6 x 2.6 x 2 cm.  The lesion is about 2 cm from the   bronchial margin.  The lesion does not grossly involve the pleural   surface.  No additional lesions are identified.  The parenchyma is   congested.  Representative sections are submitted.  Cassette #1 -   bronchial margin; cassette #2 - vascular margins; cassettes #3, #4, and   #5 - lesion with inked pleural surface; and cassette #6 - uninvolved   lung.  ss. C - The container is labeled [employer], lymph nodes, level V x3. Received in formalin is a single fragment of gray-tan tissue measuring   2 cm in greatest dimension.  The specimen is bisected and entirely   submitted.  1 ns. D - The container is labeled [employer], lymph nodes, level X x9. Received in formalin are seven fragments of gray-tan tissue varying in   size from 0.4 cm up to 1.7 cm.  The smaller fragments are submitted as   received in cassette #1.  The larger fragment is bisected and submitted   in cassette #2.  ns.     E - The container is labeled [employer], lymph nodes, level XI, 2. Received fresh are two fragments of gray-tan tissue measuring 1 and 1.2   cm.  1 ns. F - The container is labeled [employer], lymph node, level XI,   special x1.  Received in formalin is a single fragment of gray-tan   tissue measuring 1.5 cm in greatest dimension.  The specimen is friable   upon sectioning.  The specimen is trisected.  2 ns. G - The container is labeled [employer], lymph node, level VI x1. Received in formalin is a single fragment of gray-tan tissue measuring   0.5 cm.  1 ns.  JESSEE/ELLI:[id number]         Microscopic Examination:   A-G.    Procedure   Lobectomy   Specimen Laterality   Left   Tumor Site   Upper lobe of lung   Tumor Size      Total tumor size (size of entire tumor)#    2.6 x 2.6 x 2 cm   Tumor Focality   Single focus   Histologic Type   Invasive adenocarcinoma, acinar predominant   + Histologic Grade   G3: Poorly differentiated   + Spread Through Air Spaces (HILDA)   Not identified   Visceral Pleura Invasion   Not identified   Lymphovascular Invasion   Cannot be determined   Direct Invasion of Adjacent Structures   No adjacent structures present   Margins   All margins are uninvolved by tumor   Margins examined (specify): Bronchial, vascular and parenchymal       Distance of invasive carcinoma from closest margin (centimeters): 2       mm, pleural   Number of Lymph Nodes Involved: 3   Number of Lymph Nodes Examined: 14   + Extranodal Extension   Not identified   Treatment Effect   No known presurgical therapy   Pathologic Stage Classification (pTNM) (AJCC 8th Edition)   Primary Tumor (pT)   pT1c: Tumor >2 cm but <=3 cm in greatest dimension   Regional Lymph Nodes (pN)   pN1: Metastasis in ipsilateral peribronchial and/or ipsilateral hilar          lymph nodes, and intrapulmonary nodes, including involvement by          direct extension   + Additional Pathologic Findings   Emphysema     [id number]   [id number]   [id number]                                                     <Sign Out Dr. Hannah Muñoz M.D., F.C.A.P. [facility]  Printed on:  5/25/2021   Tiff Schrader [address] [facility], [address]   Original print date: 05/25/2021   Lab and Collection    Surgical Pathology - 5/4/2021  Result History    Surgical Pathology on 5/25/2021 - Result Edited   Result Information    Status: Edited Result - FINAL (Collected: 5/4/2021 08:04) Provider Status: Reviewed   All Reviewers List    Nori Barraza MD on 5/26/2021 12:23   Arlen Hinds MD on 5/26/2021 08:10         Bronchoscopy EBUS 4/27/2021  Pulmonary at [facility]  4R node-negative,   station 7 node negative,   station 4L-minute group of atypical cells. Station 11 poorly differentiated moderately differentiated adenocarcinoma  Note: Dr. Roxana Mcqueen had the pathology reviewed elsewhere and it was felt that the station 4L lymph node unequivocally did NOT show malignancy.     GENETICS:  Not applicable    MOLECULAR:  Not applicable i.e. patient does not have metastatic disease at this time    ASSESSMENT/PLAN:    1: Diagnosis: 73-year-old female with a heavy smoking history with 3.9 cm cavitary adenocarcinoma of the left upper lung. PET scan shows no hypermetabolic adenopathy or distant mets. Brain MRI shows no mets. Bronchoscopy EBUS as reported above shows T2 a N1 M0/IIB disease. The L4 Station node on review and felt to be benign. Status post left upper lobectomy with pathologic stage T1CN1 with 3 of 7+ nodes with a high-grade tumor. Stage IIb. 2) Prognosis / Disease Status: Potentially curable. High risk for recurrence and is receiving adjuvant chemotherapy. 3) Work-up:    Labs: Labs reviewed today   Imaging: All completed   Procedures: Port removal possibly in the next month and for upcoming chest CT posttreatment looks favorable. Consults: None   Other: Patient has a.m. gastroparesis nausea the predated chemotherapy. Zofran is very effective. Zofran renewed. 4) Symptom Management: None needed at this time. 5) Supportive care provided. Level of care is appropriate. Teaching done today. It was as the importance of stopping smoking. She is also informed regarding the risks and benefits of adjuvant chemotherapy. She is high risk and meets NCCN criteria for adjuvant chemotherapy. 6) Treatment goal:      Treatment plan:      Left upper lobectomy and mediastinal node dissection. To be followed by adjuvant chemotherapy. Adjuvant chemotherapy - cis-platinum 75 mg meter squared day 1 along with Alimta 500 mg meter squared day 1 through 21 days x 4. Patient's start date was June 16. She was seen her last adjuvant course #4 on 8/18. Thus far well-tolerated other than minor nausea controlled with Zofran. Renewed Zofran prescription today. 7) Medications reviewed.    Prescriptions today: Zofran ODT  None Compazine, Zofran ODT, folic acid 1 mg, EMLA cream, Decadron premed for Alimta              Orders Placed This Encounter   Medications    ondansetron (ZOFRAN-ODT) 8 MG TBDP disintegrating tablet     Sig: Place 1 tablet under the tongue every 8 hours as needed for Nausea or Vomiting     Dispense:  10 tablet     Refill:  2        [place]RS:  Controlled Substance Monitoring:    Acute and Chronic Pain Monitoring:   No flowsheet data found. 8) Research Options:      Not applicable      9) Other:   Encouraged to stop smoking. 10) Follow Up:  Patient will follow up with me i in 3 weeks. Anticipate port removal shortly thereafter. Patient will get a posttreatment CT of the chest in 2 weeks  Note patient had extranodal extension and lymph nodes. Not sure if this is an indication for radiation but will review this with radiation oncology as well. I spent 25 minutes face-to-face with the patient and family. Greater than 50% of time spent on counseling and coordinating the patient's care. Face-to-face counseling included prognosis, review of risks and benefits of any treatment along with compliance and risk reduction. Addendum  I reviewed the case with Dr. 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     Rosio Jordan MD

## 2021-10-04 ENCOUNTER — HOSPITAL ENCOUNTER (OUTPATIENT)
Dept: CT IMAGING | Age: 61
Discharge: HOME | End: 2021-10-04
Payer: COMMERCIAL

## 2021-10-04 DIAGNOSIS — C34.92 PRIMARY ADENOCARCINOMA OF LEFT LUNG (HCC): ICD-10-CM

## 2021-10-04 PROCEDURE — 71260 CT THORAX DX C+: CPT

## 2021-10-04 PROCEDURE — 6360000002 HC RX W HCPCS: Performed by: RADIOLOGY

## 2021-10-04 PROCEDURE — 6360000004 HC RX CONTRAST MEDICATION: Performed by: INTERNAL MEDICINE

## 2021-10-04 RX ORDER — HEPARIN SODIUM (PORCINE) LOCK FLUSH IV SOLN 100 UNIT/ML 100 UNIT/ML
500 SOLUTION INTRAVENOUS ONCE
Status: COMPLETED | OUTPATIENT
Start: 2021-10-04 | End: 2021-10-04

## 2021-10-04 RX ADMIN — HEPARIN 500 UNITS: 100 SYRINGE at 08:05

## 2021-10-04 RX ADMIN — IOPAMIDOL 80 ML: 755 INJECTION, SOLUTION INTRAVENOUS at 07:45

## 2021-10-20 ENCOUNTER — OFFICE VISIT (OUTPATIENT)
Dept: ONCOLOGY | Age: 61
End: 2021-10-20
Payer: COMMERCIAL

## 2021-10-20 ENCOUNTER — HOSPITAL ENCOUNTER (OUTPATIENT)
Dept: INFUSION THERAPY | Age: 61
Discharge: HOME | End: 2021-10-20
Payer: COMMERCIAL

## 2021-10-20 VITALS
TEMPERATURE: 98 F | SYSTOLIC BLOOD PRESSURE: 120 MMHG | HEART RATE: 80 BPM | DIASTOLIC BLOOD PRESSURE: 86 MMHG | RESPIRATION RATE: 16 BRPM | OXYGEN SATURATION: 98 %

## 2021-10-20 VITALS
HEIGHT: 66 IN | HEART RATE: 80 BPM | DIASTOLIC BLOOD PRESSURE: 86 MMHG | SYSTOLIC BLOOD PRESSURE: 120 MMHG | TEMPERATURE: 98 F | BODY MASS INDEX: 20.09 KG/M2 | RESPIRATION RATE: 16 BRPM | WEIGHT: 125 LBS

## 2021-10-20 DIAGNOSIS — C34.92 PRIMARY ADENOCARCINOMA OF LEFT LUNG (HCC): Primary | ICD-10-CM

## 2021-10-20 DIAGNOSIS — Z90.2 S/P LOBECTOMY OF LUNG: ICD-10-CM

## 2021-10-20 DIAGNOSIS — J43.8 OTHER EMPHYSEMA (HCC): ICD-10-CM

## 2021-10-20 DIAGNOSIS — Z72.0 TOBACCO ABUSE: ICD-10-CM

## 2021-10-20 DIAGNOSIS — F17.210 SMOKING GREATER THAN 40 PACK YEARS: ICD-10-CM

## 2021-10-20 DIAGNOSIS — C34.12 PRIMARY CANCER OF LEFT UPPER LOBE OF LUNG (HCC): ICD-10-CM

## 2021-10-20 LAB
ABSOLUTE IMMATURE GRANULOCYTE: 0.01 THOU/MM3 (ref 0–0.07)
ALBUMIN SERPL-MCNC: 4.3 G/DL (ref 3.5–5.1)
ALP BLD-CCNC: 84 U/L (ref 38–126)
ALT SERPL-CCNC: 7 U/L (ref 11–66)
AST SERPL-CCNC: 11 U/L (ref 5–40)
BASINOPHIL, AUTOMATED: 1 % (ref 0–3)
BASOPHILS ABSOLUTE: 0 THOU/MM3 (ref 0–0.1)
BILIRUB SERPL-MCNC: 0.2 MG/DL (ref 0.3–1.2)
BILIRUBIN DIRECT: < 0.2 MG/DL (ref 0–0.3)
BUN, WHOLE BLOOD: 10 MG/DL (ref 8–26)
CHLORIDE, WHOLE BLOOD: 107 MEQ/L (ref 98–109)
CREATININE, WHOLE BLOOD: 0.7 MG/DL (ref 0.5–1.2)
EOSINOPHILS ABSOLUTE: 0 THOU/MM3 (ref 0–0.4)
EOSINOPHILS RELATIVE PERCENT: 1 % (ref 0–4)
GFR, ESTIMATED: > 90 ML/MIN/1.73M2
GLUCOSE, WHOLE BLOOD: 96 MG/DL (ref 70–108)
HCT VFR BLD CALC: 36.6 % (ref 37–47)
HEMOGLOBIN: 12 GM/DL (ref 12–16)
IMMATURE GRANULOCYTES: 0 %
IONIZED CALCIUM, WHOLE BLOOD: 1.24 MMOL/L (ref 1.12–1.32)
LYMPHOCYTES # BLD: 15 % (ref 15–47)
LYMPHOCYTES ABSOLUTE: 0.9 THOU/MM3 (ref 1–4.8)
MCH RBC QN AUTO: 33.6 PG (ref 26–33)
MCHC RBC AUTO-ENTMCNC: 32.8 GM/DL (ref 32.2–35.5)
MCV RBC AUTO: 103 FL (ref 81–99)
MONOCYTES ABSOLUTE: 0.4 THOU/MM3 (ref 0.4–1.3)
MONOCYTES: 6 % (ref 0–12)
PDW BLD-RTO: 12.8 % (ref 11.5–14.5)
PLATELET # BLD: 114 THOU/MM3 (ref 130–400)
PMV BLD AUTO: 12.2 FL (ref 9.4–12.4)
POTASSIUM, WHOLE BLOOD: 4.1 MEQ/L (ref 3.5–4.9)
RBC # BLD: 3.57 MILL/MM3 (ref 4.2–5.4)
SEG NEUTROPHILS: 77 % (ref 43–75)
SEGMENTED NEUTROPHILS ABSOLUTE COUNT: 4.7 THOU/MM3 (ref 1.8–7.7)
SODIUM, WHOLE BLOOD: 145 MEQ/L (ref 138–146)
TOTAL CO2, WHOLE BLOOD: 26 MEQ/L (ref 23–33)
TOTAL PROTEIN: 6.7 G/DL (ref 6.1–8)
WBC # BLD: 6 THOU/MM3 (ref 4.8–10.8)

## 2021-10-20 PROCEDURE — 36591 DRAW BLOOD OFF VENOUS DEVICE: CPT

## 2021-10-20 PROCEDURE — 6360000002 HC RX W HCPCS: Performed by: INTERNAL MEDICINE

## 2021-10-20 PROCEDURE — 99213 OFFICE O/P EST LOW 20 MIN: CPT | Performed by: INTERNAL MEDICINE

## 2021-10-20 PROCEDURE — 85025 COMPLETE CBC W/AUTO DIFF WBC: CPT

## 2021-10-20 PROCEDURE — 80076 HEPATIC FUNCTION PANEL: CPT

## 2021-10-20 PROCEDURE — 2580000003 HC RX 258: Performed by: INTERNAL MEDICINE

## 2021-10-20 PROCEDURE — 99211 OFF/OP EST MAY X REQ PHY/QHP: CPT

## 2021-10-20 PROCEDURE — 80047 BASIC METABLC PNL IONIZED CA: CPT

## 2021-10-20 RX ORDER — SODIUM CHLORIDE 0.9 % (FLUSH) 0.9 %
5-40 SYRINGE (ML) INJECTION PRN
Status: DISCONTINUED | OUTPATIENT
Start: 2021-10-20 | End: 2021-10-21 | Stop reason: HOSPADM

## 2021-10-20 RX ORDER — HEPARIN SODIUM (PORCINE) LOCK FLUSH IV SOLN 100 UNIT/ML 100 UNIT/ML
500 SOLUTION INTRAVENOUS PRN
OUTPATIENT
Start: 2021-10-20

## 2021-10-20 RX ORDER — SODIUM CHLORIDE 9 MG/ML
25 INJECTION, SOLUTION INTRAVENOUS PRN
OUTPATIENT
Start: 2021-10-20

## 2021-10-20 RX ORDER — SODIUM CHLORIDE 0.9 % (FLUSH) 0.9 %
5-40 SYRINGE (ML) INJECTION PRN
OUTPATIENT
Start: 2021-10-20

## 2021-10-20 RX ORDER — HEPARIN SODIUM (PORCINE) LOCK FLUSH IV SOLN 100 UNIT/ML 100 UNIT/ML
500 SOLUTION INTRAVENOUS PRN
Status: DISCONTINUED | OUTPATIENT
Start: 2021-10-20 | End: 2021-10-21 | Stop reason: HOSPADM

## 2021-10-20 RX ADMIN — SODIUM CHLORIDE, PRESERVATIVE FREE 10 ML: 5 INJECTION INTRAVENOUS at 12:05

## 2021-10-20 RX ADMIN — SODIUM CHLORIDE, PRESERVATIVE FREE 10 ML: 5 INJECTION INTRAVENOUS at 12:06

## 2021-10-20 RX ADMIN — HEPARIN 500 UNITS: 100 SYRINGE at 12:25

## 2021-10-20 RX ADMIN — SODIUM CHLORIDE, PRESERVATIVE FREE 10 ML: 5 INJECTION INTRAVENOUS at 12:25

## 2021-10-20 NOTE — PROGRESS NOTES
[address] CANCER [address] [place] [address]  Dept: [phone]  Loc: [phone]   Hematology/Oncology Consult (Clinic)        10/20/2021    Truong Hudson   1960     No ref. provider found   Ashley Segura MD     DIAGNOSIS:  -  Left upper lobe cavitary adenocarcinoma of the lung. 3.9 x 2.1 cm. PET scan negative. Bronchoscopy EBUS-isolated station 11 lymph node with a moderate to poorly differentiated adenocarcinoma. 4L node with a minute focus of atypical cells on path review felt to be benign. Clinical stage: T2a N1M0 / IIB. Pathologic stage: T1 cN1 M0 /IIB (poorly differentiated, 3 of 7 nodes positive including extranodal extension. EGFR- Not Detected    TREATMENT:  -Left upper lobectomy and mediastinal node dissection scheduled by Dr. Eron Snider 5/3/2021  -Adjuvant chemotherapy : cis-platinum 75 mg meter squared day 1 and pemetrexed 500 mg meter squared day 1 every 21 days x 4 cycles . Start date 6/16/2021. Course 4 done 8/18/21. No adjuvant immune therapy as she has stage II disease. PARAMETERS:  -Chest CT without contrast 3/12/2021,   -Posttreatment chest CT10/4/2021--KATHIE  -PET/CT 4/14/2021  -Brain MRI with and without contrast 4/9/2021    SUBJECTIVE: Patient is still using about 5 cigarettes/day. Overall doing well. Has gastroparesis; 5 years of am nausea. Zofran helps this tremendously. She is not a diabetic and was told by her GI doctor she had gastroparesis. Here for Chest Ct report. No neuropathy. She is ready to have her port removed. HPI: Fanny Gaytan is a 62-year-old female with a 45-pack-year smoking habit (ongoing) with a chest CT without contrast on 3/12/2021 revealing a 3.9 x 2.1 cm cavitary mass in the left upper lung corresponding to the abnormality seen on the chest x-ray of 2/26/2021. Radiologically very suspicious. Patient underwent a needle biopsy on 3/26 revealing adenocarcinoma.  PET scan shows no distant mets or adenopathy. PFT's done and reviewed below. She is waiting  for a consult with Dr Ping Marin of Surgery. Reports 30 # weight loss ove 9 months; unintentional. Normal weight @ 145. Appetite has remained good. Chronic cough unchanged, no blood. No chest pain or pain elsewhere. Denied increased sob. Average 1ppd x 45 years; now about 5 cigs/day . Motivated to stop but failed Nicotine replacement products. Chantix not attempted thus far. ROS:  Review of Systems 14 point negative except as above. Limited to unintentional weight loss. Chronic unchanged nonproductive cough and shortness of breath with activity also unchanged. PMH:   Past Medical History:   Diagnosis Date    Arthritis     COPD (chronic obstructive pulmonary disease) ([facility])     Dr Hopkins Case [facility])     Dr Mary Jo Ayers   Aneurysm    Social HX:   Social History     Socioeconomic History    Marital status:      Spouse name: Not on file    Number of children: Not on file    Years of education: Not on file    Highest education level: Not on file   Occupational History    Not on file   Tobacco Use    Smoking status: Current Every Day Smoker     Packs/day: 0.25     Years: 45.00     Pack years: 11.25     Types: Cigarettes    Smokeless tobacco: Never Used   Vaping Use    Vaping Use: Never used   Substance and Sexual Activity    Alcohol use: Never    Drug use: Yes     Types: Marijuana     Comment: past month    Sexual activity: Not on file   Other Topics Concern    Not on file   Social History Narrative    Not on file     Social Determinants of Health     Financial Resource Strain:     Difficulty of Paying Living Expenses:    Food Insecurity:     Worried About Running Out of Food in the Last Year:     Ran Out of Food in the Last Year:    Transportation Needs:     Lack of Transportation (Medical):      Lack of Transportation (Non-Medical):    Physical Activity:     Days of Exercise per Week:     Minutes of Exercise per Session:    Stress:     Feeling of Stress :    Social Connections:     Frequency of Communication with Friends and Family:     Frequency of Social Gatherings with Friends and Family:     Attends Zoroastrianism Services:     Active Member of Clubs or Organizations:     Attends Club or Organization Meetings:     Marital Status:    Intimate Partner Violence:     Fear of Current or Ex-Partner:     Emotionally Abused:     Physically Abused:     Sexually Abused:       45 pack year smoking history, currently down to 5 cigarettes/day and attempting to stop. Spouse: Ijeoma Lemons [phone]    Phone: [phone] [address]     Employment: Works at [employer] and walk but cannot I as a ta    Immunizations: There is no immunization history on file for this patient. Health Screenings:  Mammogram:  No results found for this or any previous visit. Results for orders placed during the hospital encounter of 02/26/21   [facility] BOUCHRA DIGITAL SCREEN BILATERAL    Narrative IMPRESSION: Mammogram BI-RADS: Category 1: Negative    There is no mammographic evidence of malignancy. A 1 year   screening mammogram is recommended. The patient has been entered   into our database and they will receive a notification when they   are due for their next exam.      The patient was notified of the results. #[id number] - [facility] BOUCHRA DIGITAL SCREEN BILATERAL  BILATERAL DIGITAL SCREENING MAMMOGRAM 3D/2D WITH CAD: 2/26/2021  CLINICAL: Tomosynthesis. Screening. Comparison is made to exam dated:  2/17/2011 mammogram - .   [facility]. The tissue of both breasts is heterogeneously dense. This may   lower the sensitivity of mammography. Current study was also evaluated with a Computer Aided Detection   (CAD) system. No significant masses, calcifications, or other findings are seen   in either breast.    There has been no significant interval change.          Vicki Schulte            mikie/nacho:2021 13:05:44      Imaging Technologist: Azar DIALLO)(M), [facility]   [facility]ammography At [facility]  letter sent: Normal-All Doctor Names    [id number]        Pap / Pelvic:na  C-Scope: Approximately 5 years ago with 2 benign polyps      Gyn HX:   GPA:  DARREL/BSO:31 years ago  LMP: No LMP recorded (lmp unknown). Patient has had a hysterectomy. Health Maintenance Due   Topic Date Due    Pneumococcal 0-64 years Vaccine (1 of 4 - PCV13) Never done    COVID-19 Vaccine (1) Never done    HIV screen  Never done    DTaP/Tdap/Td vaccine (1 - Tdap) Never done    Lipid screen  Never done    Colon cancer screen colonoscopy  Never done    Shingles Vaccine (1 of 2) Never done    Flu vaccine (1) Never done        Interests:   none    Fam HX:   Family History   Problem Relation Age of Onset    Other Mother         aneursym    No Known Problems Father     Depression Sister     Heart Disease Brother     Diabetes Brother     Other Brother         Lung disease    Lung Cancer Maternal Grandfather     Other Brother         Crohns    Macular Degen Brother     Depression Sister     Other Sister         Blood clots    Arthritis Sister     Brain Cancer Paternal Uncle         Hospitalizations:   None recent    Allergies:   Allergies   Allergen Reactions    Tylenol With Codeine #3 [Acetaminophen-Codeine] Nausea And Vomiting    Codeine Nausea And Vomiting        Adult Illness:  Patient Active Problem List   Diagnosis    Primary cancer of left upper lobe of lung ([address]    Other emphysema (HCC)    Smoking greater than 40 pack years    Tobacco abuse    KERR (dyspnea on exertion)    Intermittent palpitations    Borderline Abnormal EKG with rsr'    S/P left upper lobectomy of lung and mediastinal dissection    Moderate COPD (chronic obstructive pulmonary disease) ([address]    Encounter for insertion of venous access port    Ascending aorta dilatation (HCC) 4.1 cm Surgery:  Past Surgical History:   Procedure Laterality Date    BRONCHOSCOPY N/A 5/6/2021    BRONCHOSCOPY performed by Kirill Bobo MD at [address]      x4   [address] [facility] COLONOSCOPY  07/11/2016    CT NEEDLE BIOPSY LUNG PERCUTANEOUS  03/26/2021    CT NEEDLE BIOPSY LUNG PERCUTANEOUS 3/26/2021 [facility] CT SCAN    HYSTERECTOMY      LUNG REMOVAL, TOTAL Left 5/4/2021    ROBOTIC ASSISTED LEFT UPPER LOBECTOMY AND MEDIASTINAL DISSECTION, POSSIBLE OPEN AND CRYOTHERAPY OF INTERCOSTAL NERVES 5, 6, 7 & 8 performed by Christopher Abarca MD at [address] [place] Left 6/10/2021    LEFT SUBCLAVIAN SINGLE LUMEN SMART PORT INSERTION performed by Christopher Abarca MD at [address] [facility]  07/11/2016        Medications:  Current Outpatient Medications   Medication Sig Dispense Refill    ondansetron (ZOFRAN-ODT) 8 MG TBDP disintegrating tablet Place 1 tablet under the tongue every 8 hours as needed for Nausea or Vomiting 10 tablet 2    vitamin D3 (CHOLECALCIFEROL) 125 MCG (5000 UT) TABS tablet Cholecalciferol (Vitamin D3) (Vitamin D3) 125 mcg (5,000 unit) Tablet Active 125 MCG PO Daily April 22nd, 2021 4:50pm      lidocaine-prilocaine (EMLA) 2.5-2.5 % cream Apply topically as needed. 1 Tube 1    acetaminophen (TYLENOL) 500 MG tablet Take 1 tablet by mouth 4 times daily as needed for Pain 120 tablet 0    senna (SENOKOT) 8.6 MG tablet Take 1 tablet by mouth daily       albuterol sulfate HFA (VENTOLIN HFA) 108 (90 Base) MCG/ACT inhaler Inhale 2 puffs into the lungs 4 times daily as needed for Wheezing 1 Inhaler 11    oxyCODONE (ROXICODONE) 5 MG immediate release tablet Take 5 mg by mouth every 4 hours as needed.   (Patient not taking: Reported on 8/18/2021)      dexamethasone (DECADRON) 4 MG tablet 4 mg Decadron take 1 tablet p.o. twice daily x3 days starting 1 day before chemotherapy with Alimta (Patient not taking: Reported on 9/15/2021) 6 tablet 3    docusate sodium (COLACE) 100 MG capsule Take 100 mg by mouth 2 times daily (Patient not taking: Reported on 9/15/2021)       No current facility-administered medications for this visit. EXAM:    /86 (Site: Right Upper Arm, Position: Sitting, Cuff Size: Small Adult)   Pulse 80   Temp 98 °F (36.7 °C)   Resp 16   Ht 5' 6\" (1.676 m)   Wt 125 lb (56.7 kg)   LMP  (LMP Unknown)   BMI 20.18 kg/m²      ECO  General: Non-ill appearing. Appears comfortable and in no distress. HEENT: NC/AT,nonicteric,  Neck: normal thyroid, no masses. pulses nl, no bruits,   Nodes: No adenopathy  Lungs/chest: clear, no rales,rhonchi or wheezing, lung bases clear. There is a dime sized healing wound with a scab and border with erythema that appears to be healing with no discharge or odor. CV: rrr, no rubs ,gallops or murmurs  Breasts: Not examined  Abd/Rectal: soft, non-tender,bowel sounds normal , no HSM,no masses  Back: normal curvature, No midline tenderness. flanks nontender  : Not Examined  Extremities: no cyanosis,clubbing or edema. Skin: unremarkable  Neuro: A and O x 4, CN exam nonfocal, Motor- no deficits, Sensory- no deficits, gait-nl, speech- fluent, no ataxia.   Devices: Oynotz-h-Gncc left chest unremarkable      DATA:  10/20/2021-CBC, CMP to be drawn    CBC with Differential:      Lab Results   Component Value Date    WBC 5.4 09/15/2021    RBC 3.49 09/15/2021    RBC 4.63 10/10/2017    HGB 11.8 09/15/2021    HCT 35.5 09/15/2021     09/15/2021     09/15/2021    MCH 33.8 09/15/2021    MCHC 33.2 09/15/2021    RDW 15.0 09/15/2021    NRBC 0 2021    SEGSPCT 74.5 2021    LYMPHOPCT 15.8 10/10/2017    MONOPCT 5.9 2021    EOSPCT 1.4 10/10/2017    BASOPCT 0.7 10/10/2017    MONOSABS 0.4 09/15/2021    LYMPHSABS 1.2 09/15/2021    EOSABS 0.1 09/15/2021    BASOSABS 0.0 09/15/2021       CMP:    Lab Results   Component Value Date     09/15/2021     2021    K 4.3 09/15/2021    K 5.2 06/03/2021     06/03/2021    CO2 25 06/03/2021    BUN 9 06/03/2021    CREATININE 0.9 09/15/2021    CREATININE 0.7 06/03/2021    LABGLOM 85 06/03/2021    GLUCOSE 87 06/03/2021    GLUCOSE 79 10/10/2017    PROT 6.8 09/15/2021    LABALBU 4.4 09/15/2021    CALCIUM 9.9 06/03/2021    BILITOT 0.2 09/15/2021    ALKPHOS 72 09/15/2021    AST 12 09/15/2021    ALT 8 09/15/2021       BMP:    Lab Results   Component Value Date     09/15/2021     06/03/2021    K 4.3 09/15/2021    K 5.2 06/03/2021     06/03/2021    CO2 25 06/03/2021    BUN 9 06/03/2021    LABALBU 4.4 09/15/2021    CREATININE 0.9 09/15/2021    CREATININE 0.7 06/03/2021    CALCIUM 9.9 06/03/2021    LABGLOM 85 06/03/2021    GLUCOSE 87 06/03/2021    GLUCOSE 79 10/10/2017       Magnesium:    Lab Results   Component Value Date    MG 2.2 08/18/2021     PT/INR:    Lab Results   Component Value Date    INR 0.97 05/18/2021     TSH:    Lab Results   Component Value Date    TSH 4.750 10/10/2017           IMAGING:  PROCEDURE: XR CHEST (2 VW)       2/26/2021       CLINICAL INFORMATION: Cough       COMPARISON: 10/10/2017       TECHNIQUE: PA and lateral views of the chest were obtained.               Impression   1. Normal heart size. No acute infiltrates or effusions are seen. 2. Interval development of a 2 cm nodule left perihilar region since prior study which should be considered malignant until proven otherwise. CT thorax recommended for further evaluation.                 Radiological Data:  CT scan of chest without contrast.  Fri Mar 12, 2021  8:18 AM   PROCEDURE: CT CHEST WO CONTRAST   CLINICAL INFORMATION: Pulmonary nodule   COMPARISON: Chest x-ray dated 2/26/2021   1. There is a cavitary mass demonstrated within the left upper lobe with peripheral mild spiculation measuring 3.9 x 2.1 cm on axial image 34. This corresponds with previous chest x-ray dated 2/26/2021. This is concerning for malignancy until proven otherwise. Further evaluation could be obtained with PET/CT and/or biopsy. An infectious process is also not excluded.   2. There are additional smaller groundglass nodular densities within the right lung as detailed above.   3. Small pericardial effusion is noted. This is nonspecific.          Ct Needle Biopsy Lung Percutaneous  Result Date: 3/26/2021  1. Status post successful CT guided lung biopsy. 2. Followup chest radiographs will be obtained. **This report has been created using voice recognition software. It may contain minor errors which are inherent in voice recognition technology. ** Final report electronically signed by Dr. Valentina Llanes on 3/26/2021 4:02 PM    PROCEDURE: PET CT SKULL BASE TO MID THIGH       CLINICAL INFORMATION: 50-year-old woman with recently diagnosed adenocarcinoma of the left lung; initial treatment strategy.       Radiopharmaceutical: 13.8 mCi F-18 FDG, intravenously.       TECHNIQUE: PET/CT imaging was performed from the skull base to the midthigh levels using routine PET acquisition.       Injection site: Left antecubital fossa.       Time of FDG injection: 7:37 AM.       Serum glucose: 86 mg/dL at 7:35 AM.       Time of imagin:37 AM.       COMPARISON: CT chest 3/11/2021       FINDINGS:        Neck: No FDG avid cervical lymphadenopathy. Physiologic activity is noted bilaterally in the lower neck.       Chest: Cardiac size is normal. Atherosclerotic calcifications are present in the thoracic aorta and coronary arteries. There is stable aneurysmal dilation of the ascending thoracic aorta which measures 4.3 cm in maximum diameter (image 103). There is no    pleural or pericardial effusion. Emphysematous changes are again noted in the bilateral lungs. In the regular mass with areas of cavitation in the lingula is stable in size, again measuring approximately 3.7 cm and is associated with a maximum SUV of 6.7    (image 107). Adjacent opacities are likely postobstructive atelectasis.  A 0.4 cm nodule at the right lung apex has a maximum SUV of 1.9 (image 70). A 0.4 cm nodule in the right upper lobe has a maximum SUV of 0.9 (image 80). An indistinct 0.5 cm nodular    density at the right mid lung is not associated with activity above background. There is no FDG avid mediastinal, hilar or axillary lymphadenopathy. Degenerative changes are present in the thoracic spine without evidence of hypermetabolic osseous    metastatic disease.       Abdomen/pelvis: Physiologic activity is present in the liver, spleen, urinary collecting system and gastrointestinal tract. The gallbladder is surgically absent. Atherosclerotic calcifications are seen in the abdominal aorta without evidence of aneurysm.    The urinary bladder is unremarkable. There are phleboliths in the pelvis. There is no FDG avid mesenteric, retroperitoneal, pelvic or inguinal lymphadenopathy. Degenerative changes are present in the lumbar spine and pelvis without evidence of    hypermetabolic osseous metastatic disease.           Impression   1. FDG avid left lung mass corresponding to known malignancy. 2. Small, mildly FDG avid right lung nodules, possibly infectious or inflammatory but satellite malignancy cannot be excluded.       Final report electronically signed by Dr. Cody العراقي on 4/14/2021 10:02 AM       Mri Brain W Wo Contrast  Result Date: 4/9/2021   1. No evidence of intracranial metastases or other acute intracranial abnormality. 2. Moderate severity chronic microvascular angiopathy. 3. Mild to moderate mucosal inflammation in the paranasal sinuses with air-fluid levels as evidence for an acute component. **This report has been created using voice recognition software. It may contain minor errors which are inherent in voice recognition technology. ** Final report electronically signed by Dr. Krissy Glynn MD on 4/9/2021 1:50 PM    Chest CT with contrast 10/4/2021 (posttreatment CT)  PROCEDURE: CT CHEST W CONTRAST     CLINICAL INFORMATION: Adenocarcinoma of the left lung, status post resection and chemotherapy.       TECHNIQUE: CT of the chest was performed following administration of 80 mL Isovue-370 intravenous contrast. Axial images as well as coronal and sagittal instructions were obtained.       All CT scans at this facility use dose modulation, iterative reconstruction, and/or weight-based dosing when appropriate to reduce radiation dose to as low as reasonably achievable.       COMPARISON: CT chest 6/1/2021       FINDINGS: Cardiac size is normal. There is stable shift of cardiomediastinal structures to the left side due to postsurgical volume loss. Atherosclerotic calcifications are present in the thoracic aorta and coronary arteries. Ascending thoracic aorta is    at the upper limits of normal in size. There is no pleural or pericardial effusion. Indistinct densities measuring 6 mm in the right upper lobe are stable (image 17). A prominent right hilar lymph node is not pathologically enlarged by CT criteria and    may be reactive. There is no mediastinal, left hilar or axillary lymphadenopathy. Degenerative changes are present in the thoracic spine without evidence of aggressive osseous lesions.       The gallbladder is surgically absent. Prominence of the common bile duct is likely postsurgical.           Impression   1. No acute intrathoracic findings. 2. Stable right upper lobe densities, likely benign. 3. Prominent right hilar lymph node, likely reactive.       Final report electronically signed by Dr. Rouse Failing on 10/4/2021 9:04 AM     Note; pretreatment April 2021 PET scan showed- a lingular cavitary mass measuring 3.7 with SUV of 6.7. Also 8.4 cm nodule right lung apex with SUV of 1.9. No GI with adenopathy.        PROCEDURES:  -Core biopsy cavitary left upper lobe mass 3/26/2021    -Full pulmonary function study with bronchodilators completed 4/6/2021  FEV1 1.68 (61% predicted 25% change with bronchodilator. FVC 2.62, 75% predicted 15% change with bronchodilator  FEV1 over FVC ratio 64  Interpretation-airway obstruction. FVC is reduced relative to the SVC indicating air trapping. While the TLC is within normal limits, the FRC, RV and RV/TLC ratio are increased. Follow administration of bronchodilators there is a good response. Reduced diffusing capacity indicates had mild loss of functional alveolar capillary surface. PATHOLOGY:   3/26/2021  Clinical Information: CAVITATING MASS SHANNON, SMOKER     FINAL DIAGNOSIS:   Lung, left, needle core biopsies:       Adenocarcinoma. Specimen:   5/4/21  BIOPSY OF LUNG, LEFT      Microscopic Examination:   The specimen consists of several scant fragments of lung tissue with   infiltrating malignant glands consistent with adenocarcinoma. Radha Pleas is   also anthracotic pigment. ADDENDUM REPORT 5/18/2021, 5/24/2021:       FINAL DIAGNOSIS:   A. Lymph nodes, level VII, resections:    Negative for neoplasm in 3 out of 3 lymph nodes. B. Left upper lobe of lung, resection:    Invasive, moderately to poorly differentiated adenocarcinoma, acinar   type.    Margins of resection are free of neoplasia.    Maximum tumor size = 2.6 cm.    Moderate emphysema.    pT1c, pN1. C. Lymph node, level V, resection:    Negative for neoplasm in one out of one lymph node. D. Lymph node, level X, resections:    Metastatic carcinoma in one out of 7 lymph nodes. E. Lymph nodes, level XI, resections:    Metastatic carcinoma in one out of 2 lymph nodes. F. Lymph node, level XI \"special\", resection:    Metastatic carcinoma in one out of one lymph node. G. Lymph node, level VI, resection:    Benign fibroadipose tissue and blood, no lymphoid tissue identified. COMMENT 5/18/2021:  Selected slides and pathology report were reviewed   by Dr. Moiz Baldwin and archival tissue was selected for the following testing:   EGFR (ordered by Dr. Tanmay Irwin).      ADDENDUM REPORT 5/24/2021: EGFR Mutation by Pyrosequencing:  Not Detected     Please see the separately issued reference report for the results to   these test(s). Specimen:   A) LYMPH NODE(S), LEVEL 7, X 2   B) LUNG, LEFT UPPER LOBE   C) LYMPH NODE(S), LEVEL 5, X 3   D) LYMPH NODE(S), LEVEL 10, X 9   E) LYMPH NODE(S), LEVEL 11, X 2   F) LYMPH NODE(S), LEVEL 11, SPECIAL, X 1   G) LYMPH NODE(S), LEVEL 6, X 1       Gross Examination:   A - The container is labeled [employer], lymph nodes, level VII. Received fresh are two fragments of gray-tan tissue, each about 1.1 cm.    1 ns. B - The container is labeled [employer], left upper lobe, lung. Received fresh is a lobe of lung measuring 15 x 7 x 4 cm.  The pleural   surface is blue-gray.  No peribronchial lymph nodes are identified. There is a firm palpable mass.  The pleural surface is inked blue. Sections through the specimen reveal a well-circumscribed, white mass   measuring grossly 2.6 x 2.6 x 2 cm.  The lesion is about 2 cm from the   bronchial margin.  The lesion does not grossly involve the pleural   surface.  No additional lesions are identified.  The parenchyma is   congested.  Representative sections are submitted.  Cassette #1 -   bronchial margin; cassette #2 - vascular margins; cassettes #3, #4, and   #5 - lesion with inked pleural surface; and cassette #6 - uninvolved   lung.  ss. C - The container is labeled [employer], lymph nodes, level V x3. Received in formalin is a single fragment of gray-tan tissue measuring   2 cm in greatest dimension.  The specimen is bisected and entirely   submitted.  1 ns. D - The container is labeled [employer], lymph nodes, level X x9.    Received in formalin are seven fragments of gray-tan tissue varying in   size from 0.4 cm up to 1.7 cm.  The smaller fragments are submitted as   received in cassette #1.  The larger fragment is bisected and submitted   in cassette #2.  ns.     E - The container is labeled Danis Felix Belindaeke, lymph nodes, level XI, 2. Received fresh are two fragments of gray-tan tissue measuring 1 and 1.2   cm.  1 ns. F - The container is labeled Mita Cifuentes, lymph node, level XI,   special x1.  Received in formalin is a single fragment of gray-tan   tissue measuring 1.5 cm in greatest dimension.  The specimen is friable   upon sectioning.  The specimen is trisected.  2 ns. G - The container is labeled Mita Cifuentes, lymph node, level VI x1. Received in formalin is a single fragment of gray-tan tissue measuring   0.5 cm.  1 ns.  JESSEE/DANIKAR:[id number]         Microscopic Examination:   A-G.    Procedure   Lobectomy   Specimen Laterality   Left   Tumor Site   Upper lobe of lung   Tumor Size      Total tumor size (size of entire tumor)#    2.6 x 2.6 x 2 cm   Tumor Focality   Single focus   Histologic Type   Invasive adenocarcinoma, acinar predominant   + Histologic Grade   G3: Poorly differentiated   + Spread Through Air Spaces (HILDA)   Not identified   Visceral Pleura Invasion   Not identified   Lymphovascular Invasion   Cannot be determined   Direct Invasion of Adjacent Structures   No adjacent structures present   Margins   All margins are uninvolved by tumor   Margins examined (specify): Bronchial, vascular and parenchymal       Distance of invasive carcinoma from closest margin (centimeters): 2       mm, pleural   Number of Lymph Nodes Involved: 3   Number of Lymph Nodes Examined: 14   + Extranodal Extension   Not identified   Treatment Effect   No known presurgical therapy   Pathologic Stage Classification (pTNM) (AJCC 8th Edition)   Primary Tumor (pT)   pT1c: Tumor >2 cm but <=3 cm in greatest dimension   Regional Lymph Nodes (pN)   pN1: Metastasis in ipsilateral peribronchial and/or ipsilateral hilar          lymph nodes, and intrapulmonary nodes, including involvement by          direct extension   + Additional Pathologic Findings   [facility]     [id number]   [id number]   [id number]                                                     <Sign Out Dr. Eric Parkinson M.D., F.C.A.P. NVML/ [address] [facility]  Printed on:  5/25/2021   Tiff Schrader [address]   FRANCISCA MIX II.VIERTEL, One [address]mi [place]iva   Original print date: 05/25/2021   Lab and Collection    Surgical Pathology - 5/4/2021  Result History    Surgical Pathology on 5/25/2021 - Result Edited   Result Information    Status: Edited Result - FINAL (Collected: 5/4/2021 08:04) Provider Status: Reviewed   All Reviewers List    Noah Watkins MD on 5/26/2021 12:23   Reyes Gonzalez MD on 5/26/2021 08:10         Bronchoscopy EBUS 4/27/2021  Pulmonary at [facility]  4R node-negative,   station 7 node negative,   station 4L-minute group of atypical cells. Station 11 poorly differentiated moderately differentiated adenocarcinoma  Note: Dr. Fabián Verma had the pathology reviewed elsewhere and it was felt that the station 4L lymph node unequivocally did NOT show malignancy. GENETICS:  Not applicable    MOLECULAR:  Not applicable i.e. 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 PET scan shows no hypermetabolic adenopathy or distant mets. Brain MRI shows no mets. Bronchoscopy EBUS as reported above shows T2 a N1 M0/IIB disease. The L4 Station node on review and felt to be benign. Status post left upper lobectomy with pathologic stage T1CN1 with 3 of 7+ nodes with a high-grade tumor. Stage IIb. Completed 4 courses of adjuvant chemotherapy in mid August.  Posttreatment CAT scan shows no evidence of disease. 2) Prognosis / Disease Status: Potentially curable. High risk for recurrence and completed adjuvant chemotherapy. 3) Work-up:    Labs: Labs pending from today   Imaging: All completed   Procedures: Port removal per Dr. Fabián Verma in the next several weeks.

## 2021-10-20 NOTE — PATIENT INSTRUCTIONS
Refer back to Dr. Gela Baez of surgery for removal of the [id number]  Labs today via port.   Follow-up with me in 6 months. 4/20/22  1 week before follow-up in 6 months  4/13/22   she will have a CBC, CMP and chest CT with contrast.

## 2021-10-20 NOTE — PLAN OF CARE
Problem: Musculor/Skeletal Functional Status  Goal: Absence of falls  Outcome: Met This Shift  Note: Patient free of falls this visit. Intervention: Fall precautions  Note: Fall risks assessed. Precautions discussed. Call light within reach during visit. Problem: Discharge Planning:  Goal: Discharged to appropriate level of care  Description: Discharged to appropriate level of care  Outcome: Met This Shift  Note: Patient verbalizes understanding of discharge instructions, follow up appointment, and when to call physician if needed   Intervention: Assess knowledge level of healthcare  Note: Discharge instructions given to pt and reviewed. Follow up appointments discussed. Problem: Infection - Central Venous Catheter-Associated Bloodstream Infection:  Goal: Will show no infection signs and symptoms  Description: Will show no infection signs and symptoms  Outcome: Met This Shift  Note: Mediport site with no redness or warmth. Skin over port site intact with no signs of breakdown noted. Patient verbalizes signs/symptoms of port infection and when to notify the physician. Intervention: Infection risk assessment  Note: Discuss port maintenance, infection prevention, signs of infection, and when to call the doctor. Care plan reviewed with patient. Patient verbalizes understanding of the plan of care and contributes to goal setting.

## 2021-10-22 PROBLEM — Z45.2 ENCOUNTER FOR REMOVAL OF TUNNELED CENTRAL VENOUS CATHETER (CVC) WITH PORT: Status: ACTIVE | Noted: 2021-10-22

## 2021-10-22 NOTE — PATIENT INSTRUCTIONS
No stitches to remove    Ok to get wet    Skin glue will stay on for up to 14 days and then peel off, ok to remove when edges start to peel off    No office follow up needed

## 2021-10-25 ENCOUNTER — PROCEDURE VISIT (OUTPATIENT)
Dept: SURGERY | Age: 61
End: 2021-10-25
Payer: COMMERCIAL

## 2021-10-25 VITALS
BODY MASS INDEX: 20.09 KG/M2 | TEMPERATURE: 96.1 F | RESPIRATION RATE: 20 BRPM | DIASTOLIC BLOOD PRESSURE: 62 MMHG | HEIGHT: 66 IN | HEART RATE: 52 BPM | SYSTOLIC BLOOD PRESSURE: 118 MMHG | WEIGHT: 125 LBS | OXYGEN SATURATION: 92 %

## 2021-10-25 DIAGNOSIS — Z45.2 ENCOUNTER FOR REMOVAL OF TUNNELED CENTRAL VENOUS CATHETER (CVC) WITH PORT: ICD-10-CM

## 2021-10-25 PROCEDURE — 36590 REMOVAL TUNNELED CV CATH: CPT | Performed by: SURGERY

## 2021-11-07 NOTE — PROGRESS NOTES
[place] [facility]   Pulmonary medicine follow up note. Patient: Farzana Nevarez                 : 1960                           [place]: Farzana Nevarez is a 62 y.o. old female came for follow up regarding her moderately severe COPD. She had a history of left lung upper lobe lung mass. She underwent CT-guided biopsy of the left upper lobe lung mass by interventional radiology service on 2021. She was diagnosed with Adenocarcinoma. She underwent MRI scan of the brain with and without contrast, full pulmonary function tests and PET scan. Had a PET scan was reported as FDG avid left lung mass corresponding to known malignancy along with Small, mildly FDG avid right lung nodules, possibly infectious or inflammatory but satellite malignancy. She was subsequently referred to Dr. Freddie Rodriguez. DO Richi pulmonologist at [facility] for a [address] as recommended by Dr. Josh Chang MD medical oncologist.  She underwent staging EBUS on 2021. She was found to have a positive lymph node for a lung adenocarcinoma at 11 L station rest of the mediastinal lymph nodes were negative. She underwent robotic assisted left upper lobectomy and mediastinal dissection along with cryotherapy of the intercostal nerves for postoperative pain control on 4 May 2021 by Dr. aSra Rock MD. postoperatively she recovered well however she developed a persistent leak from her left side chest tube. Due to persistent left-sided chest tube leak she was sent to further management by [facility] interventional pulmonologist Dr. Delbert Altamirano. Patient was subsequently discharged with a small bore chest tube on her left side of the chest from [facility]. Her left side chest tube was removed on 24 May 2021 by Dr. Sara Rock MD.  Currently she had a small wound for which she is applying triple antibiotic therapy.   She had a follow-up appointment with Dr. Gabe Smith MD today afternoon. She is awaiting to receive chemotherapy from Dr. Thalia Mckenna MD.  So far she did not received any chemotherapy or radiation therapy. She was initially referred from Dr. Kvng Eduardo.     On today's questioning:  She admits to occasional cough with no expectoration. She denies hemoptysis. She denies fever or chills. She denies recent hospitalizations or emergency room visits. She he is currently not using any maintenance inhalers  She is using her rescue inhaler rarely. She is currently on treatment with following inhalers/Nebs:  -Albuterol HFA 90mcg/Spray MDI, 2puffs  Q6Hprn.  -She refused to go on any maintenance inhalers. She still did not want to go on any maintenance inhalers. She admits to recent loss of weight or appetite changes. She denies recent decline in functional status. She is currently not using any home oxygen at home. 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 History of tobacco smoking:Yes  Amount of tobacco smokin.0 PPD. Years of tobacco smokin. Quit smoking: No.              Current smoker: Yes. Amount of current tobacco smoking: She still smoking 5 to 6 cigarettes/day. History of recreational or IV drug use in the past: She smokes marijuana occasionally.   History of alcohol use in the past:NO     History of exposure to coal mines/coal dust: NO  History of exposure to foundry dust/welding: NO  History of exposure to quarry/silica/sandblasting: NO  History of exposure to asbestos/working with breaks/ships: NO  History of exposure to farm dust: NO  History of recent travel to long distances: NO  History of exposure to birds, pigeons, or chickens in the past:NO  Pet animals at home:Yes  Dogs: 2  Cats: 2     History of pulmonary embolism in the past: No            History of DVT in the past:No                         Review of Systems:   General/Constitutional: she lost 6 lbs of weight from the last visit. No fever or chills. HENT: Negative. Eyes: Negative. Upper respiratory tract: No nasal stuffiness or post nasal drip. Lower respiratory tract/ lungs: See HPI  Cardiovascular: No palpitations or chest pain. Gastrointestinal: No nausea or vomiting. Neurological: No focal neurologiacal weakness. Extremities: No edema. Musculoskeletal: No complaints. Genitourinary: No complaints. Hematological: Negative. Psychiatric/Behavioral: Negative. Skin: No itching. Healing wound present in the inferior lateral aspect of left breast from her previous chest tube placement. With a Band-Aid.       Past Medical History:   Diagnosis Date    Arthritis     [facility]     Dr Kyra Drummond [facility])     Dr Hue Rahman       Past Surgical History:   Procedure Laterality Date    BRONCHOSCOPY N/A 05/06/2021    BRONCHOSCOPY performed by Skye Brewer MD at [address]      x4    CHOLECYSTECTOMY      COLONOSCOPY  07/11/2016    CT NEEDLE BIOPSY LUNG PERCUTANEOUS  03/26/2021    CT NEEDLE BIOPSY LUNG PERCUTANEOUS 3/26/2021 [facility] CT SCAN    HYSTERECTOMY      LUNG REMOVAL, TOTAL Left 05/04/2021    ROBOTIC ASSISTED LEFT UPPER LOBECTOMY AND MEDIASTINAL DISSECTION, POSSIBLE OPEN AND CRYOTHERAPY OF INTERCOSTAL NERVES 5, 6, 7 & 8 performed by Lucila Almonte MD at [address] [place] [address]  10/25/2021    port removal Dr Blake Rider in the procedure room    PORT SURGERY Left 06/10/2021    LEFT SUBCLAVIAN SINGLE LUMEN SMART PORT INSERTION performed by Olivia Andersen Galileo Gaytan MD at [address] [place] [address], [address]  07/11/2016       Social History     Tobacco Use    Smoking status: Current Every Day Smoker     Packs/day: 0.25     Years: 45.00     Pack years: 11.25     Types: Cigarettes    Smokeless tobacco: Never Used   Vaping Use    Vaping Use: Never used   Substance Use Topics    Alcohol use: Never    Drug use: Yes     Types: Marijuana (Weed)     Comment: past month       Allergies   Allergen Reactions    Tylenol With Codeine #3 [Acetaminophen-Codeine] Nausea And Vomiting    Codeine Nausea And Vomiting       Current Outpatient Medications   Medication Sig Dispense Refill    vitamin D3 (CHOLECALCIFEROL) 125 MCG (5000 UT) TABS tablet Cholecalciferol (Vitamin D3) (Vitamin D3) 125 mcg (5,000 unit) Tablet Active 125 MCG PO Daily April 22nd, 2021 4:50pm      acetaminophen (TYLENOL) 500 MG tablet Take 1 tablet by mouth 4 times daily as needed for Pain 120 tablet 0    albuterol sulfate HFA (VENTOLIN HFA) 108 (90 Base) MCG/ACT inhaler Inhale 2 puffs into the lungs 4 times daily as needed for Wheezing 1 Inhaler 11     No current facility-administered medications for this visit. Family History   Problem Relation Age of Onset    Other Mother         aneursym    No Known Problems Father     Depression Sister     Heart Disease Brother     Diabetes Brother     Other Brother         Lung disease    Lung Cancer Maternal Grandfather     Other Brother         Crohns    Macular Degen Brother     Depression Sister     Other Sister         Blood clots    Arthritis Sister     Brain Cancer Paternal Uncle           Vitals: LMP  (LMP Unknown)         Nursing note and vitals reviewed. Constitutional: Patient appears moderately built and moderately nourished. No distress. Patient is oriented to person, place, and time. HENT:   Head: Normocephalic and atraumatic.    Right Ear: External ear normal.   Left Ear: External ear normal.   Mouth/Throat: Oropharynx is clear DIAGNOSIS:   Lung, left, needle core biopsies:       Adenocarcinoma. Chest x-ray PA view:  PROCEDURE: XR CHEST PA INSPIRATION 1 VW   1. No pneumothorax following left lung lesion biopsy. There is redemonstration of a nodular density overlying the left midlung. CXR  Tue May 11, 2021  3:52 AM   Exam: One View of the chest   Impression: Stable small left pneumothorax.          PET scan:  4/14/2021 10:04 AM   PROCEDURE: PET CT SKULL BASE TO MID THIGH   1. FDG avid left lung mass corresponding to known malignancy. 2. Small, mildly FDG avid right lung nodules, possibly infectious or inflammatory but satellite malignancy cannot be excluded. MRI scan of the brain with and without contrast:   PROCEDURE: MRI BRAIN W WO CONTRAST   1. No evidence of intracranial metastases or other acute intracranial abnormality. 2. Moderate severity chronic microvascular angiopathy. 3. Mild to moderate mucosal inflammation in the paranasal sinuses with air-fluid levels as evidence for an acute component     Pulmonary function tests: Performed on 6 April 2021 4/30/21  Cytology   FINAL DIAGNOSIS:   Slides received from [facility], [id number], [address] [place] [address] Drive 4/23/21:   [facility] 4R, lymph node biopsy:    Negative for malignancy. [facility] 7, lymph node biopsy:    Lymphoid tissue and granuloma.    Negative for malignancy. C - Station 4L, lymph node biopsy:    Lymphoid tissue and possible granulomatous inflammation.    Negative for malignancy. D - Station 11L, lymph node biopsy:    Consistent with lung adenocarcinoma.   See microscopic. CT scan of chest without contrast performed on 1 June 2021:  Tue Jun 1, 2021  8:35 PM   CT of the chest was performed without contrast.   Impression: Small partially loculated left pleural effusion containing a small amount of gas. Empyema should be excluded. There is a trace left apical pneumothorax benign emphysema.  Indeterminate small ill-defined density right upper lobe recommend documentation of long-term stability. Other findings as above. Assessment:  -3.9 x 2.1 cm spiculated cavitary mass present within the left upper lobe S/p CT guided biopsy by IR service on 3/26/2021. The biopsy is consistent with Adenocarcinoma. She underwent robotic assisted left upper lobectomy and mediastinal dissection along with cryotherapy of the intercostal nerves for postoperative pain control- She is following with Dr. Radha Baldwin MD   -Hx of Subcutaneous emphysema with left sided chest tube placement-persistent airleak underwent bronchoscopy 5/6/2021   -Moderate COPD-she is using albuterol HFA 2 puffs every 6 hourly as needed (Ventolin HFA). She refused to go on any maintenance inhalers.  -Chronic history of tobacco smoking for 45 years with 1 pack of cigarettes per day. She is planning to quit smoking. Recommendations/Plan:  -Patient educated to quit smoking as soon as possible. Patient verbalizes understanding of adverse consequences of tobacco smoking.  -Continue albuterol HFA 90mcg/Spray MDI, 2puffs  Q6Hprn.  -She was advised to continue triple ointment cream application topically to the left side old chest tube site wound.  -Patient advised to continue current inhalers and keep good compliance. Patient verbalizes understanding.  -Patient educated to update his pneumococcal vaccine with family physician and take influenza vaccine in coming season with out fail. Patient verbalizes understanding. Patient told me that she do not take flu shots or pneumonia vaccines. -She was advised to keep her scheduled follow-up appointment with Dr. Fabiano Hines MD at [facility] for further evaluation and management of abnormal CT scan of chest dated 1 June 2021.  -She was advised to keep her scheduled appointment for Medical Oncology consultation with Dr. Michael Pedroza MD- Medical Oncologist for further evaluation and management of lung adenocarcinoma.   She is currently in the process of getting chemotherapy.  -Schedule patient for follow up with my clinic in 6 months for clinical re evaluation regarding her COPD. Hernando Ramos Patient advised to make early appointment if needed. -Chelsi Parrish was advised to make early appointment with my clinic if she develops any constitutional symptoms including loss of weight, poor appetite or hemoptysis. She verbalizes under standing.  - Patient  educated about my impression and plan. She verbalizes understanding.       -I personally reviewed and updated the Past medical hx, Past surgical hx,Social hx, Family hx, Medications, Allergies in the discrete data section of the patient chart. I also reviewed pertaining labs and Pulmonary medicine,Sleep medicine related, Pathological, Microbiological and Radiological investigations.

## 2021-11-10 NOTE — PROGRESS NOTES
[facility]    Patient: Ramos Pickett, 62 y.o.   : 1960    Patient of Dr. Colton Humphreys   Patient presents with    Follow-up     6 month follow up, no testing         COPD  She complains of shortness of breath and wheezing (\"every now and then\"). There is no chest tightness, cough, hemoptysis or sputum production. This is a chronic problem. The current episode started more than 1 year ago. The problem occurs every several days. The problem has been unchanged. Pertinent negatives include no appetite change, chest pain, dyspnea on exertion, fever, nasal congestion, orthopnea, postnasal drip, rhinorrhea, sneezing, sore throat or weight loss. Her symptoms are aggravated by strenuous activity, exercise and change in weather. Her symptoms are alleviated by beta-agonist. She reports moderate improvement on treatment. Risk factors for lung disease include animal exposure and smoking/tobacco exposure. Her past medical history is significant for COPD. Anthony Bradley is here for follow up for moderate COPD and lung cancer. Patient was last seen by Dr. Ciara Mark on 2021. At the time of that appointment, she was to continue as needed albuterol, as she had declined daily inhaler therapy. Patient had undergone CT-guided biopsy of SHANNON lung mass by IR on 3/26/2021 and was diagnosed with adenocarcinoma. PET scan revealed FDG avid left lung mass along with small, mildly FDG avid right lung nodules. She underwent staging EBUS by Dr. Abdelrahman Mahan at [facility] and was found to have a (+) lymph node for a lung adenocarcinoma at 11 L station. She underwent robotic assisted left upper lobectomy and mediastinal dissection along with cryotherapy of the intercostal nerves for postop pain control on 2021 by Dr. Domingo Broussard. She had a persistent left-sided chest tube leak and was sent to [facility] pulmonologist, Dr. Dai Case.  She was discharged from [facility] with a small bore chest tube that was removed on 5/24/2021 by Dr. Fco Barcenas. Patient has been started on adjuvant chemotherapy by Dr. Nneka Abreu - cis-platinum 75 mg meter squared day 1 along with Alimta 500 mg meter squared day 1 through 21 days x 4. Patient's start date was June 16. Dr. Nneka Abreu had reviewed patient's case with Dr. Kourtney Steel and the N1 disease even with extranodal extension would not merit radiation, especially after complete resection. Patient follows with Dr. Nneka Abrue next in April 2022 with CBC, CMP, and CT Chest prior to her appointment. Patient reports that she has completed chemo and is currently cancer free. Overall patient reports respiratory symptoms have been a little worse since last appointment. She attributes this to the cold weather and going back to work. Patient using albuterol 2-3 times per week on average. Patient reports no physical limitation due to respiratory symptoms. Her past medical history is significant for COPD, aneurysm (follows Dr. Polo Hernandez), lung cancer, and arthritis. She denies history of glaucoma or urinary retention. MMRC Dyspnea Scale:   0: Dyspneic on strenuous exercise  1: Dyspneic on walking a slight hill  2: Dyspneic on walking level ground; must stop occasionally due to breathlessness  3: Must stop for breathlessness after walking 100 yards or after a few minutes  4: Cannot leave house; breathless on dressing/undressing    MMRC dyspnea score: 0    Progress History:   Since last visit any new medical issues? No  New ER or hospital visits? No  Any new or changes in medicines? No  Using inhalers? Yes as needed albuterol   Are they helpful? Yes   Any recent exacerbations? No  Last PFT: 4/6/2021 - moderate obstruction, air trapping, and decreased diffusion  Last 6 MWT: none in epic    Smoking History:  Current smoker of 5-7 cigarettes per day with 45 PY history  Patient reports that she has nicotine patches at home.     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 (Coal, dust, asbestos, molds ie Hay)  Denies living on a farm that primarily raised crops, livestock or combination  Admits to exposure to pets/animals at home. 2 dogs  Denies exposure to tuberculosis. Flu vaccine: Not vaccinated and does not wish to receive vaccination  Pneumonia vaccine: Not vaccinated and does not wish to receive vaccination  COVID-19 vaccine: Not vaccinated and does not wish to receive vaccination  Past Medical hx   PMH:  Past Medical History:   Diagnosis Date    Arthritis     COPD (chronic obstructive pulmonary disease) ([facility] [address]     Dr Rowdy Bustillo [facility])     Dr Centeno Redding:  Past Surgical History:   Procedure Laterality Date    BRONCHOSCOPY N/A 05/06/2021    BRONCHOSCOPY performed by Roberto Gaytan MD at [address] [place] [address]      x4   [address] [place] [address]  07/11/2016    CT NEEDLE BIOPSY LUNG PERCUTANEOUS  03/26/2021    CT NEEDLE BIOPSY LUNG PERCUTANEOUS 3/26/2021 [facility] CT SCAN    HYSTERECTOMY      LUNG REMOVAL, TOTAL Left 05/04/2021    ROBOTIC ASSISTED LEFT UPPER LOBECTOMY AND MEDIASTINAL DISSECTION, POSSIBLE OPEN AND CRYOTHERAPY OF INTERCOSTAL NERVES 5, 6, 7 & 8 performed by Parker Ma MD at [address] [place] [address]  10/25/2021    port removal Dr Radha Powell in the procedure room    PORT SURGERY Left 06/10/2021    LEFT SUBCLAVIAN SINGLE LUMEN SMART PORT INSERTION performed by Parker Ma MD at [place] [address]  07/11/2016     SOCIAL HISTORY:  Social History     Tobacco Use    Smoking status: Current Every Day Smoker     Packs/day: 0.25     Years: 45.00     Pack years: 11.25     Types: Cigarettes    Smokeless tobacco: Never Used    Tobacco comment: 5-7 cigs.  a day 12/8/21   Vaping Use    Vaping Use: Never used   Substance Use Topics    Alcohol use: Never    Drug use: Yes     Types: Marijuana (Weed)     Comment: occasionally      ALLERGIES:  Allergies   Allergen Reactions    Tylenol With Codeine #3 [Acetaminophen-Codeine] Nausea And Vomiting    Codeine Nausea And Vomiting     FAMILY HISTORY:  Family History   Problem Relation Age of Onset    Other Mother         aneursym    No Known Problems Father     Depression Sister     Heart Disease Brother     Diabetes Brother     Other Brother         Lung disease    Lung Cancer Maternal Grandfather     Other Brother         Crohns    Macular Degen Brother     Depression Sister     Other Sister         Blood clots    Arthritis Sister     Brain Cancer Paternal Uncle      CURRENT MEDICATIONS:  Current Outpatient Medications   Medication Sig Dispense Refill    umeclidinium-vilanterol (ANORO ELLIPTA) 62.5-25 MCG/INH AEPB inhaler Inhale 1 puff into the lungs daily 60 each 11    albuterol sulfate HFA (VENTOLIN HFA) 108 (90 Base) MCG/ACT inhaler Inhale 2 puffs into the lungs every 6 hours as needed for Wheezing or Shortness of Breath 8 g 11    vitamin D3 (CHOLECALCIFEROL) 125 MCG (5000 UT) TABS tablet Cholecalciferol (Vitamin D3) (Vitamin D3) 125 mcg (5,000 unit) Tablet Active 125 MCG PO Daily April 22nd, 2021 4:50pm      acetaminophen (TYLENOL) 500 MG tablet Take 1 tablet by mouth 4 times daily as needed for Pain 120 tablet 0     No current facility-administered medications for this visit. Karla HOOK   Review of Systems   Constitutional: Negative for appetite change, chills, fever and weight loss. HENT: Negative for congestion, postnasal drip, rhinorrhea, sinus pressure, sinus pain, sneezing and sore throat. Eyes: Negative for itching. Respiratory: Positive for shortness of breath and wheezing (\"every now and then\"). Negative for cough, hemoptysis, sputum production and chest tightness. Cardiovascular: Positive for palpitations (reports due to aneurysm). Negative for chest pain, dyspnea on exertion and leg swelling. Gastrointestinal: Negative for constipation and diarrhea. Gastroparesis history   Genitourinary: Negative for difficulty urinating. Musculoskeletal:        Reports that she still has numbness and burning sensation from her surgery   Allergic/Immunologic: Positive for environmental allergies. Physical exam   /78   Pulse 59   Temp 97.7 °F (36.5 °C)   Ht 5' 6\" (1.676 m)   Wt 125 lb (56.7 kg)   LMP  (LMP Unknown)   SpO2 98% Comment: r/a  BMI 20.18 kg/m²    Wt Readings from Last 3 Encounters:   12/08/21 125 lb (56.7 kg)   10/25/21 125 lb (56.7 kg)   10/20/21 125 lb (56.7 kg)       Physical Exam  Constitutional:       Appearance: She is well-developed. Comments: BMI 20  Clothing smells of tobacco smoke   HENT:      Right Ear: External ear normal.      Left Ear: External ear normal.      Mouth/Throat:      Mouth: Mucous membranes are moist.      Pharynx: Oropharynx is clear. No oropharyngeal exudate. Eyes:      General:         Right eye: No discharge. Left eye: No discharge. Cardiovascular:      Rate and Rhythm: Normal rate and regular rhythm. Pulmonary:      Effort: Pulmonary effort is normal.      Breath sounds: No wheezing, rhonchi or rales. Comments: Diminished in bilateral posterior bases  Chest:      Chest wall: No tenderness. Abdominal:      General: Abdomen is flat. There is no distension. Tenderness: There is no abdominal tenderness. Musculoskeletal:      Cervical back: Neck supple. Right lower leg: No edema. Left lower leg: No edema. Skin:     General: Skin is warm and dry. Neurological:      General: No focal deficit present. Mental Status: She is alert. Psychiatric:         Mood and Affect: Mood normal.         Behavior: Behavior normal.         Thought Content:  Thought content normal.         Judgment: Judgment normal.          Results   Lung Nodule Screening     [x] Qualifies    [] Does not qualify   [] Declined    [] Completed  45 PY history   The USPSTF recommends annual screening for lung cancer with low-dose computed tomography (LDCT) in adults aged 48 to 80 years who have a 20 pack-year smoking history and currently smoke or have quit within the past 15 years. Screening should be discontinued once a person has not smoked for 15 years or develops a health problem that substantially limits life expectancy or the ability or willingness to have curative lung surgery. CT Chest 10/4/2021 (Reviewed) stable RUL densities, likely benign and prominent right hilar lymph node that is likely reactive  Narrative   PROCEDURE: CT CHEST W CONTRAST       CLINICAL INFORMATION: Adenocarcinoma of the left lung, status post resection and chemotherapy.       TECHNIQUE: CT of the chest was performed following administration of 80 mL Isovue-370 intravenous contrast. Axial images as well as coronal and sagittal instructions were obtained.       All CT scans at this facility use dose modulation, iterative reconstruction, and/or weight-based dosing when appropriate to reduce radiation dose to as low as reasonably achievable.       COMPARISON: CT chest 6/1/2021       FINDINGS: Cardiac size is normal. There is stable shift of cardiomediastinal structures to the left side due to postsurgical volume loss. Atherosclerotic calcifications are present in the thoracic aorta and coronary arteries. Ascending thoracic aorta is    at the upper limits of normal in size. There is no pleural or pericardial effusion. Indistinct densities measuring 6 mm in the right upper lobe are stable (image 17). A prominent right hilar lymph node is not pathologically enlarged by CT criteria and    may be reactive. There is no mediastinal, left hilar or axillary lymphadenopathy. Degenerative changes are present in the thoracic spine without evidence of aggressive osseous lesions.       The gallbladder is surgically absent.  Prominence of the common bile duct is likely postsurgical.         Impression   1. No acute intrathoracic findings. 2. Stable right upper lobe densities, likely benign. 3. Prominent right hilar lymph node, likely reactive.       Final report electronically signed by Dr. Richie Givens on 10/4/2021 9:04 AM             Assessment      Diagnosis Orders   1. Moderate COPD (chronic obstructive pulmonary disease) (HCC)  umeclidinium-vilanterol (ANORO ELLIPTA) 62.5-25 MCG/INH AEPB inhaler    albuterol sulfate HFA (VENTOLIN HFA) 108 (90 Base) MCG/ACT inhaler   2. Adenocarcinoma of left lung ([address]     3. Tobacco abuse           Plan   1. Moderate COPD (chronic obstructive pulmonary disease) (HCC)  - Symptoms not well controlled. Start Anoro, 2 samples of Anoro given today. - Discussed albuterol inhaler use. Reviewed signs and symptoms indicating the need for use including shortness of breath and wheezing. Discussed with the patient the importance of using the inhaler within the prescribed time frames. Patient verbalized understanding to use within the prescribed time frame. Patient also verbalized understanding that if there is no relief of symptoms after using albuterol and resting for 15 minutes they need to go to nearest ER or call 911.  - Discussed A1A swab with patient today, she would like to discuss further at next appointment  - Patient declines vaccinations  - umeclidinium-vilanterol (ANORO ELLIPTA) 62.5-25 MCG/INH AEPB inhaler; Inhale 1 puff into the lungs daily  Dispense: 60 each; Refill: 11  - albuterol sulfate HFA (VENTOLIN HFA) 108 (90 Base) MCG/ACT inhaler; Inhale 2 puffs into the lungs every 6 hours as needed for Wheezing or Shortness of Breath  Dispense: 8 g; Refill: 11    2. Adenocarcinoma of left lung [facility])  - Patient follows with Dr. Bella Bone and has follow up with CT Chest prior in April 2022. Advised patient to continue with Dr. Bella Bone    3. Tobacco abuse  - Patient reports that she is not currently ready to quit smoking.  She reports that she does have nicotine patches at home. - Discussed with patient smoking cessation techniques including patches and gum patient reports has used in the past and did not help, reinforced to patient the importance of quitting smoking to prevent further damage to lung function, patient verbalized understanding. (Approximately 5 mins)     Advised patient to call office with any changes, questions, or concerns regarding respiratory status or issues with prescribed medications    Return in about 3 months (around 3/8/2022) for COPD med check.        Electronically signed by ERYN Guillen CNP on 12/8/2021 at 9:19 AM

## 2021-12-08 ENCOUNTER — OFFICE VISIT (OUTPATIENT)
Dept: PULMONOLOGY | Age: 61
End: 2021-12-08
Payer: COMMERCIAL

## 2021-12-08 VITALS
WEIGHT: 125 LBS | BODY MASS INDEX: 20.09 KG/M2 | TEMPERATURE: 97.7 F | HEIGHT: 66 IN | OXYGEN SATURATION: 98 % | SYSTOLIC BLOOD PRESSURE: 110 MMHG | HEART RATE: 59 BPM | DIASTOLIC BLOOD PRESSURE: 78 MMHG

## 2021-12-08 DIAGNOSIS — Z72.0 TOBACCO ABUSE: ICD-10-CM

## 2021-12-08 DIAGNOSIS — C34.92 ADENOCARCINOMA OF LEFT LUNG (HCC): ICD-10-CM

## 2021-12-08 DIAGNOSIS — J44.9 MODERATE COPD (CHRONIC OBSTRUCTIVE PULMONARY DISEASE) (HCC): Primary | ICD-10-CM

## 2021-12-08 PROCEDURE — 99214 OFFICE O/P EST MOD 30 MIN: CPT

## 2021-12-08 PROCEDURE — 99406 BEHAV CHNG SMOKING 3-10 MIN: CPT

## 2021-12-08 RX ORDER — UMECLIDINIUM BROMIDE AND VILANTEROL TRIFENATATE 62.5; 25 UG/1; UG/1
1 POWDER RESPIRATORY (INHALATION) DAILY
Qty: 60 EACH | Refills: 11 | Status: SHIPPED | OUTPATIENT
Start: 2021-12-08 | End: 2022-12-08

## 2021-12-08 RX ORDER — ALBUTEROL SULFATE 90 UG/1
2 AEROSOL, METERED RESPIRATORY (INHALATION) EVERY 6 HOURS PRN
Qty: 8 G | Refills: 11 | Status: SHIPPED | OUTPATIENT
Start: 2021-12-08

## 2021-12-08 ASSESSMENT — ENCOUNTER SYMPTOMS
SHORTNESS OF BREATH: 1
WHEEZING: 1
DIARRHEA: 0
CHEST TIGHTNESS: 0
SORE THROAT: 0
RHINORRHEA: 0
SINUS PAIN: 0
COUGH: 0
SINUS PRESSURE: 0
CONSTIPATION: 0
HEMOPTYSIS: 0
EYE ITCHING: 0
SPUTUM PRODUCTION: 0

## 2021-12-08 ASSESSMENT — COPD QUESTIONNAIRES: COPD: 1

## 2021-12-08 NOTE — PATIENT INSTRUCTIONS
your risk for many other types of cancer is lower too. How would quitting help others in your life? When you quit smoking, you improve the health of everyone who now breathes in your smoke. · Their heart, lung, and cancer risks drop, much like yours. · They are sick less. For babies and small children, living smoke-free means they're less likely to have ear infections, pneumonia, and bronchitis. · If you're a woman who is or will be pregnant someday, quitting smoking means a healthier . · Children who are close to you are less likely to become adult smokers. Where can you learn more? Go to https://[employer]eweb.[employer]. org and sign in to your [employer] account. Enter [phone] 11 in the [employer] box to learn more about \"Learning About Benefits From Quitting Smoking. \"     If you do not have an account, please click on the \"Sign Up Now\" link. Current as of: 2021               Content Version: 12.9   [employer]wise, [employer]. Care instructions adapted under license by [facility] ([facility]). If you have questions about a medical condition or this instruction, always ask your healthcare professional. [place] [address] any warranty or liability for your use of this information.

## 2021-12-09 ENCOUNTER — TELEPHONE (OUTPATIENT)
Dept: PULMONOLOGY | Age: 61
End: 2021-12-09

## 2021-12-10 NOTE — TELEPHONE ENCOUNTER
Patient phoned in and was notified that she needs to call insurance and get alternative, patient voiced understanding and reported that she would call on Monday.

## 2022-02-08 NOTE — PROGRESS NOTES
[facility]    Patient: Pedrito Montanez, 62 y.o.   : 1960  3/10/2022    Patient of Dr. Cande Grimaldo   Patient presents with    Follow-up     copd/lung ca 3 month med check        Sheree Van is here for follow up for moderate COPD and lung cancer. At patient's last appointment, she was started on Anoro. Patient has a follow up with her oncologist, Dr. Gimenez Reason with a CT Chest next month. Patient is using robitussin to help thin her mucus. Overall patient reports respiratory symptoms have been stable since last appointment. Patient reports that she has continued burning pain under her left breast that hurts to the touch and numbness since her lobectomy. Patient is taking Anoro compliantly. Patient has only required albuterol once since starting Anoro. Patient reports some physical limitation due to respiratory symptoms. Her past medical history is significant for COPD, aneurysm (follows Dr. Halima Arthur), lung cancer, and arthritis. COPD  She complains of cough, shortness of breath (at the end of the day) and sputum production. There is no chest tightness, hemoptysis or wheezing. This is a chronic problem. The current episode started more than 1 year ago. The problem occurs daily. The problem has been unchanged. The cough is productive of sputum (grey and sticky). Associated symptoms include rhinorrhea. Pertinent negatives include no appetite change, chest pain, dyspnea on exertion, fever, nasal congestion, postnasal drip, sneezing, sore throat or weight loss. Her symptoms are aggravated by strenuous activity, exercise and change in weather (Cold air). Her symptoms are alleviated by beta-agonist. She reports significant improvement on treatment. Risk factors for lung disease include smoking/tobacco exposure and animal exposure. Her past medical history is significant for COPD.      MMRC Dyspnea Scale:   0: Dyspneic on strenuous exercise  1: Dyspneic on walking a slight hill  2: Dyspneic on walking level ground; must stop occasionally due to breathlessness  3: Must stop for breathlessness after walking 100 yards or after a few minutes  4: Cannot leave house; breathless on dressing/undressing    MMRC dyspnea score: 0    HPI from previous visit  Patient had undergone CT-guided biopsy of SHANNON lung mass by IR on 3/26/2021 and was diagnosed with adenocarcinoma. PET scan revealed FDG avid left lung mass along with small, mildly FDG avid right lung nodules. She underwent staging EBUS by Dr. Svetlana Caba at [facility] and was found to have a (+) lymph node for a lung adenocarcinoma at 11 L station. She underwent robotic assisted left upper lobectomy and mediastinal dissection along with cryotherapy of the intercostal nerves for postop pain control on 5/4/2021 by Dr. Clementina Mercado. She had a persistent left-sided chest tube leak and was sent to [facility] pulmonologist, Dr. Nichol Buckner. She was discharged from [facility] with a small bore chest tube that was removed on 5/24/2021 by Dr. Clementina Mercado. Patient has been started on adjuvant chemotherapy by Dr. Jamison Holley - cis-platinum 75 mg meter squared day 1 along with Alimta 500 mg meter squared day 1 through 21 days x 4.  Patient's start date was June 16. Dr. Jamison Holley had reviewed patient's case with Dr. Bhavin Aldana and the N1 disease even with extranodal extension would not merit radiation, especially after complete resection. Patient follows with Dr. Jamison Holley next in April 2022 with CBC, CMP, and CT Chest prior to her appointment. Patient reports that she has completed chemo and is currently cancer free. Progress History:   Since last visit any new medical issues? No  New ER or hospital visits? No  Any new or changes in medicines? No  Using inhalers? Yes Anoro and as needed albuterol   Are they helpful? Yes   Any recent exacerbations?  No  Last PFT: 4/6/2021 - moderate obstruction, air trapping, and decreased diffusion  Last 6 MWT: none in epic     Smoking History:  Current smoker of 10 cigarettes per day with 39 PY history  Patient reports that she has nicotine patches at home.     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 (Coal, dust, asbestos, molds ie Hay)  Denies living on a farm that primarily raised crops, livestock or combination  Admits to exposure to pets/animals at home. 2 dogs  Denies exposure to tuberculosis.   She denies history of glaucoma or urinary retention.     Flu vaccine: Not vaccinated and does not wish to receive vaccination  Pneumonia vaccine: Not vaccinated and does not wish to receive vaccination  COVID-19 vaccine: Not vaccinated and does not wish to receive vaccination  Past Medical hx   PMH:  Past Medical History:   Diagnosis Date    Arthritis     [facility]     Dr Miller Mis [facility])     Dr Centeno Perez:  Past Surgical History:   Procedure Laterality Date    BRONCHOSCOPY N/A 05/06/2021    BRONCHOSCOPY performed by Kirstin Kennedy MD at [address] [place] [address]      x4   [address] [place] [address]  07/11/2016    CT NEEDLE BIOPSY LUNG PERCUTANEOUS  03/26/2021    CT NEEDLE BIOPSY LUNG PERCUTANEOUS 3/26/2021 [facility] CT SCAN    HYSTERECTOMY      LUNG REMOVAL, TOTAL Left 05/04/2021    ROBOTIC ASSISTED LEFT UPPER LOBECTOMY AND MEDIASTINAL DISSECTION, POSSIBLE OPEN AND CRYOTHERAPY OF INTERCOSTAL NERVES 5, 6, 7 & 8 [facility]  [facility]    port removal Dr Pastor Santiago in the procedure room    PORT SURGERY Left 06/10/2021    LEFT SUBCLAVIAN SINGLE LUMEN SMART PORT INSERTION performed by Mayela Parker MD at [address] [facility]  07/11/2016     SOCIAL HISTORY:  Social History     Tobacco Use    Smoking status: Current Every Day Smoker     Packs/day: 0.25 Years: 45.00     Pack years: 11.25     Types: Cigarettes    Smokeless tobacco: Never Used    Tobacco comment: 10 cig per day 3/10/22   Vaping Use    Vaping Use: Never used   Substance Use Topics    Alcohol use: Never    Drug use: Yes     Types: Marijuana William Heredia)     Comment: occasionally 3/10/22     ALLERGIES:  Allergies   Allergen Reactions    Tylenol With Codeine #3 [Acetaminophen-Codeine] Nausea And Vomiting    Codeine Nausea And Vomiting     FAMILY HISTORY:  Family History   Problem Relation Age of Onset    Other Mother         aneursym    No Known Problems Father     Depression Sister     Heart Disease Brother     Diabetes Brother     Other Brother         Lung disease    Lung Cancer Maternal Grandfather     Other Brother         Crohns    Macular Degen Brother     Depression Sister     Other Sister         Blood clots    Arthritis Sister     Brain Cancer Paternal Uncle      CURRENT MEDICATIONS:  Current Outpatient Medications   Medication Sig Dispense Refill    Pseudoephedrine-DM-GG (ROBITUSSIN CF PO) Take by mouth      umeclidinium-vilanterol (ANORO ELLIPTA) 62.5-25 MCG/INH AEPB inhaler Inhale 1 puff into the lungs daily 60 each 11    albuterol sulfate HFA (VENTOLIN HFA) 108 (90 Base) MCG/ACT inhaler Inhale 2 puffs into the lungs every 6 hours as needed for Wheezing or Shortness of Breath 8 g 11    acetaminophen (TYLENOL) 500 MG tablet Take 1 tablet by mouth 4 times daily as needed for Pain 120 tablet 0    vitamin D3 (CHOLECALCIFEROL) 125 MCG (5000 UT) TABS tablet Cholecalciferol (Vitamin D3) (Vitamin D3) 125 mcg (5,000 unit) Tablet Active 125 MCG PO Daily April 22nd, 2021 4:50pm (Patient not taking: Reported on 3/10/2022)       No current facility-administered medications for this visit. Everette HOOK   Review of Systems   Constitutional: Negative for appetite change, fever, unexpected weight change and weight loss. HENT: Positive for rhinorrhea.  Negative for postnasal drip, sinus pressure, sinus pain, sneezing and sore throat. Respiratory: Positive for cough, sputum production and shortness of breath (at the end of the day). Negative for hemoptysis and wheezing. Cardiovascular: Positive for palpitations. Negative for chest pain, dyspnea on exertion and leg swelling. Follows with Dr. Halima Arthur   Genitourinary: Negative for difficulty urinating. Allergic/Immunologic: Positive for environmental allergies. Physical exam   /78 (Site: Right Upper Arm, Position: Sitting, Cuff Size: Medium Adult)   Pulse 67   Temp 97.7 °F (36.5 °C) (Oral)   Ht 5' 6\" (1.676 m)   Wt 128 lb 12.8 oz (58.4 kg)   LMP  (LMP Unknown)   SpO2 97% Comment: room air  BMI 20.79 kg/m²    Wt Readings from Last 3 Encounters:   03/10/22 128 lb 12.8 oz (58.4 kg)   12/08/21 125 lb (56.7 kg)   10/25/21 125 lb (56.7 kg)       Physical Exam  Constitutional:       Appearance: She is well-developed. Comments: BMI 20.79  Clothing smells strongly of tobacco   HENT:      Head: Normocephalic. Right Ear: External ear normal.      Left Ear: External ear normal.      Mouth/Throat:      Mouth: Mucous membranes are moist.      Pharynx: Oropharynx is clear. No oropharyngeal exudate. Eyes:      General:         Right eye: No discharge. Left eye: No discharge. Cardiovascular:      Rate and Rhythm: Normal rate and regular rhythm. Pulmonary:      Effort: Pulmonary effort is normal.      Breath sounds: No wheezing, rhonchi or rales. Comments: Diminished breath sounds in bilateral posterior bases  Chest:      Chest wall: No tenderness. Abdominal:      General: Bowel sounds are normal.   Musculoskeletal:      Cervical back: Neck supple. Right lower leg: No edema. Left lower leg: No edema. Skin:     General: Skin is warm and dry. Neurological:      General: No focal deficit present. Mental Status: She is alert.    Psychiatric:         Mood and Affect: Mood normal. Behavior: Behavior normal.         Thought Content: Thought content normal.         Judgment: Judgment normal.          Results   Lung Nodule Screening     [x] Qualifies    [] Does not qualify   [] Declined    [] Completed  39 PY history   The USPSTF recommends annual screening for lung cancer with low-dose computed tomography (LDCT) in adults aged 48 to [de-identified] years who have a 20 pack-year smoking history and currently smoke or have quit within the past 15 years. Screening should be discontinued once a person has not smoked for 15 years or develops a health problem that substantially limits life expectancy or the ability or willingness to have curative lung surgery. Assessment      Diagnosis Orders   1. Moderate COPD (chronic obstructive pulmonary disease) ([address]  DME Order for (Specify) as OP    Alpha-1-Antitrypsin   2. Mucopurulent chronic bronchitis ([facility]  DME Order for (Specify) as OP   3. Adenocarcinoma of left lung ([facility]     4. Tobacco abuse           Plan   1. Moderate COPD (chronic obstructive pulmonary disease) with Mucopurulent chronic bronchitis   - Symptoms well controlled on Anoro, will continue Anoro at current dose  - Discussed albuterol inhaler use. Reviewed signs and symptoms indicating the need for use including shortness of breath and wheezing. Discussed with the patient the importance of using the inhaler within the prescribed time frames. Patient verbalized understanding to use within the prescribed time frame.  - Patient is unable to produce sputum on her own. She requires oscillating flutter device to assist with cough clearance and mucus production. Order placed for acapella. - Patient declines preventative vaccinations  - Alpha-1-Antitrypsin swab in office today    2.  Adenocarcinoma of left lung ([facility]  - Continue with Dr. Orly Mai with planned CT Chest next month  - Patient has had continued burning pain under her left breast that hurts to the touch and numbness since her lobectomy with cyrotherapy. Advised patient to discuss with her surgeon, Dr. Yennifer Medina, for recommendations. 3. Tobacco abuse  - Discussed with patient smoking cessation techniques including patches and gum patient reports has used in the past and did not help, reinforced to patient the importance of quitting smoking to prevent further damage to lung function, patient verbalized understanding. (Approximately 5 mins)   - Patient would like to try hypnotherapy. She is currently not interested in [facility] smoking cessation clinic.     Advised patient to call office with any changes, questions, or concerns regarding respiratory status or issues with prescribed medications    Return in about 6 months (around 9/10/2022) for COPD no test.       Electronically signed by ERYN Wei CNP on 3/10/2022 at 8:55 AM

## 2022-03-10 ENCOUNTER — OFFICE VISIT (OUTPATIENT)
Dept: PULMONOLOGY | Age: 62
End: 2022-03-10
Payer: COMMERCIAL

## 2022-03-10 VITALS
WEIGHT: 128.8 LBS | DIASTOLIC BLOOD PRESSURE: 78 MMHG | HEIGHT: 66 IN | TEMPERATURE: 97.7 F | SYSTOLIC BLOOD PRESSURE: 118 MMHG | HEART RATE: 67 BPM | OXYGEN SATURATION: 97 % | BODY MASS INDEX: 20.7 KG/M2

## 2022-03-10 DIAGNOSIS — Z72.0 TOBACCO ABUSE: ICD-10-CM

## 2022-03-10 DIAGNOSIS — J41.1 MUCOPURULENT CHRONIC BRONCHITIS (HCC): ICD-10-CM

## 2022-03-10 DIAGNOSIS — J44.9 MODERATE COPD (CHRONIC OBSTRUCTIVE PULMONARY DISEASE) (HCC): Primary | ICD-10-CM

## 2022-03-10 DIAGNOSIS — C34.92 ADENOCARCINOMA OF LEFT LUNG (HCC): ICD-10-CM

## 2022-03-10 PROCEDURE — 99406 BEHAV CHNG SMOKING 3-10 MIN: CPT

## 2022-03-10 PROCEDURE — 99214 OFFICE O/P EST MOD 30 MIN: CPT

## 2022-03-10 ASSESSMENT — ENCOUNTER SYMPTOMS
SINUS PAIN: 0
CHEST TIGHTNESS: 0
COUGH: 1
WHEEZING: 0
SHORTNESS OF BREATH: 1
SORE THROAT: 0
SPUTUM PRODUCTION: 1
SINUS PRESSURE: 0
HEMOPTYSIS: 0
RHINORRHEA: 1

## 2022-03-10 ASSESSMENT — COPD QUESTIONNAIRES: COPD: 1

## 2022-03-10 NOTE — PATIENT INSTRUCTIONS
Continue your Anoro. Continue your albuterol inhaler. You may take 2 puffs every 6 hours as needed for shortness of breath or wheezing. Continue to work on quitting smoking. Let me know if you would like a referral to the smoking cessation program.    Make sure to notify your surgeon about your continued left sided breast/chest pain post surgery. Make sure to continue following with Dr. Jamison Stapleton. I will see you back in about 6 months. Learning About Benefits From Quitting Smoking  How does quitting smoking make you healthier? If you're thinking about quitting smoking, you may have a few reasons to be smoke-free. Your health may be one of them. · When you quit smoking, you lower your risks for cancer, lung disease, heart attack, stroke, blood vessel disease, and blindness from macular degeneration. · When you're smoke-free, you get sick less often, and you heal faster. You are less likely to get colds, flu, bronchitis, and pneumonia. · As a nonsmoker, you may find that your mood is better and you are less stressed. When and how will you feel healthier? Quitting has real health benefits that start from day 1 of being smoke-free. And the longer you stay smoke-free, the healthier you get and the better you feel. The first hours  · After just 20 minutes, your blood pressure and heart rate go down. That means there's less stress on your heart and blood vessels. · Within 12 hours, the level of carbon monoxide in your blood drops back to normal. That makes room for more oxygen. With more oxygen in your body, you may notice that you have more energy than when you smoked. After 2 weeks  · Your lungs start to work better. · Your risk of heart attack starts to drop. After 1 month  · When your lungs are clear, you cough less and breathe deeper, so it's easier to be active. · Your sense of taste and smell return. That means you can enjoy food more than you have since you started smoking.   Over the years  · Over the years, your risks of heart disease, heart attack, and stroke are lower. · After 10 years, your risk of dying from lung cancer is cut by about half. And your risk for many other types of cancer is lower too. How would quitting help others in your life? When you quit smoking, you improve the health of everyone who now breathes in your smoke. · Their heart, lung, and cancer risks drop, much like yours. · They are sick less. For babies and small children, living smoke-free means they're less likely to have ear infections, pneumonia, and bronchitis. · If you're a woman who is or will be pregnant someday, quitting smoking means a healthier . · Children who are close to you are less likely to become adult smokers. Where can you learn more? Go to https://Sonim Technologiesnikole.NewsCastic. org and sign in to your TotSpot account. Enter 286 [id number] [id number] [phone] in the KylesThe Daily Muse box to learn more about \"Learning About Benefits From Quitting Smoking. \"     If you do not have an account, please click on the \"Sign Up Now\" link. Current as of: 2021               Content Version: 12.9  © 8195-3199 [employer]. Care instructions adapted under license by [facility] ([facility]). If you have questions about a medical condition or this instruction, always ask your healthcare professional. [place] [address] any warranty or liability for your use of this information. Chronic Obstructive Pulmonary Disease (COPD): Care Instructions  Your Care Instructions     Chronic obstructive pulmonary disease (COPD) is a general term for a group of lung diseases, including emphysema and chronic bronchitis. People with COPD have decreased airflow in and out of the lungs, which makes it hard to breathe. The airways also can get clogged with thick mucus. Cigarette smoking is a major cause of COPD. Although there is no cure for COPD, you can slow its progress.  Following your treatment plan and taking care of yourself can help you feel better and live longer. Follow-up care is a key part of your treatment and safety. Be sure to make and go to all appointments, and call your doctor if you are having problems. It's also a good idea to know your test results and keep a list of the medicines you take. How can you care for yourself at home? Staying healthy    · Do not smoke. This is the most important step you can take to prevent more damage to your lungs. If you need help quitting, talk to your doctor about stop-smoking programs and medicines. These can increase your chances of quitting for good. · Avoid colds and flu. Get a pneumococcal vaccine shot. If you have had one before, ask your doctor whether you need a second dose. Get the flu vaccine every fall. If you must be around people with colds or the flu, wash your hands often. · Avoid secondhand smoke, air pollution, and high altitudes. Also avoid cold, dry air and hot, humid air. Stay at home with your windows closed when air pollution is bad. Medicines and oxygen therapy    1. Take your medicines exactly as prescribed. Call your doctor if you think you are having a problem with your medicine. You may be taking medicines such as:  ? Bronchodilators. These help open your airways and make breathing easier. They are either short-acting (work for 6 to 9 hours) or long-acting (work for 24 hours). You inhale most bronchodilators, so they start to act quickly. Always carry your quick-relief inhaler with you in case you need it while you are away from home. ? Corticosteroids (prednisone, budesonide). These reduce airway inflammation. They come in pill or inhaled form. You must take these medicines every day for them to work well. · Ask your doctor or pharmacist if a spacer is right for you. A spacer may help you get more inhaled medicine to your lungs. If you use one, ask how to use it properly.      · Do not take any vitamins, trouble breathing. Call your doctor now or seek immediate medical care if:    · You have new or worse trouble breathing. · You cough up blood. · You have a fever. Watch closely for changes in your health, and be sure to contact your doctor if:    · You cough more deeply or more often, especially if you notice more mucus or a change in the color of your mucus. · You have new or worse swelling in your legs or belly. · You are not getting better as expected. Where can you learn more? Go to https://CANDDipePax Worldwideeb.Kids Calendar. org and sign in to your Media Machines account. Enter W334 in the Denty's box to learn more about \"Chronic Obstructive Pulmonary Disease (COPD): Care Instructions. \"     If you do not have an account, please click on the \"Sign Up Now\" link. Current as of: October 26, 2020               Content Version: 12.9  © 2286-3109 Healthwise, Incorporated. Care instructions adapted under license by [facility] ([facility]). If you have questions about a medical condition or this instruction, always ask your healthcare professional. [address] any warranty or liability for your use of this information.

## 2022-04-11 ENCOUNTER — HOSPITAL ENCOUNTER (OUTPATIENT)
Dept: CT IMAGING | Age: 62
Discharge: HOME | End: 2022-04-11
Payer: COMMERCIAL

## 2022-04-11 DIAGNOSIS — C34.92 PRIMARY ADENOCARCINOMA OF LEFT LUNG (HCC): ICD-10-CM

## 2022-04-11 LAB — POC CREATININE WHOLE BLOOD: 0.9 MG/DL (ref 0.5–1.2)

## 2022-04-11 PROCEDURE — 82565 ASSAY OF CREATININE: CPT

## 2022-04-11 PROCEDURE — 71260 CT THORAX DX C+: CPT

## 2022-04-11 PROCEDURE — 6360000004 HC RX CONTRAST MEDICATION: Performed by: INTERNAL MEDICINE

## 2022-04-11 RX ADMIN — IOPAMIDOL 80 ML: 755 INJECTION, SOLUTION INTRAVENOUS at 09:00

## 2022-04-18 ENCOUNTER — HOSPITAL ENCOUNTER (OUTPATIENT)
Dept: INFUSION THERAPY | Age: 62
Discharge: HOME | End: 2022-04-18
Payer: COMMERCIAL

## 2022-04-18 ENCOUNTER — OFFICE VISIT (OUTPATIENT)
Dept: ONCOLOGY | Age: 62
End: 2022-04-18
Payer: COMMERCIAL

## 2022-04-18 VITALS
HEIGHT: 66 IN | TEMPERATURE: 97.9 F | BODY MASS INDEX: 20.09 KG/M2 | DIASTOLIC BLOOD PRESSURE: 70 MMHG | WEIGHT: 125 LBS | SYSTOLIC BLOOD PRESSURE: 108 MMHG | OXYGEN SATURATION: 98 % | RESPIRATION RATE: 18 BRPM | HEART RATE: 71 BPM

## 2022-04-18 VITALS
TEMPERATURE: 98.4 F | HEART RATE: 71 BPM | RESPIRATION RATE: 18 BRPM | SYSTOLIC BLOOD PRESSURE: 108 MMHG | DIASTOLIC BLOOD PRESSURE: 70 MMHG

## 2022-04-18 DIAGNOSIS — C34.92 PRIMARY ADENOCARCINOMA OF LEFT LUNG (HCC): ICD-10-CM

## 2022-04-18 DIAGNOSIS — C34.92 MALIGNANT NEOPLASM OF LEFT LUNG, UNSPECIFIED PART OF LUNG (HCC): Primary | ICD-10-CM

## 2022-04-18 LAB
ABSOLUTE IMMATURE GRANULOCYTE: 0 THOU/MM3 (ref 0–0.07)
BASINOPHIL, AUTOMATED: 1 % (ref 0–3)
BASOPHILS ABSOLUTE: 0.1 THOU/MM3 (ref 0–0.1)
BUN, WHOLE BLOOD: 9 MG/DL (ref 8–26)
CHLORIDE, WHOLE BLOOD: 105 MEQ/L (ref 98–109)
CREATININE, WHOLE BLOOD: 1 MG/DL (ref 0.5–1.2)
EOSINOPHILS ABSOLUTE: 0.2 THOU/MM3 (ref 0–0.4)
EOSINOPHILS RELATIVE PERCENT: 2 % (ref 0–4)
GFR, ESTIMATED: 60 ML/MIN/1.73M2
GLUCOSE, WHOLE BLOOD: 89 MG/DL (ref 70–108)
HCT VFR BLD CALC: 39.2 % (ref 37–47)
HEMOGLOBIN: 12.9 GM/DL (ref 12–16)
IMMATURE GRANULOCYTES: 0 %
IONIZED CALCIUM, WHOLE BLOOD: 1.22 MMOL/L (ref 1.12–1.32)
LYMPHOCYTES # BLD: 15 % (ref 15–47)
LYMPHOCYTES ABSOLUTE: 1 THOU/MM3 (ref 1–4.8)
MCH RBC QN AUTO: 31.9 PG (ref 26–33)
MCHC RBC AUTO-ENTMCNC: 32.9 GM/DL (ref 32.2–35.5)
MCV RBC AUTO: 97 FL (ref 81–99)
MONOCYTES ABSOLUTE: 0.3 THOU/MM3 (ref 0.4–1.3)
MONOCYTES: 4 % (ref 0–12)
PDW BLD-RTO: 13.1 % (ref 11.5–14.5)
PLATELET # BLD: 143 THOU/MM3 (ref 130–400)
PMV BLD AUTO: 12.6 FL (ref 9.4–12.4)
POTASSIUM, WHOLE BLOOD: 4.5 MEQ/L (ref 3.5–4.9)
RBC # BLD: 4.04 MILL/MM3 (ref 4.2–5.4)
SEG NEUTROPHILS: 78 % (ref 43–75)
SEGMENTED NEUTROPHILS ABSOLUTE COUNT: 5.4 THOU/MM3 (ref 1.8–7.7)
SODIUM, WHOLE BLOOD: 141 MEQ/L (ref 138–146)
TOTAL CO2, WHOLE BLOOD: 29 MEQ/L (ref 23–33)
WBC # BLD: 6.9 THOU/MM3 (ref 4.8–10.8)

## 2022-04-18 PROCEDURE — 80076 HEPATIC FUNCTION PANEL: CPT

## 2022-04-18 PROCEDURE — 99211 OFF/OP EST MAY X REQ PHY/QHP: CPT

## 2022-04-18 PROCEDURE — 36415 COLL VENOUS BLD VENIPUNCTURE: CPT

## 2022-04-18 PROCEDURE — 85025 COMPLETE CBC W/AUTO DIFF WBC: CPT

## 2022-04-18 PROCEDURE — 80047 BASIC METABLC PNL IONIZED CA: CPT

## 2022-04-18 PROCEDURE — 99214 OFFICE O/P EST MOD 30 MIN: CPT | Performed by: INTERNAL MEDICINE

## 2022-04-18 NOTE — PROGRESS NOTES
[place] [id number] [facility] [place] [address]  Dept: [phone]  Loc: [phone]   Hematology/Oncology Consult (Clinic)        4/18/2022    Dorothye Siemens   1960     No ref. provider found   Tamara Barrientos MD     DIAGNOSIS:  -  Left upper lobe cavitary adenocarcinoma of the lung. 3.9 x 2.1 cm. PET scan negative. Bronchoscopy EBUS-isolated station 11 lymph node with a moderate to poorly differentiated adenocarcinoma. 4L node with a minute focus of atypical cells on path review felt to be benign. Clinical stage: T2a N1M0 / IIB. Pathologic stage: T1 cN1 M0 /IIB (poorly differentiated, 3 of 7 nodes positive including extranodal extension. EGFR- Not Detected    TREATMENT:  -Left upper lobectomy and mediastinal node dissection scheduled by Dr. Cassandra Vallejo 5/3/2021  -Adjuvant chemotherapy : cis-platinum 75 mg meter squared day 1 and pemetrexed 500 mg meter squared day 1 every 21 days x 4 cycles . Start date 6/16/2021. Course 4 done 8/18/21. No adjuvant immune therapy as she has stage II disease. PARAMETERS:  -Chest CT without contrast 3/12/2021,   -Posttreatment chest CT10/4/2021--KATHIE  -PET/CT 4/14/2021  -Brain MRI with and without contrast 4/9/2021    SUBJECTIVE: Patient is still using about 6 cigarettes/day. Overall doing well. Has gastroparesis; 5 years of am nausea. Zofran helps this tremendously. She is not a diabetic and was told by her GI doctor she had gastroparesis. Here for Chest Ct report. No neuropathy. HPI: Ronny Bravo is a 69-year-old female with a 45-pack-year smoking habit (ongoing) with a chest CT without contrast on 3/12/2021 revealing a 3.9 x 2.1 cm cavitary mass in the left upper lung corresponding to the abnormality seen on the chest x-ray of 2/26/2021. Radiologically very suspicious. Patient underwent a needle biopsy on 3/26 revealing adenocarcinoma.  PET scan shows no distant mets or adenopathy. PFT's done and reviewed below. She is waiting  for a consult with Dr Roxana Mcqueen of Surgery. Reports 30 # weight loss ove 9 months; unintentional. Normal weight @ 145. Appetite has remained good. Chronic cough unchanged, no blood. No chest pain or pain elsewhere. Denied increased sob. Average 1ppd x 45 years; now about 5 cigs/day . Motivated to stop but failed Nicotine replacement products. Chantix not attempted thus far. ROS:  Review of Systems 14 point negative except as above. Limited to unintentional weight loss. Chronic unchanged nonproductive cough and shortness of breath with activity also unchanged. PMH:   Past Medical History:   Diagnosis Date    Arthritis     COPD (chronic obstructive pulmonary disease) ([facility])     Dr Cherly Severin [facility])     Dr Zeeshan Duff   Aneurysm    Social HX:   Social History     Socioeconomic History    Marital status:      Spouse name: Not on file    Number of children: Not on file    Years of education: Not on file    Highest education level: Not on file   Occupational History    Not on file   Tobacco Use    Smoking status: Current Every Day Smoker     Packs/day: 0.50     Years: 45.00     Pack years: 22.50     Types: Cigarettes    Smokeless tobacco: Never Used    Tobacco comment: 10 cig per day 3/10/22   Vaping Use    Vaping Use: Never used   Substance and Sexual Activity    Alcohol use: Never    Drug use: Yes     Types: Marijuana Jyoti Kales)     Comment: occasionally 3/10/22    Sexual activity: Not on file   Other Topics Concern    Not on file   Social History Narrative    Not on file     Social Determinants of Health     Financial Resource Strain:     Difficulty of Paying Living Expenses: Not on file   Food Insecurity:     Worried About [address] in the Last Year: Not on file    [address] in the Last Year: Not on file   Transportation Needs:     Lack of Transportation (Medical):  Not on file    Lack of Transportation (Non-Medical): Not on file   Physical Activity:     Days of Exercise per Week: Not on file    Minutes of Exercise per Session: Not on file   Stress:     Feeling of Stress : Not on file   Social Connections:     Frequency of Communication with Friends and Family: Not on file    Frequency of Social Gatherings with Friends and Family: Not on file    Attends Jew Services: Not on file    Active Member of [address] [place] [address] or Organizations: Not on file    Attends Club or Organization Meetings: Not on file    Marital Status: Not on file   Intimate Partner Violence:     Fear of Current or Ex-Partner: Not on file    Emotionally Abused: Not on file    Physically Abused: Not on file    Sexually Abused: Not on file   Housing Stability:     Unable to Pay for Housing in the Last Year: Not on file    Number of [place] in the Last Year: Not on file    Unstable Housing in the Last Year: Not on file      45 pack year smoking history, currently down to 5 cigarettes/day and attempting to stop. Spouse: Miguel Yousif [phone]    Phone: [phone] [address]     Employment: Works at [employer] and walk but cannot I as a ta    Immunizations: There is no immunization history on file for this patient. Health Screenings:  Mammogram:  No results found for this or any previous visit. Results for orders placed during the hospital encounter of 02/26/21   [facility] BOUCHRA DIGITAL SCREEN BILATERAL    Narrative IMPRESSION: Mammogram BI-RADS: Category 1: Negative    There is no mammographic evidence of malignancy. A 1 year   screening mammogram is recommended. The patient has been entered   into our database and they will receive a notification when they   are due for their next exam.      The patient was notified of the results. #[id number] - [facility] BOUCHRA DIGITAL SCREEN BILATERAL  BILATERAL DIGITAL SCREENING MAMMOGRAM 3D/2D WITH CAD: 2/26/2021  CLINICAL: Tomosynthesis. Screening.       Comparison is made to exam dated:  2011 mammogram - [facility]. The tissue of both breasts is heterogeneously dense. This may   lower the sensitivity of mammography. Current study was also evaluated with a Computer Aided Detection   (CAD) system. No significant masses, calcifications, or other findings are seen   in either breast.    There has been no significant interval change. Dr. Romelia Austin            nn/penrad:2021 13:05:44      Imaging Technologist: Kavon SUTTON(NISHA)(M), [facility]  letter sent: Normal-All Doctor Names    [id number]        Pap / Pelvic:na  C-Scope: Approximately 5 years ago with 2 benign polyps      Gyn HX:   GPA:  DARREL/BSO:31 years ago  LMP: No LMP recorded (lmp unknown). Patient has had a hysterectomy. Health Maintenance Due   Topic Date Due    Pneumococcal 0-64 years Vaccine (1 - PCV) Never done    Depression Screen  Never done    COVID-19 Vaccine (1) Never done    HIV screen  Never done    DTaP/Tdap/Td vaccine (1 - Tdap) Never done    Lipid screen  Never done    Colorectal Cancer Screen  Never done    Shingles Vaccine (1 of 2) Never done        Interests:   none    Fam HX:   Family History   Problem Relation Age of Onset    Other Mother         aneursym    No Known Problems Father     Depression Sister     Heart Disease Brother     Diabetes Brother     Other Brother         Lung disease    Lung Cancer Maternal Grandfather     Other Brother         Crohns    Macular Degen Brother     Depression Sister     Other Sister         Blood clots    Arthritis Sister     Brain Cancer Paternal Uncle         Hospitalizations:   None recent    Allergies:   Allergies   Allergen Reactions    Tylenol With Codeine #3 [Acetaminophen-Codeine] Nausea And Vomiting    Codeine Nausea And Vomiting        Adult Illness:  Patient Active Problem List   Diagnosis    [facility] left [facility]    Other emph[facility]sema [facility]    Smoking greater than 40 pack years    Tobacco abuse    KERR (dyspnea on exertion)    Intermittent palpitations    Borderline Abnormal EKG with rsr'    S/P left upper lobectomy of lung and mediastinal dissection    Moderate COPD (chronic obstructive pulmonary disease) ([facility]    Encounter for insertion of venous access port    Ascending aorta dilatation (HCC) 4.1 cm    Encounter for removal of tunneled central venous catheter (CVC) with port    Adenocarcinoma of left lung [facility])        Surgery:  Past Surgical History:   Procedure Laterality Date    BRONCHOSCOPY N/A 2021    BRONCHOSCOPY performed by Kenyetta Valencia MD at [facility]     SECTION      x4    CHOLECYSTECTOMY      COLONOSCOPY  2016    CT NEEDLE BIOPSY LUNG PERCUTANEOUS  2021    CT NEEDLE BIOPSY LUNG PERCUTANEOUS 3/26/2021 [facility] CT SCAN    HYSTERECTOMY      LUNG REMOVAL, TOTAL Left 2021    ROBOTIC ASSISTED LEFT UPPER LOBECTOMY AND MEDIASTINAL DISSECTION, POSSIBLE OPEN AND CRYOTHERAPY OF INTERCOSTAL NERVES 5, 6, 7 & 8 performed by Danae Morales MD at [place] [address].  10/25/2021    port removal Dr Trina Bocanegra in the procedure room    PORT SURGERY Left 06/10/2021    LEFT SUBCLAVIAN SINGLE LUMEN SMART PORT INSERTION performed by Danae Morales MD at [address] [place] [address]  2016        Medications:  Current Outpatient Medications   Medication Sig Dispense Refill    umeclidinium-vilanterol (ANORO ELLIPTA) 62.5-25 MCG/INH AEPB inhaler Inhale 1 puff into the lungs daily 60 each 11    albuterol sulfate HFA (VENTOLIN HFA) 108 (90 Base) MCG/ACT inhaler Inhale 2 puffs into the lungs every 6 hours as needed for Wheezing or Shortness of Breath 8 g 11    acetaminophen (TYLENOL) 500 MG tablet Take 1 tablet by mouth 4 times daily as needed for Pain 120 tablet 0    Pseudoephedrine-DM-GG (ROBITUSSIN CF PO) Take by mouth (Patient not taking: Reported on 2022)      vitamin D3 (CHOLECALCIFEROL) 125 MCG (5000 UT) TABS tablet Cholecalciferol (Vitamin D3) (Vitamin D3) 125 mcg (5,000 unit) Tablet Active 125 MCG PO Daily 2021 4:50pm (Patient not taking: Reported on 3/10/2022)       No current facility-administered medications for this visit. EXAM:    /70 (Site: Right Upper Arm, Position: Sitting, Cuff Size: Medium Adult)   Pulse 71   Temp 97.9 °F (36.6 °C) (Oral)   Resp 18   Ht 5' 6\" (1.676 m)   Wt 125 lb (56.7 kg)   LMP  (LMP Unknown)   SpO2 98%   BMI 20.18 kg/m²      ECO  General: Non-ill appearing. Appears comfortable and in no distress. HEENT: NC/AT,nonicteric,  Neck: normal thyroid, no masses. pulses nl, no bruits,   Nodes: No adenopathy  Lungs/chest: clear, no rales,rhonchi or wheezing, lung bases clear. There is a dime sized healing wound with a scab and border with erythema that appears to be healing with no discharge or odor. CV: rrr, no rubs ,gallops or murmurs  Breasts: Not examined  Abd/Rectal: soft, non-tender,bowel sounds normal , no HSM,no masses  Back: normal curvature, No midline tenderness. flanks nontender  : Not Examined  Extremities: no cyanosis,clubbing or edema. Skin: unremarkable  Neuro: A and O x 4, CN exam nonfocal, Motor- no deficits, Sensory- no deficits, gait-nl, speech- fluent, no ataxia.   Devices: [id number]-y-Zxdr left chest unremarkable      DATA:  10/20/2021-CBC, CMP to be drawn    CBC with Differential:      Lab Results   Component Value Date    WBC 6.9 2022    RBC 4.04 2022    RBC 4.63 10/10/2017    HGB 12.9 2022    HCT 39.2 2022     2022    MCV 97 2022    MCH 31.9 2022    MCHC 32.9 2022    RDW 13.1 2022    NRBC 0 2021    SEGSPCT 74.5 2021    LYMPHOPCT 15.8 10/10/2017    MONOPCT 5.9 2021    EOSPCT 1.4 10/10/2017    BASOPCT 0.7 10/10/2017    MONOSABS 0.3 2022    LYMPHSABS 1.0 2022    EOSABS 0.2 04/18/2022    BASOSABS 0.1 04/18/2022       CMP:    Lab Results   Component Value Date     04/18/2022     06/03/2021    K 4.5 04/18/2022    K 5.2 06/03/2021     06/03/2021    CO2 25 06/03/2021    BUN 9 06/03/2021    CREATININE 1.0 04/18/2022    CREATININE 0.7 06/03/2021    LABGLOM 85 06/03/2021    GLUCOSE 87 06/03/2021    GLUCOSE 79 10/10/2017    PROT 6.7 10/20/2021    LABALBU 4.3 10/20/2021    CALCIUM 9.9 06/03/2021    BILITOT 0.2 10/20/2021    ALKPHOS 84 10/20/2021    AST 11 10/20/2021    ALT 7 10/20/2021       BMP:    Lab Results   Component Value Date     04/18/2022     06/03/2021    K 4.5 04/18/2022    K 5.2 06/03/2021     06/03/2021    CO2 25 06/03/2021    BUN 9 06/03/2021    LABALBU 4.3 10/20/2021    CREATININE 1.0 04/18/2022    CREATININE 0.7 06/03/2021    CALCIUM 9.9 06/03/2021    LABGLOM 85 06/03/2021    GLUCOSE 87 06/03/2021    GLUCOSE 79 10/10/2017       Magnesium:    Lab Results   Component Value Date    MG 2.2 08/18/2021     PT/INR:    Lab Results   Component Value Date    INR 0.97 05/18/2021     TSH:    Lab Results   Component Value Date    TSH 4.750 10/10/2017           IMAGING:  ** ADDENDUM #1 **      ADDENDUM:       This is an addendum to original report dated 4/11/2022 at 10:52 AM.      Additional finding include a small left fat-containing lumbar hernia. ** ORIGINAL    REPORT*      PROCEDURE: CT CHEST W CONTRAST  4/11/22      CLINICAL INFORMATION: Primary adenocarcinoma of left lung ([address]      COMPARISON: CT chest 5/18/2021, 6/1/2021, 10/12/2021. TECHNIQUE:    Helical CT acquisition of the chest was performed with administration of    intravenous contrast. Multiplanar reformats are provided. All CT scans at this facility use dose modulation, iterative    reconstruction, and/or weight based dosing when appropriate to reduce the    radiation dose to as low as reasonably achievable.       CONTRAST: 80 cc of Isovue-370 intravenously FINDINGS:      Measurements comparison are based on this observer's measurements of the    current and prior examinations. Mild emphysematous changes. A 1.3 cm spiculated right upper lobe pulmonary    nodule (series 2, image 18) previously 10 mm. Platelike atelectasis is    seen in the right lung base. A new 4 mm right upper lobe pulmonary nodule    (image 26). A stable 3 mm right    middle lobe pulmonary nodule (image 31). A stable 6 mm right middle lobe    pulmonary nodule (image 36). Aortocoronary calcifications. A 1.2 cm mildly enlarged right hilar lymph    node is seen. Postsurgical change of the left lung. No focal pulmonary consolidation. No large pulmonary mass. Central airway    is patent. No pleural effusions. No significant pericardial effusion. The heart is not enlarged. Ascending thoracic aorta is not dilated. The    main pulmonary artery is not dilated. No significantly enlarged mediastinal or axillary lymph node. The thyroid is not enlarged. No chest wall mass. Limited evaluation below the diaphragm show that the gallbladder is    surgically absent. The adrenal glands are not enlarged. .      Bones: Degenerative changes of the thoracic spine. . Mild levoscoliosis. Narrative   PROCEDURE: CT CHEST W CONTRAST       CLINICAL INFORMATION: Primary adenocarcinoma of left lung ([address]       COMPARISON: CT chest 5/18/2021, 6/1/2021, 10/12/2021.       TECHNIQUE:     Helical CT acquisition of the chest was performed with administration of intravenous contrast. Multiplanar reformats are provided.       All CT scans at this facility use dose modulation, iterative reconstruction, and/or weight based dosing when appropriate to reduce the radiation dose to as low as reasonably achievable.       CONTRAST: 80  cc of Isovue-370  intravenously           FINDINGS:       Measurements comparison are based on this observer's measurements of the current and prior examinations.     Mild emphysematous changes. A 1.3 cm spiculated right upper lobe pulmonary nodule (series 2, image 18) previously 10 mm. Platelike atelectasis is seen in the right lung base. A new 4 mm right upper lobe pulmonary nodule (image 26). A stable 3 mm right    middle lobe pulmonary nodule (image 31). A stable 6 mm right middle lobe pulmonary nodule (image 36).     Aortocoronary calcifications. A 1.2 cm mildly enlarged right hilar lymph node is seen. Postsurgical change of the left lung.       No focal pulmonary consolidation. No large pulmonary mass. Central airway is patent.       No pleural effusions.  No significant pericardial effusion.       The heart is not enlarged. Ascending thoracic aorta is not dilated.  The main pulmonary artery is not dilated.       No significantly enlarged axillary lymph node.       The thyroid is not enlarged. No chest wall mass.       Limited evaluation below the diaphragm show that the gallbladder is surgically absent. The adrenal glands are not enlarged. .       Bones: Degenerative changes of the thoracic spine. . Mild levoscoliosis.             Impression   1. A 1.3 cm spiculated right upper lobe pulmonary nodule increased in size. This is concerning for malignancy.       2. New 4 mm right upper lobe pulmonary nodule is seen.       3. Other stable pulmonary nodules are seen measuring 6 mm or less       4. Mild emphysema.       5. Mildly enlarged right hilar lymph node at 1.2 cm.           **This report has been created using voice recognition software.  It may contain minor errors which are inherent in voice recognition technology. **       Final report electronically signed by Dr Alyx Domingo on 4/11/2022 12:17 PM       ** ORIGINAL REPORT *         PROCEDURE: XR CHEST (2 VW)       2/26/2021       CLINICAL INFORMATION: Cough       COMPARISON: 10/10/2017       TECHNIQUE: PA and lateral views of the chest were obtained.               Impression   1. Normal heart size.  No acute No FDG avid cervical lymphadenopathy. Physiologic activity is noted bilaterally in the lower neck.       Chest: Cardiac size is normal. Atherosclerotic calcifications are present in the thoracic aorta and coronary arteries. There is stable aneurysmal dilation of the ascending thoracic aorta which measures 4.3 cm in maximum diameter (image 103). There is no    pleural or pericardial effusion. Emphysematous changes are again noted in the bilateral lungs. In the regular mass with areas of cavitation in the lingula is stable in size, again measuring approximately 3.7 cm and is associated with a maximum SUV of 6.7    (image 107). Adjacent opacities are likely postobstructive atelectasis. A 0.4 cm nodule at the right lung apex has a maximum SUV of 1.9 (image 70). A 0.4 cm nodule in the right upper lobe has a maximum SUV of 0.9 (image 80). An indistinct 0.5 cm nodular    density at the right mid lung is not associated with activity above background. There is no FDG avid mediastinal, hilar or axillary lymphadenopathy. Degenerative changes are present in the thoracic spine without evidence of hypermetabolic osseous    metastatic disease.       Abdomen/pelvis: Physiologic activity is present in the liver, spleen, urinary collecting system and gastrointestinal tract. The gallbladder is surgically absent. Atherosclerotic calcifications are seen in the abdominal aorta without evidence of aneurysm.    The urinary bladder is unremarkable. There are phleboliths in the pelvis. There is no FDG avid mesenteric, retroperitoneal, pelvic or inguinal lymphadenopathy. Degenerative changes are present in the lumbar spine and pelvis without evidence of    hypermetabolic osseous metastatic disease.           Impression   1. FDG avid left lung mass corresponding to known malignancy.    2. Small, mildly FDG avid right lung nodules, possibly infectious or inflammatory but satellite malignancy cannot be excluded.       Final report electronically signed by Dr. Иавн Blackwell on 4/14/2021 10:02 AM       Mri Brain W Wo Contrast  Result Date: 4/9/2021   1. No evidence of intracranial metastases or other acute intracranial abnormality. 2. Moderate severity chronic microvascular angiopathy. 3. Mild to moderate mucosal inflammation in the paranasal sinuses with air-fluid levels as evidence for an acute component. **This report has been created using voice recognition software. It may contain minor errors which are inherent in voice recognition technology. ** Final report electronically signed by Dr. Vahe Amato MD on 4/9/2021 1:50 PM    Chest CT with contrast 10/4/2021 (posttreatment CT)  PROCEDURE: CT CHEST W CONTRAST       CLINICAL INFORMATION: Adenocarcinoma of the left lung, status post resection and chemotherapy.       TECHNIQUE: CT of the chest was performed following administration of 80 mL Isovue-370 intravenous contrast. Axial images as well as coronal and sagittal instructions were obtained.       All CT scans at this facility use dose modulation, iterative reconstruction, and/or weight-based dosing when appropriate to reduce radiation dose to as low as reasonably achievable.       COMPARISON: CT chest 6/1/2021       FINDINGS: Cardiac size is normal. There is stable shift of cardiomediastinal structures to the left side due to postsurgical volume loss. Atherosclerotic calcifications are present in the thoracic aorta and coronary arteries. Ascending thoracic aorta is    at the upper limits of normal in size. There is no pleural or pericardial effusion. Indistinct densities measuring 6 mm in the right upper lobe are stable (image 17). A prominent right hilar lymph node is not pathologically enlarged by CT criteria and    may be reactive. There is no mediastinal, left hilar or axillary lymphadenopathy. Degenerative changes are present in the thoracic spine without evidence of aggressive osseous lesions.       The gallbladder is surgically absent. Prominence of the common bile duct is likely postsurgical.           Impression   1. No acute intrathoracic findings. 2. Stable right upper lobe densities, likely benign. 3. Prominent right hilar lymph node, likely reactive.       Final report electronically signed by Dr. Katya Ramsey on 10/4/2021 9:04 AM     Note; pretreatment April 2021 PET scan showed- a lingular cavitary mass measuring 3.7 with SUV of 6.7. Also 8.4 cm nodule right lung apex with SUV of 1.9. No GI with adenopathy. PROCEDURES:  -Core biopsy cavitary left upper lobe mass 3/26/2021    -Full pulmonary function study with bronchodilators completed 4/6/2021  FEV1 1.68 (61% predicted 25% change with bronchodilator. FVC 2.62, 75% predicted 15% change with bronchodilator  FEV1 over FVC ratio 64  Interpretation-airway obstruction. FVC is reduced relative to the SVC indicating air trapping. While the TLC is within normal limits, the FRC, RV and RV/TLC ratio are increased. Follow administration of bronchodilators there is a good response. Reduced diffusing capacity indicates had mild loss of functional alveolar capillary surface. PATHOLOGY:   3/26/2021  Clinical Information: CAVITATING MASS SHANNON, SMOKER     FINAL DIAGNOSIS:   Lung, left, needle core biopsies:       Adenocarcinoma. Specimen:   5/4/21  BIOPSY OF LUNG, LEFT      Microscopic Examination:   The specimen consists of several scant fragments of lung tissue with   infiltrating malignant glands consistent with adenocarcinoma. Lolly Srikanth is   also anthracotic pigment. ADDENDUM REPORT 5/18/2021, 5/24/2021:       FINAL DIAGNOSIS:   A. Lymph nodes, level VII, resections:    Negative for neoplasm in 3 out of 3 lymph nodes. B. Left upper lobe of lung, resection:    Invasive, moderately to poorly differentiated adenocarcinoma, acinar   type.    Margins of resection are free of neoplasia.    Maximum tumor size = 2.6 cm.    Moderate emphysema.    pT1c, pN1.    C. Lymph node, level #3, #4, and   #5 - lesion with inked pleural surface; and cassette #6 - uninvolved   lung.  ss. C - The container is labeled [employer], lymph nodes, level V x3. Received in formalin is a single fragment of gray-tan tissue measuring   2 cm in greatest dimension.  The specimen is bisected and entirely   submitted.  1 ns. D - The container is labeled [employer], lymph nodes, level X x9. Received in formalin are seven fragments of gray-tan tissue varying in   size from 0.4 cm up to 1.7 cm.  The smaller fragments are submitted as   received in cassette #1.  The larger fragment is bisected and submitted   in cassette #2.  ns.     E - The container is labeled [employer], lymph nodes, level XI, 2. Received fresh are two fragments of gray-tan tissue measuring 1 and 1.2   cm.  1 ns. F - The container is labeled [employer], lymph node, level XI,   special x1.  Received in formalin is a single fragment of gray-tan   tissue measuring 1.5 cm in greatest dimension.  The specimen is friable   upon sectioning.  The specimen is trisected.  2 ns. G - The container is labeled [employer], lymph node, level VI x1. Received in formalin is a single fragment of gray-tan tissue measuring   0.5 cm.  1 ns.  JESSEE/DANIKAR:[id number]         Microscopic Examination:   A-G.    Procedure   Lobectomy   Specimen Laterality   Left   Tumor Site   Upper lobe of lung   Tumor Size      Total tumor size (size of entire tumor)#    2.6 x 2.6 x 2 cm   Tumor Focality   Single focus   Histologic Type   Invasive adenocarcinoma, acinar predominant   + Histologic Grade   G3: Poorly differentiated   + Spread Through Air Spaces (HILDA)   Not identified   Visceral Pleura Invasion   Not identified   Lymphovascular Invasion   Cannot be determined   Direct Invasion of Adjacent Structures   No adjacent structures present   Margins   All margins are uninvolved by tumor   Margins examined (specify): Bronchial, vascular and parenchymal       Distance of invasive carcinoma from closest margin (centimeters): 2       mm, pleural   Number of Lymph Nodes Involved: 3   Number of Lymph Nodes Examined: 14   + Extranodal Extension   Not identified   Treatment Effect   No known presurgical therapy   Pathologic Stage Classification (pTNM) (AJCC 8th Edition)   Primary Tumor (pT)   pT1c: Tumor >2 cm but <=3 cm in greatest dimension   Regional Lymph Nodes (pN)   pN1: Metastasis in ipsilateral peribronchial and/or ipsilateral hilar          lymph nodes, and intrapulmonary nodes, including involvement by          direct extension   + Additional Pathologic Findings   [facility]     [id number]   [id number]   [id number]                                                     <Sign Out Dr. Radha Evans M.D., F.C.A.P. NVML/ [address] [address]  Printed on:  5/25/2021   Tiff Schrader [address]   [facility] BRYANT  DOMINGUEZ II.PAPI, [employer]   Original print date: 05/25/2021   Lab and Collection    Surgical Pathology - 5/4/2021  Result History    Surgical Pathology on 5/25/2021 - Result Edited   Result Information    Status: Edited Result - FINAL (Collected: 5/4/2021 08:04) Provider Status: Reviewed   All Reviewers List    Malorie Saleem MD on 5/26/2021 12:23   Marii Perry MD on 5/26/2021 08:10         Bronchoscopy EBUS 4/27/2021  Pulmonary at [facility]  4R node-negative,   station 7 node negative,   station 4L-minute group of atypical cells. Station 11 poorly differentiated moderately differentiated adenocarcinoma  Note: Dr. Lisa Johnson had the pathology reviewed elsewhere and it was felt that the station 4L lymph node unequivocally did NOT show malignancy. GENETICS:  Not applicable    MOLECULAR:  Not applicable i.e. 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  PET scan shows no hypermetabolic adenopathy or distant mets. Brain MRI shows no mets. Bronchoscopy EBUS as reported above shows T2 a N1 M0/IIB disease. The L4 Station node on review and felt to be benign. Status post left upper lobectomy with pathologic stage T1CN1 with 3 of 7+ nodes with a high-grade tumor. Stage IIb. Completed 4 courses of adjuvant chemotherapy in mid August.  Posttreatment CAT scan shows no evidence of disease. 2) Prognosis / Disease Status: Potentially curable. High risk for recurrence and completed adjuvant chemotherapy. 3) Work-up:    Labs: Labs preliminary results reviewed and are unremarkable. LFTs pending   Imagin2022 chest CT with contrast reviewed. Report of an increased size of the right upper lobe nodule. I reviewed this with radiology and the lesion appears unchanged. At most 1 to 2 mm difference depending on where you place the cursor. Report of a new 4 mm right upper lobe nodule somewhat nonspecific all of this needs repeat imaging in several months reviewed this in detail with the patient. Procedures: None new   Consults: None   Other: Patient has a.m. gastroparesis nausea the predated chemotherapy. Zofran is very effective. 4) Symptom Management: None needed at this time. 5) Supportive care provided. Level of care is appropriate. Teaching done today. It was as the importance of stopping smoking. 6) Treatment goal:      Treatment plan:      Left upper lobectomy and mediastinal node dissection. To be followed by adjuvant chemotherapy. Adjuvant chemotherapy - cis-platinum 75 mg meter squared day 1 along with Alimta 500 mg meter squared day 1 through 21 days x 4. Patient's start date was . She was seen her last adjuvant course #4 on . No sequelae from treatment including neuropathy. Mild nausea related to gastroparesis predated his treatment. 7) Medications reviewed.    Prescriptions today: None  None Compazine, Zofran ODT, folic acid 1 mg, EMLA cream, Decadron premed for Alimta              No orders of the defined types were placed in this encounter. OARRS:  Controlled Substance Monitoring:    Acute and Chronic Pain Monitoring:   No flowsheet data found. 8) Research Options:      Not applicable      9) Other:   Encouraged to stop smoking.     10) Follow Up:  Patient will follow up with me in 6 months on August 25 1 week before and August 18 she will have a CBC, CMP and chest CT with contrast.        Raphael Reyes MD

## 2022-04-19 LAB
ALBUMIN SERPL-MCNC: 4.2 G/DL (ref 3.5–5.1)
ALP BLD-CCNC: 84 U/L (ref 38–126)
ALT SERPL-CCNC: 9 U/L (ref 11–66)
AST SERPL-CCNC: 12 U/L (ref 5–40)
BILIRUB SERPL-MCNC: 0.2 MG/DL (ref 0.3–1.2)
BILIRUBIN DIRECT: < 0.2 MG/DL (ref 0–0.3)
TOTAL PROTEIN: 6.4 G/DL (ref 6.1–8)

## 2022-05-21 ENCOUNTER — APPOINTMENT (OUTPATIENT)
Dept: GENERAL RADIOLOGY | Age: 62
End: 2022-05-21
Payer: COMMERCIAL

## 2022-05-21 ENCOUNTER — HOSPITAL ENCOUNTER (EMERGENCY)
Age: 62
Discharge: HOME | End: 2022-05-21
Attending: EMERGENCY MEDICINE
Payer: COMMERCIAL

## 2022-05-21 ENCOUNTER — APPOINTMENT (OUTPATIENT)
Dept: CT IMAGING | Age: 62
End: 2022-05-21
Payer: COMMERCIAL

## 2022-05-21 VITALS
TEMPERATURE: 98.1 F | HEIGHT: 66 IN | OXYGEN SATURATION: 100 % | WEIGHT: 125 LBS | BODY MASS INDEX: 20.09 KG/M2 | DIASTOLIC BLOOD PRESSURE: 79 MMHG | RESPIRATION RATE: 20 BRPM | SYSTOLIC BLOOD PRESSURE: 143 MMHG | HEART RATE: 70 BPM

## 2022-05-21 DIAGNOSIS — J18.9 PNEUMONIA OF LEFT LOWER LOBE DUE TO INFECTIOUS ORGANISM: Primary | ICD-10-CM

## 2022-05-21 LAB
ALBUMIN SERPL-MCNC: 4.1 G/DL (ref 3.5–5.1)
ALP BLD-CCNC: 127 U/L (ref 38–126)
ALT SERPL-CCNC: 9 U/L (ref 11–66)
ANION GAP SERPL CALCULATED.3IONS-SCNC: 12 MEQ/L (ref 8–16)
AST SERPL-CCNC: 9 U/L (ref 5–40)
BASOPHILS # BLD: 0.3 %
BASOPHILS ABSOLUTE: 0 THOU/MM3 (ref 0–0.1)
BILIRUB SERPL-MCNC: 0.5 MG/DL (ref 0.3–1.2)
BILIRUBIN DIRECT: < 0.2 MG/DL (ref 0–0.3)
BUN BLDV-MCNC: 10 MG/DL (ref 7–22)
CALCIUM SERPL-MCNC: 9.7 MG/DL (ref 8.5–10.5)
CHLORIDE BLD-SCNC: 104 MEQ/L (ref 98–111)
CO2: 25 MEQ/L (ref 23–33)
CREAT SERPL-MCNC: 0.8 MG/DL (ref 0.4–1.2)
EOSINOPHIL # BLD: 0.6 %
EOSINOPHILS ABSOLUTE: 0.1 THOU/MM3 (ref 0–0.4)
ERYTHROCYTE [DISTWIDTH] IN BLOOD BY AUTOMATED COUNT: 13 % (ref 11.5–14.5)
ERYTHROCYTE [DISTWIDTH] IN BLOOD BY AUTOMATED COUNT: 46.2 FL (ref 35–45)
FLU A ANTIGEN: NEGATIVE
FLU B ANTIGEN: NEGATIVE
GFR SERPL CREATININE-BSD FRML MDRD: 73 ML/MIN/1.73M2
GLUCOSE BLD-MCNC: 112 MG/DL (ref 70–108)
HCT VFR BLD CALC: 38.6 % (ref 37–47)
HEMOGLOBIN: 12.6 GM/DL (ref 12–16)
IMMATURE GRANS (ABS): 0.05 THOU/MM3 (ref 0–0.07)
IMMATURE GRANULOCYTES: 0.4 %
LIPASE: 25.8 U/L (ref 5.6–51.3)
LYMPHOCYTES # BLD: 7.7 %
LYMPHOCYTES ABSOLUTE: 0.9 THOU/MM3 (ref 1–4.8)
MCH RBC QN AUTO: 31.5 PG (ref 26–33)
MCHC RBC AUTO-ENTMCNC: 32.6 GM/DL (ref 32.2–35.5)
MCV RBC AUTO: 96.5 FL (ref 81–99)
MONOCYTES # BLD: 6.1 %
MONOCYTES ABSOLUTE: 0.7 THOU/MM3 (ref 0.4–1.3)
NUCLEATED RED BLOOD CELLS: 0 /100 WBC
OSMOLALITY CALCULATION: 281.1 MOSMOL/KG (ref 275–300)
PLATELET # BLD: 153 THOU/MM3 (ref 130–400)
PLATELET ESTIMATE: ADEQUATE
PMV BLD AUTO: 11.6 FL (ref 9.4–12.4)
POTASSIUM SERPL-SCNC: 3.4 MEQ/L (ref 3.5–5.2)
RBC # BLD: 4 MILL/MM3 (ref 4.2–5.4)
SARS-COV-2, NAAT: NOT DETECTED
SCAN OF BLOOD SMEAR: NORMAL
SEG NEUTROPHILS: 84.9 %
SEGMENTED NEUTROPHILS ABSOLUTE COUNT: 9.8 THOU/MM3 (ref 1.8–7.7)
SODIUM BLD-SCNC: 141 MEQ/L (ref 135–145)
TOTAL PROTEIN: 7.3 G/DL (ref 6.1–8)
WBC # BLD: 11.6 THOU/MM3 (ref 4.8–10.8)

## 2022-05-21 PROCEDURE — 6370000000 HC RX 637 (ALT 250 FOR IP): Performed by: STUDENT IN AN ORGANIZED HEALTH CARE EDUCATION/TRAINING PROGRAM

## 2022-05-21 PROCEDURE — 85025 COMPLETE CBC W/AUTO DIFF WBC: CPT

## 2022-05-21 PROCEDURE — 87804 INFLUENZA ASSAY W/OPTIC: CPT

## 2022-05-21 PROCEDURE — 6360000002 HC RX W HCPCS: Performed by: STUDENT IN AN ORGANIZED HEALTH CARE EDUCATION/TRAINING PROGRAM

## 2022-05-21 PROCEDURE — 87635 SARS-COV-2 COVID-19 AMP PRB: CPT

## 2022-05-21 PROCEDURE — 74177 CT ABD & PELVIS W/CONTRAST: CPT

## 2022-05-21 PROCEDURE — 80053 COMPREHEN METABOLIC PANEL: CPT

## 2022-05-21 PROCEDURE — 99285 EMERGENCY DEPT VISIT HI MDM: CPT

## 2022-05-21 PROCEDURE — 83690 ASSAY OF LIPASE: CPT

## 2022-05-21 PROCEDURE — 96374 THER/PROPH/DIAG INJ IV PUSH: CPT

## 2022-05-21 PROCEDURE — 82248 BILIRUBIN DIRECT: CPT

## 2022-05-21 PROCEDURE — 6360000004 HC RX CONTRAST MEDICATION: Performed by: STUDENT IN AN ORGANIZED HEALTH CARE EDUCATION/TRAINING PROGRAM

## 2022-05-21 PROCEDURE — 71045 X-RAY EXAM CHEST 1 VIEW: CPT

## 2022-05-21 RX ORDER — AMOXICILLIN AND CLAVULANATE POTASSIUM 875; 125 MG/1; MG/1
1 TABLET, FILM COATED ORAL 2 TIMES DAILY
Qty: 10 TABLET | Refills: 0 | Status: SHIPPED | OUTPATIENT
Start: 2022-05-21 | End: 2022-05-26

## 2022-05-21 RX ORDER — AZITHROMYCIN 250 MG/1
500 TABLET, FILM COATED ORAL ONCE
Status: COMPLETED | OUTPATIENT
Start: 2022-05-21 | End: 2022-05-21

## 2022-05-21 RX ORDER — AZITHROMYCIN 250 MG/1
250 TABLET, FILM COATED ORAL DAILY
Qty: 5 TABLET | Refills: 0 | Status: SHIPPED | OUTPATIENT
Start: 2022-05-21 | End: 2022-05-26

## 2022-05-21 RX ORDER — KETOROLAC TROMETHAMINE 30 MG/ML
15 INJECTION, SOLUTION INTRAMUSCULAR; INTRAVENOUS ONCE
Status: COMPLETED | OUTPATIENT
Start: 2022-05-21 | End: 2022-05-21

## 2022-05-21 RX ORDER — AMOXICILLIN AND CLAVULANATE POTASSIUM 875; 125 MG/1; MG/1
1 TABLET, FILM COATED ORAL EVERY 12 HOURS SCHEDULED
Status: DISCONTINUED | OUTPATIENT
Start: 2022-05-21 | End: 2022-05-21 | Stop reason: HOSPADM

## 2022-05-21 RX ADMIN — KETOROLAC TROMETHAMINE 15 MG: 30 INJECTION, SOLUTION INTRAMUSCULAR; INTRAVENOUS at 08:08

## 2022-05-21 RX ADMIN — AMOXICILLIN AND CLAVULANATE POTASSIUM 1 TABLET: 875; 125 TABLET, FILM COATED ORAL at 08:07

## 2022-05-21 RX ADMIN — AZITHROMYCIN MONOHYDRATE 500 MG: 250 TABLET ORAL at 08:08

## 2022-05-21 RX ADMIN — IOPAMIDOL 80 ML: 755 INJECTION, SOLUTION INTRAVENOUS at 08:17

## 2022-05-21 ASSESSMENT — ENCOUNTER SYMPTOMS
COUGH: 0
NAUSEA: 0
SORE THROAT: 0
DIARRHEA: 0
BLOOD IN STOOL: 0
SHORTNESS OF BREATH: 0
VOMITING: 0
ABDOMINAL PAIN: 1
TROUBLE SWALLOWING: 0
BACK PAIN: 0

## 2022-05-21 ASSESSMENT — PAIN - FUNCTIONAL ASSESSMENT: PAIN_FUNCTIONAL_ASSESSMENT: 0-10

## 2022-05-21 ASSESSMENT — PAIN SCALES - GENERAL
PAINLEVEL_OUTOF10: 6
PAINLEVEL_OUTOF10: 6

## 2022-05-21 ASSESSMENT — PAIN DESCRIPTION - ORIENTATION: ORIENTATION: LEFT

## 2022-05-21 NOTE — ED PROVIDER NOTES
[address] [address]          Pt Name: Chitra Anne  MRN: [id number]  [place] 1960  Date of evaluation: 5/21/2022  Treating Resident Physician: Janice Huston MD  Supervising Physician: Ginny Jack, [facility]       Chief Complaint   Patient presents with    Abdominal Pain     LEFT SIDE     History obtained from the patient. HISTORY OF PRESENT ILLNESS    HPI  Chitra Anne is a 62 y.o. female with PMHx of primary adenocarcinoma of the left lung s/p resection and COPD who presents to the emergency department for evaluation of fever and abdominal pain. Patient reports intermittent episodes of cramping left lower quadrant abdominal pain that gone for a week. Pain is increased overnight. Reports that she had a fever last night of 101 °F that resolved with Tylenol. Denies constipation, diarrhea, vaginal discharge, dysuria, chest pain or shortness of breath worse than baseline. The patient has no other acute complaints at this time. REVIEW OF SYSTEMS   Review of Systems   Constitutional: Positive for fever. Negative for chills, diaphoresis and fatigue. HENT: Negative for sore throat and trouble swallowing. Eyes: Negative for visual disturbance. Respiratory: Negative for cough and shortness of breath. Cardiovascular: Negative for chest pain, palpitations and leg swelling. Gastrointestinal: Positive for abdominal pain. Negative for blood in stool, diarrhea, nausea and vomiting. Genitourinary: Negative for dysuria, hematuria and urgency. Musculoskeletal: Negative for arthralgias, back pain, myalgias and neck pain. Skin: Negative for rash. Neurological: Negative for seizures, syncope, weakness, numbness and headaches.          PAST MEDICAL AND SURGICAL HISTORY     Past Medical History:   Diagnosis Date    Arthritis     COPD (chronic obstructive pulmonary disease) ([facility] [address]     Dr Jacque Miller [facility]) Dr Medina Yang     Past Surgical History:   Procedure Laterality Date    BRONCHOSCOPY N/A 05/06/2021    BRONCHOSCOPY performed by Johanna Lee MD at [address] [place] [address] Drive      x4    CHOLECYSTECTOMY      COLONOSCOPY  07/11/2016    CT NEEDLE BIOPSY LUNG PERCUTANEOUS  03/26/2021    CT NEEDLE BIOPSY LUNG PERCUTANEOUS 3/26/2021 [facility] CT SCAN    HYSTERECTOMY      LUNG REMOVAL, TOTAL Left 05/04/2021    ROBOTIC ASSISTED LEFT UPPER LOBECTOMY AND MEDIASTINAL DISSECTION, POSSIBLE OPEN AND CRYOTHERAPY OF INTERCOSTAL NERVES 5, 6, 7 & 8 performed by Julio Nguyen MD at [address]  10/25/2021    port removal Dr Claudetta Grant in the procedure room    PORT SURGERY Left 06/10/2021    LEFT SUBCLAVIAN SINGLE LUMEN SMART PORT INSERTION performed by Julio Nguyen MD at [address]  07/11/2016         MEDICATIONS     Current Facility-Administered Medications:     amoxicillin-clavulanate (AUGMENTIN) 875-125 MG per tablet 1 tablet, 1 tablet, Oral, 2 times per day, Mari Gutierrez MD, 1 tablet at 05/21/22 0807    Current Outpatient Medications:     amoxicillin-clavulanate (AUGMENTIN) 875-125 MG per tablet, Take 1 tablet by mouth 2 times daily for 5 days, Disp: 10 tablet, Rfl: 0    azithromycin (ZITHROMAX) 250 MG tablet, Take 1 tablet by mouth daily for 5 days, Disp: 5 tablet, Rfl: 0    Pseudoephedrine-DM-GG (ROBITUSSIN CF PO), Take by mouth (Patient not taking: Reported on 4/18/2022), Disp: , Rfl:     umeclidinium-vilanterol (ANORO ELLIPTA) 62.5-25 MCG/INH AEPB inhaler, Inhale 1 puff into the lungs daily, Disp: 60 each, Rfl: 11    albuterol sulfate HFA (VENTOLIN HFA) 108 (90 Base) MCG/ACT inhaler, Inhale 2 puffs into the lungs every 6 hours as needed for Wheezing or Shortness of Breath, Disp: 8 g, Rfl: 11    vitamin D3 (CHOLECALCIFEROL) 125 MCG (5000 UT) TABS tablet, Cholecalciferol (Vitamin D3) (Vitamin D3) 125 mcg (5,000 unit) Tablet Active 125 MCG PO Daily April 22nd, 2021 4:50pm (Patient not taking: Reported on 3/10/2022), Disp: , Rfl:     acetaminophen (TYLENOL) 500 MG tablet, Take 1 tablet by mouth 4 times daily as needed for Pain, Disp: 120 tablet, Rfl: 0      SOCIAL HISTORY     Social History     Social History Narrative    Not on file     Social History     Tobacco Use    Smoking status: Current Every Day Smoker     Packs/day: 0.50     Years: 45.00     Pack years: 22.50     Types: Cigarettes    Smokeless tobacco: Never Used    Tobacco comment: 10 cig per day 3/10/22   Vaping Use    Vaping Use: Never used   Substance Use Topics    Alcohol use: Never    Drug use: Yes     Types: Marijuana Deepti Beat)     Comment: occasionally 3/10/22         ALLERGIES     Allergies   Allergen Reactions    Tylenol With Codeine #3 [Acetaminophen-Codeine] Nausea And Vomiting    Codeine Nausea And Vomiting         FAMILY HISTORY     Family History   Problem Relation Age of Onset    Other Mother         aneursym    No Known Problems Father     Depression Sister     Heart Disease Brother     Diabetes Brother     Other Brother         Lung disease   Florette Gamma Maternal Grandfather     Other Brother         Crohns    Macular Degen Brother     Depression Sister     Other Sister         Blood clots    Arthritis Sister     Brain Cancer Paternal Uncle          PREVIOUS RECORDS   Previous records reviewed: I reviewed the patient's past medical records including relevant labs, imaging and procedures. CT chest from 1 month ago reveals a 1.3 cm spiculated right upper lobe nodule concerning for malignancy and a new 4 mm right upper lobe nodule. Mildly enlarged right hilar lymph node 1.2 cm        PHYSICAL EXAM     ED Triage Vitals   BP Temp Temp src Pulse Resp SpO2 Height Weight   -- -- -- -- -- -- -- --     Initial vital signs and nursing assessment reviewed and normal. Body mass index is 20.18 kg/m². Pulsoximetry is normal per my interpretation.     Additional Vital Signs:  Vitals:    05/21/22 0819   BP: (!) 143/79   Pulse: 70   Resp: 20   Temp:    SpO2: 100%       Physical Exam  Vitals and nursing note reviewed. Constitutional:       Appearance: Normal appearance. HENT:      Head: Normocephalic and atraumatic. Right Ear: Tympanic membrane normal.      Left Ear: Tympanic membrane normal.      Nose: Nose normal.      Mouth/Throat:      Mouth: Mucous membranes are moist.      Pharynx: Oropharynx is clear. No oropharyngeal exudate. Eyes:      General: No scleral icterus. Extraocular Movements: Extraocular movements intact. Conjunctiva/sclera: Conjunctivae normal.      Pupils: Pupils are equal, round, and reactive to light. Cardiovascular:      Rate and Rhythm: Normal rate and regular rhythm. Pulses: Normal pulses. Heart sounds: Normal heart sounds. No murmur heard. No friction rub. No gallop. Pulmonary:      Effort: Pulmonary effort is normal. No respiratory distress. Breath sounds: Normal breath sounds. Comments: Decreased breath sounds in the left upper lobe. Coarse breath sounds left lower lobe  Abdominal:      Palpations: Abdomen is soft. Tenderness: There is abdominal tenderness in the left lower quadrant. There is no right CVA tenderness, left CVA tenderness, guarding or rebound. Musculoskeletal:         General: No swelling or tenderness. Normal range of motion. Cervical back: Normal range of motion and neck supple. Right lower leg: No edema. Left lower leg: No edema. Skin:     General: Skin is warm and dry. Capillary Refill: Capillary refill takes less than 2 seconds. Neurological:      General: No focal deficit present. Mental Status: She is alert and oriented to person, place, and time. Cranial Nerves: No cranial nerve deficit. Motor: No weakness.              MEDICAL DECISION MAKING   Initial Assessment:   62-year-old female with past medical history of lung cancer presenting today with left lower quadrant abdominal pain and fever      Differential diagnosis includes but is not limited to:  Diverticulitis, nephrolithiasis, ruptured ovarian cyst, pelvic inflammatory disease, ruptured ovarian cyst, colitis, IBD, epiploic appendagitis, constipation, influenza, COVID    Although some of these diagnoses are unlikely they were considered in my medical decision making. Plan:    Abdominal pain work-up  Chest x-ray  COVID and flu        ED RESULTS   Laboratory results:  Labs Reviewed   BASIC METABOLIC PANEL - Abnormal; Notable for the following components:       Result Value    Potassium 3.4 (*)     Glucose 112 (*)     All other components within normal limits   CBC WITH AUTO DIFFERENTIAL - Abnormal; Notable for the following components:    WBC 11.6 (*)     RBC 4.00 (*)     RDW-SD 46.2 (*)     Segs Absolute 9.8 (*)     Lymphocytes Absolute 0.9 (*)     All other components within normal limits   HEPATIC FUNCTION PANEL - Abnormal; Notable for the following components:    Alkaline Phosphatase 127 (*)     ALT 9 (*)     All other components within normal limits   GLOMERULAR FILTRATION RATE, ESTIMATED - Abnormal; Notable for the following components:    Est, Glom Filt Rate 73 (*)     All other components within normal limits   RAPID INFLUENZA A/B ANTIGENS   COVID-19, RAPID   LIPASE   ANION GAP   OSMOLALITY   SCAN OF BLOOD SMEAR       Radiologic studies results:  CT ABDOMEN PELVIS W IV CONTRAST Additional Contrast? None   Final Result       1. Probable infectious/inflammatory process at the left lung base. Recommend follow-up imaging to resolution. 2. No evidence of acute intra-abdominal or intrapelvic abnormality. **This report has been created using voice recognition software. It may contain minor errors which are inherent in voice recognition technology. **      Final report electronically signed by Dr. Mulugeta Godfrey MD on 5/21/2022 8:40 AM      XR CHEST PORTABLE Final Result   Left basilar pneumonia. Recommend follow-up imaging to resolution. **This report has been created using voice recognition software. It may contain minor errors which are inherent in voice recognition technology. **      Final report electronically signed by Dr. Rohini Tafoya MD on 5/21/2022 7:45 AM          ED Medications administered this visit:   Medications   amoxicillin-clavulanate (AUGMENTIN) 875-125 MG per tablet 1 tablet (1 tablet Oral Given 5/21/22 0807)   azithromycin (ZITHROMAX) tablet 500 mg (500 mg Oral Given 5/21/22 0808)   ketorolac (TORADOL) injection 15 mg (15 mg IntraVENous Given 5/21/22 0808)   iopamidol (ISOVUE-370) 76 % injection 80 mL (80 mLs IntraVENous Given 5/21/22 0817)         ED COURSE     ED Course as of 05/21/22 0852   Sat May 21, 2022   0748 XR CHEST PORTABLE  IMPRESSION:  Left basilar pneumonia. Recommend follow-up imaging to resolution. [TM]   0803 WBC(!): 11.6 [TM]   0807 Patient has left lower lobe pneumonia. Curb 65 score is 0 indicating low risk of 30-day mortality. Pursue outpatient treatment [TM]   7793 SARS-CoV-2, NAAT: NOT DETECTED [TM]   0811 Flu A Antigen: Negative [TM]   0812 Flu B Antigen: Negative [TM]   0848 CT ABDOMEN PELVIS W IV CONTRAST Additional Contrast? None    IMPRESSION:     1. Probable infectious/inflammatory process at the left lung base. Recommend follow-up imaging to resolution. 2. No evidence of acute intra-abdominal or intrapelvic abnormality. [TM]      ED Course User Index  [TM] Robinson Davila MD         MDM:   Presents with left lower quadrant abdominal pain. Expect this is likely referred pain from left lower lobe pneumonia. She is got new infiltrates consolidations left lower lobe on chest x-ray which were also visualized on CT of the abdomen. CT of the abdomen did not reveal diverticulitis or signs of perforation. I sent a start the patient on antibiotics and also wrote her prescription.   Curb 65 score is 0 indicating low risk of mortality in the next 30 days. The patient is feeling better with a benign repeat examination. There is no evidence of an acute abdomen at this time. Based on history, physical exam, risk factors, and tests,  my suspicion for bowel obstruction, acute pancreatitis, abscess, perforated viscous, diverticulitis, cholecystis, appendicitis is very low and I feel the patient can be managed as an outpatient with follow up. I also see no evidence of aortic dissection, AAA, or Acute Coronary Syndrome. Instructions have been given for the patient to return to the ED for worsening of the pain, high fevers, intractable vomiting, or bleeding. Strict follow up and return precautions have been discussed. Strict return precautions and follow up instructions were discussed with the patient prior to discharge, with which the patient agrees. MEDICATION CHANGES     New Prescriptions    AMOXICILLIN-CLAVULANATE (AUGMENTIN) 875-125 MG PER TABLET    Take 1 tablet by mouth 2 times daily for 5 days    AZITHROMYCIN (ZITHROMAX) 250 MG TABLET    Take 1 tablet by mouth daily for 5 days         FINAL DISPOSITION     Final diagnoses:   Pneumonia of left lower lobe due to infectious organism     Condition: condition: stable  Dispo: Discharge to home      This transcription was electronically signed. Parts of this transcriptions may have been dictated by use of voice recognition software and electronically transcribed, and parts may have been transcribed with the assistance of an ED scribe. The transcription may contain errors not detected in proofreading. Please refer to my supervising physician's documentation if my documentation differs.     Electronically Signed: Erik Chavez MD, 05/21/22, 8:52 AM         Maria Luisa Waters MD  Resident  05/21/22 6213

## 2022-05-21 NOTE — ED NOTES
Reassessment of the patients Abdominal Pain (LEFT SIDE)   is unchanged, the patients pain reassessment is a 5/10, Side rails up times 2, call light in reach, will continue to monitor.        Chucho Alberts RN  05/21/22 2093

## 2022-05-21 NOTE — Clinical Note
Edgar Mathews was seen and treated in our emergency department on 5/21/2022. She may return to work on 05/22/2022. If you have any questions or concerns, please don't hesitate to call.       Sugey Lobo MD

## 2022-05-21 NOTE — ED NOTES
Patient to ED for left sided abdominal pain. Patient denies diarrhea or vomiting however feels nauseated.      Sue Baez RN  05/21/22 9213

## 2022-07-14 ENCOUNTER — OFFICE VISIT (OUTPATIENT)
Dept: CARDIOLOGY CLINIC | Age: 62
End: 2022-07-14
Payer: COMMERCIAL

## 2022-07-14 VITALS
SYSTOLIC BLOOD PRESSURE: 115 MMHG | HEART RATE: 58 BPM | DIASTOLIC BLOOD PRESSURE: 83 MMHG | WEIGHT: 123.8 LBS | HEIGHT: 66 IN | BODY MASS INDEX: 19.89 KG/M2

## 2022-07-14 DIAGNOSIS — Z72.0 TOBACCO ABUSE: ICD-10-CM

## 2022-07-14 DIAGNOSIS — R00.2 INTERMITTENT PALPITATIONS: ICD-10-CM

## 2022-07-14 DIAGNOSIS — R06.09 DOE (DYSPNEA ON EXERTION): Primary | ICD-10-CM

## 2022-07-14 DIAGNOSIS — I77.810 ASCENDING AORTA DILATATION (HCC): ICD-10-CM

## 2022-07-14 DIAGNOSIS — R94.31 ABNORMAL EKG: ICD-10-CM

## 2022-07-14 DIAGNOSIS — Z87.898 HISTORY OF CHEST PAIN: ICD-10-CM

## 2022-07-14 DIAGNOSIS — J44.9 MODERATE COPD (CHRONIC OBSTRUCTIVE PULMONARY DISEASE) (HCC): ICD-10-CM

## 2022-07-14 PROCEDURE — 99214 OFFICE O/P EST MOD 30 MIN: CPT | Performed by: INTERNAL MEDICINE

## 2022-07-14 PROCEDURE — 93000 ELECTROCARDIOGRAM COMPLETE: CPT | Performed by: INTERNAL MEDICINE

## 2022-07-14 RX ORDER — ASPIRIN 81 MG/1
81 TABLET ORAL DAILY
Qty: 90 TABLET | Refills: 1 | COMMUNITY
Start: 2022-07-14

## 2022-07-14 NOTE — PROGRESS NOTES
Chief Complaint   Patient presents with    1 Year Follow Up     Originally  patient for pre op eval for lung Ca surgery      Had surgery for left upper lobe lung for stage II  with Dr. Ese Foote on 5-3-2021.   dxed with lung ca march 3rd 2021        1 year f/u    Occasional chest pressure- mild 3/10 last 5 min like two episodes in the last 1 yr-chronic    EKG today    Denied  dizziness or edema    Occasional palpitations- once in a while    KERR   Hx of mod  To severe COPD under F/u with pulm    Lost wt and cough and CT scan and noted lung nodule and Bx done    Smokes    FHX  Brother had and father had heart problem      Patient Active Problem List   Diagnosis    Primary cancer of left upper lobe of lung ([address]    Other emphysema ([address]    Smoking greater than 40 pack years    Tobacco abuse    KERR (dyspnea on exertion)    Intermittent palpitations    Borderline Abnormal EKG with rsr'    S/P left upper lobectomy of lung and mediastinal dissection    Moderate COPD (chronic obstructive pulmonary disease) ([address]    Encounter for insertion of venous access port    Ascending aorta dilatation (HCC) 4.1 cm    Encounter for removal of tunneled central venous catheter (CVC) with port    Adenocarcinoma of left lung ([address]    History of chest pain atypical once in a while not exertion related       Past Surgical History:   Procedure Laterality Date    BRONCHOSCOPY N/A 05/06/2021    BRONCHOSCOPY performed by Chiquis Resendiz MD at [address] [place] [address] Drive      x4    CHOLECYSTECTOMY      COLONOSCOPY  07/11/2016    CT NEEDLE BIOPSY LUNG PERCUTANEOUS  03/26/2021    CT NEEDLE BIOPSY LUNG PERCUTANEOUS 3/26/2021 [facility] CT SCAN    HYSTERECTOMY (CERVIX STATUS UNKNOWN)      LUNG REMOVAL, TOTAL Left 05/04/2021    ROBOTIC ASSISTED LEFT UPPER LOBECTOMY AND MEDIASTINAL DISSECTION, POSSIBLE OPEN AND CRYOTHERAPY OF INTERCOSTAL NERVES 5, 6, 7 & 8 performed by Amanda Restrepo MD at [place] [address] HISTORY  10/25/2021    port removal Dr Tracey Juares in the procedure room    PORT SURGERY Left 06/10/2021    LEFT SUBCLAVIAN SINGLE LUMEN SMART PORT INSERTION performed by Letitia Alberts MD at [address] [place] [address]  07/11/2016       Allergies   Allergen Reactions    Tylenol With Codeine #3 [Acetaminophen-Codeine] Nausea And Vomiting    Codeine Nausea And Vomiting        Family History   Problem Relation Age of Onset    Other Mother         aneursym    No Known Problems Father     Depression Sister     Heart Disease Brother     Diabetes Brother     Other Brother         Lung disease    Lung Cancer Maternal Grandfather     Other Brother         Crohns    Macular Degen Brother     Depression Sister     Other Sister         Blood clots    Arthritis Sister     Brain Cancer Paternal Uncle         Social History     Socioeconomic History    Marital status:      Spouse name: Not on file    Number of children: Not on file    Years of education: Not on file    Highest education level: Not on file   Occupational History    Not on file   Tobacco Use    Smoking status: Current Every Day Smoker     Packs/day: 0.50     Years: 45.00     Pack years: 22.50     Types: Cigarettes    Smokeless tobacco: Never Used    Tobacco comment: 10 cig per day 3/10/22   Vaping Use    Vaping Use: Never used   Substance and Sexual Activity    Alcohol use: Never    Drug use: Yes     Types: Marijuana Juanetta Presidio)     Comment: occasionally 3/10/22    Sexual activity: Not on file   Other Topics Concern    Not on file   Social History Narrative    Not on file     Social Determinants of Health     Financial Resource Strain:     Difficulty of Paying Living Expenses: Not on file   Food Insecurity:     Worried About [address] in the Last Year: Not on file    Sylvia of Food in the Last Year: Not on file   Transportation Needs:     Lack of Transportation (Medical):  Not on file    Lack of Transportation (Non-Medical): Not on file   Physical Activity:     Days of Exercise per Week: Not on file    Minutes of Exercise per Session: Not on file   Stress:     Feeling of Stress : Not on file   Social Connections:     Frequency of Communication with Friends and Family: Not on file    Frequency of Social Gatherings with Friends and Family: Not on file    Attends Hindu Services: Not on file    Active Member of [address] [place] [address] or Organizations: Not on file    Attends Club or Organization Meetings: Not on file    Marital Status: Not on file   Intimate Partner Violence:     Fear of Current or Ex-Partner: Not on file    Emotionally Abused: Not on file    Physically Abused: Not on file    Sexually Abused: Not on file   Housing Stability:     Unable to Pay for Housing in the Last Year: Not on file    Number of [place] in the Last Year: Not on file    Unstable Housing in the Last Year: Not on file       Current Outpatient Medications   Medication Sig Dispense Refill    aspirin EC 81 MG EC tablet Take 1 tablet by mouth daily 90 tablet 1    umeclidinium-vilanterol (ANORO ELLIPTA) 62.5-25 MCG/INH AEPB inhaler Inhale 1 puff into the lungs daily 60 each 11    albuterol sulfate HFA (VENTOLIN HFA) 108 (90 Base) MCG/ACT inhaler Inhale 2 puffs into the lungs every 6 hours as needed for Wheezing or Shortness of Breath 8 g 11    vitamin D3 (CHOLECALCIFEROL) 125 MCG (5000 UT) TABS tablet daily       acetaminophen (TYLENOL) 500 MG tablet Take 1 tablet by mouth 4 times daily as needed for Pain 120 tablet 0     No current facility-administered medications for this visit. Review of Systems -     General ROS: negative  Psychological ROS: negative  Hematological and Lymphatic ROS: No history of blood clots or bleeding disorder.    Respiratory ROS: no cough,  or wheezing, the rest see HPI  Cardiovascular ROS: See HPI  Gastrointestinal ROS: negative  Genito-Urinary ROS: no dysuria, trouble voiding, or hematuria  Musculoskeletal ROS: negative  Neurological ROS: no TIA or stroke symptoms  Dermatological ROS: negative      Blood pressure 115/83, pulse 58, height 5' 6\" (1.676 m), weight 123 lb 12.8 oz (56.2 kg). Physical Examination:    General appearance - alert, well appearing, and in no distress  HEENT- Pink conjunctiva  , Non-icteri sclera,PERRLA  Mental status - alert, oriented to person, place, and time  Neck - supple, no significant adenopathy, no JVD, or carotid bruits  Chest - clear to auscultation, no wheezes, rales or rhonchi, symmetric air entry  Heart - normal rate, regular rhythm, normal S1, S2, no murmurs, rubs, clicks or gallops  Abdomen - soft, nontender, nondistended, no masses or organomegaly  BERNARD- no CVA or flank tenderness, no suprapubic tenderness  Neurological - alert, oriented, normal speech, no focal findings or movement disorder noted  Musculoskeletal/limbs - no joint tenderness, deformity or swelling   - peripheral pulses normal, no pedal edema, no clubbing or cyanosis  Skin - normal coloration and turgor, no rashes, no suspicious skin lesions noted  Psych- appropriate mood and affect    Lab  No results for input(s): CKTOTAL, CKMB, CKMBINDEX, TROPONINI in the last 72 hours.   CBC:   Lab Results   Component Value Date/Time    WBC 11.6 05/21/2022 07:00 AM    RBC 4.00 05/21/2022 07:00 AM    RBC 4.63 10/10/2017 09:16 AM    HGB 12.6 05/21/2022 07:00 AM    HCT 38.6 05/21/2022 07:00 AM    MCV 96.5 05/21/2022 07:00 AM    MCH 31.5 05/21/2022 07:00 AM    MCHC 32.6 05/21/2022 07:00 AM    RDW 13.1 04/18/2022 10:59 AM     05/21/2022 07:00 AM    MPV 11.6 05/21/2022 07:00 AM     BMP:    Lab Results   Component Value Date/Time     05/21/2022 07:00 AM    K 3.4 05/21/2022 07:00 AM    K 5.2 06/03/2021 12:25 PM     05/21/2022 07:00 AM    CO2 25 05/21/2022 07:00 AM    BUN 10 05/21/2022 07:00 AM    LABALBU 4.1 05/21/2022 07:00 AM    CREATININE 0.8 05/21/2022 07:00 AM    CALCIUM 9.7 05/21/2022 07:00 AM    LABGLOM 73 05/21/2022 07:00 AM    GLUCOSE 112 05/21/2022 07:00 AM    GLUCOSE 79 10/10/2017 09:16 AM     Hepatic Function Panel:    Lab Results   Component Value Date/Time    ALKPHOS 127 05/21/2022 07:00 AM    ALT 9 05/21/2022 07:00 AM    AST 9 05/21/2022 07:00 AM    PROT 7.3 05/21/2022 07:00 AM    BILITOT 0.5 05/21/2022 07:00 AM    BILIDIR <0.2 05/21/2022 07:00 AM    LABALBU 4.1 05/21/2022 07:00 AM     Magnesium:    Lab Results   Component Value Date/Time    MG 2.2 08/18/2021 09:51 AM     Warfarin PT/INR:  No components found for: PTPATWAR, PTINRWAR  HgBA1c:  No results found for: LABA1C  FLP:  No results found for: TRIG, HDL, LDLCALC, LDLDIRECT, LABVLDL  TSH:    Lab Results   Component Value Date/Time    TSH 4.750 10/10/2017 09:16 AM   Conclusions      Summary   Normal left ventricle size and systolic function. Ejection fraction was   estimated at 60 %. There were no regional left ventricular wall motion   abnormalities and wall thickness was within normal limits. Aortic aneurysm noted in the ascending aorta . Aortic aneurysm measures 4.1 cm . Signature      ----------------------------------------------------------------   Electronically signed by Ingris Cabrera MD (Interpreting   physician) on 04/27/2021 at 07:10 PM    June 2021 CTA chest  The ascending aorta is upper limits of normal in diameter    Conclusions      Summary   Lexiscan EKG stress test is not suggestive for ischemia. The nuclear images is not suggestive for myocardial ischemia. Signatures      ----------------------------------------------------------------   Electronically signed by Ingris Cabrera MD (Interpreting   Cardiologist) on 04/29/2021 at 18:54   ----------------------------------------------------------------        ekg 4/22/21  NSR,RSR' no acute abn    ekg 7/14/22  Sinus  Bradycardia   -Left atrial enlargement.    -Anterior infarct -age undetermined. ABNORMAL       Assessment   Diagnosis Orders   1. KERR (dyspnea on exertion)     2. Intermittent palpitations     3. Borderline Abnormal EKG with rsr'     4. Ascending aorta dilatation (HCC) 4.1 cm     5. History of chest pain atypical once in a while not exertion related     6. Tobacco abuse     7. Moderate COPD (chronic obstructive pulmonary disease) (HCC)         -3.9 x 2.1 cm spiculated cavitary mass present within the left upper lobe S/p CT guided biopsy by IR service on 3/26/2021. The biopsy is consistent with Adenocarcinoma. Plan     The most current eds and labs reviewed    Minimal abn ekg  KERR  COPD  Tobacco abuse  The  Echo and catracho nuc stress- WNL 04/2021  Hx of atypical cp rare not exertion related- med Rx and F/u  Palpitation occasional -med Rx  Start asa 81 po qd    Smoking: discussed with the patient the importance of smoke cessation especially with the risk of CAD. Aortic aneurysm measures 4.1 cm . June 2021 CTA chest  The ascending aorta is upper limits of normal in diameter  CTA 04/2022  Ascending thoracic aorta is not dilated  Need f/u  And d.w the pat in detail      D.w the pat the plan of care    Discussed use, benefit, and side effects of prescribed medications. All patient questions answered. Pt voiced understanding. Instructed to continue current medications, diet and exercise. Continue risk factor modification and medical management. Patient agreed with treatment plan. Follow up as directed.       RTC in  1 year        Maribeth Moreau [facility]

## 2022-08-02 ENCOUNTER — HOSPITAL ENCOUNTER (OUTPATIENT)
Dept: MAMMOGRAPHY | Age: 62
Discharge: HOME | End: 2022-08-02
Payer: COMMERCIAL

## 2022-08-02 DIAGNOSIS — Z12.31 VISIT FOR SCREENING MAMMOGRAM: ICD-10-CM

## 2022-08-02 PROCEDURE — 77067 SCR MAMMO BI INCL CAD: CPT

## 2022-08-18 ENCOUNTER — HOSPITAL ENCOUNTER (OUTPATIENT)
Dept: CT IMAGING | Age: 62
Discharge: HOME | End: 2022-08-18
Payer: COMMERCIAL

## 2022-08-18 DIAGNOSIS — C34.92 MALIGNANT NEOPLASM OF LEFT LUNG, UNSPECIFIED PART OF LUNG (HCC): ICD-10-CM

## 2022-08-18 LAB — POC CREATININE WHOLE BLOOD: 0.9 MG/DL (ref 0.5–1.2)

## 2022-08-18 PROCEDURE — 71260 CT THORAX DX C+: CPT

## 2022-08-18 PROCEDURE — 82565 ASSAY OF CREATININE: CPT

## 2022-08-18 PROCEDURE — 6360000004 HC RX CONTRAST MEDICATION: Performed by: INTERNAL MEDICINE

## 2022-08-18 RX ADMIN — IOPAMIDOL 80 ML: 755 INJECTION, SOLUTION INTRAVENOUS at 08:58

## 2022-08-25 ENCOUNTER — OFFICE VISIT (OUTPATIENT)
Dept: ONCOLOGY | Age: 62
End: 2022-08-25
Payer: COMMERCIAL

## 2022-08-25 ENCOUNTER — HOSPITAL ENCOUNTER (OUTPATIENT)
Dept: INFUSION THERAPY | Age: 62
Discharge: HOME | End: 2022-08-25
Payer: COMMERCIAL

## 2022-08-25 VITALS
RESPIRATION RATE: 18 BRPM | DIASTOLIC BLOOD PRESSURE: 80 MMHG | TEMPERATURE: 97.7 F | OXYGEN SATURATION: 98 % | SYSTOLIC BLOOD PRESSURE: 140 MMHG | HEIGHT: 66 IN | WEIGHT: 126 LBS | BODY MASS INDEX: 20.25 KG/M2 | HEART RATE: 66 BPM

## 2022-08-25 VITALS
SYSTOLIC BLOOD PRESSURE: 140 MMHG | HEART RATE: 66 BPM | TEMPERATURE: 97.7 F | OXYGEN SATURATION: 98 % | RESPIRATION RATE: 18 BRPM | DIASTOLIC BLOOD PRESSURE: 80 MMHG

## 2022-08-25 DIAGNOSIS — R91.1 LUNG NODULE: Primary | ICD-10-CM

## 2022-08-25 DIAGNOSIS — C34.92 MALIGNANT NEOPLASM OF LEFT LUNG, UNSPECIFIED PART OF LUNG (HCC): ICD-10-CM

## 2022-08-25 LAB
ABSOLUTE IMMATURE GRANULOCYTE: 0 THOU/MM3 (ref 0–0.07)
ALBUMIN SERPL-MCNC: 4.8 G/DL (ref 3.5–5.1)
ALP BLD-CCNC: 83 U/L (ref 38–126)
ALT SERPL-CCNC: 11 U/L (ref 11–66)
AST SERPL-CCNC: 17 U/L (ref 5–40)
BASINOPHIL, AUTOMATED: 1 % (ref 0–3)
BASOPHILS ABSOLUTE: 0.1 THOU/MM3 (ref 0–0.1)
BILIRUB SERPL-MCNC: 0.4 MG/DL (ref 0.3–1.2)
BILIRUBIN DIRECT: < 0.2 MG/DL (ref 0–0.3)
BUN, WHOLE BLOOD: 10 MG/DL (ref 8–26)
CHLORIDE, WHOLE BLOOD: 107 MEQ/L (ref 98–109)
CREATININE, WHOLE BLOOD: 0.9 MG/DL (ref 0.5–1.2)
EOSINOPHILS ABSOLUTE: 0.1 THOU/MM3 (ref 0–0.4)
EOSINOPHILS RELATIVE PERCENT: 2 % (ref 0–4)
GFR, ESTIMATED: 68 ML/MIN/1.73M2
GLUCOSE, WHOLE BLOOD: 71 MG/DL (ref 70–108)
HCT VFR BLD CALC: 39.9 % (ref 37–47)
HEMOGLOBIN: 13.1 GM/DL (ref 12–16)
IMMATURE GRANULOCYTES: 0 %
IONIZED CALCIUM, WHOLE BLOOD: 1.22 MMOL/L (ref 1.12–1.32)
LYMPHOCYTES # BLD: 18 % (ref 15–47)
LYMPHOCYTES ABSOLUTE: 1.2 THOU/MM3 (ref 1–4.8)
MCH RBC QN AUTO: 32.3 PG (ref 26–33)
MCHC RBC AUTO-ENTMCNC: 32.8 GM/DL (ref 32.2–35.5)
MCV RBC AUTO: 98 FL (ref 81–99)
MONOCYTES ABSOLUTE: 0.3 THOU/MM3 (ref 0.4–1.3)
MONOCYTES: 4 % (ref 0–12)
PDW BLD-RTO: 13.4 % (ref 11.5–14.5)
PLATELET # BLD: 111 THOU/MM3 (ref 130–400)
PMV BLD AUTO: 12.3 FL (ref 9.4–12.4)
POTASSIUM, WHOLE BLOOD: 4.5 MEQ/L (ref 3.5–4.9)
RBC # BLD: 4.06 MILL/MM3 (ref 4.2–5.4)
SEG NEUTROPHILS: 76 % (ref 43–75)
SEGMENTED NEUTROPHILS ABSOLUTE COUNT: 5.2 THOU/MM3 (ref 1.8–7.7)
SODIUM, WHOLE BLOOD: 141 MEQ/L (ref 138–146)
TOTAL CO2, WHOLE BLOOD: 27 MEQ/L (ref 23–33)
TOTAL PROTEIN: 7 G/DL (ref 6.1–8)
WBC # BLD: 6.9 THOU/MM3 (ref 4.8–10.8)

## 2022-08-25 PROCEDURE — 99213 OFFICE O/P EST LOW 20 MIN: CPT | Performed by: INTERNAL MEDICINE

## 2022-08-25 PROCEDURE — 85025 COMPLETE CBC W/AUTO DIFF WBC: CPT

## 2022-08-25 PROCEDURE — 36415 COLL VENOUS BLD VENIPUNCTURE: CPT

## 2022-08-25 PROCEDURE — 80047 BASIC METABLC PNL IONIZED CA: CPT

## 2022-08-25 PROCEDURE — 80076 HEPATIC FUNCTION PANEL: CPT

## 2022-08-25 PROCEDURE — 99211 OFF/OP EST MAY X REQ PHY/QHP: CPT

## 2022-08-25 NOTE — PATIENT INSTRUCTIONS
Patient's next chest CT with contrast will be the week of December 7. Follow-up with me December 14 to review CT.

## 2022-09-15 NOTE — PROGRESS NOTES
[facility]    Patient: Maury Garcia, 65 y.o.   : 1960    Patient of Dr. Maria L Kimball   Patient presents with    Follow-up     6 month COPD follow up, no testing       Naa Duran is here for follow up for Moderate COPD and lung cancer. Patient was last followed by Dr. Bella Bone with oncology on 2022 with plan for CT Chest to follow 13 mm RUL nodule in December. Overall patient reports respiratory symptoms have been stable since last appointment. Patient reports good compliance with inhaled medications (Anoro). Patient using albuterol 2 times per month on average. Patient reports some physical limitation due to respiratory symptoms. Her past medical history is significant for COPD, aneurysm (follows Dr. Claudette Bunch - ja), lung cancer, and arthritis. Stable CT Chest 22 - Repeat CT Chest in 2022 with Dr. Bella Bone    [facility] Dyspnea Scale:   0: Dyspneic on strenuous exercise  1: Dyspneic on walking a slight hill  2: Dyspneic on walking level ground; must stop occasionally due to breathlessness  3: Must stop for breathlessness after walking 100 yards or after a few minutes  4: Cannot leave house; breathless on dressing/undressing    MMRC dyspnea score: 1    COPD  She complains of cough, sputum production and wheezing (occasionally). There is no hemoptysis or shortness of breath. This is a chronic problem. The current episode started more than 1 year ago. The problem occurs intermittently. The problem has been unchanged. The cough is productive of sputum (grey-white). Associated symptoms include dyspnea on exertion, rhinorrhea and sneezing. Pertinent negatives include no appetite change, chest pain, fever or weight loss. Her symptoms are aggravated by change in weather and strenuous activity. Her symptoms are alleviated by beta-agonist. She reports significant improvement on treatment.  Risk factors for lung disease include smoking/tobacco exposure. Her past medical history is significant for COPD. Progress History:   Since last visit any new medical issues? No  New ER or hospital visits? Yes pneumonia 5/21/2022 - given Augmentin and azithromycin  Any new or changes in medicines? No  Using inhalers? Yes Anoro and as needed albuterol   Are they helpful? Yes   Any recent exacerbations? No  Last A1AT: MM  Last PFT: 4/6/2021 - moderate obstruction, air trapping, and decreased diffusion  Last 6 MWT: none in epic     Smoking History:  Current smoker of 0.5 PPD per day with 45 PY history  Patient reports that she has nicotine patches at home. Her  smokes. 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 (Coal, dust, asbestos, molds ie Hay)  Denies living on a farm that primarily raised crops, livestock or combination  Admits to exposure to pets/animals at home. 2 dogs  Denies exposure to tuberculosis. She denies history of glaucoma or urinary retention.      Flu vaccine: Not vaccinated and does not wish to receive vaccination  Pneumonia vaccine: Not vaccinated and does not wish to receive vaccination  COVID-19 vaccine: Not vaccinated and does not wish to receive vaccination  Past Medical hx   PMH:  Past Medical History:   Diagnosis Date    Arthritis     COPD (chronic obstructive pulmonary disease) ([facility]     Dr Sivan Chavira [facility])     Dr Dot Laird    Pneumonia involving left lung 2022     SURGICAL HISTORY:  Past Surgical History:   Procedure Laterality Date    BREAST BIOPSY Right 02/23/2011    Benign US Biopsy    BRONCHOSCOPY N/A 05/06/2021    BRONCHOSCOPY performed by Narinder Zapata MD at [address] [place] [address]      x4    CHOLECYSTECTOMY      COLONOSCOPY  07/11/2016    CT NEEDLE BIOPSY LUNG PERCUTANEOUS  03/26/2021    CT NEEDLE BIOPSY LUNG PERCUTANEOUS 3/26/2021 [facility] CT SCAN    LUNG REMOVAL, TOTAL Left 05/04/2021    ROBOTIC ASSISTED LEFT UPPER LOBECTOMY AND MEDIASTINAL DISSECTION, POSSIBLE OPEN AND CRYOTHERAPY OF INTERCOSTAL NERVES 5, 6, 7 & 8 performed by Bree Langley MD at [address] [place] [address]  10/25/2021    port removal Dr Carito Muller in the procedure room    PORT SURGERY Left 06/10/2021    LEFT SUBCLAVIAN SINGLE LUMEN SMART PORT INSERTION performed by Bree Langley MD at [address]  07/11/2016     SOCIAL HISTORY:  Social History     Tobacco Use    Smoking status: Every Day     Packs/day: 0.50     Years: 45.00     Pack years: 22.50     Types: Cigarettes    Smokeless tobacco: Never    Tobacco comments:     10 cig per day 8/25/22-weaning per self   Vaping Use    Vaping Use: Never used   Substance Use Topics    Alcohol use: Never    Drug use: Yes     Types: Marijuana Lindsey David)     Comment: occasionally 3/10/22     ALLERGIES:  Allergies   Allergen Reactions    Tylenol With Codeine #3 [Acetaminophen-Codeine] Nausea And Vomiting    Codeine Nausea And Vomiting     FAMILY HISTORY:  Family History   Problem Relation Age of Onset    Other Mother         aneursym    No Known Problems Father     Depression Sister     Heart Disease Brother     Diabetes Brother     Other Brother         Lung disease    Lung Cancer Maternal Grandfather     Other Brother         Crohns    Macular Degen Brother     Depression Sister     Other Sister         Blood clots    Arthritis Sister     Brain Cancer Paternal Uncle      CURRENT MEDICATIONS:  Current Outpatient Medications   Medication Sig Dispense Refill    loratadine (CLARITIN) 10 MG tablet Take 1 tablet by mouth daily 30 tablet 11    fluticasone (FLONASE) 50 MCG/ACT nasal spray 2 sprays by Nasal route daily [place] towards outside of nose, not towards the septum 16 g 11    nicotine (NICOTROL) 10 MG inhaler Nicotrol inhaler Cartridges. 6 to 16 Cartridges/day for 6 weeks. Then taper over next 6 weeks. Maximum 16 Cartridges/day.  C/ [place] 66 each 3    guaiFENesin (MUCINEX) 600 MG extended release tablet Take 1 tablet by mouth 2 times daily 60 tablet 6    aspirin EC 81 MG EC tablet Take 1 tablet by mouth daily 90 tablet 1    umeclidinium-vilanterol (ANORO ELLIPTA) 62.5-25 MCG/INH AEPB inhaler Inhale 1 puff into the lungs daily 60 each 11    albuterol sulfate HFA (VENTOLIN HFA) 108 (90 Base) MCG/ACT inhaler Inhale 2 puffs into the lungs every 6 hours as needed for Wheezing or Shortness of Breath 8 g 11    vitamin D3 (CHOLECALCIFEROL) 125 MCG (5000 UT) TABS tablet daily       acetaminophen (TYLENOL) 500 MG tablet Take 1 tablet by mouth 4 times daily as needed for Pain 120 tablet 0     No current facility-administered medications for this visit. Bebo HOOK   Review of Systems   Constitutional:  Negative for appetite change, fever and weight loss. HENT:  Positive for congestion, rhinorrhea, sinus pressure and sneezing. Raspy voice from CHEMO   Respiratory:  Positive for cough, sputum production and wheezing (occasionally). Negative for hemoptysis, chest tightness and shortness of breath. Cardiovascular:  Positive for dyspnea on exertion. Negative for chest pain, palpitations and leg swelling. Genitourinary:  Negative for difficulty urinating. Allergic/Immunologic: Positive for environmental allergies. Over the counter generic Claritin allergy medication      Physical exam   /76   Pulse 65   Temp 97.4 °F (36.3 °C)   Ht 5' 6\" (1.676 m)   Wt 126 lb (57.2 kg)   LMP  (LMP Unknown)   SpO2 98% Comment: r/a  BMI 20.34 kg/m²    Wt Readings from Last 3 Encounters:   09/22/22 126 lb (57.2 kg)   08/25/22 126 lb (57.2 kg)   07/14/22 123 lb 12.8 oz (56.2 kg)       Physical Exam  Constitutional:       General: She is not in acute distress. Appearance: She is well-developed. Comments: BMI 20   HENT:      Head: Normocephalic and atraumatic.       Right Ear: External ear normal.      Left Ear: External ear normal. Mouth/Throat:      Mouth: Mucous membranes are moist.      Pharynx: Oropharynx is clear. No oropharyngeal exudate. Comments: Deep/raspy voice  Eyes:      General:         Right eye: No discharge. Left eye: No discharge. Cardiovascular:      Rate and Rhythm: Normal rate and regular rhythm. Pulmonary:      Effort: Pulmonary effort is normal. No respiratory distress. Breath sounds: No wheezing, rhonchi or rales. Comments: Diminished breath sounds  Chest:      Chest wall: No tenderness. Musculoskeletal:      Cervical back: Neck supple. Right lower leg: No edema. Left lower leg: No edema. Skin:     General: Skin is warm and dry. Neurological:      General: No focal deficit present. Mental Status: She is alert. Psychiatric:         Mood and Affect: Mood normal.         Behavior: Behavior normal.         Thought Content: Thought content normal.         Judgment: Judgment normal.        Results   Lung Nodule Screening     [x] Qualifies    [] Does not qualify   [] Declined    [] Completed  39 PY history   The USPSTF recommends annual screening for lung cancer with low-dose computed tomography (LDCT) in adults aged 48 to [de-identified] years who have a 20 pack-year smoking history and currently smoke or have quit within the past 15 years. Screening should be discontinued once a person has not smoked for 15 years or develops a health problem that substantially limits life expectancy or the ability or willingness to have curative lung surgery. Narrative   PROCEDURE: CT CHEST W CONTRAST       CLINICAL INFORMATION: Malignant neoplasm of left lung. COMPARISON: CT chest 4/11/2022. CT chest 10/4/2021. TECHNIQUE:    5 mm axial imaging through the chest with IV contrast. Coronal and sagittal reconstruction were performed.         All CT scans at this facility use dose modulation, iterative reconstruction, and/or weight based dosing when appropriate to reduce the radiation dose to as low as reasonably achievable. CONTRAST: 80 cc Isovue-370. FINDINGS:   Heart/mediastinum: The thyroid gland is unremarkable. The heart size is normal. No pericardial effusion is identified. No aortic aneurysm or dissection is present. A 12 mm right hilar lymph node is stable (series 2, image 33). No mediastinal or axillary    lymphadenopathy is visualized. Lungs: Moderate centrilobular emphysema is unchanged. A spiculated right upper lobe pulmonary nodule (series 2, image 20) measures 13 mm (previously measured 13 mm). A 3 mm right middle lobe pulmonary nodule is stable (series 2, image 35). A 5 mm right    middle lobe pulmonary nodule is stable (series 2, image 41). No new pulmonary mass or nodule is identified. No focal consolidation, pleural effusion, or pneumothorax is observed. Upper abdomen: No acute findings are noted in the limited images through the upper abdomen. The gallbladder is surgically absent. Musculoskeletal: The visualized skeletal structures appear intact. Impression   1. A spiculated 13 mm right upper lobe pulmonary nodule appear stable. Prominent right hilar lymph node measuring 12 mm is unchanged. 3 mm a 5 mm right middle lobe pulmonary nodules are stable. No new pulmonary mass or nodule is observed. 2. Moderate centrilobular emphysema and additional stable chronic findings are noted. **This report has been created using voice recognition software. It may contain minor errors which are inherent in voice recognition technology. **       Final report electronically signed by Dr Idalia Castro on 8/18/2022 10:04 AM     Assessment      Diagnosis Orders   1. Moderate COPD (chronic obstructive pulmonary disease) (HCC)  guaiFENesin (MUCINEX) 600 MG extended release tablet      2. Environmental allergies  loratadine (CLARITIN) 10 MG tablet    fluticasone (FLONASE) 50 MCG/ACT nasal spray      3. Adenocarcinoma of left lung ([facility]        4.

## 2022-09-22 ENCOUNTER — OFFICE VISIT (OUTPATIENT)
Dept: PULMONOLOGY | Age: 62
End: 2022-09-22
Payer: COMMERCIAL

## 2022-09-22 VITALS
BODY MASS INDEX: 20.25 KG/M2 | OXYGEN SATURATION: 98 % | WEIGHT: 126 LBS | SYSTOLIC BLOOD PRESSURE: 126 MMHG | TEMPERATURE: 97.4 F | HEART RATE: 65 BPM | HEIGHT: 66 IN | DIASTOLIC BLOOD PRESSURE: 76 MMHG

## 2022-09-22 DIAGNOSIS — J44.9 MODERATE COPD (CHRONIC OBSTRUCTIVE PULMONARY DISEASE) (HCC): Primary | ICD-10-CM

## 2022-09-22 DIAGNOSIS — Z72.0 TOBACCO ABUSE: ICD-10-CM

## 2022-09-22 DIAGNOSIS — C34.92 ADENOCARCINOMA OF LEFT LUNG (HCC): ICD-10-CM

## 2022-09-22 DIAGNOSIS — Z91.09 ENVIRONMENTAL ALLERGIES: ICD-10-CM

## 2022-09-22 PROCEDURE — 99214 OFFICE O/P EST MOD 30 MIN: CPT

## 2022-09-22 PROCEDURE — 99406 BEHAV CHNG SMOKING 3-10 MIN: CPT

## 2022-09-22 RX ORDER — GUAIFENESIN 600 MG/1
600 TABLET, EXTENDED RELEASE ORAL 2 TIMES DAILY
Qty: 60 TABLET | Refills: 6 | Status: SHIPPED | OUTPATIENT
Start: 2022-09-22 | End: 2022-10-22

## 2022-09-22 RX ORDER — FLUTICASONE PROPIONATE 50 MCG
2 SPRAY, SUSPENSION (ML) NASAL DAILY
Qty: 16 G | Refills: 11 | Status: SHIPPED | OUTPATIENT
Start: 2022-09-22 | End: 2023-09-22

## 2022-09-22 RX ORDER — LORATADINE 10 MG/1
10 TABLET ORAL DAILY
Qty: 30 TABLET | Refills: 11 | Status: SHIPPED | OUTPATIENT
Start: 2022-09-22 | End: 2023-09-22

## 2022-09-22 ASSESSMENT — ENCOUNTER SYMPTOMS
RHINORRHEA: 1
HEMOPTYSIS: 0
CHEST TIGHTNESS: 0
SHORTNESS OF BREATH: 0
WHEEZING: 1
SPUTUM PRODUCTION: 1
COUGH: 1
SINUS PRESSURE: 1

## 2022-09-22 ASSESSMENT — COPD QUESTIONNAIRES: COPD: 1

## 2022-09-22 NOTE — PATIENT INSTRUCTIONS
Continue Anoro. Continue your albuterol inhaler. You may take 2 puffs every 6 hours as needed for shortness of breath or wheezing. We will start Mucinex 1 (600 mg tablet) twice daily. If you pick this up over the counter, make sure to get plain Mucinex, not Mucinex DM. I have started you on Nicotrol inhaler Cartridges. You may use 6 to 16 Cartridges/day for 6 weeks. Then taper over next 6 weeks. Maximum 16 Cartridges/day. I will see you back in about 6 months    Learning About Benefits From Quitting Smoking  How does quitting smoking make you healthier? If you're thinking about quitting smoking, you may have a few reasons to be smoke-free. Your health may be one of them. When you quit smoking, you lower your risks for cancer, lung disease, heart attack, stroke, blood vessel disease, and blindness from macular degeneration. When you're smoke-free, you get sick less often, and you heal faster. You are less likely to get colds, flu, bronchitis, and pneumonia. As a nonsmoker, you may find that your mood is better and you are less stressed. When and how will you feel healthier? Quitting has real health benefits that start from day 1 of being smoke-free. And the longer you stay smoke-free, the healthier you get and the better you feel. The first hours  After just 20 minutes, your blood pressure and heart rate go down. That means there's less stress on your heart and blood vessels. Within 12 hours, the level of carbon monoxide in your blood drops back to normal. That makes room for more oxygen. With more oxygen in your body, you may notice that you have more energy than when you smoked. After 2 weeks  Your lungs start to work better. Your risk of heart attack starts to drop. After 1 month  When your lungs are clear, you cough less and breathe deeper, so it's easier to be active. Your sense of taste and smell return.  That means you can enjoy food more than you have since you started smoking. Over the years  Over the years, your risks of heart disease, heart attack, and stroke are lower. After 10 years, your risk of dying from lung cancer is cut by about half. And your risk for many other types of cancer is lower too. How would quitting help others in your life? When you quit smoking, you improve the health of everyone who now breathes in your smoke. Their heart, lung, and cancer risks drop, much like yours. They are sick less. For babies and small children, living smoke-free means they're less likely to have ear infections, pneumonia, and bronchitis. If you're a woman who is or will be pregnant someday, quitting smoking means a healthier . Children who are close to you are less likely to become adult smokers. Where can you learn more? Go to https://Ebid.co.zwnikole.[employer]. org and sign in to your [employer] account. Enter [id number] [id number] [phone] in the [employer] box to learn more about \"Learning About Benefits From Quitting Smoking. \"     If you do not have an account, please click on the \"Sign Up Now\" link. Current as of: 2021               Content Version: 12.9  © 3793-1085 [employer]. Care instructions adapted under license by [employer]. If you have questions about a medical condition or this instruction, always ask your healthcare professional. [address] any warranty or liability for your use of this information.

## 2022-09-22 NOTE — PROGRESS NOTES
Patient is experiencing SOB: no    Patient is experiencing wheezing: Yes, not all the time, just occasionally     Patient states they have had a Strong, productive = 1 cough. Phlegm is daily, color:  Grayish white     Patient is coughing up blood: no    Patient has been experiencing chest pains: non-existent    Patient is currently taking the following inhaler(s): Anoro, Albuterol     Patient is using their rescue inhaler 2 times per month.     Other: Still smoking about half a pack,   Had Pneumonia in May of 2022

## 2022-12-07 ENCOUNTER — HOSPITAL ENCOUNTER (OUTPATIENT)
Dept: CT IMAGING | Age: 62
Discharge: HOME | End: 2022-12-07
Payer: COMMERCIAL

## 2022-12-07 DIAGNOSIS — R91.1 LUNG NODULE: ICD-10-CM

## 2022-12-07 LAB — POC CREATININE WHOLE BLOOD: 1 MG/DL (ref 0.5–1.2)

## 2022-12-07 PROCEDURE — 71260 CT THORAX DX C+: CPT

## 2022-12-07 PROCEDURE — 82565 ASSAY OF CREATININE: CPT

## 2022-12-07 PROCEDURE — 6360000004 HC RX CONTRAST MEDICATION: Performed by: INTERNAL MEDICINE

## 2022-12-07 RX ADMIN — IOPAMIDOL 80 ML: 755 INJECTION, SOLUTION INTRAVENOUS at 08:11

## 2022-12-27 NOTE — PROGRESS NOTES
[address] CANCER [address] [place] [address]  Dept: [phone]  Loc: [phone]   Hematology/Oncology Consult (Clinic)        12/28/2022    Nicholas Oreilly   1960     No ref. provider found   Rodrigo Payne MD     DIAGNOSIS:  -  Left upper lobe cavitary adenocarcinoma of the lung. 3.9 x 2.1 cm. PET scan negative. Bronchoscopy EBUS-isolated station 11 lymph node with a moderate to poorly differentiated adenocarcinoma. 4L node with a minute focus of atypical cells on path review felt to be benign. Clinical stage: T2a N1M0 / IIB. Pathologic stage: T1 cN1 M0 /IIB (poorly differentiated, 3 of 7 nodes positive including extranodal extension. EGFR- Not Detected  -Right upper lobe solitary 13 mm nodule under surveillance    TREATMENT:  -Left upper lobectomy and mediastinal node dissection scheduled by Dr. Ortiz Adrian 5/3/2021  -Adjuvant chemotherapy : cis-platinum 75 mg meter squared day 1 and pemetrexed 500 mg meter squared day 1 every 21 days x 4 cycles . Start date 6/16/2021. Course 4 done 8/18/21. No adjuvant immune therapy as she has stage II disease. PARAMETERS:  -Chest CT without contrast 12/7/22,  8/18/2022, 4/11/2022, 10/4/2021 3/12/2021,     -PET/CT 4/14/2021  -Brain MRI with and without contrast 4/9/2021    SUBJECTIVE: Patient is still using about 1/2 pack /day. Overall doing well. Has gastroparesis; 5 years of am nausea. Zofran helps this tremendously. She is not a diabetic and was told by her GI doctor she had gastroparesis. Here for the 12/7/22 Chest Ct report- Stable. .No neuropathy. No other focal or systemic complaints reported. She requests nicotine patches for her attempt to stop smoking.       HPI: Ankit Connor is a 69-year-old female with a 45-pack-year smoking habit (ongoing) with a chest CT without contrast on 3/12/2021 revealing a 3.9 x 2.1 cm cavitary mass in the left upper lung corresponding to the abnormality seen on the chest x-ray of 2/26/2021. Radiologically very suspicious. Patient underwent a needle biopsy on 3/26 revealing adenocarcinoma. PET scan shows no distant mets or adenopathy. PFT's done and reviewed below. She is waiting  for a consult with Dr Yanick Watt of Surgery. Reports 30 # weight loss ove 9 months; unintentional. Normal weight @ 145. Appetite has remained good. Chronic cough unchanged, no blood. No chest pain or pain elsewhere. Denied increased sob. Average 1ppd x 45 years; now about 5 cigs/day . Motivated to stop but failed Nicotine replacement products. Chantix not attempted thus far. ROS:  Review of Systems 14 point negative except as above. Limited to unintentional weight loss. Chronic unchanged nonproductive cough and shortness of breath with activity also unchanged.     PMH:   Past Medical History:   Diagnosis Date    Arthritis     COPD (chronic obstructive pulmonary disease) ([facility])     [facility]illo [facility]     [facility]    Pneumonia involving left lung 2022   Aneurysm    Social HX:   Social History     Socioeconomic History    Marital status:      Spouse name: Not on file    Number of children: Not on file    Years of education: Not on file    Highest education level: Not on file   Occupational History    Not on file   Tobacco Use    Smoking status: Every Day     Packs/day: 0.50     Years: 45.00     Pack years: 22.50     Types: Cigarettes    Smokeless tobacco: Never    Tobacco comments:     10 cig per day 8/25/22-weaning per self-requesting nicotine patches 12/28/22   Vaping Use    Vaping Use: Never used   Substance and Sexual Activity    Alcohol use: Never    Drug use: Yes     Types: Marijuana Dmitriy Soliman)     Comment: occasionally 3/10/22    Sexual activity: Not on file   Other Topics Concern    Not on file   Social History Narrative    Not on file     Social Determinants of Health     Financial Resource Strain: Not on file   Food Insecurity: Not on file DARREL/BSO:31 years ago  LMP: No LMP recorded (lmp unknown). Patient has had a hysterectomy. Health Maintenance Due   Topic Date Due    COVID-19 Vaccine (1) Never done    Pneumococcal 0-64 years Vaccine (1 - PCV) Never done    Depression Screen  Never done    HIV screen  Never done    DTaP/Tdap/Td vaccine (1 - Tdap) Never done    Cervical cancer screen  Never done    Lipids  Never done    Colorectal Cancer Screen  Never done    Shingles vaccine (1 of 2) Never done    Low dose CT lung screening  Never done    Flu vaccine (1) Never done        Interests:   none    Fam HX:   Family History   Problem Relation Age of Onset    Other Mother         aneursym    No Known Problems Father     Depression Sister     Heart Disease Brother     Diabetes Brother     Other Brother         Lung disease    Lung Cancer Maternal Grandfather     Other Brother         Crohns    Macular Degen Brother     Depression Sister     Other Sister         Blood clots    Arthritis Sister     Brain Cancer Paternal Uncle         Hospitalizations:   None recent    Allergies:   Allergies   Allergen Reactions    Tylenol With Codeine #3 [Acetaminophen-Codeine] Nausea And Vomiting    Codeine Nausea And Vomiting        Adult Illness:  Patient Active Problem List   Diagnosis    Primary cancer of left upper lobe of lung (HCC)    Other emphysema (HCC)    Smoking greater than 40 pack years    Tobacco abuse    KERR (dyspnea on exertion)    Intermittent palpitations    Borderline Abnormal EKG with rsr'    S/P left upper lobectomy of lung and mediastinal dissection    Moderate COPD (chronic obstructive pulmonary disease) ([address]    Encounter for insertion of venous access port    Ascending aorta dilatation (HCC) 4.1 cm    Encounter for removal of tunneled central venous catheter (CVC) with port    Adenocarcinoma of left lung ([address]    History of chest pain atypical once in a while not exertion related        Surgery:  Past Surgical History:   Procedure Laterality Date BREAST BIOPSY Right 02/23/2011    Benign US Biopsy    BRONCHOSCOPY N/A 05/06/2021    BRONCHOSCOPY performed by Francisco Javier Akbar MD at [address] [facility]      x4    CHOLECYSTECTOMY      COLONOSCOPY  07/11/2016    CT NEEDLE BIOPSY LUNG PERCUTANEOUS  03/26/2021    CT NEEDLE BIOPSY LUNG PERCUTANEOUS 3/26/2021 [facility] CT SCAN    LUNG REMOVAL, TOTAL Left 05/04/2021    ROBOTIC ASSISTED LEFT UPPER LOBECTOMY AND MEDIASTINAL DISSECTION, POSSIBLE OPEN AND CRYOTHERAPY OF INTERCOSTAL NERVES 5, 6, 7 & 8 performed by Rodrigue Gonsalez MD at [address]  10/25/2021    port removal Dr Dave Reyes in the procedure room    PORT SURGERY Left 06/10/2021    LEFT SUBCLAVIAN SINGLE LUMEN SMART PORT INSERTION performed by Rodrigue Gonsalez MD at [address]  07/11/2016        Medications:  Current Outpatient Medications   Medication Sig Dispense Refill    vitamin C (ASCORBIC ACID) 500 MG tablet Take 500 mg by mouth daily      nicotine (NICODERM CQ) 21 MG/24HR Place 1 patch onto the skin every 24 hours 30 patch 3    nicotine polacrilex (NICORETTE) 2 MG gum Take 1 each by mouth as needed for Smoking cessation 110 each 3    loratadine (CLARITIN) 10 MG tablet Take 1 tablet by mouth daily 30 tablet 11    fluticasone (FLONASE) 50 MCG/ACT nasal spray 2 sprays by Nasal route daily [place] towards outside of nose, not towards the septum 16 g 11    aspirin EC 81 MG EC tablet Take 1 tablet by mouth daily 90 tablet 1    albuterol sulfate HFA (VENTOLIN HFA) 108 (90 Base) MCG/ACT inhaler Inhale 2 puffs into the lungs every 6 hours as needed for Wheezing or Shortness of Breath 8 g 11    vitamin D3 (CHOLECALCIFEROL) 125 MCG (5000 UT) TABS tablet daily       acetaminophen (TYLENOL) 500 MG tablet Take 1 tablet by mouth 4 times daily as needed for Pain 120 tablet 0    nicotine (NICOTROL) 10 MG inhaler Nicotrol inhaler Cartridges. 6 to 16 Cartridges/day for 6 weeks.  Then taper over next 6 weeks. Maximum 16 Cartridges/day. (Patient not taking: Reported on 2022) 168 each 3     No current facility-administered medications for this visit. EXAM:    /80 (Site: Right Upper Arm, Position: Sitting, Cuff Size: Medium Adult)   Pulse 64   Temp 98 °F (36.7 °C) (Oral)   Resp 16   Ht 5' 6\" (1.676 m)   Wt 128 lb (58.1 kg)   LMP  (LMP Unknown)   SpO2 100%   BMI 20.66 kg/m²      ECO  General: Non-ill appearing. Appears comfortable and in no distress. HEENT: NC/AT,nonicteric,  Neck: normal thyroid, no masses. pulses nl, no bruits,   Nodes: No adenopathy  Lungs/chest: clear, no rales,rhonchi or wheezing, lung bases clear. There is a dime sized healing wound with a scab and border with erythema that appears to be healing with no discharge or odor. CV: rrr, no rubs ,gallops or murmurs  Breasts: Not examined  Abd/Rectal: soft, non-tender,bowel sounds normal , no HSM,no masses  Back: normal curvature, No midline tenderness. flanks nontender  : Not Examined  Extremities: no cyanosis,clubbing or edema. Skin: unremarkable  Neuro: A and O x 4, CN exam nonfocal, Motor- no deficits, Sensory- no deficits, gait-nl, speech- fluent, no ataxia.   Devices: [place]utvee-y-Pbxc left chest unremarkable      DATA:  10/20/2021-CBC, CMP to be drawn    CBC with Differential:      Lab Results   Component Value Date/Time    WBC 6.9 2022 11:09 AM    RBC 4.06 2022 11:09 AM    RBC 4.63 10/10/2017 09:16 AM    HGB 13.1 2022 11:09 AM    HCT 39.9 2022 11:09 AM     2022 11:09 AM    MCV 98 2022 11:09 AM    MCH 32.3 2022 11:09 AM    MCHC 32.8 2022 11:09 AM    RDW 13.4 2022 11:09 AM    NRBC 0 2022 07:00 AM    SEGSPCT 84.9 2022 07:00 AM    LYMPHOPCT 15.8 10/10/2017 09:16 AM    MONOPCT 6.1 2022 07:00 AM    EOSPCT 1.4 10/10/2017 09:16 AM    BASOPCT 0.7 10/10/2017 09:16 AM    MONOSABS 0.3 2022 11:09 AM    LYMPHSABS 1.2 2022 11:09 AM EOSABS 0.1 08/25/2022 11:09 AM    BASOSABS 0.1 08/25/2022 11:09 AM       CMP:    Lab Results   Component Value Date/Time     08/25/2022 11:09 AM     05/21/2022 07:00 AM    K 4.5 08/25/2022 11:09 AM    K 3.4 05/21/2022 07:00 AM    K 5.2 06/03/2021 12:25 PM     05/21/2022 07:00 AM    CO2 25 05/21/2022 07:00 AM    BUN 10 05/21/2022 07:00 AM    CREATININE 0.9 08/25/2022 11:09 AM    CREATININE 0.8 05/21/2022 07:00 AM    LABGLOM 73 05/21/2022 07:00 AM    GLUCOSE 112 05/21/2022 07:00 AM    GLUCOSE 79 10/10/2017 09:16 AM    PROT 7.0 08/25/2022 11:09 AM    LABALBU 4.8 08/25/2022 11:09 AM    CALCIUM 9.7 05/21/2022 07:00 AM    BILITOT 0.4 08/25/2022 11:09 AM    ALKPHOS 83 08/25/2022 11:09 AM    AST 17 08/25/2022 11:09 AM    ALT 11 08/25/2022 11:09 AM       BMP:    Lab Results   Component Value Date/Time     08/25/2022 11:09 AM     05/21/2022 07:00 AM    K 4.5 08/25/2022 11:09 AM    K 3.4 05/21/2022 07:00 AM    K 5.2 06/03/2021 12:25 PM     05/21/2022 07:00 AM    CO2 25 05/21/2022 07:00 AM    BUN 10 05/21/2022 07:00 AM    LABALBU 4.8 08/25/2022 11:09 AM    CREATININE 0.9 08/25/2022 11:09 AM    CREATININE 0.8 05/21/2022 07:00 AM    CALCIUM 9.7 05/21/2022 07:00 AM    LABGLOM 73 05/21/2022 07:00 AM    GLUCOSE 112 05/21/2022 07:00 AM    GLUCOSE 79 10/10/2017 09:16 AM       Magnesium:    Lab Results   Component Value Date/Time    MG 2.2 08/18/2021 09:51 AM     PT/INR:    Lab Results   Component Value Date/Time    INR 0.97 05/18/2021 04:56 PM     TSH:    Lab Results   Component Value Date/Time    TSH 4.750 10/10/2017 09:16 AM           IMAGING:  Chest CT w contrast 12/7/22  Impression   1. A stable 1.3 cm spiculated right upper lobe pulmonary nodule is seen. 2. Other sub-6 mm pulmonary nodules are stable. 3. Other findings as described above. Chest CT with contrast 8/18/2022  PROCEDURE: CT CHEST W CONTRAST       CLINICAL INFORMATION: Malignant neoplasm of left lung.        COMPARISON: CT chest 4/11/2022. CT chest 10/4/2021. TECHNIQUE:    5 mm axial imaging through the chest with IV contrast. Coronal and sagittal reconstruction were performed. All CT scans at this facility use dose modulation, iterative reconstruction, and/or weight based dosing when appropriate to reduce the radiation dose to as low as reasonably achievable. CONTRAST: 80 cc Isovue-370. FINDINGS:   Heart/mediastinum: The thyroid gland is unremarkable. The heart size is normal. No pericardial effusion is identified. No aortic aneurysm or dissection is present. A 12 mm right hilar lymph node is stable (series 2, image 33). No mediastinal or axillary    lymphadenopathy is visualized. Lungs: Moderate centrilobular emphysema is unchanged. A spiculated right upper lobe pulmonary nodule (series 2, image 20) measures 13 mm (previously measured 13 mm). A 3 mm right middle lobe pulmonary nodule is stable (series 2, image 35). A 5 mm right    middle lobe pulmonary nodule is stable (series 2, image 41). No new pulmonary mass or nodule is identified. No focal consolidation, pleural effusion, or pneumothorax is observed. Upper abdomen: No acute findings are noted in the limited images through the upper abdomen. The gallbladder is surgically absent. Musculoskeletal: The visualized skeletal structures appear intact. Impression   1. A spiculated 13 mm right upper lobe pulmonary nodule appear stable. Prominent right hilar lymph node measuring 12 mm is unchanged. 3 mm a 5 mm right middle lobe pulmonary nodules are stable. No new pulmonary mass or nodule is observed. 2. Moderate centrilobular emphysema and additional stable chronic findings are noted. **This report has been created using voice recognition software. It may contain minor errors which are inherent in voice recognition technology. **       Final report electronically signed by Dr Meir Carolina on 8/18/2022 10:04 AM       ** ADDENDUM #1 **      ADDENDUM:       This is an addendum to original report dated 4/11/2022 at 10:52 AM.      Additional finding include a small left fat-containing lumbar hernia. ** ORIGINAL    REPORT*      PROCEDURE: CT CHEST W CONTRAST  4/11/22      CLINICAL INFORMATION: Primary adenocarcinoma of left lung ([address]      COMPARISON: CT chest 5/18/2021, 6/1/2021, 10/12/2021. TECHNIQUE:    Helical CT acquisition of the chest was performed with administration of    intravenous contrast. Multiplanar reformats are provided. All CT scans at this facility use dose modulation, iterative    reconstruction, and/or weight based dosing when appropriate to reduce the    radiation dose to as low as reasonably achievable. CONTRAST: 80 cc of Isovue-370 intravenously         FINDINGS:      Measurements comparison are based on this observer's measurements of the    current and prior examinations. Mild emphysematous changes. A 1.3 cm spiculated right upper lobe pulmonary    nodule (series 2, image 18) previously 10 mm. Platelike atelectasis is    seen in the right lung base. A new 4 mm right upper lobe pulmonary nodule    (image 26). A stable 3 mm right    middle lobe pulmonary nodule (image 31). A stable 6 mm right middle lobe    pulmonary nodule (image 36). Aortocoronary calcifications. A 1.2 cm mildly enlarged right hilar lymph    node is seen. Postsurgical change of the left lung. No focal pulmonary consolidation. No large pulmonary mass. Central airway    is patent. No pleural effusions. No significant pericardial effusion. The heart is not enlarged. Ascending thoracic aorta is not dilated. The    main pulmonary artery is not dilated. No significantly enlarged mediastinal or axillary lymph node. The thyroid is not enlarged. No chest wall mass. Limited evaluation below the diaphragm show that the gallbladder is    surgically absent.  The adrenal glands are not enlarged. .      Bones: Degenerative changes of the thoracic spine. . Mild levoscoliosis. Narrative   PROCEDURE: CT CHEST W CONTRAST       CLINICAL INFORMATION: Primary adenocarcinoma of left lung ([address]       COMPARISON: CT chest 5/18/2021, 6/1/2021, 10/12/2021. TECHNIQUE:     Helical CT acquisition of the chest was performed with administration of intravenous contrast. Multiplanar reformats are provided. All CT scans at this facility use dose modulation, iterative reconstruction, and/or weight based dosing when appropriate to reduce the radiation dose to as low as reasonably achievable. CONTRAST: 80  cc of Isovue-370  intravenously           FINDINGS:       Measurements comparison are based on this observer's measurements of the current and prior examinations. Mild emphysematous changes. A 1.3 cm spiculated right upper lobe pulmonary nodule (series 2, image 18) previously 10 mm. Platelike atelectasis is seen in the right lung base. A new 4 mm right upper lobe pulmonary nodule (image 26). A stable 3 mm right    middle lobe pulmonary nodule (image 31). A stable 6 mm right middle lobe pulmonary nodule (image 36). Aortocoronary calcifications. A 1.2 cm mildly enlarged right hilar lymph node is seen. Postsurgical change of the left lung. No focal pulmonary consolidation. No large pulmonary mass. Central airway is patent. No pleural effusions. No significant pericardial effusion. The heart is not enlarged. Ascending thoracic aorta is not dilated. The main pulmonary artery is not dilated. No significantly enlarged axillary lymph node. The thyroid is not enlarged. No chest wall mass. Limited evaluation below the diaphragm show that the gallbladder is surgically absent. The adrenal glands are not enlarged. .       Bones: Degenerative changes of the thoracic spine. . Mild levoscoliosis.                Impression   1. A 1.3 cm spiculated right upper lobe pulmonary nodule increased in size. This is concerning for malignancy. 2. New 4 mm right upper lobe pulmonary nodule is seen. 3. Other stable pulmonary nodules are seen measuring 6 mm or less       4. Mild emphysema. 5. Mildly enlarged right hilar lymph node at 1.2 cm. **This report has been created using voice recognition software. It may contain minor errors which are inherent in voice recognition technology. **       Final report electronically signed by Dr Parvin Tamayo on 4/11/2022 12:17 PM       ** ORIGINAL REPORT *         PROCEDURE: XR CHEST (2 VW)       2/26/2021       CLINICAL INFORMATION: Cough       COMPARISON: 10/10/2017       TECHNIQUE: PA and lateral views of the chest were obtained. Impression   1. Normal heart size. No acute infiltrates or effusions are seen. 2. Interval development of a 2 cm nodule left perihilar region since prior study which should be considered malignant until proven otherwise. CT thorax recommended for further evaluation. Radiological Data:  CT scan of chest without contrast.  Fri Mar 12, 2021  8:18 AM   PROCEDURE: CT CHEST WO CONTRAST   CLINICAL INFORMATION: Pulmonary nodule   COMPARISON: Chest x-ray dated 2/26/2021   1. There is a cavitary mass demonstrated within the left upper lobe with peripheral mild spiculation measuring 3.9 x 2.1 cm on axial image 34. This corresponds with previous chest x-ray dated 2/26/2021. This is concerning for malignancy until proven otherwise. Further evaluation could be obtained with PET/CT and/or biopsy. An infectious process is also not excluded. 2. There are additional smaller groundglass nodular densities within the right lung as detailed above. 3. Small pericardial effusion is noted. This is nonspecific. Ct Needle Biopsy Lung Percutaneous  Result Date: 3/26/2021  1. Status post successful CT guided lung biopsy. 2. Followup chest radiographs will be obtained.  **This report has been created using voice recognition software. It may contain minor errors which are inherent in voice recognition technology. ** Final report electronically signed by Dr. Yesenia Prado on 3/26/2021 4:02 PM    PROCEDURE: PET CT SKULL BASE TO MID THIGH       CLINICAL INFORMATION: 81-year-old woman with recently diagnosed adenocarcinoma of the left lung; initial treatment strategy. Radiopharmaceutical: 13.8 mCi F-18 FDG, intravenously. TECHNIQUE: PET/CT imaging was performed from the skull base to the midthigh levels using routine PET acquisition. Injection site: Left antecubital fossa. Time of FDG injection: 7:37 AM.       Serum glucose: 86 mg/dL at 7:35 AM.       Time of imagin:37 AM.       COMPARISON: CT chest 3/11/2021       FINDINGS:        Neck: No FDG avid cervical lymphadenopathy. Physiologic activity is noted bilaterally in the lower neck. Chest: Cardiac size is normal. Atherosclerotic calcifications are present in the thoracic aorta and coronary arteries. There is stable aneurysmal dilation of the ascending thoracic aorta which measures 4.3 cm in maximum diameter (image 103). There is no    pleural or pericardial effusion. Emphysematous changes are again noted in the bilateral lungs. In the regular mass with areas of cavitation in the lingula is stable in size, again measuring approximately 3.7 cm and is associated with a maximum SUV of 6.7    (image 107). Adjacent opacities are likely postobstructive atelectasis. A 0.4 cm nodule at the right lung apex has a maximum SUV of 1.9 (image 70). A 0.4 cm nodule in the right upper lobe has a maximum SUV of 0.9 (image 80). An indistinct 0.5 cm nodular    density at the right mid lung is not associated with activity above background. There is no FDG avid mediastinal, hilar or axillary lymphadenopathy. Degenerative changes are present in the thoracic spine without evidence of hypermetabolic osseous    metastatic disease. Abdomen/pelvis: Physiologic activity is present in the liver, spleen, urinary collecting system and gastrointestinal tract. The gallbladder is surgically absent. Atherosclerotic calcifications are seen in the abdominal aorta without evidence of aneurysm. The urinary bladder is unremarkable. There are phleboliths in the pelvis. There is no FDG avid mesenteric, retroperitoneal, pelvic or inguinal lymphadenopathy. Degenerative changes are present in the lumbar spine and pelvis without evidence of    hypermetabolic osseous metastatic disease. Impression   1. FDG avid left lung mass corresponding to known malignancy. 2. Small, mildly FDG avid right lung nodules, possibly infectious or inflammatory but satellite malignancy cannot be excluded. Final report electronically signed by Dr. Phil Mishra on 4/14/2021 10:02 AM       Mri Brain W Wo Contrast  Result Date: 4/9/2021   1. No evidence of intracranial metastases or other acute intracranial abnormality. 2. Moderate severity chronic microvascular angiopathy. 3. Mild to moderate mucosal inflammation in the paranasal sinuses with air-fluid levels as evidence for an acute component. **This report has been created using voice recognition software. It may contain minor errors which are inherent in voice recognition technology. ** Final report electronically signed by Dr. Nallely Gutierrez MD on 4/9/2021 1:50 PM    Chest CT with contrast 10/4/2021 (posttreatment CT)  PROCEDURE: CT CHEST W CONTRAST       CLINICAL INFORMATION: Adenocarcinoma of the left lung, status post resection and chemotherapy. TECHNIQUE: CT of the chest was performed following administration of 80 mL Isovue-370 intravenous contrast. Axial images as well as coronal and sagittal instructions were obtained.        All CT scans at this facility use dose modulation, iterative reconstruction, and/or weight-based dosing when appropriate to reduce radiation dose to as low as reasonably achievable. COMPARISON: CT chest 6/1/2021       FINDINGS: Cardiac size is normal. There is stable shift of cardiomediastinal structures to the left side due to postsurgical volume loss. Atherosclerotic calcifications are present in the thoracic aorta and coronary arteries. Ascending thoracic aorta is    at the upper limits of normal in size. There is no pleural or pericardial effusion. Indistinct densities measuring 6 mm in the right upper lobe are stable (image 17). A prominent right hilar lymph node is not pathologically enlarged by CT criteria and    may be reactive. There is no mediastinal, left hilar or axillary lymphadenopathy. Degenerative changes are present in the thoracic spine without evidence of aggressive osseous lesions. The gallbladder is surgically absent. Prominence of the common bile duct is likely postsurgical.           Impression   1. No acute intrathoracic findings. 2. Stable right upper lobe densities, likely benign. 3. Prominent right hilar lymph node, likely reactive. Final report electronically signed by Dr. Susan Melo on 10/4/2021 9:04 AM     Note; pretreatment April 2021 PET scan showed- a lingular cavitary mass measuring 3.7 with SUV of 6.7. Also 8.4 cm nodule right lung apex with SUV of 1.9. No GI with adenopathy. PROCEDURES:  -Core biopsy cavitary left upper lobe mass 3/26/2021    -Full pulmonary function study with bronchodilators completed 4/6/2021  FEV1 1.68 (61% predicted 25% change with bronchodilator. FVC 2.62, 75% predicted 15% change with bronchodilator  FEV1 over FVC ratio 64  Interpretation-airway obstruction. FVC is reduced relative to the SVC indicating air trapping. While the TLC is within normal limits, the FRC, RV and RV/TLC ratio are increased. Follow administration of bronchodilators there is a good response. Reduced diffusing capacity indicates had mild loss of functional alveolar capillary surface.     PATHOLOGY: Belindaeke, lymph nodes, level VII. Received fresh are two fragments of gray-tan tissue, each about 1.1 cm.    1 ns. B - The container is labeled Antoinette Francis, left upper lobe, lung. Received fresh is a lobe of lung measuring 15 x 7 x 4 cm. The pleural   surface is blue-gray. No peribronchial lymph nodes are identified. There is a firm palpable mass. The pleural surface is inked blue. Sections through the specimen reveal a well-circumscribed, white mass   measuring grossly 2.6 x 2.6 x 2 cm. The lesion is about 2 cm from the   bronchial margin. The lesion does not grossly involve the pleural   surface. No additional lesions are identified. The parenchyma is   congested. Representative sections are submitted. Cassette #1 -   bronchial margin; cassette #2 - vascular margins; cassettes #3, #4, and   #5 - lesion with inked pleural surface; and cassette #6 - uninvolved   lung. ss.     C - The container is labeled Juanalena Francis, lymph nodes, level V x3. Received in formalin is a single fragment of gray-tan tissue measuring   2 cm in greatest dimension. The specimen is bisected and entirely   submitted. 1 ns. D - The container is labeled Juana Ringer, lymph nodes, level X x9. Received in formalin are seven fragments of gray-tan tissue varying in   size from 0.4 cm up to 1.7 cm. The smaller fragments are submitted as   received in cassette #1. The larger fragment is bisected and submitted   in cassette #2.  ns.     E - The container is labeled Willia Ringer, lymph nodes, level XI, 2. Received fresh are two fragments of gray-tan tissue measuring 1 and 1.2   cm.  1 ns. F - The container is labeled Juana Ringer, lymph node, level XI,   special x1. Received in formalin is a single fragment of gray-tan   tissue measuring 1.5 cm in greatest dimension. The specimen is friable   upon sectioning. The specimen is trisected. 2 ns.      G - The container is labeled Willia Ringer, lymph node, level VI x1. Received in formalin is a single fragment of gray-tan tissue measuring   0.5 cm.  1 ns. JESSEE/DANIKAR:[id number]         Microscopic Examination:   A-G. Procedure   Lobectomy   Specimen Laterality   Left   Tumor Site   Upper lobe of lung   Tumor Size      Total tumor size (size of entire tumor)#    2.6 x 2.6 x 2 cm   Tumor Focality   Single focus   Histologic Type   Invasive adenocarcinoma, acinar predominant   + Histologic Grade   G3: Poorly differentiated   + Spread Through Air Spaces (HILDA)   Not identified   Visceral Pleura Invasion   Not identified   Lymphovascular Invasion   Cannot be determined   Direct Invasion of Adjacent Structures   No adjacent structures present   Margins   All margins are uninvolved by tumor   Margins examined (specify): Bronchial, vascular and parenchymal       Distance of invasive carcinoma from closest margin (centimeters): 2       mm, pleural   Number of Lymph Nodes Involved: 3   Number of Lymph Nodes Examined: 14   + Extranodal Extension   Not identified   Treatment Effect   No known presurgical therapy   Pathologic Stage Classification (pTNM) (AJCC 8th Edition)   Primary Tumor (pT)   pT1c: Tumor >2 cm but <=3 cm in greatest dimension   Regional Lymph Nodes (pN)   pN1: Metastasis in ipsilateral peribronchial and/or ipsilateral hilar          lymph nodes, and intrapulmonary nodes, including involvement by          direct extension   + Additional Pathologic Findings   Emphysema     [id number]   [id number]   [id number]                                                     <Sign Out Dr. Elizabeth Smith M.D., F.C.A.P.        [facility]/ [id number] [address]  Printed on:  5/25/2021   [address]   [address] [facility], [facility]   Original print date: 05/25/2021   Lab and Collection    Surgical Pathology - 5/4/2021  Result History    Surgical Pathology on 5/25/2021 - Result Edited   Result Information    Status: Edited Result - FINAL (Collected: 5/4/2021 08:04) Provider Status: Reviewed   All Reviewers List    Mary Sy MD on 5/26/2021 12:23   Nubia Jama MD on 5/26/2021 08:10         Bronchoscopy EBUS 4/27/2021  Pulmonary at [facility]  4R node-negative,   station 7 node negative,   station 4L-minute group of atypical cells. Station 11 poorly differentiated moderately differentiated adenocarcinoma  Note: Dr. Fransisco Soria had the pathology reviewed elsewhere and it was felt that the station 4L lymph node unequivocally did NOT show malignancy. GENETICS:  Not applicable    MOLECULAR:  Not applicable i.e. 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 PET scan shows no hypermetabolic adenopathy or distant mets. Brain MRI shows no mets. Bronchoscopy EBUS as reported above shows T2 a N1 M0/IIB disease. The L4 Station node on review and felt to be benign. Status post left upper lobectomy with pathologic stage T1CN1 with 3 of 7+ nodes with a high-grade tumor. Stage IIb. Completed 4 courses of adjuvant chemotherapy in mid August.  Posttreatment CAT scan shows no evidence of disease. 13 mm spiculated right upper lobe nodule being followed. Stable on most recent December 2022 CAT scan compared to prior studies. See above report. Plan next CT in early May of the chest with contrast    2) Prognosis / Disease Status: Potentially curable. High risk for recurrence and completed adjuvant chemotherapy. 3) Work-up:    Labs: Labs preliminary results reviewed and are unremarkable. LFTs pending   Imaging: Next chest CT will be done early May 2023 to follow-up on this currently stable lung nodule   procedures: None new   Consults: None   Other: Patient has a.m. gastroparesis nausea the predated chemotherapy. Zofran is very effective.       4) Symptom Management: None needed at this time. 5) Supportive care provided. Level of care is appropriate. Teaching done today. It was as the importance of stopping smoking. E prescribe nicotine patches 21 mg per patch and nicotine gum      6) Treatment goal:      Treatment plan:      Left upper lobectomy and mediastinal node dissection. To be followed by adjuvant chemotherapy. Adjuvant chemotherapy - cis-platinum 75 mg meter squared day 1 along with Alimta 500 mg meter squared day 1 through 21 days x 4. Patient's start date was June 16. She was seen her last adjuvant course #4 on 8/18. No sequelae from treatment including neuropathy. Mild nausea related to gastroparesis predated his treatment. 7) Medications reviewed. Prescriptions today:   None Compazine, Zofran ODT, folic acid 1 mg, EMLA cream, Decadron premed for Alimta              Orders Placed This Encounter   Medications    nicotine (NICODERM CQ) 21 MG/24HR     Sig: Place 1 patch onto the skin every 24 hours     Dispense:  30 patch     Refill:  3    nicotine polacrilex (NICORETTE) 2 MG gum     Sig: Take 1 each by mouth as needed for Smoking cessation     Dispense:  110 each     Refill:  3          OARRS:  Controlled Substance Monitoring:    Acute and Chronic Pain Monitoring:   No flowsheet data found. 8) Research Options:      Not applicable      9) Other:   Encouraged to stop smoking. 10) Follow Up:  Labs added today including CBC CMP and TSH because of fatigue.   Follow-up with me in mid May and 1 week before chest CT with contrast will be done        Elsie Riley MD

## 2022-12-28 ENCOUNTER — HOSPITAL ENCOUNTER (OUTPATIENT)
Dept: INFUSION THERAPY | Age: 62
Discharge: HOME | End: 2022-12-28
Payer: COMMERCIAL

## 2022-12-28 ENCOUNTER — OFFICE VISIT (OUTPATIENT)
Dept: ONCOLOGY | Age: 62
End: 2022-12-28
Payer: COMMERCIAL

## 2022-12-28 VITALS
RESPIRATION RATE: 16 BRPM | OXYGEN SATURATION: 100 % | DIASTOLIC BLOOD PRESSURE: 80 MMHG | SYSTOLIC BLOOD PRESSURE: 132 MMHG | HEIGHT: 66 IN | WEIGHT: 128 LBS | TEMPERATURE: 98 F | HEART RATE: 64 BPM | BODY MASS INDEX: 20.57 KG/M2

## 2022-12-28 VITALS
SYSTOLIC BLOOD PRESSURE: 132 MMHG | DIASTOLIC BLOOD PRESSURE: 80 MMHG | HEART RATE: 64 BPM | TEMPERATURE: 98 F | RESPIRATION RATE: 16 BRPM

## 2022-12-28 DIAGNOSIS — R53.83 OTHER FATIGUE: ICD-10-CM

## 2022-12-28 DIAGNOSIS — C34.92 MALIGNANT NEOPLASM OF LEFT LUNG, UNSPECIFIED PART OF LUNG (HCC): Primary | ICD-10-CM

## 2022-12-28 DIAGNOSIS — C34.92 MALIGNANT NEOPLASM OF LEFT LUNG, UNSPECIFIED PART OF LUNG (HCC): ICD-10-CM

## 2022-12-28 LAB
ABSOLUTE IMMATURE GRANULOCYTE: 0.01 THOU/MM3 (ref 0–0.07)
ALBUMIN SERPL-MCNC: 4.4 G/DL (ref 3.5–5.1)
ALP BLD-CCNC: 82 U/L (ref 38–126)
ALT SERPL-CCNC: 10 U/L (ref 11–66)
AST SERPL-CCNC: 12 U/L (ref 5–40)
BASINOPHIL, AUTOMATED: 1 % (ref 0–3)
BASOPHILS ABSOLUTE: 0.1 THOU/MM3 (ref 0–0.1)
BILIRUB SERPL-MCNC: 0.3 MG/DL (ref 0.3–1.2)
BILIRUBIN DIRECT: < 0.2 MG/DL (ref 0–0.3)
BUN, WHOLE BLOOD: 8 MG/DL (ref 8–26)
CHLORIDE, WHOLE BLOOD: 107 MEQ/L (ref 98–109)
CREATININE, WHOLE BLOOD: 0.8 MG/DL (ref 0.5–1.2)
EOSINOPHILS ABSOLUTE: 0.1 THOU/MM3 (ref 0–0.4)
EOSINOPHILS RELATIVE PERCENT: 1 % (ref 0–4)
GFR SERPL CREATININE-BSD FRML MDRD: > 60 ML/MIN/1.73M2
GLUCOSE, WHOLE BLOOD: 101 MG/DL (ref 70–108)
HCT VFR BLD CALC: 41 % (ref 37–47)
HEMOGLOBIN: 13.3 GM/DL (ref 12–16)
IMMATURE GRANULOCYTES: 0 %
IONIZED CALCIUM, WHOLE BLOOD: 1.24 MMOL/L (ref 1.12–1.32)
LYMPHOCYTES # BLD: 17 % (ref 15–47)
LYMPHOCYTES ABSOLUTE: 1.2 THOU/MM3 (ref 1–4.8)
MCH RBC QN AUTO: 31.6 PG (ref 26–33)
MCHC RBC AUTO-ENTMCNC: 32.4 GM/DL (ref 32.2–35.5)
MCV RBC AUTO: 97 FL (ref 81–99)
MONOCYTES ABSOLUTE: 0.3 THOU/MM3 (ref 0.4–1.3)
MONOCYTES: 4 % (ref 0–12)
PDW BLD-RTO: 13.1 % (ref 11.5–14.5)
PLATELET # BLD: 161 THOU/MM3 (ref 130–400)
PMV BLD AUTO: 12.4 FL (ref 9.4–12.4)
POTASSIUM, WHOLE BLOOD: 4.4 MEQ/L (ref 3.5–4.9)
RBC # BLD: 4.21 MILL/MM3 (ref 4.2–5.4)
SEG NEUTROPHILS: 77 % (ref 43–75)
SEGMENTED NEUTROPHILS ABSOLUTE COUNT: 5.1 THOU/MM3 (ref 1.8–7.7)
SODIUM, WHOLE BLOOD: 143 MEQ/L (ref 138–146)
TOTAL CO2, WHOLE BLOOD: 28 MEQ/L (ref 23–33)
TOTAL PROTEIN: 7 G/DL (ref 6.1–8)
TSH SERPL DL<=0.05 MIU/L-ACNC: 2.77 UIU/ML (ref 0.4–4.2)
WBC # BLD: 6.7 THOU/MM3 (ref 4.8–10.8)

## 2022-12-28 PROCEDURE — 84443 ASSAY THYROID STIM HORMONE: CPT

## 2022-12-28 PROCEDURE — 99211 OFF/OP EST MAY X REQ PHY/QHP: CPT

## 2022-12-28 PROCEDURE — 80047 BASIC METABLC PNL IONIZED CA: CPT

## 2022-12-28 PROCEDURE — 36415 COLL VENOUS BLD VENIPUNCTURE: CPT

## 2022-12-28 PROCEDURE — 80076 HEPATIC FUNCTION PANEL: CPT

## 2022-12-28 PROCEDURE — 99214 OFFICE O/P EST MOD 30 MIN: CPT | Performed by: INTERNAL MEDICINE

## 2022-12-28 PROCEDURE — 85025 COMPLETE CBC W/AUTO DIFF WBC: CPT

## 2022-12-28 RX ORDER — NICOTINE 21 MG/24HR
1 PATCH, TRANSDERMAL 24 HOURS TRANSDERMAL EVERY 24 HOURS
Qty: 30 PATCH | Refills: 3 | Status: SHIPPED | OUTPATIENT
Start: 2022-12-28 | End: 2023-12-28

## 2022-12-28 RX ORDER — ASCORBIC ACID 500 MG
500 TABLET ORAL DAILY
COMMUNITY

## 2022-12-28 NOTE — PATIENT INSTRUCTIONS
Labs today include CBC, CMP and TSH  Nicotine patches and nicotine gum E prescribed. Follow-up with me in mid May. Chest CT with contrast in early May.   Ordered

## 2023-03-16 NOTE — PROGRESS NOTES
Topics    Alcohol use: Never    Drug use: Not Currently     Types: Marijuana Josh Xena)     ALLERGIES:  Allergies   Allergen Reactions    Tylenol With Codeine #3 [Acetaminophen-Codeine] Nausea And Vomiting    Codeine Nausea And Vomiting     FAMILY HISTORY:  Family History   Problem Relation Age of Onset    Other Mother         aneursym    No Known Problems Father     Depression Sister     Heart Disease Brother     Diabetes Brother     Other Brother         Lung disease    Lung Cancer Maternal Grandfather     Other Brother         Crohns    Macular Degen Brother     Depression Sister     Other Sister         Blood clots    Arthritis Sister     Brain Cancer Paternal Uncle      CURRENT MEDICATIONS:  Current Outpatient Medications   Medication Sig Dispense Refill    albuterol sulfate HFA (VENTOLIN HFA) 108 (90 Base) MCG/ACT inhaler Inhale 2 puffs into the lungs every 6 hours as needed for Wheezing or Shortness of Breath 8 g 11    vitamin C (ASCORBIC ACID) 500 MG tablet Take 500 mg by mouth daily      nicotine (NICODERM CQ) 21 MG/24HR Place 1 patch onto the skin every 24 hours 30 patch 3    nicotine polacrilex (NICORETTE) 2 MG gum Take 1 each by mouth as needed for Smoking cessation 110 each 3    loratadine (CLARITIN) 10 MG tablet Take 1 tablet by mouth daily 30 tablet 11    fluticasone (FLONASE) 50 MCG/ACT nasal spray 2 sprays by Nasal route daily [place] towards outside of nose, not towards the septum 16 g 11    aspirin EC 81 MG EC tablet Take 1 tablet by mouth daily 90 tablet 1    vitamin D3 (CHOLECALCIFEROL) 125 MCG (5000 UT) TABS tablet daily       acetaminophen (TYLENOL) 500 MG tablet Take 1 tablet by mouth 4 times daily as needed for Pain 120 tablet 0    nicotine (NICOTROL) 10 MG inhaler Nicotrol inhaler Cartridges. 6 to 16 Cartridges/day for 6 weeks. Then taper over next 6 weeks. Maximum 16 Cartridges/day.  (Patient not taking: Reported on 12/28/2022) 168 each 3     No current facility-administered medications for

## 2023-03-20 ENCOUNTER — OFFICE VISIT (OUTPATIENT)
Dept: PULMONOLOGY | Age: 63
End: 2023-03-20
Payer: COMMERCIAL

## 2023-03-20 VITALS
WEIGHT: 139.4 LBS | BODY MASS INDEX: 22.4 KG/M2 | DIASTOLIC BLOOD PRESSURE: 78 MMHG | OXYGEN SATURATION: 97 % | HEART RATE: 71 BPM | TEMPERATURE: 97.7 F | HEIGHT: 66 IN | SYSTOLIC BLOOD PRESSURE: 128 MMHG

## 2023-03-20 DIAGNOSIS — J44.9 MODERATE COPD (CHRONIC OBSTRUCTIVE PULMONARY DISEASE) (HCC): Primary | ICD-10-CM

## 2023-03-20 DIAGNOSIS — J41.1 MUCOPURULENT CHRONIC BRONCHITIS (HCC): ICD-10-CM

## 2023-03-20 DIAGNOSIS — Z91.09 ENVIRONMENTAL ALLERGIES: ICD-10-CM

## 2023-03-20 DIAGNOSIS — Z72.0 TOBACCO ABUSE: ICD-10-CM

## 2023-03-20 DIAGNOSIS — C34.92 ADENOCARCINOMA OF LEFT LUNG (HCC): ICD-10-CM

## 2023-03-20 PROCEDURE — 99214 OFFICE O/P EST MOD 30 MIN: CPT

## 2023-03-20 PROCEDURE — 99406 BEHAV CHNG SMOKING 3-10 MIN: CPT

## 2023-03-20 RX ORDER — ALBUTEROL SULFATE 90 UG/1
2 AEROSOL, METERED RESPIRATORY (INHALATION) EVERY 6 HOURS PRN
Qty: 8 G | Refills: 11 | Status: SHIPPED | OUTPATIENT
Start: 2023-03-20

## 2023-03-20 ASSESSMENT — ENCOUNTER SYMPTOMS
SHORTNESS OF BREATH: 1
RHINORRHEA: 1
COUGH: 1
WHEEZING: 1
CHEST TIGHTNESS: 0

## 2023-05-03 ENCOUNTER — HOSPITAL ENCOUNTER (OUTPATIENT)
Dept: CT IMAGING | Age: 63
Discharge: HOME | End: 2023-05-03
Payer: COMMERCIAL

## 2023-05-03 DIAGNOSIS — C34.92 MALIGNANT NEOPLASM OF LEFT LUNG, UNSPECIFIED PART OF LUNG (HCC): ICD-10-CM

## 2023-05-03 DIAGNOSIS — R53.83 OTHER FATIGUE: ICD-10-CM

## 2023-05-03 LAB — POC CREATININE WHOLE BLOOD: 1.1 MG/DL (ref 0.5–1.2)

## 2023-05-03 PROCEDURE — 82565 ASSAY OF CREATININE: CPT

## 2023-05-03 PROCEDURE — 71260 CT THORAX DX C+: CPT

## 2023-05-03 PROCEDURE — 6360000004 HC RX CONTRAST MEDICATION: Performed by: INTERNAL MEDICINE

## 2023-05-03 RX ADMIN — IOPAMIDOL 80 ML: 755 INJECTION, SOLUTION INTRAVENOUS at 06:52

## 2023-05-17 ENCOUNTER — OFFICE VISIT (OUTPATIENT)
Dept: ONCOLOGY | Age: 63
End: 2023-05-17
Payer: COMMERCIAL

## 2023-05-17 ENCOUNTER — HOSPITAL ENCOUNTER (OUTPATIENT)
Dept: INFUSION THERAPY | Age: 63
Discharge: HOME | End: 2023-05-17
Payer: COMMERCIAL

## 2023-05-17 VITALS
BODY MASS INDEX: 22.82 KG/M2 | HEART RATE: 89 BPM | SYSTOLIC BLOOD PRESSURE: 152 MMHG | WEIGHT: 142 LBS | DIASTOLIC BLOOD PRESSURE: 86 MMHG | OXYGEN SATURATION: 94 % | HEIGHT: 66 IN | TEMPERATURE: 98.2 F | RESPIRATION RATE: 18 BRPM

## 2023-05-17 VITALS
RESPIRATION RATE: 18 BRPM | TEMPERATURE: 98.2 F | SYSTOLIC BLOOD PRESSURE: 152 MMHG | DIASTOLIC BLOOD PRESSURE: 86 MMHG | HEART RATE: 89 BPM

## 2023-05-17 DIAGNOSIS — C34.92 MALIGNANT NEOPLASM OF LEFT LUNG, UNSPECIFIED PART OF LUNG (HCC): Primary | ICD-10-CM

## 2023-05-17 PROCEDURE — 99211 OFF/OP EST MAY X REQ PHY/QHP: CPT

## 2023-05-17 PROCEDURE — 99213 OFFICE O/P EST LOW 20 MIN: CPT | Performed by: INTERNAL MEDICINE

## 2023-05-17 NOTE — PATIENT INSTRUCTIONS
I reviewed CT scan results, no evidence of disease, stable findings. CT scan chest ordered for 6 mos. CBC, CMP ordered for 6 mos.   Follow up visit after completion of  CT chest( to be completed one week prior to the visit)

## 2023-05-17 NOTE — PROGRESS NOTES
[address] CANC[facility] [address] [place] [address]  Dept: [phone]  Loc: [phone]   Hematology/Oncology Consult (Clinic)        12/28/2022    Shadi Villasenor   1960     No ref. provider found   Michelle Howard MD     DIAGNOSIS:  -  Left upper lobe cavitary adenocarcinoma of the lung. 3.9 x 2.1 cm. PET scan negative. Bronchoscopy EBUS-isolated station 11 lymph node with a moderate to poorly differentiated adenocarcinoma. 4L node with a minute focus of atypical cells on path review felt to be benign. Clinical stage: T2a N1M0 / IIB. Pathologic stage: T1 cN1 M0 /IIB (poorly differentiated, 3 of 7 nodes positive including extranodal extension. EGFR- Not Detected  -Right upper lobe solitary 13 mm nodule under surveillance    TREATMENT:  -Left upper lobectomy and mediastinal node dissection scheduled by Dr. Marianne Cannon 5/3/2021  -Adjuvant chemotherapy : cis-platinum 75 mg meter squared day 1 and pemetrexed 500 mg meter squared day 1 every 21 days x 4 cycles . Start date 6/16/2021. Course 4 done 8/18/21. No adjuvant immune therapy as she has stage II disease. PARAMETERS:  -Chest CT without contrast 12/7/22,  8/18/2022, 4/11/2022, 10/4/2021 3/12/2021,     -PET/CT 4/14/2021  -Brain MRI with and without contrast 4/9/2021    SUBJECTIVE: Patient is still using about 1/2 pack /day. Overall doing well. She is scheduled to see cardiology for follow up for AAA( stable on CT scan May 2023). Pt is anxious with all the follow up appointments and wants to discuss with her PCP about her mood. She has episodes of sadness and tearful. HPI: Loreto Gonzalez is a 64-year-old female with a 45-pack-year smoking habit (ongoing) with a chest CT without contrast on 3/12/2021 revealing a 3.9 x 2.1 cm cavitary mass in the left upper lung corresponding to the abnormality seen on the chest x-ray of 2/26/2021. Radiologically very suspicious.  Patient

## 2023-07-02 DIAGNOSIS — C34.92 MALIGNANT NEOPLASM OF LEFT LUNG, UNSPECIFIED PART OF LUNG (HCC): ICD-10-CM

## 2023-07-03 RX ORDER — NICOTINE 21 MG/24HR
PATCH, TRANSDERMAL 24 HOURS TRANSDERMAL
Qty: 30 PATCH | Refills: 0 | Status: SHIPPED | OUTPATIENT
Start: 2023-07-03

## 2023-07-13 ENCOUNTER — OFFICE VISIT (OUTPATIENT)
Dept: CARDIOLOGY CLINIC | Age: 63
End: 2023-07-13
Payer: COMMERCIAL

## 2023-07-13 VITALS
WEIGHT: 141 LBS | BODY MASS INDEX: 22.66 KG/M2 | SYSTOLIC BLOOD PRESSURE: 137 MMHG | OXYGEN SATURATION: 100 % | DIASTOLIC BLOOD PRESSURE: 88 MMHG | HEIGHT: 66 IN | HEART RATE: 69 BPM

## 2023-07-13 DIAGNOSIS — R94.31 ABNORMAL EKG: ICD-10-CM

## 2023-07-13 DIAGNOSIS — J44.9 MODERATE COPD (CHRONIC OBSTRUCTIVE PULMONARY DISEASE) (HCC): ICD-10-CM

## 2023-07-13 DIAGNOSIS — Z72.0 TOBACCO ABUSE: ICD-10-CM

## 2023-07-13 DIAGNOSIS — I77.810 ASCENDING AORTA DILATATION (HCC): ICD-10-CM

## 2023-07-13 DIAGNOSIS — R07.89 CHEST PAIN, ATYPICAL: ICD-10-CM

## 2023-07-13 DIAGNOSIS — Z82.49 FAMILY HISTORY OF EARLY CAD: ICD-10-CM

## 2023-07-13 DIAGNOSIS — R06.09 DOE (DYSPNEA ON EXERTION): Primary | ICD-10-CM

## 2023-07-13 DIAGNOSIS — Z87.898 HISTORY OF CHEST PAIN: ICD-10-CM

## 2023-07-13 PROCEDURE — 99214 OFFICE O/P EST MOD 30 MIN: CPT | Performed by: INTERNAL MEDICINE

## 2023-07-13 PROCEDURE — 93000 ELECTROCARDIOGRAM COMPLETE: CPT | Performed by: INTERNAL MEDICINE

## 2023-07-13 NOTE — PROGRESS NOTES
no distress  HEENT- Pink conjunctiva  , Non-icteri sclera,PERRLA  Mental status - alert, oriented to person, place, and time  Neck - supple, no significant adenopathy, no JVD, or carotid bruits  Chest - clear to auscultation, no wheezes, rales or rhonchi, symmetric air entry  Heart - normal rate, regular rhythm, normal S1, S2, no murmurs, rubs, clicks or gallops  Abdomen - soft, nontender, nondistended, no masses or organomegaly  BERNARD- no CVA or flank tenderness, no suprapubic tenderness  Neurological - alert, oriented, normal speech, no focal findings or movement disorder noted  Musculoskeletal/limbs - no joint tenderness, deformity or swelling   - peripheral pulses normal, no pedal edema, no clubbing or cyanosis  Skin - normal coloration and turgor, no rashes, no suspicious skin lesions noted  Psych- appropriate mood and affect    Lab  No results for input(s): CKTOTAL, CKMB, CKMBINDEX, TROPONINI in the last 72 hours.   CBC:   Lab Results   Component Value Date/Time    WBC 6.7 12/28/2022 12:13 PM    RBC 4.21 12/28/2022 12:13 PM    RBC 4.63 10/10/2017 09:16 AM    HGB 13.3 12/28/2022 12:13 PM    HCT 41.0 12/28/2022 12:13 PM    MCV 97 12/28/2022 12:13 PM    MCH 31.6 12/28/2022 12:13 PM    MCHC 32.4 12/28/2022 12:13 PM    RDW 13.1 12/28/2022 12:13 PM     12/28/2022 12:13 PM    MPV 12.4 12/28/2022 12:13 PM     BMP:    Lab Results   Component Value Date/Time     12/28/2022 12:13 PM     05/21/2022 07:00 AM    K 4.4 12/28/2022 12:13 PM    K 3.4 05/21/2022 07:00 AM    K 5.2 06/03/2021 12:25 PM     05/21/2022 07:00 AM    CO2 25 05/21/2022 07:00 AM    BUN 10 05/21/2022 07:00 AM    LABALBU 4.4 12/28/2022 12:13 PM    CREATININE 0.8 12/28/2022 12:13 PM    CREATININE 0.8 05/21/2022 07:00 AM    CALCIUM 9.7 05/21/2022 07:00 AM    LABGLOM >60 12/28/2022 12:13 PM    GLUCOSE 112 05/21/2022 07:00 AM    GLUCOSE 79 10/10/2017 09:16 AM     Hepatic Function Panel:    Lab Results   Component Value Date/Time    [facility]

## 2023-07-25 ENCOUNTER — HOSPITAL ENCOUNTER (OUTPATIENT)
Dept: NON INVASIVE DIAGNOSTICS | Age: 63
Discharge: HOME | End: 2023-07-25
Attending: INTERNAL MEDICINE
Payer: COMMERCIAL

## 2023-07-25 VITALS — WEIGHT: 140 LBS | BODY MASS INDEX: 22.5 KG/M2 | HEIGHT: 66 IN

## 2023-07-25 DIAGNOSIS — J44.9 MODERATE COPD (CHRONIC OBSTRUCTIVE PULMONARY DISEASE) (HCC): ICD-10-CM

## 2023-07-25 DIAGNOSIS — R94.31 ABNORMAL EKG: ICD-10-CM

## 2023-07-25 DIAGNOSIS — R07.89 CHEST PAIN, ATYPICAL: ICD-10-CM

## 2023-07-25 DIAGNOSIS — Z72.0 TOBACCO ABUSE: ICD-10-CM

## 2023-07-25 DIAGNOSIS — Z82.49 FAMILY HISTORY OF EARLY CAD: ICD-10-CM

## 2023-07-25 DIAGNOSIS — Z87.898 HISTORY OF CHEST PAIN: ICD-10-CM

## 2023-07-25 DIAGNOSIS — I77.810 ASCENDING AORTA DILATATION (HCC): ICD-10-CM

## 2023-07-25 DIAGNOSIS — R06.09 DOE (DYSPNEA ON EXERTION): ICD-10-CM

## 2023-07-25 LAB
LV EF: 53 %
LVEF MODALITY: NORMAL

## 2023-07-25 PROCEDURE — 78452 HT MUSCLE IMAGE SPECT MULT: CPT | Performed by: INTERNAL MEDICINE

## 2023-07-25 PROCEDURE — 93017 CV STRESS TEST TRACING ONLY: CPT | Performed by: INTERNAL MEDICINE

## 2023-07-25 PROCEDURE — 6360000002 HC RX W HCPCS

## 2023-07-25 PROCEDURE — 3430000000 HC RX DIAGNOSTIC RADIOPHARMACEUTICAL: Performed by: INTERNAL MEDICINE

## 2023-07-25 PROCEDURE — A9500 TC99M SESTAMIBI: HCPCS | Performed by: INTERNAL MEDICINE

## 2023-07-25 PROCEDURE — 96374 THER/PROPH/DIAG INJ IV PUSH: CPT | Performed by: INTERNAL MEDICINE

## 2023-07-25 PROCEDURE — 93306 TTE W/DOPPLER COMPLETE: CPT

## 2023-07-25 RX ORDER — TETRAKIS(2-METHOXYISOBUTYLISOCYANIDE)COPPER(I) TETRAFLUOROBORATE 1 MG/ML
9.6 INJECTION, POWDER, LYOPHILIZED, FOR SOLUTION INTRAVENOUS
Status: COMPLETED | OUTPATIENT
Start: 2023-07-25 | End: 2023-07-25

## 2023-07-25 RX ORDER — TETRAKIS(2-METHOXYISOBUTYLISOCYANIDE)COPPER(I) TETRAFLUOROBORATE 1 MG/ML
31.6 INJECTION, POWDER, LYOPHILIZED, FOR SOLUTION INTRAVENOUS
Status: COMPLETED | OUTPATIENT
Start: 2023-07-25 | End: 2023-07-25

## 2023-07-25 RX ADMIN — Medication 31.6 MILLICURIE: at 10:00

## 2023-07-25 RX ADMIN — Medication 9.6 MILLICURIE: at 08:55

## 2023-08-16 ENCOUNTER — OFFICE VISIT (OUTPATIENT)
Dept: CARDIOLOGY CLINIC | Age: 63
End: 2023-08-16
Payer: COMMERCIAL

## 2023-08-16 VITALS
HEIGHT: 66 IN | BODY MASS INDEX: 22.98 KG/M2 | DIASTOLIC BLOOD PRESSURE: 83 MMHG | SYSTOLIC BLOOD PRESSURE: 128 MMHG | HEART RATE: 58 BPM | WEIGHT: 143 LBS

## 2023-08-16 DIAGNOSIS — Z82.49 FAMILY HISTORY OF EARLY CAD: ICD-10-CM

## 2023-08-16 DIAGNOSIS — R94.31 ABNORMAL EKG: ICD-10-CM

## 2023-08-16 DIAGNOSIS — R07.89 CHEST PAIN, ATYPICAL: ICD-10-CM

## 2023-08-16 DIAGNOSIS — I77.810 ASCENDING AORTA DILATATION (HCC): ICD-10-CM

## 2023-08-16 DIAGNOSIS — J44.9 MODERATE COPD (CHRONIC OBSTRUCTIVE PULMONARY DISEASE) (HCC): ICD-10-CM

## 2023-08-16 DIAGNOSIS — R06.09 DOE (DYSPNEA ON EXERTION): ICD-10-CM

## 2023-08-16 DIAGNOSIS — R00.2 INTERMITTENT PALPITATIONS: Primary | ICD-10-CM

## 2023-08-16 PROCEDURE — 99214 OFFICE O/P EST MOD 30 MIN: CPT | Performed by: INTERNAL MEDICINE

## 2023-08-16 NOTE — PROGRESS NOTES
Chief Complaint   Patient presents with    1 Month Follow-Up    Check-Up    Results     Originally  patient for pre op eval for lung Ca surgery      Had surgery for left upper lobe lung for stage II  with Dr. Bill Montalvo on 5-3-2021. dxed with lung ca march 3rd 2021            Pt here for a 1 month f/u - stress and echo    EKG done 7-13-23    Cont to have sob on exertion    No more cp for a while    Pat Used to have Occasional chest pressure atypical- mild 3/10 last 5 min like two episodes in the last few yr-chronic.      Denied  dizziness or edema    Occasional palpitations- once in a while    KERR   Hx of mod  To severe COPD under F/u with pulm    Lost wt and cough and CT scan and noted lung nodule and Bx done    Smokes    FHX  Brother had and father had heart problem  Brother had MI at 58      Patient Active Problem List   Diagnosis    Primary cancer of left upper lobe of lung (HCC)    Other emphysema (HCC)    Smoking greater than 40 pack years    Tobacco abuse    KERR (dyspnea on exertion)    Intermittent palpitations    S/P left upper lobectomy of lung and mediastinal dissection    Moderate COPD (chronic obstructive pulmonary disease) ([address])    Encounter for insertion of venous access port    Ascending aorta dilatation (HCC) 4.1 cm    Encounter for removal of tunneled central venous catheter (CVC) with port    Adenocarcinoma of left lung ([address])    History of chest pain atypical once in a while not exertion related    Hx of  Chest pain, atypical Once in a while -resolved    Abnormal EKG    Family history of early CAD       Past Surgical History:   Procedure Laterality Date    BREAST BIOPSY Right 02/23/2011    Benign US Biopsy    BRONCHOSCOPY N/A 05/06/2021    BRONCHOSCOPY performed by Nerissa John MD at [facility]      x4    CHOLECYSTECTOMY      COLONOSCOPY  07/11/2016    CT NEEDLE BIOPSY LUNG PERCUTANEOUS  03/26/2021    CT NEEDLE BIOPSY LUNG PERCUTANEOUS 3/26/2021 [facility] CT SCAN    LUNG

## 2023-09-21 ENCOUNTER — HOSPITAL ENCOUNTER (OUTPATIENT)
Dept: PULMONOLOGY | Age: 63
Discharge: HOME | End: 2023-09-21
Payer: COMMERCIAL

## 2023-09-21 DIAGNOSIS — J41.1 MUCOPURULENT CHRONIC BRONCHITIS (HCC): ICD-10-CM

## 2023-09-21 DIAGNOSIS — J44.9 MODERATE COPD (CHRONIC OBSTRUCTIVE PULMONARY DISEASE) (HCC): ICD-10-CM

## 2023-09-21 PROCEDURE — 94060 EVALUATION OF WHEEZING: CPT

## 2023-09-21 PROCEDURE — 94726 PLETHYSMOGRAPHY LUNG VOLUMES: CPT

## 2023-09-21 PROCEDURE — 94729 DIFFUSING CAPACITY: CPT

## 2023-09-27 NOTE — PROGRESS NOTES
currently smoke or have quit within the past 15 years. Screening should be discontinued once a person has not smoked for 15 years or develops a health problem that substantially limits life expectancy or the ability or willingness to have curative lung surgery. PROCEDURE: CT CHEST W CONTRAST     CLINICAL INFORMATION: Malignant neoplasm of left lung. Fatigue. COMPARISON: CT chest 12/7/2022. TECHNIQUE:   5 mm axial imaging through the chest with IV contrast. Coronal and sagittal reconstruction were performed. All CT scans at this facility use dose modulation, iterative reconstruction, and/or weight based dosing when appropriate to reduce the radiation dose to as low as reasonably achievable. CONTRAST: 80 cc Isovue-370. FINDINGS:  Heart/mediastinum: The thyroid gland is unremarkable. The heart size is normal. No pericardial effusion is observed. Coronary artery calcifications are visualized. The descending thoracic aorta measures 3.8 cm in diameter, unchanged. No aortic dissection   is visualized. A stable right hilar lymph node measures 10 mm in short axis (series 2, image 29). No new mediastinal, hilar, or axillary lymphadenopathy is observed. Lungs: Centrilobular emphysema is present. The spiculated 10 x 12 mm right upper lobe pulmonary nodule is stable (series 2, image 16). A 3 mm anterior right pulmonary nodule is stable (series 2, image 30). A 5 mm triangular right middle lobe pulmonary   nodule is stable (series 2, image 36). A faint 4 mm right lower lobe pulmonary nodule is stable (series 2, image 41). No new pulmonary mass or nodule is identified. No focal consolidation, pleural effusion, or pneumothorax is observed. The central   airways appear patent and unremarkable. Upper abdomen: No acute findings are noted in the limited images through the upper abdomen. Musculoskeletal: The visualized skeletal structures appear intact.      IMPRESSION:     Centrilobular emphysema

## 2023-09-28 ENCOUNTER — OFFICE VISIT (OUTPATIENT)
Dept: PULMONOLOGY | Age: 63
End: 2023-09-28
Payer: COMMERCIAL

## 2023-09-28 VITALS
WEIGHT: 144 LBS | DIASTOLIC BLOOD PRESSURE: 70 MMHG | HEART RATE: 64 BPM | BODY MASS INDEX: 23.14 KG/M2 | TEMPERATURE: 97.5 F | SYSTOLIC BLOOD PRESSURE: 130 MMHG | OXYGEN SATURATION: 99 % | HEIGHT: 66 IN

## 2023-09-28 DIAGNOSIS — Z72.0 TOBACCO ABUSE: ICD-10-CM

## 2023-09-28 DIAGNOSIS — C34.92 MALIGNANT NEOPLASM OF LEFT LUNG, UNSPECIFIED PART OF LUNG (HCC): ICD-10-CM

## 2023-09-28 DIAGNOSIS — J44.9 MODERATE COPD (CHRONIC OBSTRUCTIVE PULMONARY DISEASE) (HCC): Primary | ICD-10-CM

## 2023-09-28 DIAGNOSIS — Z91.09 ENVIRONMENTAL ALLERGIES: ICD-10-CM

## 2023-09-28 PROCEDURE — 99214 OFFICE O/P EST MOD 30 MIN: CPT

## 2023-09-28 RX ORDER — FLUTICASONE PROPIONATE 50 MCG
2 SPRAY, SUSPENSION (ML) NASAL DAILY
Qty: 16 G | Refills: 11 | Status: SHIPPED | OUTPATIENT
Start: 2023-09-28 | End: 2024-09-27

## 2023-09-28 RX ORDER — NICOTINE 21 MG/24HR
1 PATCH, TRANSDERMAL 24 HOURS TRANSDERMAL EVERY 24 HOURS
Qty: 30 PATCH | Refills: 0 | Status: SHIPPED | OUTPATIENT
Start: 2023-09-28

## 2023-09-28 ASSESSMENT — ENCOUNTER SYMPTOMS
COUGH: 1
CHEST TIGHTNESS: 0
SHORTNESS OF BREATH: 1
RHINORRHEA: 1
WHEEZING: 1
SINUS PAIN: 0
SINUS PRESSURE: 0

## 2023-09-28 NOTE — PATIENT INSTRUCTIONS
Learning About Benefits From Quitting Smoking  How does quitting smoking make you healthier? If you're thinking about quitting smoking, you may have a few reasons to be smoke-free. Your health may be one of them. When you quit smoking, you lower your risks for cancer, lung disease, heart attack, stroke, blood vessel disease, and blindness from macular degeneration. When you're smoke-free, you get sick less often, and you heal faster. You are less likely to get colds, flu, bronchitis, and pneumonia. As a nonsmoker, you may find that your mood is better and you are less stressed. When and how will you feel healthier? Quitting has real health benefits that start from day 1 of being smoke-free. And the longer you stay smoke-free, the healthier you get and the better you feel. The first hours  After just 20 minutes, your blood pressure and heart rate go down. That means there's less stress on your heart and blood vessels. Within 12 hours, the level of carbon monoxide in your blood drops back to normal. That makes room for more oxygen. With more oxygen in your body, you may notice that you have more energy than when you smoked. After 2 weeks  Your lungs start to work better. Your risk of heart attack starts to drop. After 1 month  When your lungs are clear, you cough less and breathe deeper, so it's easier to be active. Your sense of taste and smell return. That means you can enjoy food more than you have since you started smoking. Over the years  Over the years, your risks of heart disease, heart attack, and stroke are lower. After 10 years, your risk of dying from lung cancer is cut by about half. And your risk for many other types of cancer is lower too. How would quitting help others in your life? When you quit smoking, you improve the health of everyone who now breathes in your smoke. Their heart, lung, and cancer risks drop, much like yours. They are sick less.  For babies and small children,

## 2023-11-09 ENCOUNTER — HOSPITAL ENCOUNTER (OUTPATIENT)
Dept: CT IMAGING | Age: 63
Discharge: HOME | End: 2023-11-09
Payer: COMMERCIAL

## 2023-11-09 DIAGNOSIS — C34.92 MALIGNANT NEOPLASM OF LEFT LUNG, UNSPECIFIED PART OF LUNG (HCC): ICD-10-CM

## 2023-11-09 LAB — POC CREATININE WHOLE BLOOD: 0.9 MG/DL (ref 0.5–1.2)

## 2023-11-09 PROCEDURE — 82565 ASSAY OF CREATININE: CPT

## 2023-11-09 PROCEDURE — 6360000004 HC RX CONTRAST MEDICATION: Performed by: INTERNAL MEDICINE

## 2023-11-09 PROCEDURE — 71260 CT THORAX DX C+: CPT

## 2023-11-09 RX ADMIN — IOPAMIDOL 80 ML: 755 INJECTION, SOLUTION INTRAVENOUS at 09:18

## 2023-11-17 DIAGNOSIS — C34.12 PRIMARY CANCER OF LEFT UPPER LOBE OF LUNG (HCC): Primary | ICD-10-CM

## 2023-11-20 ENCOUNTER — HOSPITAL ENCOUNTER (OUTPATIENT)
Dept: INFUSION THERAPY | Age: 63
Discharge: HOME | End: 2023-11-20
Payer: COMMERCIAL

## 2023-11-20 ENCOUNTER — OFFICE VISIT (OUTPATIENT)
Dept: ONCOLOGY | Age: 63
End: 2023-11-20
Payer: COMMERCIAL

## 2023-11-20 VITALS
RESPIRATION RATE: 18 BRPM | TEMPERATURE: 97.6 F | BODY MASS INDEX: 23.63 KG/M2 | OXYGEN SATURATION: 97 % | SYSTOLIC BLOOD PRESSURE: 136 MMHG | HEART RATE: 70 BPM | HEIGHT: 66 IN | DIASTOLIC BLOOD PRESSURE: 77 MMHG | WEIGHT: 147 LBS

## 2023-11-20 VITALS
HEART RATE: 70 BPM | SYSTOLIC BLOOD PRESSURE: 136 MMHG | DIASTOLIC BLOOD PRESSURE: 77 MMHG | OXYGEN SATURATION: 97 % | TEMPERATURE: 97.6 F | RESPIRATION RATE: 18 BRPM

## 2023-11-20 DIAGNOSIS — C34.12 PRIMARY CANCER OF LEFT UPPER LOBE OF LUNG (HCC): ICD-10-CM

## 2023-11-20 DIAGNOSIS — Z90.2 S/P LOBECTOMY OF LUNG: Primary | ICD-10-CM

## 2023-11-20 LAB
ABSOLUTE IMMATURE GRANULOCYTE: 0 THOU/MM3 (ref 0–0.07)
ALBUMIN SERPL BCG-MCNC: 4.4 G/DL (ref 3.5–5.1)
ALP SERPL-CCNC: 80 U/L (ref 38–126)
ALT SERPL W/O P-5'-P-CCNC: 8 U/L (ref 11–66)
AST SERPL-CCNC: 15 U/L (ref 5–40)
BASOPHILS ABSOLUTE: 0.1 THOU/MM3 (ref 0–0.1)
BASOPHILS NFR BLD AUTO: 1 % (ref 0–3)
BILIRUB CONJ SERPL-MCNC: < 0.2 MG/DL (ref 0–0.3)
BILIRUB SERPL-MCNC: 0.2 MG/DL (ref 0.3–1.2)
BUN BLDP-MCNC: 11 MG/DL (ref 8–26)
CHLORIDE BLD-SCNC: 108 MEQ/L (ref 98–109)
CREAT BLD-MCNC: 0.7 MG/DL (ref 0.5–1.2)
EOSINOPHIL NFR BLD AUTO: 1 % (ref 0–4)
EOSINOPHILS ABSOLUTE: 0.1 THOU/MM3 (ref 0–0.4)
ERYTHROCYTE [DISTWIDTH] IN BLOOD BY AUTOMATED COUNT: 13.1 % (ref 11.5–14.5)
GFR SERPL CREATININE-BSD FRML MDRD: > 60 ML/MIN/1.73M2
GLUCOSE BLD-MCNC: 88 MG/DL (ref 70–108)
HCT VFR BLD AUTO: 37.8 % (ref 37–47)
HGB BLD-MCNC: 12.2 GM/DL (ref 12–16)
IMMATURE GRANULOCYTES: 0 %
IONIZED CALCIUM, WHOLE BLOOD: 1.17 MMOL/L (ref 1.12–1.32)
LYMPHOCYTES ABSOLUTE: 1.1 THOU/MM3 (ref 1–4.8)
LYMPHOCYTES NFR BLD AUTO: 20 % (ref 15–47)
MCH RBC QN AUTO: 32 PG (ref 26–33)
MCHC RBC AUTO-ENTMCNC: 32.3 GM/DL (ref 32.2–35.5)
MCV RBC AUTO: 99 FL (ref 81–99)
MONOCYTES ABSOLUTE: 0.2 THOU/MM3 (ref 0.4–1.3)
MONOCYTES NFR BLD AUTO: 4 % (ref 0–12)
NEUTROPHILS NFR BLD AUTO: 74 % (ref 43–75)
PLATELET # BLD AUTO: 120 THOU/MM3 (ref 130–400)
PMV BLD AUTO: 12.4 FL (ref 9.4–12.4)
POTASSIUM BLD-SCNC: 4.1 MEQ/L (ref 3.5–4.9)
PROT SERPL-MCNC: 6.7 G/DL (ref 6.1–8)
RBC # BLD AUTO: 3.81 MILL/MM3 (ref 4.2–5.4)
SEGMENTED NEUTROPHILS ABSOLUTE COUNT: 4.2 THOU/MM3 (ref 1.8–7.7)
SODIUM BLD-SCNC: 141 MEQ/L (ref 138–146)
TOTAL CO2, WHOLE BLOOD: 28 MEQ/L (ref 23–33)
WBC # BLD AUTO: 5.7 THOU/MM3 (ref 4.8–10.8)

## 2023-11-20 PROCEDURE — 80047 BASIC METABLC PNL IONIZED CA: CPT

## 2023-11-20 PROCEDURE — 36415 COLL VENOUS BLD VENIPUNCTURE: CPT

## 2023-11-20 PROCEDURE — 85025 COMPLETE CBC W/AUTO DIFF WBC: CPT

## 2023-11-20 PROCEDURE — 99211 OFF/OP EST MAY X REQ PHY/QHP: CPT

## 2023-11-20 PROCEDURE — 80076 HEPATIC FUNCTION PANEL: CPT

## 2023-11-20 PROCEDURE — 99213 OFFICE O/P EST LOW 20 MIN: CPT | Performed by: INTERNAL MEDICINE

## 2023-11-20 RX ORDER — GUAIFENESIN 600 MG/1
600 TABLET, EXTENDED RELEASE ORAL 2 TIMES DAILY
COMMUNITY
Start: 2023-08-29

## 2023-11-20 NOTE — PROGRESS NOTES
suspicious. Patient underwent a needle biopsy on 3/26 revealing adenocarcinoma. PET scan shows no distant mets or adenopathy. PFT's done and reviewed below. She is waiting  for a consult with Dr Nupur Yan of Surgery. Reports 30 # weight loss ove 9 months; unintentional. Normal weight @ 145. Appetite has remained good. Chronic cough unchanged, no blood. No chest pain or pain elsewhere. Denied increased sob. Average 1ppd x 45 years; now about 5 cigs/day . Motivated to stop but failed Nicotine replacement products. Chantix not attempted thus far. ROS:   14 point  ROS negative except as above.      PMH:   Past Medical History:   Diagnosis Date    Arthritis     COPD (chronic obstructive pulmonary disease) ([facility])     Dr Jh Rivera [facility])     Dr Belcher Snowball    Pneumonia involving left lung    Aneurysm    Social HX:   Social History     Socioeconomic History    Marital status:      Spouse name: Not on file    Number of children: Not on file    Years of education: Not on file    Highest education level: Not on file   Occupational History    Not on file   Tobacco Use    Smoking status: Every Day     Packs/day: 0.50     Years: 45.00     Additional pack years: 0.00     Total pack years: 22.50     Types: Cigarettes    Smokeless tobacco: Never    Tobacco comments:     Notes she was almost quit but her best friend  and that made her start again   Vaping Use    Vaping Use: Never used   Substance and Sexual Activity    Alcohol use: Never    Drug use: Not Currently     Types: Marijuana Steve Beck)    Sexual activity: Not on file   Other Topics Concern    Not on file   Social History Narrative    Not on file     Social Determinants of Health     Financial Resource Strain: Not on file   Food Insecurity: Not on file   Transportation Needs: Not on file   Physical Activity: Not on file   Stress: Not on file   Social Connections: Not on file   Intimate Partner Violence: Not on file   Housing Stability: Not on

## 2023-11-20 NOTE — PATIENT INSTRUCTIONS
Please schedule CT chest in 6 months.    Follow up with Dr. Bruce Azar after the CT   Call us if you have new symptoms

## 2023-12-12 RX ORDER — GUAIFENESIN 600 MG/1
600 TABLET, EXTENDED RELEASE ORAL 2 TIMES DAILY
Qty: 60 TABLET | Refills: 11 | Status: SHIPPED | OUTPATIENT
Start: 2023-12-12

## 2024-05-07 ENCOUNTER — HOSPITAL ENCOUNTER (OUTPATIENT)
Dept: CT IMAGING | Age: 64
Discharge: HOME | End: 2024-05-07
Attending: INTERNAL MEDICINE
Payer: COMMERCIAL

## 2024-05-07 DIAGNOSIS — Z90.2 S/P LOBECTOMY OF LUNG: ICD-10-CM

## 2024-05-07 DIAGNOSIS — C34.12 PRIMARY CANCER OF LEFT UPPER LOBE OF LUNG (HCC): ICD-10-CM

## 2024-05-07 LAB — POC CREATININE WHOLE BLOOD: 1 MG/DL (ref 0.5–1.2)

## 2024-05-07 PROCEDURE — 71260 CT THORAX DX C+: CPT

## 2024-05-07 PROCEDURE — 6360000004 HC RX CONTRAST MEDICATION: Performed by: INTERNAL MEDICINE

## 2024-05-07 PROCEDURE — 82565 ASSAY OF CREATININE: CPT

## 2024-05-07 RX ADMIN — IOPAMIDOL 80 ML: 755 INJECTION, SOLUTION INTRAVENOUS at 09:04

## 2024-05-15 DIAGNOSIS — Z90.2 S/P LOBECTOMY OF LUNG: Primary | ICD-10-CM

## 2024-05-15 DIAGNOSIS — C34.92 MALIGNANT NEOPLASM OF LEFT LUNG, UNSPECIFIED PART OF LUNG (HCC): ICD-10-CM

## 2024-05-27 NOTE — PROGRESS NOTES
[facility] [place] [facility]  [facility]  [address]  [address]  [place] [address]  Dept: [phone]  Loc: [phone]   Hematology/Oncology Consult (Clinic)        5/28/24    Noemi Kumar   1960     No ref. provider found   Flako Juarez MD     DIAGNOSIS:  -  Left upper lobe cavitary adenocarcinoma of the lung.  3.9 x 2.1 cm.  PET scan negative.  Bronchoscopy EBUS-isolated station 11 lymph node with a moderate to poorly differentiated adenocarcinoma.  4L node with a minute focus of atypical cells on path review felt to be benign.  Clinical stage: T2a N1M0 / IIB.  Pathologic stage: T1 cN1 M0 /IIB (poorly differentiated, 3 of 7 nodes positive including extranodal extension.  EGFR- Not Detected  -Right upper lobe solitary 13 mm nodule under surveillance    TREATMENT:  -Left upper lobectomy and mediastinal node dissection scheduled by Dr. Owusu 5/3/2021  -Adjuvant chemotherapy : cis-platinum 75 mg meter squared day 1 and pemetrexed 500 mg meter squared day 1 every 21 days x 4 cycles .  Start date 6/16/2021.    Course 4 done 8/18/21.   No adjuvant immune therapy as she has stage II disease.    PARAMETERS:  -Chest CT without contrast  5/7/24-stable.  11/9/23- stable. 12/7/22,  8/18/2022, 4/11/2022, 10/4/2021 3/12/2021,     -PET/CT 4/14/2021  -Brain MRI with and without contrast 4/9/2021    SUBJECTIVE: Seen by Dr Garcia 11/20/23. Last seen by me Dec 2022.  5/7/24 CCT -stable.       Patient is still using about 1/2 pack /day.    Overall doing well. Has gastroparesis; 5 years of am nausea.  Zofran helps this tremendously.   She is not a diabetic and was told by her GI doctor she had gastroparesis. Here for the 5/7/24 Chest Ct report- Stable..No neuropathy.  No other focal or systemic complaints reported.  She requests nicotine patches for her attempt to stop smoking.      HPI: Debbi is a 60-year-old female with a 45-pack-year smoking habit (ongoing) with a chest

## 2024-05-28 ENCOUNTER — OFFICE VISIT (OUTPATIENT)
Dept: ONCOLOGY | Age: 64
End: 2024-05-28
Payer: COMMERCIAL

## 2024-05-28 ENCOUNTER — CLINICAL DOCUMENTATION (OUTPATIENT)
Dept: CASE MANAGEMENT | Age: 64
End: 2024-05-28

## 2024-05-28 ENCOUNTER — HOSPITAL ENCOUNTER (OUTPATIENT)
Dept: INFUSION THERAPY | Age: 64
Discharge: HOME | End: 2024-05-28
Payer: COMMERCIAL

## 2024-05-28 VITALS
TEMPERATURE: 98 F | SYSTOLIC BLOOD PRESSURE: 126 MMHG | OXYGEN SATURATION: 97 % | RESPIRATION RATE: 16 BRPM | HEART RATE: 67 BPM | DIASTOLIC BLOOD PRESSURE: 82 MMHG

## 2024-05-28 VITALS
HEART RATE: 67 BPM | BODY MASS INDEX: 23.57 KG/M2 | RESPIRATION RATE: 16 BRPM | SYSTOLIC BLOOD PRESSURE: 126 MMHG | TEMPERATURE: 98 F | DIASTOLIC BLOOD PRESSURE: 82 MMHG | WEIGHT: 146 LBS | OXYGEN SATURATION: 97 %

## 2024-05-28 DIAGNOSIS — F17.200 SMOKING ADDICTION: ICD-10-CM

## 2024-05-28 DIAGNOSIS — Z12.39 BREAST SCREENING: ICD-10-CM

## 2024-05-28 DIAGNOSIS — Z90.2 S/P LOBECTOMY OF LUNG: Primary | ICD-10-CM

## 2024-05-28 DIAGNOSIS — C34.92 MALIGNANT NEOPLASM OF LEFT LUNG, UNSPECIFIED PART OF LUNG (HCC): ICD-10-CM

## 2024-05-28 LAB
ALBUMIN SERPL BCG-MCNC: 4.4 G/DL (ref 3.5–5.1)
ALP SERPL-CCNC: 77 U/L (ref 38–126)
ALT SERPL W/O P-5'-P-CCNC: 9 U/L (ref 11–66)
AST SERPL-CCNC: 12 U/L (ref 5–40)
BASOPHILS ABSOLUTE: 0.1 THOU/MM3 (ref 0–0.1)
BASOPHILS NFR BLD AUTO: 1 %
BILIRUB CONJ SERPL-MCNC: < 0.2 MG/DL (ref 0–0.3)
BILIRUB SERPL-MCNC: 0.2 MG/DL (ref 0.3–1.2)
BUN BLDP-MCNC: 10 MG/DL (ref 8–26)
CHLORIDE BLD-SCNC: 108 MEQ/L (ref 98–109)
CREAT BLD-MCNC: 0.9 MG/DL (ref 0.5–1.2)
DEPRECATED RDW RBC AUTO: 49.9 FL (ref 35–45)
EOSINOPHIL NFR BLD AUTO: 2.2 %
EOSINOPHILS ABSOLUTE: 0.2 THOU/MM3 (ref 0–0.4)
ERYTHROCYTE [DISTWIDTH] IN BLOOD BY AUTOMATED COUNT: 13.5 % (ref 11.5–14.5)
GFR SERPL CREATININE-BSD FRML MDRD: 72 ML/MIN/1.73M2
GLUCOSE BLD-MCNC: 87 MG/DL (ref 70–108)
HCT VFR BLD AUTO: 40.7 % (ref 37–47)
HGB BLD-MCNC: 13.1 GM/DL (ref 12–16)
IMM GRANULOCYTES # BLD AUTO: 0.02 THOU/MM3 (ref 0–0.07)
IMM GRANULOCYTES NFR BLD AUTO: 0.3 %
IONIZED CALCIUM, WHOLE BLOOD: 1.22 MMOL/L (ref 1.12–1.32)
LYMPHOCYTES ABSOLUTE: 1.3 THOU/MM3 (ref 1–4.8)
LYMPHOCYTES NFR BLD AUTO: 18.2 %
MCH RBC QN AUTO: 32.2 PG (ref 26–33)
MCHC RBC AUTO-ENTMCNC: 32.2 GM/DL (ref 32.2–35.5)
MCV RBC AUTO: 100 FL (ref 81–99)
MONOCYTES ABSOLUTE: 0.3 THOU/MM3 (ref 0.4–1.3)
MONOCYTES NFR BLD AUTO: 4.5 %
NEUTROPHILS ABSOLUTE: 5.1 THOU/MM3 (ref 1.8–7.7)
NEUTROPHILS NFR BLD AUTO: 73.8 %
NRBC BLD AUTO-RTO: 0 /100 WBC
PLATELET # BLD AUTO: 128 THOU/MM3 (ref 130–400)
PMV BLD AUTO: 14.6 FL (ref 9.4–12.4)
POTASSIUM BLD-SCNC: 5 MEQ/L (ref 3.5–4.9)
PROT SERPL-MCNC: 6.6 G/DL (ref 6.1–8)
RBC # BLD AUTO: 4.07 MILL/MM3 (ref 4.2–5.4)
SODIUM BLD-SCNC: 141 MEQ/L (ref 138–146)
TOTAL CO2, WHOLE BLOOD: 30 MEQ/L (ref 23–33)
WBC # BLD AUTO: 6.9 THOU/MM3 (ref 4.8–10.8)

## 2024-05-28 PROCEDURE — 85025 COMPLETE CBC W/AUTO DIFF WBC: CPT

## 2024-05-28 PROCEDURE — 36415 COLL VENOUS BLD VENIPUNCTURE: CPT

## 2024-05-28 PROCEDURE — 99214 OFFICE O/P EST MOD 30 MIN: CPT | Performed by: INTERNAL MEDICINE

## 2024-05-28 PROCEDURE — 80076 HEPATIC FUNCTION PANEL: CPT

## 2024-05-28 PROCEDURE — 99211 OFF/OP EST MAY X REQ PHY/QHP: CPT

## 2024-05-28 PROCEDURE — 80047 BASIC METABLC PNL IONIZED CA: CPT

## 2024-05-28 RX ORDER — NICOTINE 21 MG/24HR
1 PATCH, TRANSDERMAL 24 HOURS TRANSDERMAL EVERY 24 HOURS
Qty: 30 PATCH | Refills: 3 | Status: SHIPPED | OUTPATIENT
Start: 2024-05-28 | End: 2025-05-28

## 2024-05-28 NOTE — PATIENT INSTRUCTIONS
Follow-up with me in 6 months Dec 4 and 1 week before chest CT with contrast will be done 11/27/24/Ordered.    E-prescription sent for NicoDerm patches    Ordered screening bilateral mammogram to be done mid September.

## 2024-05-28 NOTE — PROGRESS NOTES
Name: Noemi Kumar  : 1960  MRN: [id number]    Oncology Navigation Follow-Up Note    Contact Type:  Medical Oncology    Subjective: voices no issues    Objective: MD review scan    Assistance Needed: denies any at this time. Informed her previous navigator retired-introduced myself as her navigator if any issues arise. Contact information given.    Receptive to Advanced Care Planning / Palliative Care:  deferred    Notes: lilia following to assist & support as needed    Electronically signed by Magdalena Garcia RN on 2024 at 3:48 PM

## 2024-08-28 ENCOUNTER — OFFICE VISIT (OUTPATIENT)
Dept: CARDIOLOGY CLINIC | Age: 64
End: 2024-08-28
Payer: COMMERCIAL

## 2024-08-28 VITALS
SYSTOLIC BLOOD PRESSURE: 110 MMHG | WEIGHT: 143.6 LBS | BODY MASS INDEX: 23.08 KG/M2 | HEIGHT: 66 IN | DIASTOLIC BLOOD PRESSURE: 76 MMHG | HEART RATE: 68 BPM

## 2024-08-28 DIAGNOSIS — R00.2 PALPITATIONS: ICD-10-CM

## 2024-08-28 DIAGNOSIS — R06.09 DOE (DYSPNEA ON EXERTION): ICD-10-CM

## 2024-08-28 DIAGNOSIS — R94.31 ABNORMAL EKG: ICD-10-CM

## 2024-08-28 DIAGNOSIS — Z72.0 TOBACCO ABUSE: ICD-10-CM

## 2024-08-28 DIAGNOSIS — R00.2 INTERMITTENT PALPITATIONS: Primary | ICD-10-CM

## 2024-08-28 DIAGNOSIS — Z82.49 FAMILY HISTORY OF EARLY CAD: ICD-10-CM

## 2024-08-28 DIAGNOSIS — I77.810 ASCENDING AORTA DILATATION (HCC): ICD-10-CM

## 2024-08-28 PROCEDURE — 93000 ELECTROCARDIOGRAM COMPLETE: CPT | Performed by: INTERNAL MEDICINE

## 2024-08-28 PROCEDURE — 99214 OFFICE O/P EST MOD 30 MIN: CPT | Performed by: INTERNAL MEDICINE

## 2024-08-28 NOTE — PROGRESS NOTES
occasional -med Rx and No B- blocker for severe copd  Cont  asa 81 po qd  Sriram nuc  negative and Echo EF 55%    Smoking: discussed with the patient the importance of smoke cessation especially with the risk of CAD.    Aortic aneurysm measures 4.1 cm .  June 2021 CTA chest  The ascending aorta is upper limits of normal in diameter  CTA 04/2022  Ascending thoracic aorta is not dilated  Need f/u  And d.w the pat in detail    Occasional palpitation  Like once a week  2 week holter  Use albuterol daily      D.w the pat the plan of care    D/w the pat the result of test    patient is advised to exercise 30 min s a day three times a week and about weight loss ,balance diet and     More fruits and vegetables .    Overall stable and doing better and no cp      Discussed use, benefit, and side effects of prescribed medications. All patient questions answered. Pt voiced understanding. Instructed to continue current medications, diet and exercise. Continue risk factor modification and medical management. Patient agreed with treatment plan. Follow up as directed.    The patient is advised to attempt to improve diet and exercise patterns to aid in medical management of this problem.  Advised more plant based nutrition/meditarrean diet   Advised patient to call office or seek immediate medical attention if there is any new onset of  any chest pain, sob, palpitations, lightheadedness, dizziness, orthopnea, PND or pedal edema.   All medication side effects were discussed in details.        RTC in  1 year         Kali Espinzoa MD,MD,FACC

## 2024-08-29 ENCOUNTER — HOSPITAL ENCOUNTER (OUTPATIENT)
Age: 64
Discharge: HOME | End: 2024-08-31
Attending: INTERNAL MEDICINE
Payer: COMMERCIAL

## 2024-08-29 DIAGNOSIS — R00.2 PALPITATIONS: ICD-10-CM

## 2024-08-29 PROCEDURE — 93246 EXT ECG>7D<15D RECORDING: CPT

## 2024-09-18 ENCOUNTER — HOSPITAL ENCOUNTER (OUTPATIENT)
Dept: MAMMOGRAPHY | Age: 64
Discharge: HOME | End: 2024-09-18
Attending: INTERNAL MEDICINE
Payer: COMMERCIAL

## 2024-09-18 VITALS — BODY MASS INDEX: 22.5 KG/M2 | HEIGHT: 66 IN | WEIGHT: 140 LBS

## 2024-09-18 DIAGNOSIS — Z12.39 BREAST SCREENING: ICD-10-CM

## 2024-09-18 DIAGNOSIS — Z90.2 S/P LOBECTOMY OF LUNG: ICD-10-CM

## 2024-09-18 DIAGNOSIS — F17.200 SMOKING ADDICTION: ICD-10-CM

## 2024-09-18 PROCEDURE — 77063 BREAST TOMOSYNTHESIS BI: CPT

## 2024-10-01 NOTE — PROGRESS NOTES
[facility]    Patient: MILO KUMAR, 64 y.o.   : 1960  10/2/2024    Patient of Dr. Ferraro    Assessment/Plan   1. Moderate COPD (chronic obstructive pulmonary disease) (HCC) - not at goal  -Start tiotropium (SPIRIVA HANDIHALER) 18 MCG inhalation capsule; Inhale 1 capsule into the lungs daily 1 capsule via inhalation daily. Patient instructed on use  -Continue albuterol inhaler and start albuterol nebulizer, one or the other, every 6 hours as needed for shortness of breath or wheezing   -Milo Kumar was evaluated today and a DME order was entered for a nebulizer compressor in order to administer Albuterol due to the diagnosis of COPD.  The need for this equipment and treatment was discussed with the patient and she understands and is in agreement.     -6 MWT - Did not qualify for supplemental O2   -Reviewed preventative vaccinations    2. Adenocarcinoma of left lung (HCC)  -Last CT Chest with oncology stable. Continue close follow up with oncology and scheduled CT Chest in 2024    3. Mucopurulent chronic bronchitis (HCC) - stable  -Continue mucinex    4. Tobacco abuse  Advised patient to quit and offered support.  Educational material provided to patient.  -Patient has nicotine patches at home. She declines refill on nicotine gum    Advised patient to call office with any changes, questions, or concerns regarding respiratory status or issues with prescribed medications    Return in about 3 months (around 2025) for COPD, med check.        Subjective     Chief Complaint   Patient presents with    Follow-up     1 yr COPD, no testing.        HPI  Complaints: Shortness of Breath \"sometimes\"  Onset Duration: Over a year  Exacerbating factors: Exertion  Alleviating factors:  slow down  Associated symptoms: \"occasional\" Cough and Sputum Production (clear) and chest tightness  Pertinent negatives: Hemoptysis and Wheezing  Risk factors for lung disease: tobacco

## 2024-10-02 ENCOUNTER — OFFICE VISIT (OUTPATIENT)
Dept: PULMONOLOGY | Age: 64
End: 2024-10-02
Payer: COMMERCIAL

## 2024-10-02 VITALS
DIASTOLIC BLOOD PRESSURE: 72 MMHG | WEIGHT: 147.8 LBS | OXYGEN SATURATION: 93 % | TEMPERATURE: 97.8 F | SYSTOLIC BLOOD PRESSURE: 118 MMHG | BODY MASS INDEX: 23.75 KG/M2 | HEART RATE: 68 BPM | HEIGHT: 66 IN

## 2024-10-02 DIAGNOSIS — C34.92 ADENOCARCINOMA OF LEFT LUNG (HCC): ICD-10-CM

## 2024-10-02 DIAGNOSIS — Z72.0 TOBACCO ABUSE: ICD-10-CM

## 2024-10-02 DIAGNOSIS — J44.9 MODERATE COPD (CHRONIC OBSTRUCTIVE PULMONARY DISEASE) (HCC): Primary | ICD-10-CM

## 2024-10-02 DIAGNOSIS — J41.1 MUCOPURULENT CHRONIC BRONCHITIS (HCC): ICD-10-CM

## 2024-10-02 PROCEDURE — 99214 OFFICE O/P EST MOD 30 MIN: CPT

## 2024-10-02 PROCEDURE — 94618 PULMONARY STRESS TESTING: CPT

## 2024-10-02 RX ORDER — ALBUTEROL SULFATE 0.83 MG/ML
2.5 SOLUTION RESPIRATORY (INHALATION) EVERY 6 HOURS PRN
Qty: 120 EACH | Refills: 11 | Status: SHIPPED | OUTPATIENT
Start: 2024-10-02 | End: 2025-10-02

## 2024-10-02 RX ORDER — TIOTROPIUM BROMIDE 18 UG/1
18 CAPSULE ORAL; RESPIRATORY (INHALATION) DAILY
Qty: 30 CAPSULE | Refills: 11 | Status: SHIPPED | OUTPATIENT
Start: 2024-10-02 | End: 2025-10-02

## 2024-10-02 RX ORDER — ALBUTEROL SULFATE 90 UG/1
2 INHALANT RESPIRATORY (INHALATION) EVERY 6 HOURS PRN
Qty: 8 G | Refills: 11 | Status: SHIPPED | OUTPATIENT
Start: 2024-10-02

## 2024-10-02 ASSESSMENT — ENCOUNTER SYMPTOMS
SINUS PRESSURE: 0
RHINORRHEA: 1
SHORTNESS OF BREATH: 1
COUGH: 1
SINUS PAIN: 0
WHEEZING: 0
CHEST TIGHTNESS: 1

## 2024-11-25 ENCOUNTER — HOSPITAL ENCOUNTER (OUTPATIENT)
Dept: CT IMAGING | Age: 64
Discharge: HOME | End: 2024-11-25
Attending: INTERNAL MEDICINE
Payer: COMMERCIAL

## 2024-11-25 DIAGNOSIS — Z90.2 S/P LOBECTOMY OF LUNG: ICD-10-CM

## 2024-11-25 LAB — POC CREATININE WHOLE BLOOD: 1 MG/DL (ref 0.5–1.2)

## 2024-11-25 PROCEDURE — 71260 CT THORAX DX C+: CPT

## 2024-11-25 PROCEDURE — 6360000004 HC RX CONTRAST MEDICATION: Performed by: INTERNAL MEDICINE

## 2024-11-25 PROCEDURE — 82565 ASSAY OF CREATININE: CPT

## 2024-11-25 RX ORDER — IOPAMIDOL 755 MG/ML
80 INJECTION, SOLUTION INTRAVASCULAR
Status: COMPLETED | OUTPATIENT
Start: 2024-11-25 | End: 2024-11-25

## 2024-11-25 RX ADMIN — IOPAMIDOL 80 ML: 755 INJECTION, SOLUTION INTRAVENOUS at 13:00

## 2024-12-04 ENCOUNTER — HOSPITAL ENCOUNTER (OUTPATIENT)
Dept: INFUSION THERAPY | Age: 64
Discharge: HOME | End: 2024-12-04
Payer: COMMERCIAL

## 2024-12-04 ENCOUNTER — OFFICE VISIT (OUTPATIENT)
Dept: ONCOLOGY | Age: 64
End: 2024-12-04
Payer: COMMERCIAL

## 2024-12-04 VITALS
SYSTOLIC BLOOD PRESSURE: 136 MMHG | TEMPERATURE: 97.9 F | HEART RATE: 71 BPM | DIASTOLIC BLOOD PRESSURE: 91 MMHG | RESPIRATION RATE: 16 BRPM

## 2024-12-04 VITALS
OXYGEN SATURATION: 94 % | SYSTOLIC BLOOD PRESSURE: 136 MMHG | TEMPERATURE: 97.9 F | RESPIRATION RATE: 16 BRPM | WEIGHT: 150 LBS | HEART RATE: 71 BPM | DIASTOLIC BLOOD PRESSURE: 91 MMHG | BODY MASS INDEX: 24.21 KG/M2

## 2024-12-04 DIAGNOSIS — C34.12 PRIMARY CANCER OF LEFT UPPER LOBE OF LUNG (HCC): Primary | ICD-10-CM

## 2024-12-04 PROCEDURE — 99214 OFFICE O/P EST MOD 30 MIN: CPT | Performed by: INTERNAL MEDICINE

## 2024-12-04 PROCEDURE — 99211 OFF/OP EST MAY X REQ PHY/QHP: CPT

## 2024-12-04 NOTE — PROGRESS NOTES
malignancy.    HPI: Debbi is a 60-year-old female with a 45-pack-year smoking habit (ongoing) with a chest CT without contrast on 3/12/2021 revealing a 3.9 x 2.1 cm cavitary mass in the left upper lung corresponding to the abnormality seen on the chest x-ray of 2/26/2021.  Radiologically very suspicious. Patient underwent a needle biopsy on 3/26 revealing adenocarcinoma. PET scan shows no distant mets or adenopathy.PFT's done and reviewed below.  She is waiting  for a consult with Dr Owusu of Surgery.   Reports 30 # weight loss ove 9 months; unintentional. Normal weight @ 145. Appetite has remained good. Chronic cough unchanged, no blood. No chest pain or pain elsewhere.Denied increased sob.  Average 1ppd x 45 years; now about 5 cigs/day . Motivated to stop but failed Nicotine replacement products. Chantix not attempted thus far.     ROS:  Review of Systems 14 point negative except as above.  Limited to unintentional weight loss.  Chronic unchanged nonproductive cough and shortness of breath with activity also unchanged.    PMH:   Past Medical History:   Diagnosis Date    Arthritis     COPD (chronic obstructive pulmonary disease) ([facility])     Dr Ferraro    Lung cancer ([facility])     Dr Pepper    Pneumonia involving left lung 2022   Aneurysm    Social HX:   Social History     Socioeconomic History    Marital status:      Spouse name: Not on file    Number of children: Not on file    Years of education: Not on file    Highest education level: Not on file   Occupational History    Not on file   Tobacco Use    Smoking status: Every Day     Current packs/day: 2.00     Average packs/day: 2.0 packs/day for 48.9 years (97.8 ttl pk-yrs)     Types: Cigarettes     Start date: 1/1/1976     Passive exposure: Past    Smokeless tobacco: Never    Tobacco comments:     10/2/24 Currently smoking half PPD, currently using nicotine patches    Vaping Use    Vaping status: Never Used   Substance and Sexual Activity    Alcohol use:

## 2024-12-04 NOTE — PATIENT INSTRUCTIONS
-Follow-up with me in 6 months 6/11/2025   -1 week before chest CT with contrast will be done .  6/4/2025/Ordered.

## 2025-01-03 NOTE — PROGRESS NOTES
rhinorrhea. Negative for postnasal drip, sinus pressure, sinus pain and sneezing.    Respiratory:  Negative for cough, chest tightness, shortness of breath and wheezing.         Denies hemoptysis   Cardiovascular:  Negative for chest pain, palpitations and leg swelling.   Allergic/Immunologic: Negative for environmental allergies.        Physical exam   /76 (Site: Left Upper Arm, Position: Sitting, Cuff Size: Medium Adult)   Pulse 70   Temp 97.2 °F (36.2 °C)   Ht 1.676 m (5' 6\")   Wt 68 kg (150 lb)   LMP  (LMP Unknown)   SpO2 98% Comment: r/a  BMI 24.21 kg/m²    Wt Readings from Last 3 Encounters:   01/06/25 68 kg (150 lb)   12/04/24 68 kg (150 lb)   10/02/24 67 kg (147 lb 12.8 oz)       Physical Exam  Vitals reviewed.   Constitutional:       General: She is not in acute distress.     Appearance: Normal appearance.   HENT:      Head: Normocephalic and atraumatic.      Right Ear: External ear normal.      Left Ear: External ear normal.      Mouth/Throat:      Mouth: Mucous membranes are moist.      Pharynx: No oropharyngeal exudate or posterior oropharyngeal erythema.   Eyes:      General:         Right eye: No discharge.         Left eye: No discharge.   Cardiovascular:      Rate and Rhythm: Normal rate and regular rhythm.      Heart sounds: Normal heart sounds.   Pulmonary:      Effort: Pulmonary effort is normal. No respiratory distress.      Breath sounds: No wheezing, rhonchi or rales.      Comments: dminished  Chest:      Chest wall: No tenderness.   Musculoskeletal:      Cervical back: Neck supple.      Right lower leg: No edema.      Left lower leg: No edema.   Skin:     General: Skin is warm and dry.   Neurological:      General: No focal deficit present.      Mental Status: She is alert.   Psychiatric:         Mood and Affect: Mood normal.         Behavior: Behavior normal.         Thought Content: Thought content normal.            Electronically signed by ERYN Brito CNP on

## 2025-01-06 ENCOUNTER — OFFICE VISIT (OUTPATIENT)
Dept: PULMONOLOGY | Age: 65
End: 2025-01-06
Payer: COMMERCIAL

## 2025-01-06 VITALS
BODY MASS INDEX: 24.11 KG/M2 | SYSTOLIC BLOOD PRESSURE: 124 MMHG | WEIGHT: 150 LBS | OXYGEN SATURATION: 98 % | HEIGHT: 66 IN | DIASTOLIC BLOOD PRESSURE: 76 MMHG | HEART RATE: 70 BPM | TEMPERATURE: 97.2 F

## 2025-01-06 DIAGNOSIS — Z91.09 ENVIRONMENTAL ALLERGIES: ICD-10-CM

## 2025-01-06 DIAGNOSIS — C34.92 ADENOCARCINOMA OF LEFT LUNG (HCC): ICD-10-CM

## 2025-01-06 DIAGNOSIS — J44.9 MODERATE COPD (CHRONIC OBSTRUCTIVE PULMONARY DISEASE) (HCC): Primary | ICD-10-CM

## 2025-01-06 PROCEDURE — 99214 OFFICE O/P EST MOD 30 MIN: CPT

## 2025-01-06 RX ORDER — FLUTICASONE PROPIONATE 50 MCG
2 SPRAY, SUSPENSION (ML) NASAL DAILY
Qty: 16 G | Refills: 11 | Status: SHIPPED | OUTPATIENT
Start: 2025-01-06 | End: 2026-01-06

## 2025-01-06 ASSESSMENT — ENCOUNTER SYMPTOMS
COUGH: 0
SINUS PAIN: 0
WHEEZING: 0
SINUS PRESSURE: 0
RHINORRHEA: 1
CHEST TIGHTNESS: 0
SHORTNESS OF BREATH: 0

## 2025-06-04 ENCOUNTER — HOSPITAL ENCOUNTER (OUTPATIENT)
Dept: CT IMAGING | Age: 65
Discharge: HOME | End: 2025-06-04
Attending: INTERNAL MEDICINE
Payer: COMMERCIAL

## 2025-06-04 DIAGNOSIS — C34.12 PRIMARY CANCER OF LEFT UPPER LOBE OF LUNG (HCC): ICD-10-CM

## 2025-06-04 LAB — POC CREATININE WHOLE BLOOD: 0.9 MG/DL (ref 0.5–1.2)

## 2025-06-04 PROCEDURE — 6360000004 HC RX CONTRAST MEDICATION: Performed by: INTERNAL MEDICINE

## 2025-06-04 PROCEDURE — 71260 CT THORAX DX C+: CPT

## 2025-06-04 PROCEDURE — 82565 ASSAY OF CREATININE: CPT

## 2025-06-04 RX ORDER — IOPAMIDOL 755 MG/ML
80 INJECTION, SOLUTION INTRAVASCULAR
Status: COMPLETED | OUTPATIENT
Start: 2025-06-04 | End: 2025-06-04

## 2025-06-04 RX ADMIN — IOPAMIDOL 80 ML: 755 INJECTION, SOLUTION INTRAVENOUS at 07:54

## 2025-06-10 NOTE — PROGRESS NOTES
Site   Upper lobe of lung   Tumor Size      Total tumor size (size of entire tumor)#    2.6 x 2.6 x 2 cm   Tumor Focality   Single focus   Histologic Type   Invasive adenocarcinoma, acinar predominant   + Histologic Grade   G3: Poorly differentiated   + Spread Through Air Spaces (HILDA)   Not identified   Visceral Pleura Invasion   Not identified   Lymphovascular Invasion   Cannot be determined   Direct Invasion of Adjacent Structures   No adjacent structures present   Margins   All margins are uninvolved by tumor   Margins examined (specify): Bronchial, vascular and parenchymal       Distance of invasive carcinoma from closest margin (centimeters): 2       mm, pleural   Number of Lymph Nodes Involved: 3   Number of Lymph Nodes Examined: 14   + Extranodal Extension   Not identified   Treatment Effect   No known presurgical therapy   Pathologic Stage Classification (pTNM) (AJCC 8th Edition)   Primary Tumor (pT)   pT1c: Tumor >2 cm but <=3 cm in greatest dimension   Regional Lymph Nodes (pN)   pN1: Metastasis in ipsilateral peribronchial and/or ipsilateral hilar          lymph nodes, and intrapulmonary nodes, including involvement by          direct extension   + Additional Pathologic Findings   Emphysema     [id number]   [id number]   [id number]                                                     <Sign Out  Signature>                                             SIABELA GERMAN M.D., F.C.A.P.       [facility]  Printed on:  5/25/2021   [address] [place] [address]   Original print date: 05/25/2021   Lab and Collection    Surgical Pathology - 5/4/2021  Result History    Surgical Pathology on 5/25/2021 - Result Edited   Result Information    Status: Edited Result - FINAL (Collected: 5/4/2021 08:04) Provider Status: Reviewed   All Reviewers List    Baljeet Pepper MD on 5/26/2021 12:23   Jamaal Owusu MD on 5/26/2021 08:10         Bronchoscopy EBUS 4/27/2021  Pulmonary at [facility]  4R

## 2025-06-11 ENCOUNTER — HOSPITAL ENCOUNTER (OUTPATIENT)
Dept: INFUSION THERAPY | Age: 65
Discharge: HOME | End: 2025-06-11
Payer: COMMERCIAL

## 2025-06-11 ENCOUNTER — OFFICE VISIT (OUTPATIENT)
Dept: ONCOLOGY | Age: 65
End: 2025-06-11
Payer: COMMERCIAL

## 2025-06-11 VITALS
DIASTOLIC BLOOD PRESSURE: 90 MMHG | OXYGEN SATURATION: 99 % | SYSTOLIC BLOOD PRESSURE: 143 MMHG | WEIGHT: 150 LBS | HEIGHT: 66 IN | HEART RATE: 63 BPM | TEMPERATURE: 98.1 F | RESPIRATION RATE: 18 BRPM | BODY MASS INDEX: 24.11 KG/M2

## 2025-06-11 VITALS
HEART RATE: 63 BPM | OXYGEN SATURATION: 99 % | TEMPERATURE: 98.1 F | RESPIRATION RATE: 18 BRPM | SYSTOLIC BLOOD PRESSURE: 143 MMHG | DIASTOLIC BLOOD PRESSURE: 90 MMHG

## 2025-06-11 DIAGNOSIS — C34.12 PRIMARY CANCER OF LEFT UPPER LOBE OF LUNG (HCC): Primary | ICD-10-CM

## 2025-06-11 PROCEDURE — 99213 OFFICE O/P EST LOW 20 MIN: CPT | Performed by: INTERNAL MEDICINE

## 2025-06-11 PROCEDURE — 99211 OFF/OP EST MAY X REQ PHY/QHP: CPT

## 2025-06-11 NOTE — PATIENT INSTRUCTIONS
-Her next chest CT to be done 12/10/2025/ordered  -Please schedule next follow-up with me 1 week later

## 2025-08-28 ENCOUNTER — OFFICE VISIT (OUTPATIENT)
Dept: CARDIOLOGY CLINIC | Age: 65
End: 2025-08-28

## 2025-08-28 VITALS
DIASTOLIC BLOOD PRESSURE: 93 MMHG | HEIGHT: 66 IN | SYSTOLIC BLOOD PRESSURE: 151 MMHG | HEART RATE: 62 BPM | WEIGHT: 141.4 LBS | BODY MASS INDEX: 22.73 KG/M2

## 2025-08-28 DIAGNOSIS — R06.09 DOE (DYSPNEA ON EXERTION): ICD-10-CM

## 2025-08-28 DIAGNOSIS — R00.2 INTERMITTENT PALPITATIONS: Primary | ICD-10-CM

## 2025-08-28 DIAGNOSIS — Z72.0 TOBACCO ABUSE: ICD-10-CM

## 2025-08-28 DIAGNOSIS — R94.31 ABNORMAL EKG: ICD-10-CM

## 2025-08-28 DIAGNOSIS — I77.810 ASCENDING AORTA DILATATION: ICD-10-CM

## (undated) DEVICE — APPLICATOR PREP 26ML 0.7% IOD POVACRYLEX 74% ISO ALC ST

## (undated) DEVICE — TIP COVER ACCESSORY

## (undated) DEVICE — ELECTRO LUBE IS A SINGLE PATIENT USE DEVICE THAT IS INTENDED TO BE USED ON ELECTROSURGICAL ELECTRODES TO REDUCE STICKING.: Brand: KEY SURGICAL ELECTRO LUBE

## (undated) DEVICE — GAUZE,SPONGE,8"X4",12PLY,XRAY,STRL,LF: Brand: MEDLINE

## (undated) DEVICE — TOTAL TRAY, DB, 100% SILI FOLEY, 16FR 10: Brand: MEDLINE

## (undated) DEVICE — STAPLER 30 RELOAD GRAY: Brand: ENDOWRIST

## (undated) DEVICE — GLOVE ORANGE PI 8   MSG9080

## (undated) DEVICE — 3M™ STERI-DRAPE™ INSTRUMENT POUCH 1018: Brand: STERI-DRAPE™

## (undated) DEVICE — Device

## (undated) DEVICE — CONTAINER,SPECIMEN,PNEU TUBE,4OZ,OR STRL: Brand: MEDLINE

## (undated) DEVICE — PACK-MAJOR

## (undated) DEVICE — CANNULA SEAL

## (undated) DEVICE — BASIC SINGLE BASIN BTC-LF: Brand: MEDLINE INDUSTRIES, INC.

## (undated) DEVICE — STAPLER SHEATH: Brand: ENDOWRIST

## (undated) DEVICE — SOLUTION ANTIFOG VIS SYS CLEARIFY LAPSCP

## (undated) DEVICE — SPONGE LAP W18XL18IN WHT COT 4 PLY FLD STRUNG RADPQ DISP ST

## (undated) DEVICE — BREAST HERNIA PACK: Brand: MEDLINE INDUSTRIES, INC.

## (undated) DEVICE — SYRINGE MED 10ML LUERLOCK TIP W/O SFTY DISP

## (undated) DEVICE — STAPLER 30 RELOAD GREEN: Brand: ENDOWRIST

## (undated) DEVICE — GAUZE,SPONGE,4"X4",12PLY,STERILE,LF,2'S: Brand: MEDLINE

## (undated) DEVICE — HYPODERMIC SAFETY NEEDLE: Brand: MAGELLAN

## (undated) DEVICE — DRAIN SURG SGL COLL PT TB FOR ATS BG OASIS

## (undated) DEVICE — TUBING INSUFFLATOR HEAT HUMIDIFIED SMK EVAC SET PNEUMOCLEAR

## (undated) DEVICE — TROCAR: Brand: KII FIOS FIRST ENTRY

## (undated) DEVICE — TIP APPL L29CM TISS GLUE EXT SPR FOR PLEUR AIR LEAK PROGEL

## (undated) DEVICE — REDUCER: Brand: ENDOWRIST

## (undated) DEVICE — DRAINAGE CATHETER SYSTEM: Brand: VANSONNENBERG™

## (undated) DEVICE — PACK PROCEDURE SURG SET UP SRMC

## (undated) DEVICE — ARM DRAPE

## (undated) DEVICE — PENCIL SMK EVAC 15FT BLADE ELECTRD ROCKER F/TELSCP

## (undated) DEVICE — COLUMN DRAPE

## (undated) DEVICE — TROCARS: Brand: KII® OPTICAL ACCESS SYSTEM

## (undated) DEVICE — AGENT HEMSTAT W2XL14IN OXIDIZED REGENERATED CELOS ABSRB FOR

## (undated) DEVICE — PENCIL SMK EVAC ALL IN 1 DSGN ENH VISIBILITY IMPROVED AIR

## (undated) DEVICE — VESSEL LOOPS,MAXI, RED: Brand: DEVON

## (undated) DEVICE — GLOVE ORANGE PI 7 1/2   MSG9075

## (undated) DEVICE — NEEDLE SPNL 18GA L3.5IN W/ QNCKE SHARPER BVL DURA CLICK

## (undated) DEVICE — SEALANT TISS GLUE 4ML PLEUR AIR LEAK PROGEL

## (undated) DEVICE — SUTURE PERMAHAND SZ 0 L30IN NONABSORBABLE BLK L30MM PSL 3/8 590H

## (undated) DEVICE — SEAL

## (undated) DEVICE — GOWN,SIRUS,NONRNF,SETINSLV,XL,20/CS: Brand: MEDLINE

## (undated) DEVICE — BAG SPEC LAP 9X7.5 IN 12 MM 1500 CC MEM WIRE CANN NYL

## (undated) DEVICE — DRAPE C ARM W36XL30IN RECTANG BND BG AND TAPE

## (undated) DEVICE — VINYL CONNECTING TUBE: Brand: COOK

## (undated) DEVICE — PROBE CRYOSURGERY 18IN BALL TIP 8MM ERGO HNDL BEND DST SHFT

## (undated) DEVICE — ADHESIVE SKIN CLSR 0.7ML TOP DERMBND ADV

## (undated) DEVICE — PACK,UNIVERSAL,NO GOWNS: Brand: MEDLINE

## (undated) DEVICE — SPONGE DRN W4XL4IN RAYON/POLYESTER 6 PLY NONWOVEN PRECUT

## (undated) DEVICE — GLIDESCOPE BFLEX 3.8 BRONCHOSCOPE

## (undated) DEVICE — SUTURE VCRL + SZ 4-0 L27IN ABSRB WHT FS-2 3/8 CIR REV CUT VCP422H

## (undated) DEVICE — PUMP SUC IRR TBNG L10FT W/ HNDPC ASSEMB STRYKEFLOW 2

## (undated) DEVICE — SUTURE PROL SZ 2-0 L30IN NONABSORBABLE BLU L26MM CT-1 1/2 8423H

## (undated) DEVICE — SUTURE VCRL + SZ 2-0 L27IN ABSRB CLR CT-1 1/2 CIR TAPERCUT VCP259H